# Patient Record
Sex: MALE | Race: WHITE | NOT HISPANIC OR LATINO | Employment: OTHER | URBAN - METROPOLITAN AREA
[De-identification: names, ages, dates, MRNs, and addresses within clinical notes are randomized per-mention and may not be internally consistent; named-entity substitution may affect disease eponyms.]

---

## 2018-08-14 ENCOUNTER — TELEPHONE (OUTPATIENT)
Dept: ENDOCRINOLOGY | Facility: CLINIC | Age: 48
End: 2018-08-14

## 2018-08-14 NOTE — TELEPHONE ENCOUNTER
----- Message from Dc Becker sent at 8/14/2018  1:39 PM CDT -----  Pt is being referred to Dr Torres. Referring clinic would like appt prior to next avail if possible. I have scanned the referral and records into media mgr within Epic for review,please contact pt for scheudling.

## 2018-08-15 NOTE — TELEPHONE ENCOUNTER
----- Message from Jyoti Roth sent at 8/15/2018  8:50 AM CDT -----  Contact: Self  Type:  Patient Returning Call    Who Called:  Patient   Who Left Message for Patient:  Zahraa Mcgarry  Does the patient know what this is regarding?:  Scheduling an appt  Best Call Back Number:  [phone]   Additional Information:  Thanks

## 2018-08-15 NOTE — TELEPHONE ENCOUNTER
Called patient and scheduled him an appointment to see Lennox JOSE 8-16-18. Patient verbalized understanding.

## 2018-08-15 NOTE — TELEPHONE ENCOUNTER
----- Message from Zahraa Mcgarry RN sent at 8/15/2018  3:33 PM CDT -----  Contact: same      ----- Message -----  From: Obey Fam  Sent: 8/15/2018   9:43 AM  To: Brian Blackwood Staff    Unsuccessful call placed to office.  Patient called in and stated he was returning call to office from earlier today.  Patient call back number is [phone]

## 2018-08-16 ENCOUNTER — OFFICE VISIT (OUTPATIENT)
Dept: ENDOCRINOLOGY | Facility: CLINIC | Age: 48
End: 2018-08-16
Payer: MEDICARE

## 2018-08-16 VITALS
RESPIRATION RATE: 18 BRPM | SYSTOLIC BLOOD PRESSURE: 144 MMHG | WEIGHT: 246.69 LBS | DIASTOLIC BLOOD PRESSURE: 90 MMHG | HEIGHT: 72 IN | HEART RATE: 90 BPM | TEMPERATURE: 98 F | BODY MASS INDEX: 33.41 KG/M2

## 2018-08-16 DIAGNOSIS — E11.9 TYPE 2 DIABETES MELLITUS WITHOUT COMPLICATION, WITHOUT LONG-TERM CURRENT USE OF INSULIN: ICD-10-CM

## 2018-08-16 DIAGNOSIS — E04.1 THYROID NODULE: ICD-10-CM

## 2018-08-16 DIAGNOSIS — E03.9 HYPOTHYROIDISM, UNSPECIFIED TYPE: Primary | ICD-10-CM

## 2018-08-16 DIAGNOSIS — I10 HYPERTENSION, UNSPECIFIED TYPE: ICD-10-CM

## 2018-08-16 PROCEDURE — 3008F BODY MASS INDEX DOCD: CPT | Mod: CPTII,S$GLB,, | Performed by: PHYSICIAN ASSISTANT

## 2018-08-16 PROCEDURE — 99204 OFFICE O/P NEW MOD 45 MIN: CPT | Mod: S$GLB,,, | Performed by: PHYSICIAN ASSISTANT

## 2018-08-16 PROCEDURE — 99999 PR PBB SHADOW E&M-EST. PATIENT-LVL III: CPT | Mod: PBBFAC,,, | Performed by: PHYSICIAN ASSISTANT

## 2018-08-16 RX ORDER — ATORVASTATIN CALCIUM 10 MG/1
10 TABLET, FILM COATED ORAL DAILY
COMMUNITY
End: 2019-04-25

## 2018-08-16 RX ORDER — LEVOTHYROXINE SODIUM 75 UG/1
75 TABLET ORAL DAILY
Qty: 40 TABLET | Refills: 0 | Status: SHIPPED | OUTPATIENT
Start: 2018-08-16 | End: 2019-09-16 | Stop reason: SDUPTHER

## 2018-08-16 RX ORDER — METFORMIN HYDROCHLORIDE 500 MG/1
500 TABLET ORAL 2 TIMES DAILY WITH MEALS
COMMUNITY
End: 2019-09-16 | Stop reason: SDUPTHER

## 2018-08-16 RX ORDER — ATENOLOL 25 MG/1
25 TABLET ORAL DAILY
Qty: 30 TABLET | Refills: 11 | Status: SHIPPED | OUTPATIENT
Start: 2018-08-16 | End: 2019-09-16 | Stop reason: SDUPTHER

## 2018-08-16 RX ORDER — LEVOTHYROXINE SODIUM 88 UG/1
88 TABLET ORAL DAILY
COMMUNITY
End: 2018-08-16

## 2018-08-16 RX ORDER — OXYCODONE HCL 40 MG/1
40 TABLET, FILM COATED, EXTENDED RELEASE ORAL EVERY 12 HOURS
COMMUNITY
End: 2018-10-23

## 2018-08-16 NOTE — PROGRESS NOTES
CC: Hypothyroidism/Type 2 DM    HPI: John Corrales is a 48 y.o. male here for hypothyroidism along with pending conditions listed in the Visit Diagnosis. Diagnosed with hypothyroidism ~1.5 years ago. +FHx of DM in his father. No fhx of thyroid disease. Follows w/ Dr. Saunders for DM.    +sweating, insomnia, palpitations, anxiety, temors    No changes in nails, hair loss, diarrhea/constipation    PMHx, PSHx: reviewed in epic.    Social Hx: no ETOH/tobacco use.     BG checks 1x daily.  Fastin  LH: 140s    No hypoglycemia.    Exercise: He swims laps in the pool every day with his daughters.    Kidney stones ~3 times but not in about 15-20 years.    Diet:  BF-banana  LH-tuna, chicken  DN-vegtables, meat  Snacks on cookies, crackers and wheat thins. Drinks water.     ROS:   Constitutional: fatigue, wt loss.  Eyes: + blurry vision with hyperglycemia,No recent visual changes  Cardiovascular: + palpitations, no chest pain  Respiratory: Denies current respiratory difficulty  Gastrointestinal: Denies recent bowel disturbances  GenitoUrinary - No dysuria  Skin: No new skin rash  Neurologic: No focal neurologic complaints  Endocrine: +polydipsia, no polyuria or polyphagia  Remainder ROS negative     BP (!) 144/90 (BP Location: Left arm, Patient Position: Sitting, BP Method: Large (Automatic))   Pulse 90   Temp 98.1 °F (36.7 °C) (Oral)   Resp 18   Ht 6' (1.829 m)   Wt 111.9 kg (246 lb 11.1 oz)   BMI 33.46 kg/m²      Personally reviewed labs from Dr. Saunders: 2018  EKG: NSR w/ nonospecific changes  TSH 0.06  FT4 2.0  FT3 3.8 N  1/3/18  Cholesterol 181  HDL 68  Trigs 84  LDL 96  MAC 1  GFR 85  Cr 1.05  A1c 6.8%  TSH 2.29    No results found for: TSH, O4ZXNRF, K2VYIVJ, THYROIDAB, FREET4       Chemistry    No results found for: NA, K, CL, CO2, BUN, CREATININE, GLU No results found for: CALCIUM, ALKPHOS, AST, ALT, BILITOT, ESTGFRAFRICA, EGFRNONAA      No results found for: HGBA1C     PE:  GENERAL: young male, well  developed, well nourished  NECK: Supple neck, normal thyroid. No bruit. No AN.   LYMPHATIC: No cervical or supraclavicular lymphadenopathy  CARDIOVASCULAR: Normal heart sounds, no edema  RESPIRATORY: Normal effort, clear to auscultation bl  ABDOMEN: soft, non-tender, non-distended.  FEET: appropriate footwear.   PSYCH: normal mood and affect, AAO x 3.    Assessment/Plan:   1. Hypothyroidism, unspecified type  TSH    T4, free    T3    Thyroglobulin    Thyroid peroxidase antibody    Anti-thyroglobulin antibody    US Soft Tissue Head Neck Thyroid    levothyroxine (SYNTHROID) 75 MCG tablet   2. Thyroid nodule  US Soft Tissue Head Neck Thyroid   3. Hypertension, unspecified type  atenolol (TENORMIN) 25 MG tablet   4. Type 2 diabetes mellitus without complication, without long-term current use of insulin  Hemoglobin A1c     Hypothyroidism-TFTs in one month w/ abs. Decrease levothyroxine to 75 mcg  daily.    Thyroid nodule-thyroid u/s screening  HTN-elevated-start atenolol 25 mg daily. Pt complains of palpitations when working out too.  Type 2 DM- [id number] Metformin 1 g daily. F/u w/ PCP.    F/u in 4 mths

## 2018-08-17 ENCOUNTER — HOSPITAL ENCOUNTER (OUTPATIENT)
Dept: RADIOLOGY | Facility: CLINIC | Age: 48
Discharge: HOME | End: 2018-08-17
Attending: PHYSICIAN ASSISTANT
Payer: MEDICARE

## 2018-08-17 DIAGNOSIS — E03.9 HYPOTHYROIDISM, UNSPECIFIED TYPE: ICD-10-CM

## 2018-08-17 DIAGNOSIS — E04.1 THYROID NODULE: ICD-10-CM

## 2018-08-17 PROCEDURE — 76536 US EXAM OF HEAD AND NECK: CPT | Mod: TC,PO

## 2018-08-17 PROCEDURE — 76536 US EXAM OF HEAD AND NECK: CPT | Mod: 26,,, | Performed by: RADIOLOGY

## 2018-10-02 ENCOUNTER — OFFICE VISIT (OUTPATIENT)
Dept: ORTHOPEDICS | Facility: CLINIC | Age: 48
End: 2018-10-02
Payer: MEDICARE

## 2018-10-02 VITALS
SYSTOLIC BLOOD PRESSURE: 170 MMHG | WEIGHT: 250 LBS | BODY MASS INDEX: 33.86 KG/M2 | DIASTOLIC BLOOD PRESSURE: 80 MMHG | HEIGHT: 72 IN

## 2018-10-02 DIAGNOSIS — M17.11 PRIMARY OSTEOARTHRITIS OF RIGHT KNEE: Primary | ICD-10-CM

## 2018-10-02 PROCEDURE — 20610 DRAIN/INJ JOINT/BURSA W/O US: CPT | Mod: RT,,, | Performed by: ORTHOPAEDIC SURGERY

## 2018-10-02 PROCEDURE — 99213 OFFICE O/P EST LOW 20 MIN: CPT | Mod: 25,,, | Performed by: ORTHOPAEDIC SURGERY

## 2018-10-02 PROCEDURE — 3008F BODY MASS INDEX DOCD: CPT | Mod: ,,, | Performed by: ORTHOPAEDIC SURGERY

## 2018-10-02 PROCEDURE — 73562 X-RAY EXAM OF KNEE 3: CPT | Mod: RT,,, | Performed by: ORTHOPAEDIC SURGERY

## 2018-10-02 RX ORDER — OXYCODONE HYDROCHLORIDE 30 MG/1
TABLET ORAL
Refills: 0 | COMMUNITY
Start: 2018-09-14 | End: 2018-10-02

## 2018-10-02 RX ORDER — HYDROMORPHONE HYDROCHLORIDE 8 MG/1
TABLET ORAL
Refills: 0 | COMMUNITY
Start: 2018-07-13 | End: 2018-10-23

## 2018-10-02 RX ORDER — OMEPRAZOLE 20 MG/1
CAPSULE, DELAYED RELEASE ORAL
Refills: 1 | COMMUNITY
Start: 2018-07-13 | End: 2019-09-16

## 2018-10-02 RX ORDER — MORPHINE SULFATE 60 MG/1
TABLET, FILM COATED, EXTENDED RELEASE ORAL
Refills: 0 | COMMUNITY
Start: 2018-07-13 | End: 2018-10-02

## 2018-10-02 RX ORDER — METHYLPREDNISOLONE ACETATE 40 MG/ML
40 INJECTION, SUSPENSION INTRA-ARTICULAR; INTRALESIONAL; INTRAMUSCULAR; SOFT TISSUE
Status: DISCONTINUED | OUTPATIENT
Start: 2018-10-02 | End: 2018-10-02 | Stop reason: HOSPADM

## 2018-10-02 RX ORDER — ATORVASTATIN CALCIUM 20 MG/1
TABLET, FILM COATED ORAL
Refills: 1 | COMMUNITY
Start: 2018-08-15 | End: 2018-10-02

## 2018-10-02 RX ADMIN — METHYLPREDNISOLONE ACETATE 40 MG: 40 INJECTION, SUSPENSION INTRA-ARTICULAR; INTRALESIONAL; INTRAMUSCULAR; SOFT TISSUE at 11:10

## 2018-10-02 NOTE — PROGRESS NOTES
[facility]    Subjective:     Chief Complaint:   Chief Complaint   Patient presents with    Right Knee - Pain     Right knee pain x years.       Past Medical History:   Diagnosis Date    Diabetes mellitus, type 2     General anesthetics causing adverse effect in therapeutic use 2007    During general anesthesia for back surgery , experienced severe pain, heard people talking and felt like his heart was about to explode.    GERD (gastroesophageal reflux disease)     Hyperlipidemia     Hypothyroidism     Nasal septal deviation        Past Surgical History:   Procedure Laterality Date    BACK SURGERY  2007    artificial disc L4-5    CYSTOSCOPY W/ STONE MANIPULATION  2002    Kidney stones removed    KNEE ARTHROSCOPY W/ DEBRIDEMENT  2000 and 2001    Right knee    SEPTOPLASTY, NOSE N/A 2/16/2012    Performed by Ben Ness MD at [facility] OR       Current Outpatient Medications   Medication Sig    atenolol (TENORMIN) 25 MG tablet Take 1 tablet (25 mg total) by mouth once daily.    atorvastatin (LIPITOR) 10 MG tablet Take 10 mg by mouth once daily.    azelastine (ASTELIN) 137 mcg nasal spray 1 spray by Nasal route daily as needed.      cetirizine-pseudoephedrine (ZYRTEC-D) 5-120 mg per tablet Take 1 tablet by mouth once daily.      fluticasone (FLONASE) 50 mcg/Actuation nasal spray 1 spray by Nasal route daily as needed.      HYDROmorphone (DILAUDID) 8 MG tablet     levothyroxine (SYNTHROID) 75 MCG tablet Take 1 tablet (75 mcg total) by mouth once daily.    metFORMIN (GLUCOPHAGE) 500 MG tablet Take 500 mg by mouth 2 (two) times daily with meals.    omeprazole (PRILOSEC) 20 MG capsule     oxyCODONE (OXYCONTIN) 40 mg 12 hr tablet Take 40 mg by mouth every 12 (twelve) hours.     No current facility-administered medications for this visit.        Review of patient's allergies indicates:   Allergen Reactions    Demerol [meperidine] Itching       Family History   Problem Relation Age of Onset     Diabetes Father     Heart disease Father     Cancer Father         prostate       Social History     Socioeconomic History    Marital status:      Spouse name: Not on file    Number of children: Not on file    Years of education: Not on file    Highest education level: Not on file   Social Needs    Financial resource strain: Not on file    Food insecurity - worry: Not on file    Food insecurity - inability: Not on file    Transportation needs - medical: Not on file    Transportation needs - non-medical: Not on file   Occupational History    Not on file   Tobacco Use    Smoking status: Never Smoker   Substance and Sexual Activity    Alcohol use: No    Drug use: No    Sexual activity: Not on file   Other Topics Concern    Not on file   Social History Narrative    Not on file       History of present illness: Patient comes in today for the right knee. He unfortunately is having severe pain difficulty ambulating. He is miserable. He has undergone arthroscopy in the past and was noted to have bone-on-bone osteoarthrosis at the time of arthroscopy      Review of Systems:    Constitution: Negative for chills, fever, and sweats.  Negative for unexplained weight loss.    HENT:  Negative for headaches and blurry vision.    Cardiovascular:Negative for chest pain or irregular heart beat. Negative for hypertension.    Respiratory:  Negative for cough and shortness of breath.    Gastrointestinal: Negative for abdominal pain, heartburn, melena, nausea, and vomitting.    Genitourinary:  Negative bladder incontinence and dysuria.    Musculoskeletal:  See HPI for details.     Neurological: Negative for numbness.    Psychiatric/Behavioral: Negative for depression.  The patient is not nervous/anxious.      Endocrine: Negative for polyuria    Hematologic/Lymphatic: Negative for bleeding problem.  Does not bruise/bleed easily.    Skin: Negative for poor would healing and rash    Objective:      Physical  Examination:    Vital Signs:    Vitals:    10/02/18 1101   BP: (!) 170/80       Body mass index is 33.91 kg/m².    This a well-developed, well nourished patient in no acute distress.  They are alert and oriented and cooperative to examination.        Patient has significant medial joint line tenderness. His significant anterior crepitus. He has a 2+ effusion. His a stable knee to varus valgus stresses  Pertinent New Results:    XRAY Report / Interpretation:   AP lateral sunrise views of the right knee demonstrate 3 compartment spurring no fractures or subluxations    Assessment/Plan:      Severe osteophyte arthritis of the right knee proven on arthroscopy. I injected the right knee with Depo-Medrol and lidocaine. I've ordered Visco supplementation for him specifically Synvisc. I will see him back in 3 weeks for Visco supplementation injection      This note was created using Dragon voice recognition software that occasionally misinterpreted phrases or words.

## 2018-10-02 NOTE — PROCEDURES
Large Joint Aspiration/Injection: R knee  Date/Time: 10/2/2018 11:48 AM  Performed by: Alok Rowe MD  Authorized by: Alok Rowe MD     Consent Done?:  Yes (Verbal)  Indications:  Pain  Procedure site marked: Yes    Timeout: Prior to procedure the correct patient, procedure, and site was verified      Location:  Knee  Site:  R knee  Prep: Patient was prepped and draped in usual sterile fashion    Needle size:  25 G  Medications:  40 mg methylPREDNISolone acetate 40 mg/mL  Patient tolerance:  Patient tolerated the procedure well with no immediate complications

## 2018-10-23 ENCOUNTER — OFFICE VISIT (OUTPATIENT)
Dept: ORTHOPEDICS | Facility: CLINIC | Age: 48
End: 2018-10-23
Payer: MEDICARE

## 2018-10-23 VITALS
SYSTOLIC BLOOD PRESSURE: 150 MMHG | WEIGHT: 250 LBS | DIASTOLIC BLOOD PRESSURE: 80 MMHG | BODY MASS INDEX: 33.86 KG/M2 | HEIGHT: 72 IN

## 2018-10-23 DIAGNOSIS — M17.11 PRIMARY OSTEOARTHRITIS OF RIGHT KNEE: Primary | ICD-10-CM

## 2018-10-23 PROCEDURE — 3008F BODY MASS INDEX DOCD: CPT | Mod: ,,, | Performed by: ORTHOPAEDIC SURGERY

## 2018-10-23 PROCEDURE — 20610 DRAIN/INJ JOINT/BURSA W/O US: CPT | Mod: RT,,, | Performed by: ORTHOPAEDIC SURGERY

## 2018-10-23 PROCEDURE — 99213 OFFICE O/P EST LOW 20 MIN: CPT | Mod: 25,,, | Performed by: ORTHOPAEDIC SURGERY

## 2018-10-23 RX ORDER — OXYCODONE HYDROCHLORIDE 30 MG/1
TABLET, FILM COATED, EXTENDED RELEASE ORAL
Refills: 0 | COMMUNITY
Start: 2018-10-12 | End: 2019-04-02

## 2018-10-23 RX ORDER — OXYCODONE HYDROCHLORIDE 30 MG/1
TABLET ORAL
Refills: 0 | COMMUNITY
Start: 2018-10-12 | End: 2019-04-02

## 2018-10-23 RX ORDER — ESOMEPRAZOLE MAGNESIUM 40 MG/1
CAPSULE, DELAYED RELEASE ORAL
Refills: 5 | COMMUNITY
Start: 2018-10-15 | End: 2019-09-16 | Stop reason: SDUPTHER

## 2018-10-23 NOTE — PROGRESS NOTES
[facility]    Subjective:     Chief Complaint:   Chief Complaint   Patient presents with    Right Knee - Pain     Here for right knee Synvisc       Past Medical History:   Diagnosis Date    Diabetes mellitus, type 2     General anesthetics causing adverse effect in therapeutic use 2007    During general anesthesia for back surgery , experienced severe pain, heard people talking and felt like his heart was about to explode.    GERD (gastroesophageal reflux disease)     Hyperlipidemia     Hypothyroidism     Nasal septal deviation        Past Surgical History:   Procedure Laterality Date    BACK SURGERY  2007    artificial disc L4-5    CYSTOSCOPY W/ STONE MANIPULATION  2002    Kidney stones removed    KNEE ARTHROSCOPY W/ DEBRIDEMENT  2000 and 2001    Right knee    SEPTOPLASTY, NOSE N/A 2/16/2012    Performed by Ben Ness MD at [facility] OR       Current Outpatient Medications   Medication Sig    atenolol (TENORMIN) 25 MG tablet Take 1 tablet (25 mg total) by mouth once daily.    atorvastatin (LIPITOR) 10 MG tablet Take 10 mg by mouth once daily.    azelastine (ASTELIN) 137 mcg nasal spray 1 spray by Nasal route daily as needed.      cetirizine-pseudoephedrine (ZYRTEC-D) 5-120 mg per tablet Take 1 tablet by mouth once daily.      esomeprazole (NEXIUM) 40 MG capsule     fluticasone (FLONASE) 50 mcg/Actuation nasal spray 1 spray by Nasal route daily as needed.      levothyroxine (SYNTHROID) 75 MCG tablet Take 1 tablet (75 mcg total) by mouth once daily.    metFORMIN (GLUCOPHAGE) 500 MG tablet Take 500 mg by mouth 2 (two) times daily with meals.    omeprazole (PRILOSEC) 20 MG capsule     oxyCODONE (ROXICODONE) 30 MG Tab     OXYCONTIN 30 mg TR12 12 hr tablet      No current facility-administered medications for this visit.        Review of patient's allergies indicates:   Allergen Reactions    Demerol [meperidine] Itching       Family History   Problem Relation Age of Onset    Diabetes  Father     Heart disease Father     Cancer Father         prostate       Social History     Socioeconomic History    Marital status:      Spouse name: Not on file    Number of children: Not on file    Years of education: Not on file    Highest education level: Not on file   Social Needs    Financial resource strain: Not on file    Food insecurity - worry: Not on file    Food insecurity - inability: Not on file    Transportation needs - medical: Not on file    Transportation needs - non-medical: Not on file   Occupational History    Not on file   Tobacco Use    Smoking status: Never Smoker   Substance and Sexual Activity    Alcohol use: No    Drug use: No    Sexual activity: Not on file   Other Topics Concern    Not on file   Social History Narrative    Not on file       History of present illness: Patient comes in today for his right knee. Continues complain of achy pain. Difficulty ambulating.      Review of Systems:    Constitution: Negative for chills, fever, and sweats.  Negative for unexplained weight loss.    HENT:  Negative for headaches and blurry vision.    Cardiovascular:Negative for chest pain or irregular heart beat. Negative for hypertension.    Respiratory:  Negative for cough and shortness of breath.    Gastrointestinal: Negative for abdominal pain, heartburn, melena, nausea, and vomitting.    Genitourinary:  Negative bladder incontinence and dysuria.    Musculoskeletal:  See HPI for details.     Neurological: Negative for numbness.    Psychiatric/Behavioral: Negative for depression.  The patient is not nervous/anxious.      Endocrine: Negative for polyuria    Hematologic/Lymphatic: Negative for bleeding problem.  Does not bruise/bleed easily.    Skin: Negative for poor would healing and rash    Objective:      Physical Examination:    Vital Signs:    Vitals:    10/23/18 0910   BP: (!) 150/80       Body mass index is 33.91 kg/m².    This a well-developed, well nourished  patient in no acute distress.  They are alert and oriented and cooperative to examination.        Patient has a 1+ effusion. Range of motion is 0-120 degrees. Significant medial joint line tenderness.  Pertinent New Results:    XRAY Report / Interpretation:   No new XRAYS Today.    Assessment/Plan:      This is a gentleman with advanced osteoarthritis. He has had 3 arthroscopies. He has had multiple Depo-Medrol injections. I injected his knee today with Synvisc one. Risks and benefits of discussed with him in great detail. Cautions and precautions are discussed with him. Sterile technique is utilized. He will follow-up in 3 months      This note was created using Dragon voice recognition software that occasionally misinterpreted phrases or words.

## 2018-10-23 NOTE — PROCEDURES
Large Joint Aspiration/Injection: R knee  Date/Time: 10/23/2018 9:38 AM  Performed by: Alok Rowe MD  Authorized by: Alok Rowe MD     Consent Done?:  Yes (Verbal)  Indications:  Pain  Procedure site marked: Yes    Timeout: Prior to procedure the correct patient, procedure, and site was verified      Location:  Knee  Site:  R knee  Prep: Patient was prepped and draped in usual sterile fashion    Ultrasonic Guidance for needle placement: No  Needle size:  25 G  Medications:  48 mg hylan g-f 20 48 mg/6 mL  Patient tolerance:  Patient tolerated the procedure well with no immediate complications

## 2019-03-20 ENCOUNTER — TELEPHONE (OUTPATIENT)
Dept: ORTHOPEDICS | Facility: CLINIC | Age: 49
End: 2019-03-20

## 2019-03-20 NOTE — TELEPHONE ENCOUNTER
----- Message from Trina Romero sent at 3/20/2019 11:05 AM CDT -----  Pt called he missed your call pts# [phone]

## 2019-03-20 NOTE — TELEPHONE ENCOUNTER
----- Message from Soledad Moreno sent at 3/20/2019  9:55 AM CDT -----  Patient would like you to send prior auth for an injection

## 2019-04-02 ENCOUNTER — OFFICE VISIT (OUTPATIENT)
Dept: ORTHOPEDICS | Facility: CLINIC | Age: 49
End: 2019-04-02
Payer: MEDICARE

## 2019-04-02 VITALS
HEART RATE: 92 BPM | BODY MASS INDEX: 33.18 KG/M2 | SYSTOLIC BLOOD PRESSURE: 150 MMHG | HEIGHT: 72 IN | DIASTOLIC BLOOD PRESSURE: 80 MMHG | WEIGHT: 245 LBS

## 2019-04-02 DIAGNOSIS — M17.11 PRIMARY OSTEOARTHRITIS OF RIGHT KNEE: Primary | ICD-10-CM

## 2019-04-02 PROCEDURE — 3077F PR MOST RECENT SYSTOLIC BLOOD PRESSURE >= 140 MM HG: ICD-10-PCS | Mod: ,,, | Performed by: ORTHOPAEDIC SURGERY

## 2019-04-02 PROCEDURE — 3079F DIAST BP 80-89 MM HG: CPT | Mod: ,,, | Performed by: ORTHOPAEDIC SURGERY

## 2019-04-02 PROCEDURE — 20610 LARGE JOINT ASPIRATION/INJECTION: R KNEE: ICD-10-PCS | Mod: RT,,, | Performed by: ORTHOPAEDIC SURGERY

## 2019-04-02 PROCEDURE — 3079F PR MOST RECENT DIASTOLIC BLOOD PRESSURE 80-89 MM HG: ICD-10-PCS | Mod: ,,, | Performed by: ORTHOPAEDIC SURGERY

## 2019-04-02 PROCEDURE — 3077F SYST BP >= 140 MM HG: CPT | Mod: ,,, | Performed by: ORTHOPAEDIC SURGERY

## 2019-04-02 PROCEDURE — 73562 PR  X-RAY KNEE 3 VIEW: ICD-10-PCS | Mod: RT,,, | Performed by: ORTHOPAEDIC SURGERY

## 2019-04-02 PROCEDURE — 3008F PR BODY MASS INDEX (BMI) DOCUMENTED: ICD-10-PCS | Mod: ,,, | Performed by: ORTHOPAEDIC SURGERY

## 2019-04-02 PROCEDURE — 73562 X-RAY EXAM OF KNEE 3: CPT | Mod: RT,,, | Performed by: ORTHOPAEDIC SURGERY

## 2019-04-02 PROCEDURE — 99213 PR OFFICE/OUTPT VISIT, EST, LEVL III, 20-29 MIN: ICD-10-PCS | Mod: 25,,, | Performed by: ORTHOPAEDIC SURGERY

## 2019-04-02 PROCEDURE — 20610 DRAIN/INJ JOINT/BURSA W/O US: CPT | Mod: RT,,, | Performed by: ORTHOPAEDIC SURGERY

## 2019-04-02 PROCEDURE — 99213 OFFICE O/P EST LOW 20 MIN: CPT | Mod: 25,,, | Performed by: ORTHOPAEDIC SURGERY

## 2019-04-02 PROCEDURE — 3008F BODY MASS INDEX DOCD: CPT | Mod: ,,, | Performed by: ORTHOPAEDIC SURGERY

## 2019-04-02 RX ORDER — METHYLPREDNISOLONE ACETATE 40 MG/ML
40 INJECTION, SUSPENSION INTRA-ARTICULAR; INTRALESIONAL; INTRAMUSCULAR; SOFT TISSUE
Status: DISCONTINUED | OUTPATIENT
Start: 2019-04-02 | End: 2019-04-02 | Stop reason: HOSPADM

## 2019-04-02 RX ORDER — ATORVASTATIN CALCIUM 20 MG/1
TABLET, FILM COATED ORAL
Refills: 1 | COMMUNITY
Start: 2019-03-12 | End: 2019-09-16 | Stop reason: SDUPTHER

## 2019-04-02 RX ORDER — LISINOPRIL 10 MG/1
TABLET ORAL
Refills: 3 | COMMUNITY
Start: 2019-03-12 | End: 2019-09-16 | Stop reason: SDUPTHER

## 2019-04-02 RX ORDER — METOPROLOL TARTRATE 25 MG/1
TABLET, FILM COATED ORAL
COMMUNITY
Start: 2019-03-26 | End: 2019-09-16

## 2019-04-02 RX ADMIN — METHYLPREDNISOLONE ACETATE 40 MG: 40 INJECTION, SUSPENSION INTRA-ARTICULAR; INTRALESIONAL; INTRAMUSCULAR; SOFT TISSUE at 12:04

## 2019-04-02 NOTE — PROGRESS NOTES
[facility]    Subjective:     Chief Complaint:   Chief Complaint   Patient presents with    Right Knee - Pain     right knee pain x years. Synvisc inj 10-23 helped until recently       Past Medical History:   Diagnosis Date    Diabetes mellitus, type 2     General anesthetics causing adverse effect in therapeutic use 2007    During general anesthesia for back surgery , experienced severe pain, heard people talking and felt like his heart was about to explode.    GERD (gastroesophageal reflux disease)     Hyperlipidemia     Hypothyroidism     Nasal septal deviation        Past Surgical History:   Procedure Laterality Date    BACK SURGERY  2007    artificial disc L4-5    CYSTOSCOPY W/ STONE MANIPULATION  2002    Kidney stones removed    KNEE ARTHROSCOPY W/ DEBRIDEMENT  2000 and 2001    Right knee    SEPTOPLASTY, NOSE N/A 2/16/2012    Performed by Ben [facility]ess [facility] at [facility] OR       Current Outpatient Medications   Medication Sig    atenolol (TENORMIN) 25 MG tablet Take 1 tablet (25 mg total) by mouth once daily.    atorvastatin (LIPITOR) 10 MG tablet Take 10 mg by mouth once daily.    atorvastatin (LIPITOR) 20 MG tablet TK 1 T PO QD    azelastine (ASTELIN) 137 mcg nasal spray 1 spray by Nasal route daily as needed.      cetirizine-pseudoephedrine (ZYRTEC-D) 5-120 mg per tablet Take 1 tablet by mouth once daily.      esomeprazole (NEXIUM) 40 MG capsule     fluticasone (FLONASE) 50 mcg/Actuation nasal spray 1 spray by Nasal route daily as needed.      levothyroxine (SYNTHROID) 75 MCG tablet Take 1 tablet (75 mcg total) by mouth once daily.    lisinopril 10 MG tablet TK 1 T PO QD    metFORMIN (GLUCOPHAGE) 500 MG tablet Take 500 mg by mouth 2 (two) times daily with meals.    metoprolol tartrate (LOPRESSOR) 25 MG tablet     omeprazole (PRILOSEC) 20 MG capsule      No current facility-administered medications for this visit.        Review of patient's allergies indicates:   Allergen  Reactions    Demerol [meperidine] Itching       Family History   Problem Relation Age of Onset    Diabetes Father     Heart disease Father     Cancer Father         prostate       Social History     Socioeconomic History    Marital status:      Spouse name: Not on file    Number of children: Not on file    Years of education: Not on file    Highest education level: Not on file   Occupational History    Not on file   Social Needs    Financial resource strain: Not on file    Food insecurity:     Worry: Not on file     Inability: Not on file    Transportation needs:     Medical: Not on file     Non-medical: Not on file   Tobacco Use    Smoking status: Never Smoker   Substance and Sexual Activity    Alcohol use: No    Drug use: No    Sexual activity: Not on file   Lifestyle    Physical activity:     Days per week: Not on file     Minutes per session: Not on file    Stress: Not on file   Relationships    Social connections:     Talks on phone: Not on file     Gets together: Not on file     Attends [employer] service: Not on file     Active member of club or organization: Not on file     Attends meetings of clubs or organizations: Not on file     Relationship status: Not on file    Intimate partner violence:     Fear of current or ex partner: Not on file     Emotionally abused: Not on file     Physically abused: Not on file     Forced sexual activity: Not on file   Other Topics Concern    Not on file   Social History Narrative    Not on file       History of present illness: Patient comes in today for the right knee. He is miserable. His knee pain is severe. He has had 3 arthroscopies. The Visco supplementation has worked well for him in the past. He wants to do that again.      Review of Systems:    Constitution: Negative for chills, fever, and sweats.  Negative for unexplained weight loss.    HENT:  Negative for headaches and blurry vision.    Cardiovascular:Negative for chest pain or  irregular heart beat. Negative for hypertension.    Respiratory:  Negative for cough and shortness of breath.    Gastrointestinal: Negative for abdominal pain, heartburn, melena, nausea, and vomitting.    Genitourinary:  Negative bladder incontinence and dysuria.    Musculoskeletal:  See HPI for details.     Neurological: Negative for numbness.    Psychiatric/Behavioral: Negative for depression.  The patient is not nervous/anxious.      Endocrine: Negative for polyuria    Hematologic/Lymphatic: Negative for bleeding problem.  Does not bruise/bleed easily.    Skin: Negative for poor would healing and rash    Objective:      Physical Examination:    Vital Signs:    Vitals:    04/02/19 1115   BP: (!) 150/80   Pulse: 92       Body mass index is 33.23 kg/m².    This a well-developed, well nourished patient in no acute distress.  They are alert and oriented and cooperative to examination.        Patient has full range of motion of his left knee. His right knee has range of motion from 5-120 degrees. His bilateral varus deformities.  Pertinent New Results:    XRAY Report / Interpretation:   AP lateral and sunrise views of the right knee demonstrate 3 compartment spurring and loss of the medial compartment    Assessment/Plan:      Arthritis right knee and a male. I injected him with Depo-Medrol and lidocaine. I have ordered Visco supplementation for him. We will see him back for the Visco supplementation injection  This note was created using Dragon voice recognition software that occasionally misinterpreted phrases or words.

## 2019-04-02 NOTE — PROCEDURES
Large Joint Aspiration/Injection: R knee  Date/Time: 4/2/2019 12:05 PM  Performed by: Alok Rowe MD  Authorized by: Alok Rowe MD     Consent Done?:  Yes (Verbal)  Indications:  Pain  Procedure site marked: Yes    Timeout: Prior to procedure the correct patient, procedure, and site was verified      Location:  Knee  Site:  R knee  Prep: Patient was prepped and draped in usual sterile fashion    Needle size:  25 G  Medications:  40 mg methylPREDNISolone acetate 40 mg/mL; 40 mg methylPREDNISolone acetate 40 mg/mL  Patient tolerance:  Patient tolerated the procedure well with no immediate complications

## 2019-04-09 ENCOUNTER — TELEPHONE (OUTPATIENT)
Dept: ORTHOPEDICS | Facility: CLINIC | Age: 49
End: 2019-04-09

## 2019-04-25 ENCOUNTER — OFFICE VISIT (OUTPATIENT)
Dept: ORTHOPEDICS | Facility: CLINIC | Age: 49
End: 2019-04-25
Payer: MEDICARE

## 2019-04-25 VITALS
BODY MASS INDEX: 33.18 KG/M2 | SYSTOLIC BLOOD PRESSURE: 138 MMHG | WEIGHT: 245 LBS | DIASTOLIC BLOOD PRESSURE: 62 MMHG | HEART RATE: 74 BPM | HEIGHT: 72 IN

## 2019-04-25 DIAGNOSIS — M17.11 PRIMARY OSTEOARTHRITIS OF RIGHT KNEE: Primary | ICD-10-CM

## 2019-04-25 PROCEDURE — 3078F PR MOST RECENT DIASTOLIC BLOOD PRESSURE < 80 MM HG: ICD-10-PCS | Mod: ,,, | Performed by: ORTHOPAEDIC SURGERY

## 2019-04-25 PROCEDURE — 99499 UNLISTED E&M SERVICE: CPT | Mod: ,,, | Performed by: ORTHOPAEDIC SURGERY

## 2019-04-25 PROCEDURE — 3008F BODY MASS INDEX DOCD: CPT | Mod: ,,, | Performed by: ORTHOPAEDIC SURGERY

## 2019-04-25 PROCEDURE — 20610 LARGE JOINT ASPIRATION/INJECTION: R KNEE: ICD-10-PCS | Mod: RT,,, | Performed by: ORTHOPAEDIC SURGERY

## 2019-04-25 PROCEDURE — 3008F PR BODY MASS INDEX (BMI) DOCUMENTED: ICD-10-PCS | Mod: ,,, | Performed by: ORTHOPAEDIC SURGERY

## 2019-04-25 PROCEDURE — 99499 NO LOS: ICD-10-PCS | Mod: ,,, | Performed by: ORTHOPAEDIC SURGERY

## 2019-04-25 PROCEDURE — 3075F SYST BP GE 130 - 139MM HG: CPT | Mod: ,,, | Performed by: ORTHOPAEDIC SURGERY

## 2019-04-25 PROCEDURE — 3075F PR MOST RECENT SYSTOLIC BLOOD PRESS GE 130-139MM HG: ICD-10-PCS | Mod: ,,, | Performed by: ORTHOPAEDIC SURGERY

## 2019-04-25 PROCEDURE — 20610 DRAIN/INJ JOINT/BURSA W/O US: CPT | Mod: RT,,, | Performed by: ORTHOPAEDIC SURGERY

## 2019-04-25 PROCEDURE — 3078F DIAST BP <80 MM HG: CPT | Mod: ,,, | Performed by: ORTHOPAEDIC SURGERY

## 2019-04-25 NOTE — PROGRESS NOTES
[facility]    Subjective:     Chief Complaint:   Chief Complaint   Patient presents with    Right Knee - Pain     Right knee pain x a while. States that he is here for Synvisc injection. States that over the weekend his knee pain got worse.        Past Medical History:   Diagnosis Date    Diabetes mellitus, type 2     General anesthetics causing adverse effect in therapeutic use 2007    During general anesthesia for back surgery , experienced severe pain, heard people talking and felt like his heart was about to explode.    GERD (gastroesophageal reflux disease)     Hyperlipidemia     Hypothyroidism     Nasal septal deviation        Past Surgical History:   Procedure Laterality Date    BACK SURGERY  2007    artificial disc L4-5    CYSTOSCOPY W/ STONE MANIPULATION  2002    Kidney stones removed    KNEE ARTHROSCOPY W/ DEBRIDEMENT  2000 and 2001    Right knee    SEPTOPLASTY, NOSE N/A 2/16/2012    Performed by Ben Ness MD at [facility] OR       Current Outpatient Medications   Medication Sig    atenolol (TENORMIN) 25 MG tablet Take 1 tablet (25 mg total) by mouth once daily.    atorvastatin (LIPITOR) 20 MG tablet TK 1 T PO QD    azelastine (ASTELIN) 137 mcg nasal spray 1 spray by Nasal route daily as needed.      cetirizine-pseudoephedrine (ZYRTEC-D) 5-120 mg per tablet Take 1 tablet by mouth once daily.      esomeprazole (NEXIUM) 40 MG capsule     fluticasone (FLONASE) 50 mcg/Actuation nasal spray 1 spray by Nasal route daily as needed.      levothyroxine (SYNTHROID) 75 MCG tablet Take 1 tablet (75 mcg total) by mouth once daily.    lisinopril 10 MG tablet TK 1 T PO QD    metFORMIN (GLUCOPHAGE) 500 MG tablet Take 500 mg by mouth 2 (two) times daily with meals.    metoprolol tartrate (LOPRESSOR) 25 MG tablet     omeprazole (PRILOSEC) 20 MG capsule      No current facility-administered medications for this visit.        Review of patient's allergies indicates:   Allergen Reactions     Demerol [meperidine] Itching       Family History   Problem Relation Age of Onset    Diabetes Father     Heart disease Father     Cancer Father         prostate       Social History     Socioeconomic History    Marital status:      Spouse name: Not on file    Number of children: Not on file    Years of education: Not on file    Highest education level: Not on file   Occupational History    Not on file   Social Needs    Financial resource strain: Not on file    Food insecurity:     Worry: Not on file     Inability: Not on file    Transportation needs:     Medical: Not on file     Non-medical: Not on file   Tobacco Use    Smoking status: Never Smoker   Substance and Sexual Activity    Alcohol use: No    Drug use: No    Sexual activity: Not on file   Lifestyle    Physical activity:     Days per week: Not on file     Minutes per session: Not on file    Stress: Not on file   Relationships    Social connections:     Talks on phone: Not on file     Gets together: Not on file     Attends Scientologist service: Not on file     Active member of club or organization: Not on file     Attends meetings of clubs or organizations: Not on file     Relationship status: Not on file   Other Topics Concern    Not on file   Social History Narrative    Not on file       History of present illness: Patient comes in today for the right knee. He's having a lot of achy pain and swelling.      Review of Systems:    Constitution: Negative for chills, fever, and sweats.  Negative for unexplained weight loss.    HENT:  Negative for headaches and blurry vision.    Cardiovascular:Negative for chest pain or irregular heart beat. Negative for hypertension.    Respiratory:  Negative for cough and shortness of breath.    Gastrointestinal: Negative for abdominal pain, heartburn, melena, nausea, and vomitting.    Genitourinary:  Negative bladder incontinence and dysuria.    Musculoskeletal:  See HPI for details.     Neurological:  Negative for numbness.    Psychiatric/Behavioral: Negative for depression.  The patient is not nervous/anxious.      Endocrine: Negative for polyuria    Hematologic/Lymphatic: Negative for bleeding problem.  Does not bruise/bleed easily.    Skin: Negative for poor would healing and rash    Objective:      Physical Examination:    Vital Signs:    Vitals:    04/25/19 1356   BP: 138/62   Pulse: 74       Body mass index is 33.23 kg/m².    This a well-developed, well nourished patient in no acute distress.  They are alert and oriented and cooperative to   Patient is 1+ effusion. He has range of motion from 0-115°. His knee is stable.      XRAY Report / Interpretation:       Assessment/Plan:       osteophyte is right knee. I injected the right knee with Synvisc one. Cautions and precautions are discussed with him in great detail. He understood. He will follow-up when necessary      This note was created using Dragon voice recognition software that occasionally misinterpreted phrases or words.

## 2019-04-25 NOTE — PROCEDURES
Large Joint Aspiration/Injection: R knee  Date/Time: 4/25/2019 3:14 PM  Performed by: Alok Rowe MD  Authorized by: Alok Rowe MD     Consent Done?:  Yes (Verbal)  Indications:  Pain  Procedure site marked: Yes    Timeout: Prior to procedure the correct patient, procedure, and site was verified      Location:  Knee  Site:  R knee  Prep: Patient was prepped and draped in usual sterile fashion    Ultrasonic Guidance for needle placement: No  Needle size:  25 G  Medications:  48 mg hylan g-f 20 48 mg/6 mL  Patient tolerance:  Patient tolerated the procedure well with no immediate complications

## 2019-09-16 ENCOUNTER — OFFICE VISIT (OUTPATIENT)
Dept: FAMILY MEDICINE | Facility: CLINIC | Age: 49
End: 2019-09-16
Payer: MEDICARE

## 2019-09-16 VITALS
HEIGHT: 72 IN | WEIGHT: 243 LBS | SYSTOLIC BLOOD PRESSURE: 130 MMHG | DIASTOLIC BLOOD PRESSURE: 82 MMHG | BODY MASS INDEX: 32.91 KG/M2

## 2019-09-16 DIAGNOSIS — E78.5 HYPERLIPIDEMIA, UNSPECIFIED HYPERLIPIDEMIA TYPE: ICD-10-CM

## 2019-09-16 DIAGNOSIS — E03.9 HYPOTHYROIDISM, UNSPECIFIED TYPE: ICD-10-CM

## 2019-09-16 DIAGNOSIS — M17.11 PRIMARY OSTEOARTHRITIS OF RIGHT KNEE: ICD-10-CM

## 2019-09-16 DIAGNOSIS — M51.9 LUMBAR DISC DISEASE: ICD-10-CM

## 2019-09-16 DIAGNOSIS — I10 HYPERTENSION, UNSPECIFIED TYPE: ICD-10-CM

## 2019-09-16 DIAGNOSIS — B35.4 TINEA CORPORIS: ICD-10-CM

## 2019-09-16 DIAGNOSIS — K21.9 GASTROESOPHAGEAL REFLUX DISEASE, ESOPHAGITIS PRESENCE NOT SPECIFIED: ICD-10-CM

## 2019-09-16 DIAGNOSIS — E11.649 UNCONTROLLED TYPE 2 DIABETES MELLITUS WITH HYPOGLYCEMIA WITHOUT COMA: Primary | ICD-10-CM

## 2019-09-16 LAB — HBA1C MFR BLD: 5.7 %

## 2019-09-16 PROCEDURE — 3008F PR BODY MASS INDEX (BMI) DOCUMENTED: ICD-10-PCS | Mod: S$GLB,,, | Performed by: FAMILY MEDICINE

## 2019-09-16 PROCEDURE — 99214 PR OFFICE/OUTPT VISIT, EST, LEVL IV, 30-39 MIN: ICD-10-PCS | Mod: S$GLB,,, | Performed by: FAMILY MEDICINE

## 2019-09-16 PROCEDURE — 3079F PR MOST RECENT DIASTOLIC BLOOD PRESSURE 80-89 MM HG: ICD-10-PCS | Mod: S$GLB,,, | Performed by: FAMILY MEDICINE

## 2019-09-16 PROCEDURE — 3075F PR MOST RECENT SYSTOLIC BLOOD PRESS GE 130-139MM HG: ICD-10-PCS | Mod: S$GLB,,, | Performed by: FAMILY MEDICINE

## 2019-09-16 PROCEDURE — 3079F DIAST BP 80-89 MM HG: CPT | Mod: S$GLB,,, | Performed by: FAMILY MEDICINE

## 2019-09-16 PROCEDURE — 83036 HEMOGLOBIN GLYCOSYLATED A1C: CPT | Mod: QW,,, | Performed by: FAMILY MEDICINE

## 2019-09-16 PROCEDURE — 3075F SYST BP GE 130 - 139MM HG: CPT | Mod: S$GLB,,, | Performed by: FAMILY MEDICINE

## 2019-09-16 PROCEDURE — 83036 POCT HEMOGLOBIN A1C: ICD-10-PCS | Mod: QW,,, | Performed by: FAMILY MEDICINE

## 2019-09-16 PROCEDURE — 99214 OFFICE O/P EST MOD 30 MIN: CPT | Mod: S$GLB,,, | Performed by: FAMILY MEDICINE

## 2019-09-16 PROCEDURE — 3008F BODY MASS INDEX DOCD: CPT | Mod: S$GLB,,, | Performed by: FAMILY MEDICINE

## 2019-09-16 RX ORDER — ATENOLOL 25 MG/1
25 TABLET ORAL DAILY
Qty: 30 TABLET | Refills: 11 | Status: SHIPPED | OUTPATIENT
Start: 2019-09-16 | End: 2020-03-16 | Stop reason: SDUPTHER

## 2019-09-16 RX ORDER — ATORVASTATIN CALCIUM 20 MG/1
20 TABLET, FILM COATED ORAL DAILY
Qty: 90 TABLET | Refills: 1 | Status: SHIPPED | OUTPATIENT
Start: 2019-09-16 | End: 2020-03-16 | Stop reason: SDUPTHER

## 2019-09-16 RX ORDER — ESOMEPRAZOLE MAGNESIUM 40 MG/1
40 CAPSULE, DELAYED RELEASE ORAL
Qty: 90 CAPSULE | Refills: 1 | Status: SHIPPED | OUTPATIENT
Start: 2019-09-16 | End: 2020-03-16 | Stop reason: SDUPTHER

## 2019-09-16 RX ORDER — NYSTATIN 100000 U/G
CREAM TOPICAL 2 TIMES DAILY
Qty: 30 G | Refills: 2 | Status: SHIPPED | OUTPATIENT
Start: 2019-09-16 | End: 2021-05-20 | Stop reason: SDUPTHER

## 2019-09-16 RX ORDER — LEVOTHYROXINE SODIUM 75 UG/1
75 TABLET ORAL DAILY
Qty: 90 TABLET | Refills: 0 | Status: SHIPPED | OUTPATIENT
Start: 2019-09-16 | End: 2020-03-16 | Stop reason: SDUPTHER

## 2019-09-16 RX ORDER — METFORMIN HYDROCHLORIDE 500 MG/1
500 TABLET ORAL 3 TIMES DAILY
Qty: 270 TABLET | Refills: 1 | Status: SHIPPED | OUTPATIENT
Start: 2019-09-16 | End: 2020-03-16 | Stop reason: SDUPTHER

## 2019-09-16 RX ORDER — LISINOPRIL 10 MG/1
10 TABLET ORAL DAILY
Qty: 90 TABLET | Refills: 1 | Status: SHIPPED | OUTPATIENT
Start: 2019-09-16 | End: 2020-03-16 | Stop reason: SDUPTHER

## 2019-09-16 NOTE — PROGRESS NOTES
SUBJECTIVE:    Patient ID: John Corrales is a 49 y.o. male.    Chief Complaint: Diabetes    Is a 49-year-old male with type 2 diabetes he has been working hard to control blood sugars and reduce his carbs intake.  He is using metformin 500 mg 2 tablets twice a day    He complains of right knee meniscus tear which is chronic.  He gets a yearly Synvisc injection from Dr. good.  This is helped tremendously.  He has a lumbar disc disease situation which she weaned himself off narcotics approximately 1 year ago.  He went through some significant withdrawal symptoms but now is maintained just with ibuprofen p.r.n. current he is not employed.  He can walk several blocks for exercise in the neighborhood.  He plays with the kids on the lawn and mows the lawn.    He complained of some internal dryness not related to skin dryness last office visit and this seems to be improved as his blood sugar improved.    States he has a ring-like rash on his abdomen      No visits with results within 6 Month(s) from this visit.   Latest known visit with results is:   Admission on 02/16/2012, Discharged on 02/16/2012   Component Date Value Ref Range Status    Hematocrit 02/13/2012 46%   Final       Past Medical History:   Diagnosis Date    Diabetes mellitus, type 2     General anesthetics causing adverse effect in therapeutic use 2007    During general anesthesia for back surgery , experienced severe pain, heard people talking and felt like his heart was about to explode.    GERD (gastroesophageal reflux disease)     Hyperlipidemia     Hypothyroidism     Nasal septal deviation      Past Surgical History:   Procedure Laterality Date    BACK SURGERY  2007    artificial disc L4-5    CYSTOSCOPY W/ STONE MANIPULATION  2002    Kidney stones removed    KNEE ARTHROSCOPY W/ DEBRIDEMENT  2000 and 2001    Right knee    SEPTOPLASTY, NOSE N/A 2/16/2012    Performed by Ben eNss MD at [facility] OR     Family History   Problem Relation  Age of Onset    Diabetes Father     Heart disease Father     Cancer Father         prostate       Marital Status:   Alcohol History:  reports that he does not drink alcohol.  Tobacco History:  reports that he has quit smoking. He has never used smokeless tobacco.  Drug History:  reports that he does not use drugs.    Review of patient's allergies indicates:   Allergen Reactions    Demerol [meperidine] Itching       Current Outpatient Medications:     atorvastatin (LIPITOR) 20 MG tablet, Take 1 tablet (20 mg total) by mouth once daily., Disp: 90 tablet, Rfl: 1    azelastine (ASTELIN) 137 mcg nasal spray, 1 spray by Nasal route daily as needed.  , Disp: , Rfl:     cetirizine-pseudoephedrine (ZYRTEC-D) 5-120 mg per tablet, Take 1 tablet by mouth once daily.  , Disp: , Rfl:     esomeprazole (NEXIUM) 40 MG capsule, Take 1 capsule (40 mg total) by mouth before breakfast., Disp: 90 capsule, Rfl: 1    fluticasone (FLONASE) 50 mcg/Actuation nasal spray, 1 spray by Nasal route daily as needed.  , Disp: , Rfl:     lisinopril 10 MG tablet, Take 1 tablet (10 mg total) by mouth once daily., Disp: 90 tablet, Rfl: 1    metFORMIN (GLUCOPHAGE) 500 MG tablet, Take 1 tablet (500 mg total) by mouth 3 (three) times daily., Disp: 270 tablet, Rfl: 1    atenolol (TENORMIN) 25 MG tablet, Take 1 tablet (25 mg total) by mouth once daily., Disp: 30 tablet, Rfl: 11    levothyroxine (SYNTHROID) 75 MCG tablet, Take 1 tablet (75 mcg total) by mouth once daily., Disp: 90 tablet, Rfl: 0    nystatin (MYCOSTATIN) cream, Apply topically 2 (two) times daily., Disp: 30 g, Rfl: 2    Review of Systems   Constitutional: Negative for appetite change, chills, fatigue, fever and unexpected weight change.   HENT: Negative for congestion, ear pain and trouble swallowing.    Eyes: Negative for pain, discharge and visual disturbance.   Respiratory: Negative for apnea, cough, shortness of breath and wheezing.    Cardiovascular: Negative for  chest pain and leg swelling.   Gastrointestinal: Negative for abdominal pain, blood in stool, constipation, diarrhea, nausea and vomiting.        May have  Lactose intolerance   Endocrine: Negative for cold intolerance, heat intolerance and polydipsia.   Genitourinary: Negative for dysuria, hematuria, testicular pain and urgency.        Nocturia 1  X  nite   Musculoskeletal: Negative for gait problem, joint swelling and myalgias.   Neurological: Negative for dizziness, seizures and numbness.   Psychiatric/Behavioral: Negative for agitation, behavioral problems and hallucinations. The patient is not nervous/anxious.           Objective:      Vitals:    09/16/19 1150   BP: 130/82   Weight: 110.2 kg (243 lb)   Height: 6' (1.829 m)     Physical Exam   Constitutional: He is oriented to person, place, and time. He appears well-developed and well-nourished.   HENT:   Head: Normocephalic and atraumatic.   Right Ear: External ear normal.   Left Ear: External ear normal.   Nose: Nose normal.   Mouth/Throat: Oropharynx is clear and moist.   Eyes: Pupils are equal, round, and reactive to light. EOM are normal.   Neck: Normal range of motion. Neck supple. Carotid bruit is not present. No thyromegaly present.   Cardiovascular: Normal rate, regular rhythm, normal heart sounds and intact distal pulses.   No murmur heard.  Pulmonary/Chest: Effort normal and breath sounds normal. He has no wheezes. He has no rales.   Abdominal: Soft. Bowel sounds are normal. He exhibits no distension. There is no hepatosplenomegaly. There is no tenderness.   Musculoskeletal: Normal range of motion. He exhibits no tenderness or deformity.        Lumbar back: Normal. He exhibits no pain and no spasm.   Bends 90 degrees at  Waist. Rt knee crepitant    Lymphadenopathy:     He has no cervical adenopathy.   Neurological: He is alert and oriented to person, place, and time. No cranial nerve deficit. Coordination normal.   Skin: Skin is warm and dry. No rash  noted.   2-3 cm of ring-like fungal rash to the umbilical area   Psychiatric: He has a normal mood and affect. His behavior is normal. Judgment and thought content normal.   Nursing note and vitals reviewed.        Assessment:       1. Uncontrolled type 2 diabetes mellitus with hypoglycemia without coma    2. Hypothyroidism, unspecified type    3. Hypertension, unspecified type    4. Hyperlipidemia, unspecified hyperlipidemia type    5. Gastroesophageal reflux disease, esophagitis presence not specified    6. Tinea corporis    7. Primary osteoarthritis of right knee    8. Lumbar disc disease         Plan:       Uncontrolled type 2 diabetes mellitus with hypoglycemia without coma  -     metFORMIN (GLUCOPHAGE) 500 MG tablet; Take 1 tablet (500 mg total) by mouth 3 (three) times daily.  Dispense: 270 tablet; Refill: 1  -     Hemoglobin A1C, POCT  A1c today is 5.7 showing excellent diabetic control.  We will reduce of metformin to 3 tablets a day at this time  Hypothyroidism, unspecified type  -     levothyroxine (SYNTHROID) 75 MCG tablet; Take 1 tablet (75 mcg total) by mouth once daily.  Dispense: 90 tablet; Refill: 0  Continue thyroid medications  Hypertension, unspecified type  -     lisinopril 10 MG tablet; Take 1 tablet (10 mg total) by mouth once daily.  Dispense: 90 tablet; Refill: 1  -     atenolol (TENORMIN) 25 MG tablet; Take 1 tablet (25 mg total) by mouth once daily.  Dispense: 30 tablet; Refill: 11  Blood pressure well controlled on these medsHyperlipidemia, unspecified hyperlipidemia type  -     atorvastatin (LIPITOR) 20 MG tablet; Take 1 tablet (20 mg total) by mouth once daily.  Dispense: 90 tablet; Refill: 1  -     Comprehensive metabolic panel; Future; Expected date: 09/16/2019  -     Lipid panel; Future; Expected date: 09/16/2019    Gastroesophageal reflux disease, esophagitis presence not specified  -     esomeprazole (NEXIUM) 40 MG capsule; Take 1 capsule (40 mg total) by mouth before breakfast.   Dispense: 90 capsule; Refill: 1  GERD meds doing well  Tinea corporis  -     nystatin (MYCOSTATIN) cream; Apply topically 2 (two) times daily.  Dispense: 30 g; Refill: 2    Primary osteoarthritis of right knee  Referred to Dr. good for further Synvisc injections  Lumbar disc disease      Follow up in about 3 months (around 12/16/2019) for Diabetic Check-Up Pilar.

## 2019-09-17 LAB
ALBUMIN SERPL-MCNC: 4.6 G/DL (ref 3.6–5.1)
ALBUMIN/GLOB SERPL: 1.8 (CALC) (ref 1–2.5)
ALP SERPL-CCNC: 58 U/L (ref 40–115)
ALT SERPL-CCNC: 16 U/L (ref 9–46)
AST SERPL-CCNC: 12 U/L (ref 10–40)
BILIRUB SERPL-MCNC: 0.5 MG/DL (ref 0.2–1.2)
BUN SERPL-MCNC: 17 MG/DL (ref 7–25)
BUN/CREAT SERPL: NORMAL (CALC) (ref 6–22)
CALCIUM SERPL-MCNC: 9.8 MG/DL (ref 8.6–10.3)
CHLORIDE SERPL-SCNC: 106 MMOL/L (ref 98–110)
CHOLEST SERPL-MCNC: 145 MG/DL
CHOLEST/HDLC SERPL: 2.7 (CALC)
CO2 SERPL-SCNC: 27 MMOL/L (ref 20–32)
CREAT SERPL-MCNC: 1.07 MG/DL (ref 0.6–1.35)
GFRSERPLBLD MDRD-ARVRAT: 82 ML/MIN/1.73M2
GLOBULIN SER CALC-MCNC: 2.5 G/DL (CALC) (ref 1.9–3.7)
GLUCOSE SERPL-MCNC: 97 MG/DL (ref 65–139)
HDLC SERPL-MCNC: 54 MG/DL
LDLC SERPL CALC-MCNC: 77 MG/DL (CALC)
NONHDLC SERPL-MCNC: 91 MG/DL (CALC)
POTASSIUM SERPL-SCNC: 4.8 MMOL/L (ref 3.5–5.3)
PROT SERPL-MCNC: 7.1 G/DL (ref 6.1–8.1)
SODIUM SERPL-SCNC: 143 MMOL/L (ref 135–146)
TRIGL SERPL-MCNC: 55 MG/DL

## 2019-09-19 ENCOUNTER — PATIENT MESSAGE (OUTPATIENT)
Dept: ORTHOPEDICS | Facility: CLINIC | Age: 49
End: 2019-09-19

## 2019-12-05 ENCOUNTER — OFFICE VISIT (OUTPATIENT)
Dept: ORTHOPEDICS | Facility: CLINIC | Age: 49
End: 2019-12-05
Payer: MEDICARE

## 2019-12-05 VITALS
HEART RATE: 78 BPM | BODY MASS INDEX: 32.91 KG/M2 | HEIGHT: 72 IN | WEIGHT: 243 LBS | DIASTOLIC BLOOD PRESSURE: 81 MMHG | SYSTOLIC BLOOD PRESSURE: 134 MMHG

## 2019-12-05 DIAGNOSIS — M17.11 PRIMARY OSTEOARTHRITIS OF RIGHT KNEE: Primary | ICD-10-CM

## 2019-12-05 PROCEDURE — 3008F BODY MASS INDEX DOCD: CPT | Mod: S$GLB,,, | Performed by: ORTHOPAEDIC SURGERY

## 2019-12-05 PROCEDURE — 3079F PR MOST RECENT DIASTOLIC BLOOD PRESSURE 80-89 MM HG: ICD-10-PCS | Mod: S$GLB,,, | Performed by: ORTHOPAEDIC SURGERY

## 2019-12-05 PROCEDURE — 99213 OFFICE O/P EST LOW 20 MIN: CPT | Mod: 25,S$GLB,, | Performed by: ORTHOPAEDIC SURGERY

## 2019-12-05 PROCEDURE — 20610 LARGE JOINT ASPIRATION/INJECTION: R KNEE: ICD-10-PCS | Mod: RT,S$GLB,, | Performed by: ORTHOPAEDIC SURGERY

## 2019-12-05 PROCEDURE — 3079F DIAST BP 80-89 MM HG: CPT | Mod: S$GLB,,, | Performed by: ORTHOPAEDIC SURGERY

## 2019-12-05 PROCEDURE — 3075F PR MOST RECENT SYSTOLIC BLOOD PRESS GE 130-139MM HG: ICD-10-PCS | Mod: S$GLB,,, | Performed by: ORTHOPAEDIC SURGERY

## 2019-12-05 PROCEDURE — 3075F SYST BP GE 130 - 139MM HG: CPT | Mod: S$GLB,,, | Performed by: ORTHOPAEDIC SURGERY

## 2019-12-05 PROCEDURE — 20610 DRAIN/INJ JOINT/BURSA W/O US: CPT | Mod: RT,S$GLB,, | Performed by: ORTHOPAEDIC SURGERY

## 2019-12-05 PROCEDURE — 3008F PR BODY MASS INDEX (BMI) DOCUMENTED: ICD-10-PCS | Mod: S$GLB,,, | Performed by: ORTHOPAEDIC SURGERY

## 2019-12-05 PROCEDURE — 99213 PR OFFICE/OUTPT VISIT, EST, LEVL III, 20-29 MIN: ICD-10-PCS | Mod: 25,S$GLB,, | Performed by: ORTHOPAEDIC SURGERY

## 2019-12-05 RX ORDER — METHYLPREDNISOLONE ACETATE 40 MG/ML
40 INJECTION, SUSPENSION INTRA-ARTICULAR; INTRALESIONAL; INTRAMUSCULAR; SOFT TISSUE
Status: DISCONTINUED | OUTPATIENT
Start: 2019-12-05 | End: 2019-12-05 | Stop reason: HOSPADM

## 2019-12-05 RX ADMIN — METHYLPREDNISOLONE ACETATE 40 MG: 40 INJECTION, SUSPENSION INTRA-ARTICULAR; INTRALESIONAL; INTRAMUSCULAR; SOFT TISSUE at 01:12

## 2019-12-05 NOTE — PROGRESS NOTES
[facility]    Subjective:     Chief Complaint:   Chief Complaint   Patient presents with    Right Knee - Pain     Right knee pain x 11.25.19. States that he had an injection done 4.25.19. And states that it did help. He is having issues with walking/ standing for long. States that he would like to see about getting another injection today.        Past Medical History:   Diagnosis Date    Diabetes mellitus, type 2     General anesthetics causing adverse effect in therapeutic use 2007    During general anesthesia for back surgery , experienced severe pain, heard people talking and felt like his heart was about to explode.    GERD (gastroesophageal reflux disease)     Hyperlipidemia     Hypothyroidism     Nasal septal deviation        Past Surgical History:   Procedure Laterality Date    BACK SURGERY  2007    artificial disc L4-5    CYSTOSCOPY W/ STONE MANIPULATION  2002    Kidney stones removed    KNEE ARTHROSCOPY W/ DEBRIDEMENT  2000 and 2001    Right knee       Current Outpatient Medications   Medication Sig    atenolol (TENORMIN) 25 MG tablet Take 1 tablet (25 mg total) by mouth once daily.    atorvastatin (LIPITOR) 20 MG tablet Take 1 tablet (20 mg total) by mouth once daily.    azelastine (ASTELIN) 137 mcg nasal spray 1 spray by Nasal route daily as needed.      esomeprazole (NEXIUM) 40 MG capsule Take 1 capsule (40 mg total) by mouth before breakfast.    fluticasone (FLONASE) 50 mcg/Actuation nasal spray 1 spray by Nasal route daily as needed.      levothyroxine (SYNTHROID) 75 MCG tablet Take 1 tablet (75 mcg total) by mouth once daily.    lisinopril 10 MG tablet Take 1 tablet (10 mg total) by mouth once daily.    metFORMIN (GLUCOPHAGE) 500 MG tablet Take 1 tablet (500 mg total) by mouth 3 (three) times daily.    cetirizine-pseudoephedrine (ZYRTEC-D) 5-120 mg per tablet Take 1 tablet by mouth once daily.      nystatin (MYCOSTATIN) cream Apply topically 2 (two) times daily.  (Patient not taking: Reported on 12/5/2019)     No current facility-administered medications for this visit.        Review of patient's allergies indicates:   Allergen Reactions    Demerol [meperidine] Itching       Family History   Problem Relation Age of Onset    Diabetes Father     Heart disease Father     Cancer Father         prostate       Social History     Socioeconomic History    Marital status:      Spouse name: Not on file    Number of children: Not on file    Years of education: Not on file    Highest education level: Not on file   Occupational History    Not on file   Social Needs    Financial resource strain: Not on file    Food insecurity:     Worry: Not on file     Inability: Not on file    Transportation needs:     Medical: Not on file     Non-medical: Not on file   Tobacco Use    Smoking status: Former Smoker    Smokeless tobacco: Never Used   Substance and Sexual Activity    Alcohol use: No    Drug use: No    Sexual activity: Not on file   Lifestyle    Physical activity:     Days per week: Not on file     Minutes per session: Not on file    Stress: Not on file   Relationships    Social connections:     Talks on phone: Not on file     Gets together: Not on file     Attends Protestant service: Not on file     Active member of club or organization: Not on file     Attends meetings of clubs or organizations: Not on file     Relationship status: Not on file   Other Topics Concern    Not on file   Social History Narrative    Not on file       History of present illness:  Patient comes in today for the right knee.  He is having lot achy pain.  His pain is severe.  The viscosupplementation work for several months but now the pain has returned.  He has known bone-on-bone arthritis proved and arthroscopy      Review of Systems:    Constitution: Negative for chills, fever, and sweats.  Negative for unexplained weight loss.    HENT:  Negative for headaches and blurry  vision.    Cardiovascular:Negative for chest pain or irregular heart beat. Negative for hypertension.    Respiratory:  Negative for cough and shortness of breath.    Gastrointestinal: Negative for abdominal pain, heartburn, melena, nausea, and vomitting.    Genitourinary:  Negative bladder incontinence and dysuria.    Musculoskeletal:  See HPI for details.     Neurological: Negative for numbness.    Psychiatric/Behavioral: Negative for depression.  The patient is not nervous/anxious.      Endocrine: Negative for polyuria    Hematologic/Lymphatic: Negative for bleeding problem.  Does not bruise/bleed easily.    Skin: Negative for poor would healing and rash    Objective:      Physical Examination:    Vital Signs:    Vitals:    12/05/19 1319   BP: 134/81   Pulse: 78       Body mass index is 32.96 kg/m².    This a well-developed, well nourished patient in no acute distress.  They are alert and oriented and cooperative to examination.        Patient has significant crepitus the right knee.  Has 1+ effusion.  His are stable knee to varus valgus stresses.  No crepitus in the left knee.  Negative straight leg raises.  Pertinent New Results:    XRAY Report / Interpretation:       Assessment/Plan:      Osteoarthritis of the right knee.  I injected the right knee with Depo-Medrol and lidocaine.  We have applied for more viscosupplementation as he got such good result last time.  We will see him back for that injection      This note was created using Dragon voice recognition software that occasionally misinterpreted phrases or words.

## 2019-12-05 NOTE — PROCEDURES
Large Joint Aspiration/Injection: R knee  Date/Time: 12/5/2019 1:15 PM  Performed by: Alok Rowe MD  Authorized by: Alok Rowe MD     Consent Done?:  Yes (Verbal)  Indications:  Pain  Procedure site marked: Yes    Timeout: Prior to procedure the correct patient, procedure, and site was verified    Anesthesia  Local anesthesia used  Anesthetic: lidocaine 1% without epinephrine    Location:  Knee  Site:  R knee  Prep: Patient was prepped and draped in usual sterile fashion    Needle size:  25 G  Medications:  40 mg methylPREDNISolone acetate 40 mg/mL; 40 mg methylPREDNISolone acetate 40 mg/mL  Patient tolerance:  Patient tolerated the procedure well with no immediate complications

## 2019-12-13 ENCOUNTER — PATIENT MESSAGE (OUTPATIENT)
Dept: ORTHOPEDICS | Facility: CLINIC | Age: 49
End: 2019-12-13

## 2019-12-17 ENCOUNTER — OFFICE VISIT (OUTPATIENT)
Dept: ORTHOPEDICS | Facility: CLINIC | Age: 49
End: 2019-12-17
Payer: MEDICARE

## 2019-12-17 VITALS
SYSTOLIC BLOOD PRESSURE: 139 MMHG | DIASTOLIC BLOOD PRESSURE: 89 MMHG | HEIGHT: 72 IN | BODY MASS INDEX: 32.91 KG/M2 | WEIGHT: 243 LBS | HEART RATE: 68 BPM

## 2019-12-17 DIAGNOSIS — M17.11 PRIMARY OSTEOARTHRITIS OF RIGHT KNEE: Primary | ICD-10-CM

## 2019-12-17 PROCEDURE — 99499 NO LOS: ICD-10-PCS | Mod: 25,S$GLB,, | Performed by: ORTHOPAEDIC SURGERY

## 2019-12-17 PROCEDURE — 3008F PR BODY MASS INDEX (BMI) DOCUMENTED: ICD-10-PCS | Mod: S$GLB,,, | Performed by: ORTHOPAEDIC SURGERY

## 2019-12-17 PROCEDURE — 3079F DIAST BP 80-89 MM HG: CPT | Mod: S$GLB,,, | Performed by: ORTHOPAEDIC SURGERY

## 2019-12-17 PROCEDURE — 20610 LARGE JOINT ASPIRATION/INJECTION: R KNEE: ICD-10-PCS | Mod: RT,S$GLB,, | Performed by: ORTHOPAEDIC SURGERY

## 2019-12-17 PROCEDURE — 3079F PR MOST RECENT DIASTOLIC BLOOD PRESSURE 80-89 MM HG: ICD-10-PCS | Mod: S$GLB,,, | Performed by: ORTHOPAEDIC SURGERY

## 2019-12-17 PROCEDURE — 20610 DRAIN/INJ JOINT/BURSA W/O US: CPT | Mod: RT,S$GLB,, | Performed by: ORTHOPAEDIC SURGERY

## 2019-12-17 PROCEDURE — 99499 UNLISTED E&M SERVICE: CPT | Mod: 25,S$GLB,, | Performed by: ORTHOPAEDIC SURGERY

## 2019-12-17 PROCEDURE — 3008F BODY MASS INDEX DOCD: CPT | Mod: S$GLB,,, | Performed by: ORTHOPAEDIC SURGERY

## 2019-12-17 PROCEDURE — 3075F PR MOST RECENT SYSTOLIC BLOOD PRESS GE 130-139MM HG: ICD-10-PCS | Mod: S$GLB,,, | Performed by: ORTHOPAEDIC SURGERY

## 2019-12-17 PROCEDURE — 3075F SYST BP GE 130 - 139MM HG: CPT | Mod: S$GLB,,, | Performed by: ORTHOPAEDIC SURGERY

## 2019-12-17 NOTE — PROGRESS NOTES
[facility]    Subjective:     Chief Complaint:   Chief Complaint   Patient presents with    Right Knee - Pain     Right knee pain x a while. States that he is here for a Synvisc injection. Pain is about the same.        Past Medical History:   Diagnosis Date    Diabetes mellitus, type 2     General anesthetics causing adverse effect in therapeutic use 2007    During general anesthesia for back surgery , experienced severe pain, heard people talking and felt like his heart was about to explode.    GERD (gastroesophageal reflux disease)     Hyperlipidemia     Hypothyroidism     Nasal septal deviation        Past Surgical History:   Procedure Laterality Date    BACK SURGERY  2007    artificial disc L4-5    CYSTOSCOPY W/ STONE MANIPULATION  2002    Kidney stones removed    KNEE ARTHROSCOPY W/ DEBRIDEMENT  2000 and 2001    Right knee       Current Outpatient Medications   Medication Sig    atenolol (TENORMIN) 25 MG tablet Take 1 tablet (25 mg total) by mouth once daily.    atorvastatin (LIPITOR) 20 MG tablet Take 1 tablet (20 mg total) by mouth once daily.    azelastine (ASTELIN) 137 mcg nasal spray 1 spray by Nasal route daily as needed.      cetirizine-pseudoephedrine (ZYRTEC-D) 5-120 mg per tablet Take 1 tablet by mouth once daily.      esomeprazole (NEXIUM) 40 MG capsule Take 1 capsule (40 mg total) by mouth before breakfast.    fluticasone (FLONASE) 50 mcg/Actuation nasal spray 1 spray by Nasal route daily as needed.      lisinopril 10 MG tablet Take 1 tablet (10 mg total) by mouth once daily.    metFORMIN (GLUCOPHAGE) 500 MG tablet Take 1 tablet (500 mg total) by mouth 3 (three) times daily.    nystatin (MYCOSTATIN) cream Apply topically 2 (two) times daily.    levothyroxine (SYNTHROID) 75 MCG tablet Take 1 tablet (75 mcg total) by mouth once daily.     No current facility-administered medications for this visit.        Review of patient's allergies indicates:   Allergen Reactions     Demerol [meperidine] Itching       Family History   Problem Relation Age of Onset    Diabetes Father     Heart disease Father     Cancer Father         prostate       Social History     Socioeconomic History    Marital status:      Spouse name: Not on file    Number of children: Not on file    Years of education: Not on file    Highest education level: Not on file   Occupational History    Not on file   Social Needs    Financial resource strain: Not on file    Food insecurity:     Worry: Not on file     Inability: Not on file    Transportation needs:     Medical: Not on file     Non-medical: Not on file   Tobacco Use    Smoking status: Former Smoker    Smokeless tobacco: Never Used   Substance and Sexual Activity    Alcohol use: No    Drug use: No    Sexual activity: Not on file   Lifestyle    Physical activity:     Days per week: Not on file     Minutes per session: Not on file    Stress: Not on file   Relationships    Social connections:     Talks on phone: Not on file     Gets together: Not on file     Attends Muslim service: Not on file     Active member of club or organization: Not on file     Attends meetings of clubs or organizations: Not on file     Relationship status: Not on file   Other Topics Concern    Not on file   Social History Narrative    Not on file       History of present illness:  Patient comes in with right knee pain.  He is here for Synvisc.      Review of Systems:    Constitution: Negative for chills, fever, and sweats.  Negative for unexplained weight loss.    HENT:  Negative for headaches and blurry vision.    Cardiovascular:Negative for chest pain or irregular heart beat. Negative for hypertension.    Respiratory:  Negative for cough and shortness of breath.    Gastrointestinal: Negative for abdominal pain, heartburn, melena, nausea, and vomitting.    Genitourinary:  Negative bladder incontinence and dysuria.    Musculoskeletal:  See HPI for details.      Neurological: Negative for numbness.    Psychiatric/Behavioral: Negative for depression.  The patient is not nervous/anxious.      Endocrine: Negative for polyuria    Hematologic/Lymphatic: Negative for bleeding problem.  Does not bruise/bleed easily.    Skin: Negative for poor would healing and rash    Objective:      Physical Examination:    Vital Signs:    Vitals:    12/17/19 0956   BP: 139/89   Pulse: 68       Body mass index is 32.96 kg/m².    This a well-developed, well nourished patient in no acute distress.  They are alert and oriented and cooperative to examination.        Patient has range of motion 0-120 degrees.  He has anterior crepitus.  He has trace effusion.  Pertinent New Results:    XRAY Report / Interpretation:       Assessment/Plan:      Right knee arthritis.  I injected him with Synvisc-One.  Cautions and precautions discussed with him in great detail.  He will follow up p.r.n.      This note was created using Dragon voice recognition software that occasionally misinterpreted phrases or words.

## 2019-12-17 NOTE — PROCEDURES
Large Joint Aspiration/Injection: R knee  Date/Time: 12/17/2019 10:00 AM  Performed by: Alok Rowe MD  Authorized by: Alok Rowe MD     Consent Done?:  Yes (Verbal)  Indications:  Pain  Procedure site marked: Yes    Timeout: Prior to procedure the correct patient, procedure, and site was verified    Anesthesia  Local anesthesia used  Anesthetic: lidocaine 1% without epinephrine    Location:  Knee  Site:  R knee  Prep: Patient was prepped and draped in usual sterile fashion    Needle size:  25 G  Ultrasonic Guidance for needle placement: No  Medications:  48 mg hylan g-f 20 48 mg/6 mL  Patient tolerance:  Patient tolerated the procedure well with no immediate complications

## 2020-03-02 ENCOUNTER — TELEPHONE (OUTPATIENT)
Dept: FAMILY MEDICINE | Facility: CLINIC | Age: 50
End: 2020-03-02

## 2020-03-02 DIAGNOSIS — E11.649 UNCONTROLLED TYPE 2 DIABETES MELLITUS WITH HYPOGLYCEMIA WITHOUT COMA: ICD-10-CM

## 2020-03-02 DIAGNOSIS — I10 HYPERTENSION, UNSPECIFIED TYPE: ICD-10-CM

## 2020-03-02 DIAGNOSIS — E03.9 HYPOTHYROIDISM, UNSPECIFIED TYPE: ICD-10-CM

## 2020-03-02 DIAGNOSIS — Z00.00 ROUTINE GENERAL MEDICAL EXAMINATION AT A HEALTH CARE FACILITY: Primary | ICD-10-CM

## 2020-03-12 ENCOUNTER — TELEPHONE (OUTPATIENT)
Dept: ORTHOPEDICS | Facility: CLINIC | Age: 50
End: 2020-03-12

## 2020-03-12 ENCOUNTER — OFFICE VISIT (OUTPATIENT)
Dept: ORTHOPEDICS | Facility: CLINIC | Age: 50
End: 2020-03-12
Payer: MEDICARE

## 2020-03-12 VITALS — WEIGHT: 245 LBS | SYSTOLIC BLOOD PRESSURE: 122 MMHG | BODY MASS INDEX: 33.23 KG/M2 | DIASTOLIC BLOOD PRESSURE: 84 MMHG

## 2020-03-12 DIAGNOSIS — Z01.818 PREOP EXAMINATION: ICD-10-CM

## 2020-03-12 DIAGNOSIS — M17.11 PRIMARY OSTEOARTHRITIS OF RIGHT KNEE: Primary | ICD-10-CM

## 2020-03-12 DIAGNOSIS — Z98.890 HISTORY OF ARTHROSCOPY OF RIGHT KNEE: ICD-10-CM

## 2020-03-12 DIAGNOSIS — M17.11 DEGENERATIVE ARTHRITIS OF RIGHT KNEE: ICD-10-CM

## 2020-03-12 PROCEDURE — 3008F BODY MASS INDEX DOCD: CPT | Mod: S$GLB,,, | Performed by: ORTHOPAEDIC SURGERY

## 2020-03-12 PROCEDURE — 3008F PR BODY MASS INDEX (BMI) DOCUMENTED: ICD-10-PCS | Mod: S$GLB,,, | Performed by: ORTHOPAEDIC SURGERY

## 2020-03-12 PROCEDURE — 3074F PR MOST RECENT SYSTOLIC BLOOD PRESSURE < 130 MM HG: ICD-10-PCS | Mod: S$GLB,,, | Performed by: ORTHOPAEDIC SURGERY

## 2020-03-12 PROCEDURE — 3079F DIAST BP 80-89 MM HG: CPT | Mod: S$GLB,,, | Performed by: ORTHOPAEDIC SURGERY

## 2020-03-12 PROCEDURE — 99213 PR OFFICE/OUTPT VISIT, EST, LEVL III, 20-29 MIN: ICD-10-PCS | Mod: S$GLB,,, | Performed by: ORTHOPAEDIC SURGERY

## 2020-03-12 PROCEDURE — 3074F SYST BP LT 130 MM HG: CPT | Mod: S$GLB,,, | Performed by: ORTHOPAEDIC SURGERY

## 2020-03-12 PROCEDURE — 3079F PR MOST RECENT DIASTOLIC BLOOD PRESSURE 80-89 MM HG: ICD-10-PCS | Mod: S$GLB,,, | Performed by: ORTHOPAEDIC SURGERY

## 2020-03-12 PROCEDURE — 99213 OFFICE O/P EST LOW 20 MIN: CPT | Mod: S$GLB,,, | Performed by: ORTHOPAEDIC SURGERY

## 2020-03-12 RX ORDER — MUPIROCIN 20 MG/G
OINTMENT TOPICAL
Status: CANCELLED | OUTPATIENT
Start: 2020-03-12

## 2020-03-12 NOTE — PROGRESS NOTES
[facility]    Subjective:     Chief Complaint:   Chief Complaint   Patient presents with    Right Knee - Pain     Right knee pain ( synvisc injection done 12.17.19) Injection did not help this time       Past Medical History:   Diagnosis Date    Diabetes mellitus, type 2     General anesthetics causing adverse effect in therapeutic use 2007    During general anesthesia for back surgery , experienced severe pain, heard people talking and felt like his heart was about to explode.    GERD (gastroesophageal reflux disease)     Hyperlipidemia     Hypothyroidism     Nasal septal deviation        Past Surgical History:   Procedure Laterality Date    BACK SURGERY  2007    artificial disc L4-5    CYSTOSCOPY W/ STONE MANIPULATION  2002    Kidney stones removed    KNEE ARTHROSCOPY W/ DEBRIDEMENT  2000 and 2001    Right knee       Current Outpatient Medications   Medication Sig    atenolol (TENORMIN) 25 MG tablet Take 1 tablet (25 mg total) by mouth once daily.    atorvastatin (LIPITOR) 20 MG tablet Take 1 tablet (20 mg total) by mouth once daily.    azelastine (ASTELIN) 137 mcg nasal spray 1 spray by Nasal route daily as needed.      cetirizine-pseudoephedrine (ZYRTEC-D) 5-120 mg per tablet Take 1 tablet by mouth once daily.      esomeprazole (NEXIUM) 40 MG capsule Take 1 capsule (40 mg total) by mouth before breakfast.    fluticasone (FLONASE) 50 mcg/Actuation nasal spray 1 spray by Nasal route daily as needed.      lisinopril 10 MG tablet Take 1 tablet (10 mg total) by mouth once daily.    metFORMIN (GLUCOPHAGE) 500 MG tablet Take 1 tablet (500 mg total) by mouth 3 (three) times daily.    nystatin (MYCOSTATIN) cream Apply topically 2 (two) times daily.    levothyroxine (SYNTHROID) 75 MCG tablet Take 1 tablet (75 mcg total) by mouth once daily.     No current facility-administered medications for this visit.        Review of patient's allergies indicates:   Allergen Reactions    Demerol  [meperidine] Itching       Family History   Problem Relation Age of Onset    Diabetes Father     Heart disease Father     Cancer Father         prostate       Social History     Socioeconomic History    Marital status:      Spouse name: Not on file    Number of children: Not on file    Years of education: Not on file    Highest education level: Not on file   Occupational History    Not on file   Social Needs    Financial resource strain: Not on file    Food insecurity:     Worry: Not on file     Inability: Not on file    Transportation needs:     Medical: Not on file     Non-medical: Not on file   Tobacco Use    Smoking status: Former Smoker    Smokeless tobacco: Never Used   Substance and Sexual Activity    Alcohol use: No    Drug use: No    Sexual activity: Not on file   Lifestyle    Physical activity:     Days per week: Not on file     Minutes per session: Not on file    Stress: Not on file   Relationships    Social connections:     Talks on phone: Not on file     Gets together: Not on file     Attends Sabianist service: Not on file     Active member of club or organization: Not on file     Attends meetings of clubs or organizations: Not on file     Relationship status: Not on file   Other Topics Concern    Not on file   Social History Narrative    Not on file       History of present illness:  Patient comes in today for the right knee.  He continues complain of severe right knee pain.  This has been going on for many years.  He underwent arthroscopy where he was noted to have full-thickness cartilaginous loss in the trochlea and the medial compartment.  He has had multiple rounds of steroid injections.  He has had viscosupplementation.  He has been on narcotics and nonsteroidals.  He has not been on narcotics now for 2 years.      Review of Systems:    Constitution: Negative for chills, fever, and sweats.  Negative for unexplained weight loss.    HENT:  Negative for headaches and  blurry vision.    Cardiovascular:Negative for chest pain or irregular heart beat. Negative for hypertension.    Respiratory:  Negative for cough and shortness of breath.    Gastrointestinal: Negative for abdominal pain, heartburn, melena, nausea, and vomitting.    Genitourinary:  Negative bladder incontinence and dysuria.    Musculoskeletal:  See HPI for details.     Neurological: Negative for numbness.    Psychiatric/Behavioral: Negative for depression.  The patient is not nervous/anxious.      Endocrine: Negative for polyuria    Hematologic/Lymphatic: Negative for bleeding problem.  Does not bruise/bleed easily.    Skin: Negative for poor would healing and rash    Objective:      Physical Examination:    Vital Signs:    Vitals:    03/12/20 0915   BP: 122/84       Body mass index is 33.23 kg/m².    This a well-developed, well nourished patient in no acute distress.  They are alert and oriented and cooperative to examination.        Patient has range of motion 0-130 degrees of the right knee.  He has a 2+ effusion.  He has significant anterior crepitus.  Is stable knee to varus valgus stresses.  Pertinent New Results:    XRAY Report / Interpretation:   AP lateral sunrise views of the right knee demonstrate 3 compartment spurring.    Assessment/Plan:      Bone-on-bone arthritis of the right knee proven arthroscopy.  He has undergone several years of conservative care including multiple Depo-Medrol injections viscosupplementation arthroscopy nonsteroidals and even narcotics.  He simply cannot live with the pain anymore.  The knee tony and gives way.  He is having severe resting pain.  He can sleep.  At this point I have offered him a total knee.  I made it very clear to him that he will more likely than not need a revision in his life time.  I also spoke to me about other complications like infection DVT arthrofibrosis and other complications.  He understood and wished to proceed      This note was created using  Dragon voice recognition software that occasionally misinterpreted phrases or words.

## 2020-03-13 LAB
ALBUMIN SERPL-MCNC: 4.7 G/DL (ref 3.6–5.1)
ALBUMIN/GLOB SERPL: 1.8 (CALC) (ref 1–2.5)
ALP SERPL-CCNC: 62 U/L (ref 36–130)
ALT SERPL-CCNC: 13 U/L (ref 9–46)
APPEARANCE UR: CLEAR
AST SERPL-CCNC: 12 U/L (ref 10–40)
BACTERIA #/AREA URNS HPF: NORMAL /HPF
BACTERIA UR CULT: NORMAL
BASOPHILS # BLD AUTO: 44 CELLS/UL (ref 0–200)
BASOPHILS NFR BLD AUTO: 0.5 %
BILIRUB SERPL-MCNC: 0.7 MG/DL (ref 0.2–1.2)
BILIRUB UR QL STRIP: NEGATIVE
BUN SERPL-MCNC: 17 MG/DL (ref 7–25)
BUN/CREAT SERPL: ABNORMAL (CALC) (ref 6–22)
CALCIUM SERPL-MCNC: 10.2 MG/DL (ref 8.6–10.3)
CHLORIDE SERPL-SCNC: 105 MMOL/L (ref 98–110)
CHOLEST SERPL-MCNC: 168 MG/DL
CHOLEST/HDLC SERPL: 2.9 (CALC)
CO2 SERPL-SCNC: 27 MMOL/L (ref 20–32)
COLOR UR: YELLOW
CREAT SERPL-MCNC: 1.19 MG/DL (ref 0.6–1.35)
EOSINOPHIL # BLD AUTO: 157 CELLS/UL (ref 15–500)
EOSINOPHIL NFR BLD AUTO: 1.8 %
ERYTHROCYTE [DISTWIDTH] IN BLOOD BY AUTOMATED COUNT: 12.1 % (ref 11–15)
GFRSERPLBLD MDRD-ARVRAT: 72 ML/MIN/1.73M2
GLOBULIN SER CALC-MCNC: 2.6 G/DL (CALC) (ref 1.9–3.7)
GLUCOSE SERPL-MCNC: 109 MG/DL (ref 65–99)
GLUCOSE UR QL STRIP: NEGATIVE
HBA1C MFR BLD: 6 % OF TOTAL HGB
HCT VFR BLD AUTO: 43.6 % (ref 38.5–50)
HDLC SERPL-MCNC: 57 MG/DL
HGB BLD-MCNC: 13.7 G/DL (ref 13.2–17.1)
HGB UR QL STRIP: NEGATIVE
HYALINE CASTS #/AREA URNS LPF: NORMAL /LPF
KETONES UR QL STRIP: NEGATIVE
LDLC SERPL CALC-MCNC: 96 MG/DL (CALC)
LEUKOCYTE ESTERASE UR QL STRIP: NEGATIVE
LYMPHOCYTES # BLD AUTO: 1618 CELLS/UL (ref 850–3900)
LYMPHOCYTES NFR BLD AUTO: 18.6 %
MCH RBC QN AUTO: 28 PG (ref 27–33)
MCHC RBC AUTO-ENTMCNC: 31.4 G/DL (ref 32–36)
MCV RBC AUTO: 89.2 FL (ref 80–100)
MONOCYTES # BLD AUTO: 522 CELLS/UL (ref 200–950)
MONOCYTES NFR BLD AUTO: 6 %
NEUTROPHILS # BLD AUTO: 6360 CELLS/UL (ref 1500–7800)
NEUTROPHILS NFR BLD AUTO: 73.1 %
NITRITE UR QL STRIP: NEGATIVE
NONHDLC SERPL-MCNC: 111 MG/DL (CALC)
PH UR STRIP: NORMAL [PH] (ref 5–8)
PLATELET # BLD AUTO: 240 THOUSAND/UL (ref 140–400)
PMV BLD REES-ECKER: 12.5 FL (ref 7.5–12.5)
POTASSIUM SERPL-SCNC: 4.7 MMOL/L (ref 3.5–5.3)
PROT SERPL-MCNC: 7.3 G/DL (ref 6.1–8.1)
PROT UR QL STRIP: NEGATIVE
RBC # BLD AUTO: 4.89 MILLION/UL (ref 4.2–5.8)
RBC #/AREA URNS HPF: NORMAL /HPF
SODIUM SERPL-SCNC: 146 MMOL/L (ref 135–146)
SP GR UR STRIP: 1.03 (ref 1–1.03)
SQUAMOUS #/AREA URNS HPF: NORMAL /HPF
TRIGL SERPL-MCNC: 60 MG/DL
TSH SERPL-ACNC: 4.57 MIU/L (ref 0.4–4.5)
WBC # BLD AUTO: 8.7 THOUSAND/UL (ref 3.8–10.8)
WBC #/AREA URNS HPF: NORMAL /HPF

## 2020-03-16 ENCOUNTER — TELEPHONE (OUTPATIENT)
Dept: ORTHOPEDICS | Facility: CLINIC | Age: 50
End: 2020-03-16

## 2020-03-16 ENCOUNTER — PATIENT MESSAGE (OUTPATIENT)
Dept: ORTHOPEDICS | Facility: CLINIC | Age: 50
End: 2020-03-16

## 2020-03-16 DIAGNOSIS — I10 HYPERTENSION, UNSPECIFIED TYPE: ICD-10-CM

## 2020-03-16 DIAGNOSIS — E03.9 HYPOTHYROIDISM, UNSPECIFIED TYPE: ICD-10-CM

## 2020-03-16 DIAGNOSIS — K21.9 GASTROESOPHAGEAL REFLUX DISEASE, ESOPHAGITIS PRESENCE NOT SPECIFIED: ICD-10-CM

## 2020-03-16 DIAGNOSIS — E11.649 UNCONTROLLED TYPE 2 DIABETES MELLITUS WITH HYPOGLYCEMIA WITHOUT COMA: ICD-10-CM

## 2020-03-16 DIAGNOSIS — E78.5 HYPERLIPIDEMIA, UNSPECIFIED HYPERLIPIDEMIA TYPE: ICD-10-CM

## 2020-03-16 RX ORDER — ATENOLOL 25 MG/1
25 TABLET ORAL DAILY
Qty: 90 TABLET | Refills: 1 | Status: SHIPPED | OUTPATIENT
Start: 2020-03-16 | End: 2020-09-28 | Stop reason: SDUPTHER

## 2020-03-16 RX ORDER — ATORVASTATIN CALCIUM 20 MG/1
20 TABLET, FILM COATED ORAL DAILY
Qty: 90 TABLET | Refills: 1 | Status: SHIPPED | OUTPATIENT
Start: 2020-03-16 | End: 2020-09-28 | Stop reason: SDUPTHER

## 2020-03-16 RX ORDER — METFORMIN HYDROCHLORIDE 500 MG/1
500 TABLET ORAL 3 TIMES DAILY
Qty: 270 TABLET | Refills: 1 | Status: SHIPPED | OUTPATIENT
Start: 2020-03-16 | End: 2020-09-28 | Stop reason: SDUPTHER

## 2020-03-16 RX ORDER — LEVOTHYROXINE SODIUM 75 UG/1
75 TABLET ORAL DAILY
Qty: 90 TABLET | Refills: 1 | Status: SHIPPED | OUTPATIENT
Start: 2020-03-16 | End: 2020-09-28 | Stop reason: SDUPTHER

## 2020-03-16 RX ORDER — ESOMEPRAZOLE MAGNESIUM 40 MG/1
40 CAPSULE, DELAYED RELEASE ORAL
Qty: 90 CAPSULE | Refills: 1 | Status: SHIPPED | OUTPATIENT
Start: 2020-03-16 | End: 2020-08-17

## 2020-03-16 RX ORDER — LISINOPRIL 10 MG/1
10 TABLET ORAL DAILY
Qty: 90 TABLET | Refills: 1 | Status: ON HOLD | OUTPATIENT
Start: 2020-03-16 | End: 2020-06-16 | Stop reason: SDUPTHER

## 2020-03-16 NOTE — TELEPHONE ENCOUNTER
Left message for patient and informed him that we would need to cancel his pre op and joint camp classes that are currently scheduled for 03/23/2020. Patient informed that someone would be reaching out to him to reschedule at the direction of the hospital.

## 2020-03-16 NOTE — TELEPHONE ENCOUNTER
----- Message from Shailesh Nguyen sent at 3/16/2020 10:13 AM CDT -----  Contact: John Neumannde  Needs refills put on his medications please. : esomeprazole, atorvastatin, Lisinopril, atenolol , levothyroxine, and Metformin  Send to [facility]  Pt# [phone]

## 2020-03-18 ENCOUNTER — PATIENT MESSAGE (OUTPATIENT)
Dept: ENDOCRINOLOGY | Facility: CLINIC | Age: 50
End: 2020-03-18

## 2020-03-25 ENCOUNTER — TELEPHONE (OUTPATIENT)
Dept: ORTHOPEDICS | Facility: CLINIC | Age: 50
End: 2020-03-25

## 2020-04-06 ENCOUNTER — PATIENT MESSAGE (OUTPATIENT)
Dept: ORTHOPEDICS | Facility: CLINIC | Age: 50
End: 2020-04-06

## 2020-04-17 ENCOUNTER — TELEPHONE (OUTPATIENT)
Dept: ORTHOPEDICS | Facility: CLINIC | Age: 50
End: 2020-04-17

## 2020-04-17 NOTE — TELEPHONE ENCOUNTER
Spoke with patient, he is still interested in having TKA done so I told him we would call as soon as we are able to schedule and give patient a surgery date

## 2020-04-27 ENCOUNTER — OFFICE VISIT (OUTPATIENT)
Dept: FAMILY MEDICINE | Facility: CLINIC | Age: 50
End: 2020-04-27
Payer: MEDICARE

## 2020-04-27 DIAGNOSIS — I10 HYPERTENSION, UNSPECIFIED TYPE: Primary | ICD-10-CM

## 2020-04-27 DIAGNOSIS — E11.9 TYPE 2 DIABETES MELLITUS WITHOUT COMPLICATION, WITHOUT LONG-TERM CURRENT USE OF INSULIN: ICD-10-CM

## 2020-04-27 DIAGNOSIS — E78.5 HYPERLIPIDEMIA, UNSPECIFIED HYPERLIPIDEMIA TYPE: ICD-10-CM

## 2020-04-27 DIAGNOSIS — E03.9 HYPOTHYROIDISM, UNSPECIFIED TYPE: ICD-10-CM

## 2020-04-27 PROCEDURE — 99214 OFFICE O/P EST MOD 30 MIN: CPT | Mod: 95,,, | Performed by: PHYSICIAN ASSISTANT

## 2020-04-27 PROCEDURE — 3044F HG A1C LEVEL LT 7.0%: CPT | Mod: 95,,, | Performed by: PHYSICIAN ASSISTANT

## 2020-04-27 PROCEDURE — 99214 PR OFFICE/OUTPT VISIT, EST, LEVL IV, 30-39 MIN: ICD-10-PCS | Mod: 95,,, | Performed by: PHYSICIAN ASSISTANT

## 2020-04-27 PROCEDURE — 3044F PR MOST RECENT HEMOGLOBIN A1C LEVEL <7.0%: ICD-10-PCS | Mod: 95,,, | Performed by: PHYSICIAN ASSISTANT

## 2020-04-27 NOTE — PROGRESS NOTES
Subjective:        The chief complaint leading to consultation is: annual/6month checkup  The patient location is:  Home  Visit type: Virtual visit with synchronous audio/video or audio only  This was a video visit in lieu of in-person visit due to the coronavirus emergency. Patient acknowledged and consented to the video visit encounter.     This is a 50-year-old white male who presents today for regular 6 month checkup.  This visit is via virtual telemedicine conference.  He reports that overall he has been doing well.  He manages his blood sugars with metformin alone.  Most recent A1c last month at 6%.  Cholesterol and blood pressure has also been looking great.  He is still planning to proceed with total knee replacement per Dr. good.  This is hopefully going to be scheduled for next month.  He admits to some restless leg symptoms at night just for the past few weeks.  We will plan to see if this resolves after his surgery.  If not given strong family history of we may warrant treatment.      Past Surgical History:   Procedure Laterality Date    BACK SURGERY  2007    artificial disc L4-5    CYSTOSCOPY W/ STONE MANIPULATION  2002    Kidney stones removed    KNEE ARTHROSCOPY W/ DEBRIDEMENT  2000 and 2001    Right knee     Past Medical History:   Diagnosis Date    Diabetes mellitus, type 2     General anesthetics causing adverse effect in therapeutic use 2007    During general anesthesia for back surgery , experienced severe pain, heard people talking and felt like his heart was about to explode.    GERD (gastroesophageal reflux disease)     Hyperlipidemia     Hypothyroidism     Nasal septal deviation      Family History   Problem Relation Age of Onset    Diabetes Father     Heart disease Father     Cancer Father         prostate        Social History:   Marital Status:   Alcohol History:  reports that he does not drink alcohol.  Tobacco History:  reports that he has quit smoking. He has  never used smokeless tobacco.  Drug History:  reports that he does not use drugs.    Review of patient's allergies indicates:   Allergen Reactions    Demerol [meperidine] Itching       Current Outpatient Medications   Medication Sig Dispense Refill    atenoloL (TENORMIN) 25 MG tablet Take 1 tablet (25 mg total) by mouth once daily. 90 tablet 1    atorvastatin (LIPITOR) 20 MG tablet Take 1 tablet (20 mg total) by mouth once daily. 90 tablet 1    azelastine (ASTELIN) 137 mcg nasal spray 1 spray by Nasal route daily as needed.        cetirizine-pseudoephedrine (ZYRTEC-D) 5-120 mg per tablet Take 1 tablet by mouth once daily.        esomeprazole (NEXIUM) 40 MG capsule Take 1 capsule (40 mg total) by mouth before breakfast. 90 capsule 1    fluticasone (FLONASE) 50 mcg/Actuation nasal spray 1 spray by Nasal route daily as needed.        levothyroxine (SYNTHROID) 75 MCG tablet Take 1 tablet (75 mcg total) by mouth once daily. 90 tablet 1    lisinopriL 10 MG tablet Take 1 tablet (10 mg total) by mouth once daily. 90 tablet 1    metFORMIN (GLUCOPHAGE) 500 MG tablet Take 1 tablet (500 mg total) by mouth 3 (three) times daily. 270 tablet 1    nystatin (MYCOSTATIN) cream Apply topically 2 (two) times daily. 30 g 2     No current facility-administered medications for this visit.        Review of Systems   Constitutional: Negative for activity change, fatigue, fever and unexpected weight change.   HENT: Negative for congestion.    Respiratory: Negative for apnea, cough, chest tightness and shortness of breath.    Cardiovascular: Negative for chest pain and palpitations.   Gastrointestinal: Negative for abdominal distention and abdominal pain.   Genitourinary: Negative for difficulty urinating and dysuria.   Musculoskeletal: Negative for arthralgias and back pain.   Neurological: Negative for dizziness and weakness.         Objective:        Physical Exam:   Physical Exam   Constitutional: He appears well-developed and  well-nourished. No distress.   HENT:   Head: Normocephalic and atraumatic.   Neck: Normal range of motion.   Pulmonary/Chest: Effort normal. No respiratory distress.            Assessment:       1. Hypertension, unspecified type    2. Hyperlipidemia, unspecified hyperlipidemia type    3. Hypothyroidism, unspecified type    4. Type 2 diabetes mellitus without complication, without long-term current use of insulin      Plan:   Hypertension, unspecified type  Comments:  at goal. continue as is. Has refills.    Hyperlipidemia, unspecified hyperlipidemia type  Comments:  lipids well managed with atorvastatin. no changes today.    Hypothyroidism, unspecified type  Comments:  recent TSH at goal.    Type 2 diabetes mellitus without complication, without long-term current use of insulin  Comments:  a1c right at 6%, no changes today. f/u in 6 mos. Will let me know if he needs refills before that time.      Follow up in about 6 months (around 10/27/2020) for Diabetic Check-Up.    Total time spent with patient: 20min    Each patient to whom he or she provides medical services by telemedicine is:  (1) informed of the relationship between the physician and patient and the respective role of any other health care provider with respect to management of the patient; and (2) notified that he or she may decline to receive medical services by telemedicine and may withdraw from such care at any time.    This note was created using [employer] voice recognition software that occasionally misinterprets phrases or words.

## 2020-04-27 NOTE — PATIENT INSTRUCTIONS
Prevention Guidelines, Men Ages 40 to 49  Screening tests and vaccines are an important part of managing your health. Health counseling is essential, too. Below are guidelines for these, for men ages 40 to 49. Talk with your healthcare provider to make sure youre up to date on what you need.  Screening Who needs it How often   Alcohol misuse All men in this age group At routine exams   Blood pressure All men in this age group Every 2 years if your blood pressure reading is less than 120/80 mm Hg; yearly if your systolic blood pressure reading is 120 to 139 mm Hg, or your diastolic blood pressure reading is 80 to 89 mm Hg   Depression All men in this age group At routine exams   Type 2 diabetes or prediabetes All adults beginning at age 45 and adults without symptoms at any age who are overweight or obese and have 1 or more other risk factors for diabetes At least every 3 years (yearly if blood sugar has begun to rise)   Hepatitis C Men at increased risk for infection - talk with your healthcare provider At routine exams   High cholesterol or triglycerides All men ages 35 and older, and younger men at high risk for coronary artery disease At least every 5 years   HIV All men At routine exams   Obesity All men in this age group At routine exams   Prostate cancer Starting at age 45, talk to healthcare provider about risks and benefits of digital rectal exam (KRISSY) and prostate-specific antigen (PSA) screening1 At routine exams   Syphilis Men at increased risk for infection - talk with your healthcare provider At routine exams   Tuberculosis Men at increased risk for infection - talk with your healthcare provider Check with your healthcare provider   Vision All men in this age group Every 2 to 4 years if no risk factors for eye disease2   Vaccine Who needs it How often   Chickenpox (varicella) All men in this age group who have no record of this infection or vaccine 2 doses; the second dose should be given at least 4  weeks after the first dose   Hepatitis A Men at increased risk for infection - talk with your healthcare provider 2 doses given at least 6 months apart   Hepatitis B Men at increased risk for infection - talk with your healthcare provider 3 doses over 6 months; second dose should be given 1 month after the first dose; the third dose should be given at least 2 months after the second dose and at least 4 months after the first dose   Haemophilus influenzae Type B (HIB) Men at increased risk for infection - talk with your healthcare provider 1 to 3 doses   Influenza (flu) All men in this age group Once a year   Measles, mumps, rubella (MMR) All men in this age group who have no record of these infections or vaccines 1 or 2 doses   Meningococcal Men at increased risk for infection - talk with your healthcare provider 1 or more doses   Pneumococcal conjugate vaccine (PCV13) and pneumococcal polysaccharide vaccine (PPSV23) Men at increased risk for infection - talk with your healthcare provider PCV13: 1 dose ages 19 to 65 (protects against 13 types of pneumococcal bacteria)     PPSV23: 1 to 2 doses through age 64, or 1 dose at 65 or older (protects against 23 types of pneumococcal bacteria)      Tetanus/diphtheria/  pertussis (Td/Tdap) booster All men in this age group Td every 10 years, or a one-time dose of Tdap instead of a Td booster after age 18, then Td every 10 years   Counseling Who needs it How often   Diet and exercise Men who are overweight or obese When diagnosed, and then at routine exams   Sexually transmitted infection prevention Men at increased risk for infection - talk with your healthcare provider At routine exams   Use of daily aspirin Men ages 45 to 79 at risk for cardiovascular health problems At routine exams   Use of tobacco and the health effects it can cause All men in this age group Every exam   [address] [place] [address] [facility]   2[facility] [employer]  Date Last Reviewed:  2/1/2017  © 5773-9354 [employer]. [address] [place] [address], [place] [address]. All rights reserved. This information is not intended as a substitute for professional medical care. Always follow your healthcare professional's instructions.

## 2020-05-05 ENCOUNTER — TELEPHONE (OUTPATIENT)
Dept: ORTHOPEDICS | Facility: CLINIC | Age: 50
End: 2020-05-05

## 2020-05-11 ENCOUNTER — TELEPHONE (OUTPATIENT)
Dept: ORTHOPEDICS | Facility: CLINIC | Age: 50
End: 2020-05-11

## 2020-05-11 NOTE — TELEPHONE ENCOUNTER
----- Message from Tala Herrera sent at 5/11/2020 10:28 AM CDT -----  Ready to schedule surgery Dr. Rowe    [phone]

## 2020-05-12 ENCOUNTER — PATIENT MESSAGE (OUTPATIENT)
Dept: FAMILY MEDICINE | Facility: CLINIC | Age: 50
End: 2020-05-12

## 2020-05-12 DIAGNOSIS — M17.11 PRIMARY OSTEOARTHRITIS OF RIGHT KNEE: Primary | ICD-10-CM

## 2020-05-12 DIAGNOSIS — Z01.818 PREOP EXAMINATION: ICD-10-CM

## 2020-05-19 ENCOUNTER — PATIENT MESSAGE (OUTPATIENT)
Dept: SURGERY | Facility: HOSPITAL | Age: 50
End: 2020-05-19

## 2020-05-20 DIAGNOSIS — M17.11 PRIMARY OSTEOARTHRITIS OF RIGHT KNEE: Primary | ICD-10-CM

## 2020-05-20 RX ORDER — HYDROCODONE BITARTRATE AND ACETAMINOPHEN 5; 325 MG/1; MG/1
1 TABLET ORAL EVERY 6 HOURS PRN
Qty: 28 TABLET | Refills: 0 | Status: SHIPPED | OUTPATIENT
Start: 2020-05-20 | End: 2020-05-27

## 2020-06-04 ENCOUNTER — TELEPHONE (OUTPATIENT)
Dept: FAMILY MEDICINE | Facility: CLINIC | Age: 50
End: 2020-06-04

## 2020-06-04 NOTE — TELEPHONE ENCOUNTER
----- Message from Masha Almonte LPN sent at 6/3/2020  9:02 AM CDT -----      ----- Message -----  From: Masha Almonte LPN  Sent: 5/22/2020   9:19 AM CDT  To: Gilmer Jacobsen    Schedule follow up please     ----- Message -----  From: Gavin Sandoval PA-C  Sent: 4/27/2020  11:13 AM CDT  To: Cyn Morris LPN

## 2020-06-08 ENCOUNTER — HOSPITAL ENCOUNTER (OUTPATIENT)
Dept: RADIOLOGY | Facility: HOSPITAL | Age: 50
Discharge: HOME | End: 2020-06-08
Attending: ORTHOPAEDIC SURGERY
Payer: MEDICARE

## 2020-06-08 ENCOUNTER — HOSPITAL ENCOUNTER (OUTPATIENT)
Dept: PREADMISSION TESTING | Facility: HOSPITAL | Age: 50
Discharge: HOME | End: 2020-06-08
Attending: ORTHOPAEDIC SURGERY
Payer: MEDICARE

## 2020-06-08 VITALS — HEIGHT: 72 IN | BODY MASS INDEX: 32.51 KG/M2 | WEIGHT: 240 LBS

## 2020-06-08 DIAGNOSIS — M17.11 PRIMARY OSTEOARTHRITIS OF RIGHT KNEE: ICD-10-CM

## 2020-06-08 DIAGNOSIS — Z01.818 PREOP EXAMINATION: ICD-10-CM

## 2020-06-08 DIAGNOSIS — Z01.818 PREOP EXAMINATION: Primary | ICD-10-CM

## 2020-06-08 LAB
ABO + RH BLD: NORMAL
ALBUMIN SERPL BCP-MCNC: 4.3 G/DL (ref 3.5–5.2)
ALP SERPL-CCNC: 63 U/L (ref 55–135)
ALT SERPL W/O P-5'-P-CCNC: 24 U/L (ref 10–44)
ANION GAP SERPL CALC-SCNC: 10 MMOL/L (ref 8–16)
AST SERPL-CCNC: 18 U/L (ref 10–40)
BASOPHILS # BLD AUTO: 0.03 K/UL (ref 0–0.2)
BASOPHILS NFR BLD: 0.5 % (ref 0–1.9)
BILIRUB SERPL-MCNC: 0.5 MG/DL (ref 0.1–1)
BILIRUB UR QL STRIP: NEGATIVE
BLD GP AB SCN CELLS X3 SERPL QL: NORMAL
BUN SERPL-MCNC: 16 MG/DL (ref 6–20)
CALCIUM SERPL-MCNC: 9.4 MG/DL (ref 8.7–10.5)
CHLORIDE SERPL-SCNC: 106 MMOL/L (ref 95–110)
CLARITY UR: CLEAR
CO2 SERPL-SCNC: 24 MMOL/L (ref 23–29)
COLOR UR: YELLOW
CREAT SERPL-MCNC: 1.2 MG/DL (ref 0.5–1.4)
DIFFERENTIAL METHOD: ABNORMAL
EOSINOPHIL # BLD AUTO: 0.2 K/UL (ref 0–0.5)
EOSINOPHIL NFR BLD: 3.7 % (ref 0–8)
ERYTHROCYTE [DISTWIDTH] IN BLOOD BY AUTOMATED COUNT: 12.1 % (ref 11.5–14.5)
EST. GFR  (AFRICAN AMERICAN): >60 ML/MIN/1.73 M^2
EST. GFR  (NON AFRICAN AMERICAN): >60 ML/MIN/1.73 M^2
GLUCOSE SERPL-MCNC: 110 MG/DL (ref 70–110)
GLUCOSE UR QL STRIP: NEGATIVE
HCT VFR BLD AUTO: 43.9 % (ref 40–54)
HGB BLD-MCNC: 13.8 G/DL (ref 14–18)
HGB UR QL STRIP: NEGATIVE
IMM GRANULOCYTES # BLD AUTO: 0.05 K/UL (ref 0–0.04)
IMM GRANULOCYTES NFR BLD AUTO: 0.8 % (ref 0–0.5)
KETONES UR QL STRIP: ABNORMAL
LEUKOCYTE ESTERASE UR QL STRIP: NEGATIVE
LYMPHOCYTES # BLD AUTO: 1.6 K/UL (ref 1–4.8)
LYMPHOCYTES NFR BLD: 24.1 % (ref 18–48)
MCH RBC QN AUTO: 26.9 PG (ref 27–31)
MCHC RBC AUTO-ENTMCNC: 31.4 G/DL (ref 32–36)
MCV RBC AUTO: 86 FL (ref 82–98)
MONOCYTES # BLD AUTO: 0.6 K/UL (ref 0.3–1)
MONOCYTES NFR BLD: 9.6 % (ref 4–15)
NEUTROPHILS # BLD AUTO: 4 K/UL (ref 1.8–7.7)
NEUTROPHILS NFR BLD: 61.3 % (ref 38–73)
NITRITE UR QL STRIP: NEGATIVE
NRBC BLD-RTO: 0 /100 WBC
PH UR STRIP: 7 [PH] (ref 5–8)
PLATELET # BLD AUTO: 219 K/UL (ref 150–350)
PMV BLD AUTO: 11.5 FL (ref 9.2–12.9)
POTASSIUM SERPL-SCNC: 4.5 MMOL/L (ref 3.5–5.1)
PROT SERPL-MCNC: 7.5 G/DL (ref 6–8.4)
PROT UR QL STRIP: NEGATIVE
RBC # BLD AUTO: 5.13 M/UL (ref 4.6–6.2)
SODIUM SERPL-SCNC: 140 MMOL/L (ref 136–145)
SP GR UR STRIP: 1.02 (ref 1–1.03)
URN SPEC COLLECT METH UR: ABNORMAL
UROBILINOGEN UR STRIP-ACNC: NEGATIVE EU/DL
WBC # BLD AUTO: 6.55 K/UL (ref 3.9–12.7)

## 2020-06-08 PROCEDURE — 99900103 DSU ONLY-NO CHARGE-INITIAL HR (STAT)

## 2020-06-08 PROCEDURE — 87081 CULTURE SCREEN ONLY: CPT

## 2020-06-08 PROCEDURE — 71046 X-RAY EXAM CHEST 2 VIEWS: CPT | Mod: TC,FY

## 2020-06-08 PROCEDURE — 36415 COLL VENOUS BLD VENIPUNCTURE: CPT

## 2020-06-08 PROCEDURE — 85025 COMPLETE CBC W/AUTO DIFF WBC: CPT

## 2020-06-08 PROCEDURE — 80053 COMPREHEN METABOLIC PANEL: CPT

## 2020-06-08 PROCEDURE — 71046 XR CHEST PA AND LATERAL: ICD-10-PCS | Mod: 26,,, | Performed by: RADIOLOGY

## 2020-06-08 PROCEDURE — 81003 URINALYSIS AUTO W/O SCOPE: CPT

## 2020-06-08 PROCEDURE — 99900104 DSU ONLY-NO CHARGE-EA ADD'L HR (STAT)

## 2020-06-08 PROCEDURE — 71046 X-RAY EXAM CHEST 2 VIEWS: CPT | Mod: 26,,, | Performed by: RADIOLOGY

## 2020-06-08 PROCEDURE — 86901 BLOOD TYPING SEROLOGIC RH(D): CPT

## 2020-06-08 PROCEDURE — 93005 ELECTROCARDIOGRAM TRACING: CPT

## 2020-06-08 NOTE — PRE ADMISSION SCREENING
Patient Name: John Corrales  YOB: 1970   MRN: [id number]     [facility]   Basic Mobility Inpatient Short Form 6 Clicks         How much difficulty does the patient currently have  Unable  A Lot  A Little  None      1. Turning over in bed (including adjusting bedclothes, sheets and blankets)?     1 []    2 []    3 []    4 [x]        2. Sitting down on and standing up from a chair with arms (e.g., wheelchair, bedside commode, etc.)     1 []  2 []  3 []     4 [x]      3. Moving from lying on back to sitting on the side of the bed?     1 []  2 []  3 []    4 [x]    How much help from another person does the patient currently need  Total  A Lot  A Little  None      4. Moving to and from a bed to a chair (including a wheelchair)?    1 []  2 []  3 []    4 [x]      5. Need to walk in hospital room?    1 []  2 []  3 []    4 [x]      6. Climbing 3-5 steps with a railing?    1 []  2 []  3 []    4 [x]       Raw Score:       24           CMS 0-100% Score:    0        %   Standardized Score:    61.14           CMS Modifier:      [facility] AM-PAC   Basic Mobility Inpatient Short Form 6 Clicks Score Conversion Table*         *Use this form to convert -PAC Basic Mobility Inpatient Raw Scores.   [facility] Inpatient Basic Mobility Short Form Scoring Example   1. Add the number values associated with the response to each item. For example, items totals yield a Raw Score of 21.   2. Match the raw score to the t-Scale scores (t-Scale score = 50.25, SE = 4.69).   3. Find the associated CMS % (CMS % = 28.97%).   4. Locate the correct CMS Functional Modifier Code, or G Code (G code = CJ)     NOTE: Each -PAC Short Form has a separate conversion table. Make sure that you use the correct conversion table.       Instruction Manual - page 45 contains conversion table

## 2020-06-08 NOTE — PRE ADMISSION SCREENING
JOINT CAMP ASSESSMENT    Name John Corrales   MRN [id number]    Age/Sex 50 y.o. male    Surgeon Dr. Alok Rowe   Joint Camp Date 6/8/2020   Surgery Date 6/15/2020   Procedure Right Knee Arthroplasty   Insurance Payor: [employer]ARE / Plan: [employer] 65 / Product Type: Medicare Advantage /    Care Team Patient Care Team:  Ralf Saunders MD as PCP - General (Family Medicine)  Alok Rowe MD as Consulting Physician (Orthopedic Surgery)    Pharmacy   [id number] - [place], [place] - [address] AT [facility] [address]  [address]  [place] [address]  Phone: [phone] Fax: [phone]     AM-PAC Score   24   Risk Assessment Score 3     Past Medical History:   Diagnosis Date    Diabetes mellitus, type 2     General anesthetics causing adverse effect in therapeutic use 2007    During general anesthesia for back surgery , experienced severe pain, heard people talking and felt like his heart was about to explode.    GERD (gastroesophageal reflux disease)     Hyperlipidemia     Hypothyroidism     Nasal septal deviation        Past Surgical History:   Procedure Laterality Date    BACK SURGERY  2007    artificial disc L4-5    CYSTOSCOPY W/ STONE MANIPULATION  2002    Kidney stones removed    KNEE ARTHROSCOPY W/ DEBRIDEMENT  2000 and 2001    Right knee         Home Enviroment     Living Arrangement: Lives with family  Home Environment: 1-story house/ trailer, tub-shower  Home Safety Concerns: Pets in the home: dogs (1).    DISCHARGE CAREGIVER/SUPPORT SYSTEM     Identified post-op caregiver: Patient has spouse / significant other.  Patient's caregiver(s) will be able to provide physical assistance. Patient will have someone to assist overnight.      Caregiver present at pre-op interview:  No      PRE-OPERATIVE FUNCTIONAL STATUS     Employment: Disabled    Pre-op Functional Status: Patient is independent with mobility/ambulation, transfers, ADL's,  IADL's.    Use of assistive device for ambulation: none  ADL: self care  ADL Limitations: difficulty with walking  Medical Restrictions: Decreased range of motions in extremities    POTENTIAL BARRIERS TO DISCHARGE/POTENTIAL POST-OP COMPLICATIONS      Patient with hx of type II diabetes, and complications with anesthesia (hearing staff, pain, feeling like his heart was going to explode).    DISCHARGE PLAN     Expected LOS of 2 days or less for joint replacement discussed with patient. We also discussed a discharge path of HH for approximately two weeks with a transition to outpatient PT on the third week given no post-op complications.      Patient in agreement with discharge plan: Yes    Discharge to: Discharge home with home health (PT/OT) x2 weeks with transition to outpatient PT     HH:  [facility]     OP PT: [facility]     Home DME: none    Needed DME at D/C: rolling walker, bedside commode and tub transfer bench. Patient o purchase tub transfer bench from retail prior to surgery.     Rx: Per Dr. Rowe at discharge.     Meds to Beds: N/A  Patient expected to discharge on Aspirin 325mg by mouth twice daily for DVT prophylaxis.

## 2020-06-08 NOTE — PRE ADMISSION SCREENING
CJR Risk Assessment Scale    Patient Name: John Corrales  YOB: 1970  MRN: [id number]            RIsk Factor Measure Recommendation Patient Data Scale/Score   BMI >40 Reconsider surgery, weight loss   Estimated body mass index is 32.55 kg/m² as calculated from the following:    Height as of this encounter: 6' (1.829 m).    Weight as of this encounter: 108.9 kg (240 lb).   [] 0 = 1 - 24.9  [] 1 = 25-29.9  [x] 2 = 30-34.9  [] 3 = 35-39.9  [] 4 = 40-44.9  [] 5 = 45-99.9   Hemoglobin AIC (if applicable) >9 Delay surgery until DM under control  Refer for:  · Nutrition Therapy  · Exercise   · Medication    Lab Results   Component Value Date    HGBA1C 6.0 (H) 03/12/2020       Lab Results   Component Value Date     06/08/2020      [] 0 = 4.0-5.6  [x] 1 = 5.7-6.4  [] 2 = 6.5-6.9  [] 3 = 7.0-7.9  [] 4 = 8.0-8.9  [] 5 = 9.0-12   Hemoglobin (Anemia) <9 Delay surgery   Correct anemia Lab Results   Component Value Date    HGB 13.8 (L) 06/08/2020    [] 20 - <9.0                    Albumin <3 Delay surgery &Workup Lab Results   Component Value Date    ALBUMIN 4.3 06/08/2020    [] 20 - <3.0   Smoking Cessation >4 Weeks Delay Surgery  Refer to OP Cessation Class    Former Smoker [] 20 - current smoker                                _____ PPD                    Hx of MI, PE, Arrhythmia, CVA, DVT <30 Days Delay Surgery    N/A [] 20      Infection Variable Delay surgery and re-evaluate   N/A [] 20 - recent/current infection     Depression (PHQ) >10 out of 27 Delay Surgery and re-evaluate  Medication  Counseling              [x] 0     []1     []2     []3      []4      [] 5                    (1-4)      (5-9)  (10-14)  (15-19)   (20-27)     Memory Impairment & Memory loss (Mini-Cog Screening Tool) Advanced dementia and/or Parkinson's Reconsider surgery     [x] 0     []1     []2     []3     []4     [] 5     Physical Conditioning (Modified AM-PAC Per Physical Therapy at [facility]) Unable to ambulate on  day of surgery Delay surgery and re-evaluate  Pre-Rehabilitation   (PT evaluation)       [x]  0   []4       []8     []12        []16     []20       (<20%)   (<40%)   (<60%)   (<80% )    (>80%)     Home Environment/Caregiver support  (Per /Navigator Interview)    Availability of basic services and/or approprate assistance during post-operative period Delay surgery and re-evaluate  Safe home environment  Health   1 week post-surgery  Transportation  availability  Ability to obtain DME/Medications post-op    [x] 0     []1     []2     []3     []4     [] 5  [x] 0     []1     []2     []3     []4     [] 5  [x] 0     []1     []2     []3     []4     [] 5  [x] 0     []1     []2     []3     []4     [] 5         MD Contact: Dr. Rowe Comments:  Total Score:  3

## 2020-06-08 NOTE — DISCHARGE INSTRUCTIONS

## 2020-06-10 LAB — MRSA SPEC QL CULT: NORMAL

## 2020-06-12 ENCOUNTER — ANESTHESIA EVENT (OUTPATIENT)
Dept: SURGERY | Facility: HOSPITAL | Age: 50
End: 2020-06-12
Payer: MEDICARE

## 2020-06-13 ENCOUNTER — LAB VISIT (OUTPATIENT)
Dept: PRIMARY CARE CLINIC | Facility: CLINIC | Age: 50
End: 2020-06-13
Payer: MEDICARE

## 2020-06-13 DIAGNOSIS — M17.11 PRIMARY OSTEOARTHRITIS OF RIGHT KNEE: ICD-10-CM

## 2020-06-13 DIAGNOSIS — Z01.818 PREOP EXAMINATION: ICD-10-CM

## 2020-06-13 PROCEDURE — U0003 INFECTIOUS AGENT DETECTION BY NUCLEIC ACID (DNA OR RNA); SEVERE ACUTE RESPIRATORY SYNDROME CORONAVIRUS 2 (SARS-COV-2) (CORONAVIRUS DISEASE [COVID-19]), AMPLIFIED PROBE TECHNIQUE, MAKING USE OF HIGH THROUGHPUT TECHNOLOGIES AS DESCRIBED BY CMS-2020-01-R: HCPCS

## 2020-06-15 ENCOUNTER — ANESTHESIA (OUTPATIENT)
Dept: SURGERY | Facility: HOSPITAL | Age: 50
End: 2020-06-15
Payer: MEDICARE

## 2020-06-15 ENCOUNTER — HOSPITAL ENCOUNTER (OUTPATIENT)
Facility: HOSPITAL | Age: 50
Discharge: HOME HEALTH | End: 2020-06-16
Attending: ORTHOPAEDIC SURGERY | Admitting: ORTHOPAEDIC SURGERY
Payer: MEDICARE

## 2020-06-15 DIAGNOSIS — M17.11 PRIMARY OSTEOARTHRITIS OF RIGHT KNEE: Primary | ICD-10-CM

## 2020-06-15 DIAGNOSIS — I10 HYPERTENSION, UNSPECIFIED TYPE: ICD-10-CM

## 2020-06-15 DIAGNOSIS — Z01.818 PREOP EXAMINATION: ICD-10-CM

## 2020-06-15 PROBLEM — E11.9 TYPE 2 DIABETES MELLITUS WITHOUT COMPLICATION, WITHOUT LONG-TERM CURRENT USE OF INSULIN: Status: ACTIVE | Noted: 2019-09-16

## 2020-06-15 LAB
POCT GLUCOSE: 140 MG/DL (ref 70–110)
POCT GLUCOSE: 144 MG/DL (ref 70–110)
POCT GLUCOSE: 148 MG/DL (ref 70–110)
SARS-COV-2 RDRP RESP QL NAA+PROBE: NEGATIVE
SARS-COV-2 RNA RESP QL NAA+PROBE: NOT DETECTED

## 2020-06-15 PROCEDURE — 63600175 PHARM REV CODE 636 W HCPCS: Performed by: ORTHOPAEDIC SURGERY

## 2020-06-15 PROCEDURE — C1713 ANCHOR/SCREW BN/BN,TIS/BN: HCPCS | Performed by: ORTHOPAEDIC SURGERY

## 2020-06-15 PROCEDURE — 27200750 HC INSULATED NEEDLE/ STIMUPLEX: Performed by: ANESTHESIOLOGY

## 2020-06-15 PROCEDURE — 36000710: Performed by: ORTHOPAEDIC SURGERY

## 2020-06-15 PROCEDURE — 37000008 HC ANESTHESIA 1ST 15 MINUTES: Performed by: ORTHOPAEDIC SURGERY

## 2020-06-15 PROCEDURE — 99900035 HC TECH TIME PER 15 MIN (STAT)

## 2020-06-15 PROCEDURE — 97116 GAIT TRAINING THERAPY: CPT

## 2020-06-15 PROCEDURE — U0002 COVID-19 LAB TEST NON-CDC: HCPCS

## 2020-06-15 PROCEDURE — D9220A PRA ANESTHESIA: Mod: CRNA,,, | Performed by: NURSE ANESTHETIST, CERTIFIED REGISTERED

## 2020-06-15 PROCEDURE — 64447 PR NERVE BLOCK INJ, ANES/STEROID, FEMORAL, INCL IMAG GUIDANCE: ICD-10-PCS | Mod: 59,RT,, | Performed by: ANESTHESIOLOGY

## 2020-06-15 PROCEDURE — D9220A PRA ANESTHESIA: Mod: ANES,,, | Performed by: ANESTHESIOLOGY

## 2020-06-15 PROCEDURE — D9220A PRA ANESTHESIA: ICD-10-PCS | Mod: CRNA,,, | Performed by: NURSE ANESTHETIST, CERTIFIED REGISTERED

## 2020-06-15 PROCEDURE — D9220A PRA ANESTHESIA: ICD-10-PCS | Mod: ANES,,, | Performed by: ANESTHESIOLOGY

## 2020-06-15 PROCEDURE — 76942 ECHO GUIDE FOR BIOPSY: CPT | Mod: 26,,, | Performed by: ANESTHESIOLOGY

## 2020-06-15 PROCEDURE — 25000003 PHARM REV CODE 250: Performed by: NURSE PRACTITIONER

## 2020-06-15 PROCEDURE — 64447 NJX AA&/STRD FEMORAL NRV IMG: CPT | Mod: RT,59 | Performed by: ANESTHESIOLOGY

## 2020-06-15 PROCEDURE — S0020 INJECTION, BUPIVICAINE HYDRO: HCPCS | Performed by: ANESTHESIOLOGY

## 2020-06-15 PROCEDURE — 63600175 PHARM REV CODE 636 W HCPCS: Performed by: ANESTHESIOLOGY

## 2020-06-15 PROCEDURE — 27200688 HC TRAY, SPINAL-HYPER/ ISOBARIC: Performed by: ANESTHESIOLOGY

## 2020-06-15 PROCEDURE — C9290 INJ, BUPIVACAINE LIPOSOME: HCPCS | Performed by: ANESTHESIOLOGY

## 2020-06-15 PROCEDURE — 97161 PT EVAL LOW COMPLEX 20 MIN: CPT

## 2020-06-15 PROCEDURE — 25000003 PHARM REV CODE 250: Performed by: ORTHOPAEDIC SURGERY

## 2020-06-15 PROCEDURE — 27201423 OPTIME MED/SURG SUP & DEVICES STERILE SUPPLY: Performed by: ORTHOPAEDIC SURGERY

## 2020-06-15 PROCEDURE — 64447 NJX AA&/STRD FEMORAL NRV IMG: CPT | Mod: 59,RT,, | Performed by: ANESTHESIOLOGY

## 2020-06-15 PROCEDURE — 25000003 PHARM REV CODE 250: Performed by: NURSE ANESTHETIST, CERTIFIED REGISTERED

## 2020-06-15 PROCEDURE — 25000003 PHARM REV CODE 250: Performed by: ANESTHESIOLOGY

## 2020-06-15 PROCEDURE — 37000009 HC ANESTHESIA EA ADD 15 MINS: Performed by: ORTHOPAEDIC SURGERY

## 2020-06-15 PROCEDURE — 63600175 PHARM REV CODE 636 W HCPCS: Performed by: NURSE ANESTHETIST, CERTIFIED REGISTERED

## 2020-06-15 PROCEDURE — 71000033 HC RECOVERY, INTIAL HOUR: Performed by: ORTHOPAEDIC SURGERY

## 2020-06-15 PROCEDURE — 36000711: Performed by: ORTHOPAEDIC SURGERY

## 2020-06-15 PROCEDURE — 94799 UNLISTED PULMONARY SVC/PX: CPT

## 2020-06-15 PROCEDURE — 71000039 HC RECOVERY, EACH ADD'L HOUR: Performed by: ORTHOPAEDIC SURGERY

## 2020-06-15 PROCEDURE — 94761 N-INVAS EAR/PLS OXIMETRY MLT: CPT

## 2020-06-15 PROCEDURE — 76942 PR U/S GUIDANCE FOR NEEDLE GUIDANCE: ICD-10-PCS | Mod: 26,,, | Performed by: ANESTHESIOLOGY

## 2020-06-15 PROCEDURE — C1776 JOINT DEVICE (IMPLANTABLE): HCPCS | Performed by: ORTHOPAEDIC SURGERY

## 2020-06-15 PROCEDURE — 63600175 PHARM REV CODE 636 W HCPCS

## 2020-06-15 PROCEDURE — S5010 5% DEXTROSE AND 0.45% SALINE: HCPCS | Performed by: ORTHOPAEDIC SURGERY

## 2020-06-15 DEVICE — IMPLANTABLE DEVICE: Type: IMPLANTABLE DEVICE | Site: KNEE | Status: FUNCTIONAL

## 2020-06-15 DEVICE — CEMENT BONE ORTHOSET 1 40 GRAM: Type: IMPLANTABLE DEVICE | Site: KNEE | Status: FUNCTIONAL

## 2020-06-15 DEVICE — COMPONENT PATELLA TRI-PEG 38MM: Type: IMPLANTABLE DEVICE | Site: KNEE | Status: FUNCTIONAL

## 2020-06-15 DEVICE — COMPONENT FEM SZ 6 CS/CR PRI R: Type: IMPLANTABLE DEVICE | Site: KNEE | Status: FUNCTIONAL

## 2020-06-15 RX ORDER — LANOLIN ALCOHOL/MO/W.PET/CERES
800 CREAM (GRAM) TOPICAL
Status: DISCONTINUED | OUTPATIENT
Start: 2020-06-15 | End: 2020-06-16 | Stop reason: HOSPADM

## 2020-06-15 RX ORDER — ZOLPIDEM TARTRATE 5 MG/1
5 TABLET ORAL NIGHTLY PRN
Status: DISCONTINUED | OUTPATIENT
Start: 2020-06-15 | End: 2020-06-16 | Stop reason: HOSPADM

## 2020-06-15 RX ORDER — POTASSIUM CHLORIDE 20 MEQ/15ML
60 SOLUTION ORAL
Status: DISCONTINUED | OUTPATIENT
Start: 2020-06-15 | End: 2020-06-16 | Stop reason: HOSPADM

## 2020-06-15 RX ORDER — MIDAZOLAM HYDROCHLORIDE 1 MG/ML
INJECTION, SOLUTION INTRAMUSCULAR; INTRAVENOUS
Status: DISCONTINUED | OUTPATIENT
Start: 2020-06-15 | End: 2020-06-15

## 2020-06-15 RX ORDER — LIDOCAINE HYDROCHLORIDE 10 MG/ML
1 INJECTION, SOLUTION EPIDURAL; INFILTRATION; INTRACAUDAL; PERINEURAL ONCE
Status: COMPLETED | OUTPATIENT
Start: 2020-06-15 | End: 2020-06-15

## 2020-06-15 RX ORDER — TRANEXAMIC ACID 100 MG/ML
INJECTION, SOLUTION INTRAVENOUS
Status: DISCONTINUED | OUTPATIENT
Start: 2020-06-15 | End: 2020-06-15

## 2020-06-15 RX ORDER — OXYCODONE HYDROCHLORIDE 10 MG/1
10 TABLET ORAL EVERY 4 HOURS PRN
Status: DISCONTINUED | OUTPATIENT
Start: 2020-06-15 | End: 2020-06-16 | Stop reason: HOSPADM

## 2020-06-15 RX ORDER — POLYETHYLENE GLYCOL 3350 17 G/17G
17 POWDER, FOR SOLUTION ORAL DAILY
Status: DISCONTINUED | OUTPATIENT
Start: 2020-06-15 | End: 2020-06-16 | Stop reason: HOSPADM

## 2020-06-15 RX ORDER — ONDANSETRON 2 MG/ML
8 INJECTION INTRAMUSCULAR; INTRAVENOUS EVERY 8 HOURS PRN
Status: DISCONTINUED | OUTPATIENT
Start: 2020-06-15 | End: 2020-06-15

## 2020-06-15 RX ORDER — ONDANSETRON 2 MG/ML
INJECTION INTRAMUSCULAR; INTRAVENOUS
Status: DISCONTINUED | OUTPATIENT
Start: 2020-06-15 | End: 2020-06-15

## 2020-06-15 RX ORDER — CELECOXIB 100 MG/1
200 CAPSULE ORAL ONCE
Status: DISCONTINUED | OUTPATIENT
Start: 2020-06-15 | End: 2020-06-15

## 2020-06-15 RX ORDER — DEXTROSE MONOHYDRATE AND SODIUM CHLORIDE 5; .45 G/100ML; G/100ML
INJECTION, SOLUTION INTRAVENOUS CONTINUOUS
Status: DISCONTINUED | OUTPATIENT
Start: 2020-06-15 | End: 2020-06-16

## 2020-06-15 RX ORDER — NAPROXEN SODIUM 220 MG/1
325 TABLET, FILM COATED ORAL 2 TIMES DAILY
Status: CANCELLED | OUTPATIENT
Start: 2020-06-15

## 2020-06-15 RX ORDER — GLUCAGON 1 MG
1 KIT INJECTION
Status: DISCONTINUED | OUTPATIENT
Start: 2020-06-15 | End: 2020-06-16 | Stop reason: HOSPADM

## 2020-06-15 RX ORDER — BUPIVACAINE HYDROCHLORIDE 5 MG/ML
INJECTION, SOLUTION EPIDURAL; INTRACAUDAL
Status: DISCONTINUED | OUTPATIENT
Start: 2020-06-15 | End: 2020-06-15

## 2020-06-15 RX ORDER — POTASSIUM CHLORIDE 20 MEQ/15ML
40 SOLUTION ORAL
Status: DISCONTINUED | OUTPATIENT
Start: 2020-06-15 | End: 2020-06-16 | Stop reason: HOSPADM

## 2020-06-15 RX ORDER — CEFAZOLIN SODIUM 2 G/50ML
2 SOLUTION INTRAVENOUS
Status: COMPLETED | OUTPATIENT
Start: 2020-06-15 | End: 2020-06-15

## 2020-06-15 RX ORDER — PREGABALIN 75 MG/1
75 CAPSULE ORAL ONCE
Status: COMPLETED | OUTPATIENT
Start: 2020-06-15 | End: 2020-06-15

## 2020-06-15 RX ORDER — IBUPROFEN 200 MG
16 TABLET ORAL
Status: DISCONTINUED | OUTPATIENT
Start: 2020-06-15 | End: 2020-06-16 | Stop reason: HOSPADM

## 2020-06-15 RX ORDER — SODIUM CHLORIDE 0.9 % (FLUSH) 0.9 %
10 SYRINGE (ML) INJECTION
Status: DISCONTINUED | OUTPATIENT
Start: 2020-06-15 | End: 2020-06-16 | Stop reason: HOSPADM

## 2020-06-15 RX ORDER — DEXAMETHASONE SODIUM PHOSPHATE 4 MG/ML
INJECTION, SOLUTION INTRA-ARTICULAR; INTRALESIONAL; INTRAMUSCULAR; INTRAVENOUS; SOFT TISSUE
Status: DISCONTINUED | OUTPATIENT
Start: 2020-06-15 | End: 2020-06-15

## 2020-06-15 RX ORDER — FAMOTIDINE 20 MG/1
20 TABLET, FILM COATED ORAL 2 TIMES DAILY
Status: DISCONTINUED | OUTPATIENT
Start: 2020-06-15 | End: 2020-06-16

## 2020-06-15 RX ORDER — BISACODYL 10 MG
10 SUPPOSITORY, RECTAL RECTAL DAILY
Status: DISCONTINUED | OUTPATIENT
Start: 2020-06-15 | End: 2020-06-16 | Stop reason: HOSPADM

## 2020-06-15 RX ORDER — PROPOFOL 10 MG/ML
VIAL (ML) INTRAVENOUS CONTINUOUS PRN
Status: DISCONTINUED | OUTPATIENT
Start: 2020-06-15 | End: 2020-06-15

## 2020-06-15 RX ORDER — MORPHINE SULFATE 2 MG/ML
2 INJECTION, SOLUTION INTRAMUSCULAR; INTRAVENOUS EVERY 4 HOURS PRN
Status: DISCONTINUED | OUTPATIENT
Start: 2020-06-15 | End: 2020-06-16 | Stop reason: HOSPADM

## 2020-06-15 RX ORDER — CELECOXIB 100 MG/1
400 CAPSULE ORAL ONCE
Status: COMPLETED | OUTPATIENT
Start: 2020-06-15 | End: 2020-06-15

## 2020-06-15 RX ORDER — CELECOXIB 100 MG/1
100 CAPSULE ORAL 2 TIMES DAILY
Status: DISCONTINUED | OUTPATIENT
Start: 2020-06-16 | End: 2020-06-15

## 2020-06-15 RX ORDER — ATORVASTATIN CALCIUM 20 MG/1
20 TABLET, FILM COATED ORAL NIGHTLY
Status: DISCONTINUED | OUTPATIENT
Start: 2020-06-15 | End: 2020-06-16 | Stop reason: HOSPADM

## 2020-06-15 RX ORDER — CELECOXIB 100 MG/1
200 CAPSULE ORAL 2 TIMES DAILY
Status: DISCONTINUED | OUTPATIENT
Start: 2020-06-16 | End: 2020-06-16 | Stop reason: HOSPADM

## 2020-06-15 RX ORDER — OXYCODONE HYDROCHLORIDE 5 MG/1
5 TABLET ORAL
Status: DISCONTINUED | OUTPATIENT
Start: 2020-06-15 | End: 2020-06-15 | Stop reason: HOSPADM

## 2020-06-15 RX ORDER — FENTANYL CITRATE 50 UG/ML
25 INJECTION, SOLUTION INTRAMUSCULAR; INTRAVENOUS EVERY 5 MIN PRN
Status: DISCONTINUED | OUTPATIENT
Start: 2020-06-15 | End: 2020-06-15 | Stop reason: HOSPADM

## 2020-06-15 RX ORDER — IBUPROFEN 200 MG
24 TABLET ORAL
Status: DISCONTINUED | OUTPATIENT
Start: 2020-06-15 | End: 2020-06-16 | Stop reason: HOSPADM

## 2020-06-15 RX ORDER — ASPIRIN 325 MG
325 TABLET ORAL 2 TIMES DAILY
Status: DISCONTINUED | OUTPATIENT
Start: 2020-06-15 | End: 2020-06-16 | Stop reason: HOSPADM

## 2020-06-15 RX ORDER — OXYCODONE HYDROCHLORIDE 10 MG/1
10 TABLET ORAL
Status: DISCONTINUED | OUTPATIENT
Start: 2020-06-15 | End: 2020-06-16 | Stop reason: HOSPADM

## 2020-06-15 RX ORDER — ACETAMINOPHEN 10 MG/ML
INJECTION, SOLUTION INTRAVENOUS
Status: DISCONTINUED | OUTPATIENT
Start: 2020-06-15 | End: 2020-06-15

## 2020-06-15 RX ORDER — FENTANYL CITRATE 50 UG/ML
INJECTION, SOLUTION INTRAMUSCULAR; INTRAVENOUS
Status: DISCONTINUED | OUTPATIENT
Start: 2020-06-15 | End: 2020-06-15

## 2020-06-15 RX ORDER — LISINOPRIL 10 MG/1
10 TABLET ORAL DAILY
Status: DISCONTINUED | OUTPATIENT
Start: 2020-06-16 | End: 2020-06-16

## 2020-06-15 RX ORDER — ACETAMINOPHEN 500 MG
1000 TABLET ORAL EVERY 6 HOURS PRN
Status: DISCONTINUED | OUTPATIENT
Start: 2020-06-15 | End: 2020-06-16 | Stop reason: HOSPADM

## 2020-06-15 RX ORDER — INSULIN ASPART 100 [IU]/ML
1-10 INJECTION, SOLUTION INTRAVENOUS; SUBCUTANEOUS
Status: DISCONTINUED | OUTPATIENT
Start: 2020-06-15 | End: 2020-06-16 | Stop reason: HOSPADM

## 2020-06-15 RX ORDER — OXYCODONE AND ACETAMINOPHEN 7.5; 325 MG/1; MG/1
1 TABLET ORAL EVERY 4 HOURS PRN
Qty: 28 TABLET | Refills: 0 | Status: SHIPPED | OUTPATIENT
Start: 2020-06-15 | End: 2020-06-19 | Stop reason: SDUPTHER

## 2020-06-15 RX ORDER — ACETAMINOPHEN 325 MG/1
650 TABLET ORAL EVERY 4 HOURS
Status: DISCONTINUED | OUTPATIENT
Start: 2020-06-15 | End: 2020-06-16 | Stop reason: HOSPADM

## 2020-06-15 RX ORDER — ONDANSETRON 2 MG/ML
4 INJECTION INTRAMUSCULAR; INTRAVENOUS EVERY 12 HOURS PRN
Status: DISCONTINUED | OUTPATIENT
Start: 2020-06-15 | End: 2020-06-15

## 2020-06-15 RX ORDER — HYDROMORPHONE HYDROCHLORIDE 2 MG/ML
0.2 INJECTION, SOLUTION INTRAMUSCULAR; INTRAVENOUS; SUBCUTANEOUS EVERY 5 MIN PRN
Status: DISCONTINUED | OUTPATIENT
Start: 2020-06-15 | End: 2020-06-15 | Stop reason: HOSPADM

## 2020-06-15 RX ORDER — SODIUM CHLORIDE 0.9 % (FLUSH) 0.9 %
5 SYRINGE (ML) INJECTION
Status: DISCONTINUED | OUTPATIENT
Start: 2020-06-15 | End: 2020-06-16 | Stop reason: HOSPADM

## 2020-06-15 RX ORDER — OXYCODONE HYDROCHLORIDE 5 MG/1
5 TABLET ORAL
Status: DISCONTINUED | OUTPATIENT
Start: 2020-06-15 | End: 2020-06-16 | Stop reason: HOSPADM

## 2020-06-15 RX ORDER — OXYCODONE HCL 10 MG/1
10 TABLET, FILM COATED, EXTENDED RELEASE ORAL ONCE
Status: COMPLETED | OUTPATIENT
Start: 2020-06-15 | End: 2020-06-15

## 2020-06-15 RX ORDER — CEFAZOLIN SODIUM 2 G/50ML
2 SOLUTION INTRAVENOUS
Status: COMPLETED | OUTPATIENT
Start: 2020-06-15 | End: 2020-06-16

## 2020-06-15 RX ORDER — PANTOPRAZOLE SODIUM 40 MG/1
40 TABLET, DELAYED RELEASE ORAL DAILY
Status: DISCONTINUED | OUTPATIENT
Start: 2020-06-15 | End: 2020-06-16 | Stop reason: HOSPADM

## 2020-06-15 RX ORDER — KETAMINE HYDROCHLORIDE 100 MG/ML
INJECTION, SOLUTION INTRAMUSCULAR; INTRAVENOUS
Status: DISCONTINUED | OUTPATIENT
Start: 2020-06-15 | End: 2020-06-15

## 2020-06-15 RX ORDER — ONDANSETRON 8 MG/1
8 TABLET, ORALLY DISINTEGRATING ORAL EVERY 8 HOURS PRN
Status: DISCONTINUED | OUTPATIENT
Start: 2020-06-15 | End: 2020-06-16 | Stop reason: HOSPADM

## 2020-06-15 RX ADMIN — DEXTROSE AND SODIUM CHLORIDE: 5; .45 INJECTION, SOLUTION INTRAVENOUS at 10:06

## 2020-06-15 RX ADMIN — BUPIVACAINE 20 ML: 13.3 INJECTION, SUSPENSION, LIPOSOMAL INFILTRATION at 07:06

## 2020-06-15 RX ADMIN — CELECOXIB 400 MG: 100 CAPSULE ORAL at 06:06

## 2020-06-15 RX ADMIN — ASPIRIN 325 MG ORAL TABLET 325 MG: 325 PILL ORAL at 08:06

## 2020-06-15 RX ADMIN — CEFAZOLIN SODIUM 2 G: 2 SOLUTION INTRAVENOUS at 07:06

## 2020-06-15 RX ADMIN — DEXAMETHASONE SODIUM PHOSPHATE 4 MG: 4 INJECTION, SOLUTION INTRAMUSCULAR; INTRAVENOUS at 09:06

## 2020-06-15 RX ADMIN — FENTANYL CITRATE 50 MCG: 50 INJECTION, SOLUTION INTRAMUSCULAR; INTRAVENOUS at 07:06

## 2020-06-15 RX ADMIN — CEFAZOLIN SODIUM 2 G: 2 SOLUTION INTRAVENOUS at 03:06

## 2020-06-15 RX ADMIN — TRANEXAMIC ACID 1000 MG: 100 INJECTION, SOLUTION INTRAVENOUS at 08:06

## 2020-06-15 RX ADMIN — ACETAMINOPHEN 1000 MG: 10 INJECTION, SOLUTION INTRAVENOUS at 08:06

## 2020-06-15 RX ADMIN — MORPHINE SULFATE 2 MG: 2 INJECTION, SOLUTION INTRAMUSCULAR; INTRAVENOUS at 03:06

## 2020-06-15 RX ADMIN — OXYCODONE HYDROCHLORIDE 10 MG: 10 TABLET, FILM COATED, EXTENDED RELEASE ORAL at 06:06

## 2020-06-15 RX ADMIN — PANTOPRAZOLE SODIUM 40 MG: 40 TABLET, DELAYED RELEASE ORAL at 11:06

## 2020-06-15 RX ADMIN — OXYCODONE HYDROCHLORIDE 10 MG: 10 TABLET ORAL at 11:06

## 2020-06-15 RX ADMIN — KETAMINE HYDROCHLORIDE 15 MG: 100 INJECTION, SOLUTION, CONCENTRATE INTRAMUSCULAR; INTRAVENOUS at 07:06

## 2020-06-15 RX ADMIN — SODIUM CHLORIDE, SODIUM GLUCONATE, SODIUM ACETATE, POTASSIUM CHLORIDE, MAGNESIUM CHLORIDE, SODIUM PHOSPHATE, DIBASIC, AND POTASSIUM PHOSPHATE: .53; .5; .37; .037; .03; .012; .00082 INJECTION, SOLUTION INTRAVENOUS at 06:06

## 2020-06-15 RX ADMIN — OXYCODONE HYDROCHLORIDE 15 MG: 10 TABLET ORAL at 05:06

## 2020-06-15 RX ADMIN — FAMOTIDINE 20 MG: 20 TABLET, FILM COATED ORAL at 08:06

## 2020-06-15 RX ADMIN — ONDANSETRON 4 MG: 2 INJECTION, SOLUTION INTRAMUSCULAR; INTRAVENOUS at 08:06

## 2020-06-15 RX ADMIN — SODIUM CHLORIDE, SODIUM GLUCONATE, SODIUM ACETATE, POTASSIUM CHLORIDE, MAGNESIUM CHLORIDE, SODIUM PHOSPHATE, DIBASIC, AND POTASSIUM PHOSPHATE: .53; .5; .37; .037; .03; .012; .00082 INJECTION, SOLUTION INTRAVENOUS at 08:06

## 2020-06-15 RX ADMIN — KETAMINE HYDROCHLORIDE 10 MG: 100 INJECTION, SOLUTION, CONCENTRATE INTRAMUSCULAR; INTRAVENOUS at 07:06

## 2020-06-15 RX ADMIN — MIDAZOLAM 2 MG: 1 INJECTION INTRAMUSCULAR; INTRAVENOUS at 07:06

## 2020-06-15 RX ADMIN — ATORVASTATIN CALCIUM 20 MG: 20 TABLET, FILM COATED ORAL at 08:06

## 2020-06-15 RX ADMIN — LIDOCAINE HYDROCHLORIDE: 10 INJECTION, SOLUTION EPIDURAL; INFILTRATION; INTRACAUDAL; PERINEURAL at 06:06

## 2020-06-15 RX ADMIN — PREGABALIN 75 MG: 75 CAPSULE ORAL at 06:06

## 2020-06-15 RX ADMIN — ACETAMINOPHEN 650 MG: 325 TABLET ORAL at 11:06

## 2020-06-15 RX ADMIN — ACETAMINOPHEN 650 MG: 325 TABLET ORAL at 05:06

## 2020-06-15 RX ADMIN — BUPIVACAINE HYDROCHLORIDE 10 ML: 5 INJECTION, SOLUTION EPIDURAL; INTRACAUDAL; PERINEURAL at 07:06

## 2020-06-15 RX ADMIN — PROPOFOL 50 MCG/KG/MIN: 10 INJECTION, EMULSION INTRAVENOUS at 07:06

## 2020-06-15 RX ADMIN — OXYCODONE HYDROCHLORIDE 15 MG: 10 TABLET ORAL at 08:06

## 2020-06-15 RX ADMIN — ACETAMINOPHEN 650 MG: 325 TABLET ORAL at 09:06

## 2020-06-15 NOTE — PLAN OF CARE
POD 0, right TKA. Remains free from fall or injury. Ambulated well with PT. Pain controlled with PO and IV meds. Neurovascular checks WDL. Cryo in place. IV antibiotics administered per order. Afebrile and VS stable. Updated on POC. Call light in reach. Will continue to monitor.

## 2020-06-15 NOTE — ASSESSMENT & PLAN NOTE
Patient has chronic hypothyroidism. TFTs reviewed-   Lab Results   Component Value Date    TSH 4.57 (H) 03/12/2020   . Will continue chronic levothyroxine and adjust for and clinical changes.

## 2020-06-15 NOTE — ANESTHESIA PROCEDURE NOTES
Peripheral Block    Patient location during procedure: pre-op   Block not for primary anesthetic.  Reason for block: at surgeon's request and post-op pain management   Post-op Pain Location: right knee  Start time: 6/15/2020 7:21 AM  Timeout: 6/15/2020 7:21 AM   End time: 6/15/2020 7:26 AM    Staffing  Authorizing Provider: Augustine Dallas MD  Performing Provider: Augustine Dallas MD    Preanesthetic Checklist  Completed: patient identified, site marked, surgical consent, pre-op evaluation, timeout performed, IV checked, risks and benefits discussed and monitors and equipment checked  Peripheral Block  Patient position: supine  Prep: ChloraPrep  Patient monitoring: heart rate, cardiac monitor, continuous pulse ox, continuous capnometry and frequent blood pressure checks  Block type: adductor canal  Laterality: right  Injection technique: single shot  Needle  Needle type: Stimuplex   Needle gauge: 21 G  Needle length: 4 in  Needle localization: anatomical landmarks and ultrasound guidance   -ultrasound image captured on disc.  Assessment  Injection assessment: negative aspiration, negative parasthesia and local visualized surrounding nerve  Paresthesia pain: none  Heart rate change: no  Slow fractionated injection: yes  Additional Notes  VSS.  [facility] RN monitoring vitals throughout procedure.  Patient tolerated procedure well.Exparel 20ml, 266mg and Bup 0.5%,10ml - no complication

## 2020-06-15 NOTE — HPI
John Corrales is a 50-year-old male with PMHx significant for DM2, HTN, HLD, thyroid disease, and arthritis.  Pt is S/P right TKA with Dr. Rowe.  Preoperatively, he was evaluated by Dr. Rowe for chronic right knee pain. He has had multiple rounds of steroid injections.  He has had viscosupplementation.  He has been on narcotics and nonsteroidals.  Postoperatively, he denies any pain and does endorse expected post anesthesia numbness.  He denies any chest pain, SOB, nausea, vomiting, fever, chills, or other complaints at this time.  Pt was placed in outpatient recovery under the service of hospital Medicine for additional evaluation and management postoperatively.  Other pertinent medical Hx as below:

## 2020-06-15 NOTE — OP NOTE
[facility]  Surgery Department  Operative Note    SUMMARY     Date of Procedure: 6/15/2020     Procedure:  Right total knee arthroplasty    Surgeon(s) and Role:     * Alok Rowe MD - Primary    Assisting Surgeon: Alfa    Pre-Operative Diagnosis: Primary osteoarthritis of right knee [M17.11]  Preop examination [Z01.818]    Post-Operative Diagnosis: Post-Op Diagnosis Codes:     * Primary osteoarthritis of right knee [M17.11]     * Preop examination [Z01.818]    Anesthesia: Spinal/Epidural    Intraoperative Findings:  Bone-on-bone arthritis right knee    Description of the Findings of the Procedure:  Patient was placed on the operating table in supine position.  The right knee was prepped and draped in the usual sterile manner for surgery.  An anterior approach was undertaken to the knee.  It was carried down sharply through the skin.  The medial parapatellar tissues were release the patella was taken laterally.  The medial tibial plateau was taken down minimally in the knee was flexed to 90°.  There was tremendous bone-on-bone arthritis with complete loss of all the cartilage in the medial side.  The patellofemoral joint had no cartilage on the trochlea.  The lateral compartment had grade 3 chondromalacia throughout.  At this point a drill was used to gain access to the femur and an intramedullary rosalia was inserted up the femoral canal.  A cutting jig was pinned into position such that would make a 9mm cut intake a 5 degree valgus cut.  It was checked secondarily in the cut was made.  We now sized the femur.  The size 7 was just too big.  The size 6 was slightly small but it was closer to a 6 and a 7 and so we went with the 6.  Size 6 cutting jig was pinned into position the cuts were made.  We now sublux the tibia anteriorly.  A cutting jig was pinned into position such that it would make a neutral cut intake 2 mm of medial bone.  It was checked secondarily in the cut was made a placed a  femoral trial and tibial trial.  The construct with symmetric flexion extension gaps.  The patella was cut and a button was placed.  The construct trialed beautifully with no lift off.  We pulsed and irrigated and dried the bony surfaces.  We mixed up bone cement and cemented 1st the tibia next the femur and finally the patella.  All excess cement was removed at the time of see mentation in the construct was held in full extension into all the cement hardened.  We copiously irrigated.  The actual spacer was tapped into position.  We closed the extensor mechanism with a combination of 2.  FiberWire.  We closed the subcu with 2 0 Vicryl and the skin with 4-0 Monocryl.  Sterile dressings were applied and the patient was taken to recovery room in stable condition       Complications: No    Estimated Blood Loss (EBL): * No values recorded between 6/15/2020  8:19 AM and 6/15/2020  8:59 AM *           Implants:   Implant Name Type Inv. Item Serial No.  Lot No. LRB No. Used Action   CEMENT BONE ORTHOSET 1 40 GRAM - [id number]  CEMENT BONE ORTHOSET 1 40 GRAM  [employer]  Right 2 Implanted   COMPONENT FEM SZ 6 CS/CR FREDERICK R - [id number]  COMPONENT FEM SZ 6 CS/CR FREDERICK R  [employer]  Right 1 Implanted   [employer] EVOLUTION MP TIBIAL BASE 6+ RIGHT PRIMARY CEMENTED      Right 1 Implanted   COMPONENT PATELLA TRI-PEG 38MM - [id number]  COMPONENT PATELLA TRI-PEG 38MM  [employer]  Right 1 Implanted       Specimens:   Specimen (12h ago, onward)    None                  Condition: Good    Disposition: PACU - hemodynamically stable.

## 2020-06-15 NOTE — ANESTHESIA POSTPROCEDURE EVALUATION
Anesthesia Post Evaluation    Patient: John Corrales    Procedure(s) Performed: Procedure(s) (LRB):  ARTHROPLASTY, KNEE (Right)    Final Anesthesia Type: spinal    Patient location during evaluation: PACU  Patient participation: Yes- Able to Participate  Level of consciousness: sedated and awake  Post-procedure vital signs: reviewed and stable  Pain management: adequate  Airway patency: patent    PONV status at discharge: No PONV  Anesthetic complications: no      Cardiovascular status: hypertensive and blood pressure returned to baseline  Respiratory status: spontaneous ventilation  Hydration status: euvolemic  Follow-up not needed.          Vitals Value Taken Time   /78 06/15/20 1519   Temp 36.7 °C (98 °F) 06/15/20 1519   Pulse 69 06/15/20 1519   Resp 18 06/15/20 1519   SpO2 98 % 06/15/20 1519         Event Time   Out of Recovery 11:00:02         Pain/Sukumar Score: Pain Rating Prior to Med Admin: 8 (6/15/2020  3:24 PM)  Pain Rating Post Med Admin: 4 (6/15/2020 12:57 PM)  Sukumar Score: 10 (6/15/2020 10:30 AM)

## 2020-06-15 NOTE — ASSESSMENT & PLAN NOTE
POD 0 s/p right TKA with Dr. Rowe  Continue to follow Orthopedic recommendations.  Needs aggressive incentive spirometry.  Follow hemoglobin and hematocrit closely.  Pain control with IV narcotics and antiemetics as needed.  Physical therapy as per Orthopedics protocol with fall precautions.  ASA 325mg PO BID for DVT prophylaxis per orthopedic recommendations.

## 2020-06-15 NOTE — H&P
CC/Indication for Procedure: 50 y.o. male with Primary osteoarthritis of right knee [M17.11]  Preop examination [Z01.818]  Primary osteoarthritis of right knee [M17.11].    Patient scheduled for DC TOTAL KNEE ARTHROPLASTY [[id number]] (ARTHROPLASTY, KNEE).    Past Medical History:   Diagnosis Date    Diabetes mellitus, type 2     General anesthetics causing adverse effect in therapeutic use 2007    During general anesthesia for back surgery , experienced severe pain, heard people talking and felt like his heart was about to explode.    GERD (gastroesophageal reflux disease)     Hyperlipidemia     Hypothyroidism     Nasal septal deviation      Past Surgical History:   Procedure Laterality Date    BACK SURGERY  2007    artificial disc L4-5    CYSTOSCOPY W/ STONE MANIPULATION  2002    Kidney stones removed    KNEE ARTHROSCOPY W/ DEBRIDEMENT  2000 and 2001    Right knee     Family History   Problem Relation Age of Onset    Diabetes Father     Heart disease Father     Cancer Father         prostate     Social History     Socioeconomic History    Marital status:      Spouse name: Not on file    Number of children: Not on file    Years of education: Not on file    Highest education level: Not on file   Occupational History    Not on file   Social Needs    Financial resource strain: Not on file    Food insecurity     Worry: Not on file     Inability: Not on file    Transportation needs     Medical: Not on file     Non-medical: Not on file   Tobacco Use    Smoking status: Former Smoker    Smokeless tobacco: Never Used   Substance and Sexual Activity    Alcohol use: No    Drug use: No    Sexual activity: Not on file   Lifestyle    Physical activity     Days per week: Not on file     Minutes per session: Not on file    Stress: Not on file   Relationships    Social connections     Talks on phone: Not on file     Gets together: Not on file     Attends Holiness service: Not on file     Active member  of club or organization: Not on file     Attends meetings of clubs or organizations: Not on file     Relationship status: Not on file   Other Topics Concern    Not on file   Social History Narrative    Not on file       Review of patient's allergies indicates:   Allergen Reactions    Demerol [meperidine] Itching         Current Facility-Administered Medications:     cefazolin (ANCEF) 2 gram in dextrose 5% 50 mL IVPB (premix), 2 g, Intravenous, On Call Procedure, Alok Rowe MD    electrolyte-S (ISOLYTE), , Intravenous, Continuous, Titi Fraga MD, Last Rate: 10 mL/hr at 06/15/20 0625    sodium chloride 0.9% bolus 1,000 mL, 1,000 mL, Intravenous, Once, Titi Fraga MD    ROS:    Denies chest pain or palpitations  Denies shortness of breath  Denies fevers or chills  Denies chest pain  Denies abdominal pain    PE:    General Appearance: Well nourished  Orientation: Oriented to time, place, person  Mental Status: Alert  Heart: RRR  Lungs: CTA  Abdomen: Soft and non-tender    Anesthesia/Surgery risks, benefits and alternative options discussed and understood by patient/family.    This note was created using Dragon voice recognition software that occasionally misinterpreted phrases or words.

## 2020-06-15 NOTE — CARE UPDATE
06/15/20 1129   Patient Assessment/Suction   Level of Consciousness (AVPU) alert   PRE-TX-O2   O2 Device (Oxygen Therapy) room air   SpO2 98 %   Pulse Oximetry Type Intermittent   $ Pulse Oximetry - Multiple Charge Pulse Oximetry - Multiple   Incentive Spirometer   $ Incentive Spirometer Charges postop instruction   Incentive Spirometer Predicted Level (mL) 3000   Administration (IS) instruction provided, initial;proper technique demonstrated   Number of Repetitions (IS) 10   Level Incentive Spirometer (mL) 2750   Patient Tolerance (IS) good

## 2020-06-15 NOTE — PLAN OF CARE
Preop complete. No family present. Text notifications initiated. Pt states that he does not have any belongings that need to be locked in the safe. Pt belongings bag, red bag, and walker all labeled and brought to post op pt belongings cabinet.

## 2020-06-15 NOTE — ASSESSMENT & PLAN NOTE
Patient's FSGs are controlled on current hypoglycemics.   Last A1c reviewed-   Lab Results   Component Value Date    HGBA1C 6.0 (H) 03/12/2020     Most recent fingerstick glucose reviewed-   Recent Labs   Lab 06/15/20  1126   POCTGLUCOSE 144*     Current correctional scale  Medium  Maintain anti-hyperglycemic dose as follows-   Antihyperglycemics (From admission, onward)    Start     Stop Route Frequency Ordered    06/15/20 1150  insulin aspart U-100 pen 1-10 Units      -- SubQ Before meals & nightly PRN 06/15/20 1055        Hold Oral hypoglycemics while patient is in the hospital.

## 2020-06-15 NOTE — PT/OT/SLP EVAL
Physical Therapy Evaluation    Patient Name:  John Corrales   MRN:  [id number]    Recommendations:     Discharge Recommendations:  home health PT   Discharge Equipment Recommendations: none   Barriers to discharge: None    Assessment:     John Corrales is a 50 y.o. male admitted with a medical diagnosis of Primary osteoarthritis of right knee.  He presents with the following impairments/functional limitations:  weakness, impaired endurance, impaired sensation, impaired functional mobilty, gait instability, impaired balance, decreased lower extremity function, pain, orthopedic precautions. Patient is agreeable to PT evaluation. He is POD #0 R TKA. He has a medical history of T2DM, HTN, HLD, thyroid disease, and OA. He lives with his wife and children. He does not work. He states that he used crutches prn prior to surgery. He does have R foot drop post-operatively with DF strength of 1/5. He requires SBA for supine to sit and Kalyan for sit to stand. He ambulated 60' with RW and Kalyan. He performed 20 reps of RLE therex.     Rehab Prognosis: Good; patient would benefit from acute skilled PT services to address these deficits and reach maximum level of function.    Recent Surgery: Procedure(s) (LRB):  ARTHROPLASTY, KNEE (Right) Day of Surgery    Plan:     During this hospitalization, patient to be seen BID to address the identified rehab impairments via gait training, therapeutic activities, therapeutic exercises and progress toward the following goals:    · Plan of Care Expires:  07/15/20    Subjective     Chief Complaint: pain after ambulating  Patient/Family Comments/goals: get R foot moving  Pain/Comfort:  · Pain Rating 1: 0/10  · Location - Side 1: Right  · Location 1: knee  · Pain Rating Post-Intervention 1: 8/10  · Location - Side 2: Right  · Location 2: knee  · Pain Addressed 2: Nurse notified    Patients cultural, spiritual, Quaker conflicts given the current situation:      Living Environment:  Patient  lives with family in a H  Prior to admission, patients level of function was independent, but he would use crutches prn when his knee was bothering him. He does not work.  Equipment used at home: none.  DME owned (not currently used): rolling walker, bedside commode and transfer tub bench.  Upon discharge, patient will have assistance from family.    Objective:     Communicated with MIC Hebert prior to session.  Patient found HOB elevated with cryotherapy, peripheral IV, SCD, telemetry  upon PT entry to room.    General Precautions: Standard, fall   Orthopedic Precautions:RLE weight bearing as tolerated   Braces: N/A     Exams:  · RLE ROM: ~60 degrees of knee flexion  · RLE Strength: 1/5 DF  · LLE Strength: WFL    Functional Mobility:  · Bed Mobility:     · Supine to Sit: stand by assistance  · Transfers:     · Sit to Stand:  minimum assistance with rolling walker  · Gait: 60' RW, Kalyan with R foot drop  · Balance: Fair    Therapeutic Activities and Exercises:   Patient was educated on the importance of OOB activity during hospital stay, safe transfers and ambulation, D/C planning, quad sets and ankle pumps as homework today to increase mm activation    Patient performed 20 reps of LLE QS, SLR, LAQ, SAQ, HS     AM-PAC 6 CLICK MOBILITY  Total Score:19     Patient left HOB elevated with all lines intact, call button in reach, bed alarm on and RN notified.    GOALS:   Multidisciplinary Problems     Physical Therapy Goals        Problem: Physical Therapy Goal    Goal Priority Disciplines Outcome Goal Variances Interventions   Physical Therapy Goal     PT, PT/OT                      History:     Past Medical History:   Diagnosis Date    Diabetes mellitus, type 2     General anesthetics causing adverse effect in therapeutic use 2007    During general anesthesia for back surgery , experienced severe pain, heard people talking and felt like his heart was about to explode.    GERD (gastroesophageal reflux disease)      Hyperlipidemia     Hypothyroidism     Nasal septal deviation        Past Surgical History:   Procedure Laterality Date    BACK SURGERY  2007    artificial disc L4-5    CYSTOSCOPY W/ STONE MANIPULATION  2002    Kidney stones removed    KNEE ARTHROSCOPY W/ DEBRIDEMENT  2000 and 2001    Right knee       Time Tracking:     PT Received On: 06/15/20  PT Start Time: 1447     PT Stop Time: 1516  PT Total Time (min): 29 min     Billable Minutes: Evaluation 10 and Gait Training 19      Salma Marie, PT  06/15/2020

## 2020-06-15 NOTE — SUBJECTIVE & OBJECTIVE
Past Medical History:   Diagnosis Date    Diabetes mellitus, type 2     General anesthetics causing adverse effect in therapeutic use 2007    During general anesthesia for back surgery , experienced severe pain, heard people talking and felt like his heart was about to explode.    GERD (gastroesophageal reflux disease)     Hyperlipidemia     Hypothyroidism     Nasal septal deviation        Past Surgical History:   Procedure Laterality Date    BACK SURGERY  2007    artificial disc L4-5    CYSTOSCOPY W/ STONE MANIPULATION  2002    Kidney stones removed    KNEE ARTHROSCOPY W/ DEBRIDEMENT  2000 and 2001    Right knee       Review of patient's allergies indicates:   Allergen Reactions    Demerol [meperidine] Itching       No current facility-administered medications on file prior to encounter.      Current Outpatient Medications on File Prior to Encounter   Medication Sig    atenoloL (TENORMIN) 25 MG tablet Take 1 tablet (25 mg total) by mouth once daily.    atorvastatin (LIPITOR) 20 MG tablet Take 1 tablet (20 mg total) by mouth once daily. (Patient taking differently: Take 20 mg by mouth every evening. )    esomeprazole (NEXIUM) 40 MG capsule Take 1 capsule (40 mg total) by mouth before breakfast.    levothyroxine (SYNTHROID) 75 MCG tablet Take 1 tablet (75 mcg total) by mouth once daily.    metFORMIN (GLUCOPHAGE) 500 MG tablet Take 1 tablet (500 mg total) by mouth 3 (three) times daily.    nystatin (MYCOSTATIN) cream Apply topically 2 (two) times daily.    azelastine (ASTELIN) 137 mcg nasal spray 1 spray by Nasal route daily as needed.      cetirizine-pseudoephedrine (ZYRTEC-D) 5-120 mg per tablet Take 1 tablet by mouth once daily.      fluticasone (FLONASE) 50 mcg/Actuation nasal spray 1 spray by Nasal route daily as needed.      lisinopriL 10 MG tablet Take 1 tablet (10 mg total) by mouth once daily.     Family History     Problem Relation (Age of Onset)    Cancer Father    Diabetes Father     Heart disease Father        Tobacco Use    Smoking status: Former Smoker    Smokeless tobacco: Never Used   Substance and Sexual Activity    Alcohol use: No    Drug use: No    Sexual activity: Not on file     Review of Systems   Constitutional: Negative for chills, fatigue and fever.   HENT: Negative for congestion, postnasal drip and trouble swallowing.    Eyes: Negative for visual disturbance.   Respiratory: Negative for cough, shortness of breath and wheezing.    Cardiovascular: Negative for chest pain, palpitations and leg swelling.   Gastrointestinal: Negative for abdominal distention, abdominal pain, constipation, diarrhea, nausea and vomiting.   Genitourinary: Negative for difficulty urinating, dysuria and hematuria.   Musculoskeletal: Positive for arthralgias and gait problem.   Skin: Positive for wound (surgical).   Neurological: Negative for weakness, light-headedness and headaches.   Psychiatric/Behavioral: Negative for confusion. The patient is not nervous/anxious.      Objective:     Vital Signs (Most Recent):  Temp: 97.6 °F (36.4 °C) (06/15/20 1058)  Pulse: (!) 51 (06/15/20 1058)  Resp: 16 (06/15/20 1058)  BP: 134/67 (06/15/20 1058)  SpO2: 98 % (06/15/20 1129) Vital Signs (24h Range):  Temp:  [97.6 °F (36.4 °C)-98.8 °F (37.1 °C)] 97.6 °F (36.4 °C)  Pulse:  [46-78] 51  Resp:  [9-18] 16  SpO2:  [95 %-100 %] 98 %  BP: (105-135)/(55-77) 134/67     Weight: 108.9 kg (240 lb)  Body mass index is 32.55 kg/m².    Physical Exam  Vitals signs and nursing note reviewed.   Constitutional:       Appearance: Normal appearance. He is normal weight.   HENT:      Head: Normocephalic and atraumatic.      Nose: Nose normal.      Mouth/Throat:      Mouth: Mucous membranes are moist.      Pharynx: Oropharynx is clear.   Eyes:      Extraocular Movements: Extraocular movements intact.      Conjunctiva/sclera: Conjunctivae normal.      Pupils: Pupils are equal, round, and reactive to light.   Neck:      Musculoskeletal:  Normal range of motion and neck supple.   Cardiovascular:      Rate and Rhythm: Normal rate and regular rhythm.      Pulses: Normal pulses.      Heart sounds: Normal heart sounds.   Pulmonary:      Effort: Pulmonary effort is normal. No respiratory distress.      Breath sounds: Normal breath sounds.   Abdominal:      General: Abdomen is flat. Bowel sounds are normal.      Palpations: Abdomen is soft.   Musculoskeletal:      Comments: RLE wrapped postoperatively with cryo in place.  2+ palpable DP.   Skin:     Capillary Refill: Capillary refill takes less than 2 seconds.   Neurological:      General: No focal deficit present.      Mental Status: He is alert and oriented to person, place, and time. Mental status is at baseline.   Psychiatric:         Mood and Affect: Mood normal.         Behavior: Behavior normal.         Thought Content: Thought content normal.         Judgment: Judgment normal.           CRANIAL NERVES     CN III, IV, VI   Pupils are equal, round, and reactive to light.       Significant Labs:  I reviewed preoperative labs.    Significant Imaging:  XR R knee: Status post right knee joint replacement.

## 2020-06-15 NOTE — ASSESSMENT & PLAN NOTE
Patient is chronically on statin.will continue for now. Monitor clinically. Last LDL was   Lab Results   Component Value Date    LDLCALC 96 03/12/2020

## 2020-06-15 NOTE — H&P
[facility] Hospital Medicine  History & Physical    Patient Name: John Corrales  MRN: [id number]  Admission Date: 6/15/2020  Attending Physician: Pradeep Ballard MD   Primary Care Provider: Ralf Saunders MD         Patient information was obtained from patient and past medical records.     Subjective:     Principal Problem:Primary osteoarthritis of right knee    Chief Complaint:   Chief Complaint   Patient presents with    Knee Pain        HPI: John Corrales is a 50-year-old male with PMHx significant for DM2, HTN, HLD, thyroid disease, and arthritis.  Pt is S/P right TKA with Dr. Rowe.  Preoperatively, he was evaluated by Dr. Rowe for chronic right knee pain. He has had multiple rounds of steroid injections.  He has had viscosupplementation.  He has been on narcotics and nonsteroidals.  Postoperatively, he denies any pain and does endorse expected post anesthesia numbness.  He denies any chest pain, SOB, nausea, vomiting, fever, chills, or other complaints at this time.  Pt was placed in outpatient recovery under the service of hospital Medicine for additional evaluation and management postoperatively.  Other pertinent medical Hx as below:    Past Medical History:   Diagnosis Date    Diabetes mellitus, type 2     General anesthetics causing adverse effect in therapeutic use 2007    During general anesthesia for back surgery , experienced severe pain, heard people talking and felt like his heart was about to explode.    GERD (gastroesophageal reflux disease)     Hyperlipidemia     Hypothyroidism     Nasal septal deviation        Past Surgical History:   Procedure Laterality Date    BACK SURGERY  2007    artificial disc L4-5    CYSTOSCOPY W/ STONE MANIPULATION  2002    Kidney stones removed    KNEE ARTHROSCOPY W/ DEBRIDEMENT  2000 and 2001    Right knee       Review of patient's allergies indicates:   Allergen Reactions    Demerol [meperidine] Itching       No current  facility-administered medications on file prior to encounter.      Current Outpatient Medications on File Prior to Encounter   Medication Sig    atenoloL (TENORMIN) 25 MG tablet Take 1 tablet (25 mg total) by mouth once daily.    atorvastatin (LIPITOR) 20 MG tablet Take 1 tablet (20 mg total) by mouth once daily. (Patient taking differently: Take 20 mg by mouth every evening. )    esomeprazole (NEXIUM) 40 MG capsule Take 1 capsule (40 mg total) by mouth before breakfast.    levothyroxine (SYNTHROID) 75 MCG tablet Take 1 tablet (75 mcg total) by mouth once daily.    metFORMIN (GLUCOPHAGE) 500 MG tablet Take 1 tablet (500 mg total) by mouth 3 (three) times daily.    nystatin (MYCOSTATIN) cream Apply topically 2 (two) times daily.    azelastine (ASTELIN) 137 mcg nasal spray 1 spray by Nasal route daily as needed.      cetirizine-pseudoephedrine (ZYRTEC-D) 5-120 mg per tablet Take 1 tablet by mouth once daily.      fluticasone (FLONASE) 50 mcg/Actuation nasal spray 1 spray by Nasal route daily as needed.      lisinopriL 10 MG tablet Take 1 tablet (10 mg total) by mouth once daily.     Family History     Problem Relation (Age of Onset)    Cancer Father    Diabetes Father    Heart disease Father        Tobacco Use    Smoking status: Former Smoker    Smokeless tobacco: Never Used   Substance and Sexual Activity    Alcohol use: No    Drug use: No    Sexual activity: Not on file     Review of Systems   Constitutional: Negative for chills, fatigue and fever.   HENT: Negative for congestion, postnasal drip and trouble swallowing.    Eyes: Negative for visual disturbance.   Respiratory: Negative for cough, shortness of breath and wheezing.    Cardiovascular: Negative for chest pain, palpitations and leg swelling.   Gastrointestinal: Negative for abdominal distention, abdominal pain, constipation, diarrhea, nausea and vomiting.   Genitourinary: Negative for difficulty urinating, dysuria and hematuria.    Musculoskeletal: Positive for arthralgias and gait problem.   Skin: Positive for wound (surgical).   Neurological: Negative for weakness, light-headedness and headaches.   Psychiatric/Behavioral: Negative for confusion. The patient is not nervous/anxious.      Objective:     Vital Signs (Most Recent):  Temp: 97.6 °F (36.4 °C) (06/15/20 1058)  Pulse: (!) 51 (06/15/20 1058)  Resp: 16 (06/15/20 1058)  BP: 134/67 (06/15/20 1058)  SpO2: 98 % (06/15/20 1129) Vital Signs (24h Range):  Temp:  [97.6 °F (36.4 °C)-98.8 °F (37.1 °C)] 97.6 °F (36.4 °C)  Pulse:  [46-78] 51  Resp:  [9-18] 16  SpO2:  [95 %-100 %] 98 %  BP: (105-135)/(55-77) 134/67     Weight: 108.9 kg (240 lb)  Body mass index is 32.55 kg/m².    Physical Exam  Vitals signs and nursing note reviewed.   Constitutional:       Appearance: Normal appearance. He is normal weight.   HENT:      Head: Normocephalic and atraumatic.      Nose: Nose normal.      Mouth/Throat:      Mouth: Mucous membranes are moist.      Pharynx: Oropharynx is clear.   Eyes:      Extraocular Movements: Extraocular movements intact.      Conjunctiva/sclera: Conjunctivae normal.      Pupils: Pupils are equal, round, and reactive to light.   Neck:      Musculoskeletal: Normal range of motion and neck supple.   Cardiovascular:      Rate and Rhythm: Normal rate and regular rhythm.      Pulses: Normal pulses.      Heart sounds: Normal heart sounds.   Pulmonary:      Effort: Pulmonary effort is normal. No respiratory distress.      Breath sounds: Normal breath sounds.   Abdominal:      General: Abdomen is flat. Bowel sounds are normal.      Palpations: Abdomen is soft.   Musculoskeletal:      Comments: RLE wrapped postoperatively with cryo in place.  2+ palpable DP.   Skin:     Capillary Refill: Capillary refill takes less than 2 seconds.   Neurological:      General: No focal deficit present.      Mental Status: He is alert and oriented to person, place, and time. Mental status is at baseline.    Psychiatric:         Mood and Affect: Mood normal.         Behavior: Behavior normal.         Thought Content: Thought content normal.         Judgment: Judgment normal.           CRANIAL NERVES     CN III, IV, VI   Pupils are equal, round, and reactive to light.       Significant Labs:  I reviewed preoperative labs.    Significant Imaging:  XR R knee: Status post right knee joint replacement.    Assessment/Plan:     * Primary osteoarthritis of right knee  POD 0 s/p right TKA with Dr. Rowe  Continue to follow Orthopedic recommendations.  Needs aggressive incentive spirometry.  Follow hemoglobin and hematocrit closely.  Pain control with IV narcotics and antiemetics as needed.  Physical therapy as per Orthopedics protocol with fall precautions.  ASA 325mg PO BID for DVT prophylaxis per orthopedic recommendations.      Type 2 diabetes mellitus without complication, without long-term current use of insulin  Patient's FSGs are controlled on current hypoglycemics.   Last A1c reviewed-   Lab Results   Component Value Date    HGBA1C 6.0 (H) 03/12/2020     Most recent fingerstick glucose reviewed-   Recent Labs   Lab 06/15/20  1126   POCTGLUCOSE 144*     Current correctional scale  Medium  Maintain anti-hyperglycemic dose as follows-   Antihyperglycemics (From admission, onward)    Start     Stop Route Frequency Ordered    06/15/20 1150  insulin aspart U-100 pen 1-10 Units      -- SubQ Before meals & nightly PRN 06/15/20 1055        Hold Oral hypoglycemics while patient is in the hospital.      Gastroesophageal reflux disease  Chronic, stable.  Continue PPI.      Hyperlipidemia   Patient is chronically on statin.will continue for now. Monitor clinically. Last LDL was   Lab Results   Component Value Date    LDLCALC 96 03/12/2020            Hypertension  Chronic, controlled.  Latest blood pressure and vitals reviewed-   Temp:  [97.6 °F (36.4 °C)-98.8 °F (37.1 °C)]   Pulse:  [46-78]   Resp:  [9-18]   BP: (105-135)/(55-77)    SpO2:  [95 %-100 %] .   Home meds for hypertension were reviewed and noted below. Hospital anti-hypertensive changes were made as shown below.  Hypertension Medications             atenoloL (TENORMIN) 25 MG tablet Take 1 tablet (25 mg total) by mouth once daily.    lisinopriL 10 MG tablet Take 1 tablet (10 mg total) by mouth once daily.      Hospital Medications             lisinopriL tablet 10 mg Starting on 6/16/2020. 10 mg, Oral, Daily        Will utilize p.r.n. blood pressure medication only if patient's blood pressure greater than  180/110 and he develops symptoms such as worsening chest pain or shortness of breath.        Hypothyroidism  Patient has chronic hypothyroidism. TFTs reviewed-   Lab Results   Component Value Date    TSH 4.57 (H) 03/12/2020   . Will continue chronic levothyroxine and adjust for and clinical changes.          VTE Risk Mitigation (From admission, onward)         Ordered     Place sequential compression device  Until discontinued      06/15/20 1055     IP VTE HIGH RISK PATIENT  Once      06/15/20 1055     Place sequential compression device  Until discontinued      06/15/20 1055                   Clara Lozano NP  Department of Hospital Medicine   [facility]

## 2020-06-15 NOTE — TRANSFER OF CARE
Anesthesia Transfer of Care Note    Patient: John Corrales    Procedure(s) Performed: Procedure(s) (LRB):  ARTHROPLASTY, KNEE (Right)    Patient location: PACU    Anesthesia Type: general    Transport from OR: Transported from OR on 2-3 L/min O2 by NC with adequate spontaneous ventilation    Post pain: adequate analgesia    Post assessment: no apparent anesthetic complications and tolerated procedure well    Post vital signs: stable    Level of consciousness: awake, alert and oriented    Nausea/Vomiting: no nausea/vomiting    Complications: none    Transfer of care protocol was followed      Last vitals:   Visit Vitals  /67   Pulse 74   Temp 37.1 °C (98.8 °F) (Skin)   Resp 18   Ht 6' (1.829 m)   Wt 108.9 kg (240 lb)   SpO2 99%   BMI 32.55 kg/m²

## 2020-06-15 NOTE — ANESTHESIA PROCEDURE NOTES
Spinal    Diagnosis: total knee  Patient location during procedure: OR  Start time: 6/15/2020 7:40 AM  Timeout: 6/15/2020 7:40 AM  End time: 6/15/2020 7:46 AM    Staffing  Authorizing Provider: Augustine Dallas MD  Performing Provider: Augustine Dallas MD    Spinal Block  Patient position: sitting  Prep: Betadine and ChloraPrep  Patient monitoring: heart rate, cardiac monitor, continuous pulse ox and frequent blood pressure checks  Approach: left paramedian  Location: L2-3  Injection technique: single shot  CSF Fluid: clear free-flowing CSF  Needle  Needle type: Quincke   Needle gauge: 22 G  Needle length: 3.5 in  Needle localization: anatomical landmarks  Assessment  Sensory level: T10   Dermatomal levels determined by alcohol wipe  Ease of block: moderate  Patient's tolerance of the procedure: comfortable throughout block  Additional Notes  Marcaine 15mg, hyperbaric. No complications. Attempted juli 24G - too short, 22G quincke times one pass, +binu, +csf. No paresthesia

## 2020-06-15 NOTE — ANESTHESIA PREPROCEDURE EVALUATION
06/15/2020  John Corrales is a 50 y.o., male.    Anesthesia Evaluation    I have reviewed the Patient Summary Reports.    I have reviewed the Nursing Notes. I have reviewed the NPO Status.   I have reviewed the Medications.     Review of Systems      Physical Exam  General:  Morbid Obesity    Airway/Jaw/Neck:  Airway Findings: Mouth Opening: Normal Tongue: Normal  General Airway Assessment: Adult  Mallampati: III  Improves to II with phonation.      Dental:  Dental Findings: In tact   Chest/Lungs:  Chest/Lungs Findings: Clear to auscultation, Normal Respiratory Rate         Mental Status:  Mental Status Findings:  Cooperative, Alert and Oriented         Anesthesia Plan  Type of Anesthesia, risks & benefits discussed:  Anesthesia Type:  general, regional, spinal  Patient's Preference:   Intra-op Monitoring Plan: standard ASA monitors  Intra-op Monitoring Plan Comments:   Post Op Pain Control Plan: peripheral nerve block and multimodal analgesia  Post Op Pain Control Plan Comments:   Induction:   Inhalation and IV  Beta Blocker:  Patient is on a Beta-Blocker and has received one dose within the past 24 hours (No further documentation required).       Informed Consent: Patient understands risks and agrees with Anesthesia plan.  Questions answered. Anesthesia consent signed with patient.  ASA Score: 2     Day of Surgery Review of History & Physical:            Ready For Surgery From Anesthesia Perspective.

## 2020-06-15 NOTE — PROGRESS NOTES
Pt arrived to the floor from recovery via bed. No acute distress noted. Denies pain at this time. VSS. Pt oriented to the room and call light in reach. Pt updated on POC. Will continue to monitor.

## 2020-06-16 ENCOUNTER — TELEPHONE (OUTPATIENT)
Dept: FAMILY MEDICINE | Facility: CLINIC | Age: 50
End: 2020-06-16

## 2020-06-16 LAB
ANION GAP SERPL CALC-SCNC: 9 MMOL/L (ref 8–16)
BASOPHILS # BLD AUTO: 0.01 K/UL (ref 0–0.2)
BASOPHILS NFR BLD: 0.1 % (ref 0–1.9)
BUN SERPL-MCNC: 14 MG/DL (ref 6–20)
CALCIUM SERPL-MCNC: 8.1 MG/DL (ref 8.7–10.5)
CHLORIDE SERPL-SCNC: 109 MMOL/L (ref 95–110)
CO2 SERPL-SCNC: 18 MMOL/L (ref 23–29)
CREAT SERPL-MCNC: 1 MG/DL (ref 0.5–1.4)
DIFFERENTIAL METHOD: ABNORMAL
EOSINOPHIL # BLD AUTO: 0.1 K/UL (ref 0–0.5)
EOSINOPHIL NFR BLD: 1.1 % (ref 0–8)
ERYTHROCYTE [DISTWIDTH] IN BLOOD BY AUTOMATED COUNT: 12.3 % (ref 11.5–14.5)
EST. GFR  (AFRICAN AMERICAN): >60 ML/MIN/1.73 M^2
EST. GFR  (NON AFRICAN AMERICAN): >60 ML/MIN/1.73 M^2
GLUCOSE SERPL-MCNC: 168 MG/DL (ref 70–110)
HCT VFR BLD AUTO: 36.8 % (ref 40–54)
HGB BLD-MCNC: 11.6 G/DL (ref 14–18)
IMM GRANULOCYTES # BLD AUTO: 0.02 K/UL (ref 0–0.04)
IMM GRANULOCYTES NFR BLD AUTO: 0.2 % (ref 0–0.5)
LYMPHOCYTES # BLD AUTO: 1.4 K/UL (ref 1–4.8)
LYMPHOCYTES NFR BLD: 13.7 % (ref 18–48)
MCH RBC QN AUTO: 27.4 PG (ref 27–31)
MCHC RBC AUTO-ENTMCNC: 31.5 G/DL (ref 32–36)
MCV RBC AUTO: 87 FL (ref 82–98)
MONOCYTES # BLD AUTO: 0.8 K/UL (ref 0.3–1)
MONOCYTES NFR BLD: 7.4 % (ref 4–15)
NEUTROPHILS # BLD AUTO: 8.1 K/UL (ref 1.8–7.7)
NEUTROPHILS NFR BLD: 77.5 % (ref 38–73)
NRBC BLD-RTO: 0 /100 WBC
PLATELET # BLD AUTO: 169 K/UL (ref 150–350)
PMV BLD AUTO: 11.8 FL (ref 9.2–12.9)
POCT GLUCOSE: 130 MG/DL (ref 70–110)
POTASSIUM SERPL-SCNC: 5.2 MMOL/L (ref 3.5–5.1)
RBC # BLD AUTO: 4.23 M/UL (ref 4.6–6.2)
SODIUM SERPL-SCNC: 136 MMOL/L (ref 136–145)
WBC # BLD AUTO: 10.43 K/UL (ref 3.9–12.7)

## 2020-06-16 PROCEDURE — 97116 GAIT TRAINING THERAPY: CPT

## 2020-06-16 PROCEDURE — 25000003 PHARM REV CODE 250: Performed by: NURSE PRACTITIONER

## 2020-06-16 PROCEDURE — 99900035 HC TECH TIME PER 15 MIN (STAT)

## 2020-06-16 PROCEDURE — S5010 5% DEXTROSE AND 0.45% SALINE: HCPCS | Performed by: ORTHOPAEDIC SURGERY

## 2020-06-16 PROCEDURE — 25000003 PHARM REV CODE 250: Performed by: ORTHOPAEDIC SURGERY

## 2020-06-16 PROCEDURE — 36415 COLL VENOUS BLD VENIPUNCTURE: CPT

## 2020-06-16 PROCEDURE — 97110 THERAPEUTIC EXERCISES: CPT

## 2020-06-16 PROCEDURE — 25000003 PHARM REV CODE 250: Performed by: ANESTHESIOLOGY

## 2020-06-16 PROCEDURE — 80048 BASIC METABOLIC PNL TOTAL CA: CPT

## 2020-06-16 PROCEDURE — 63600175 PHARM REV CODE 636 W HCPCS: Performed by: ORTHOPAEDIC SURGERY

## 2020-06-16 PROCEDURE — 94799 UNLISTED PULMONARY SVC/PX: CPT

## 2020-06-16 PROCEDURE — 97165 OT EVAL LOW COMPLEX 30 MIN: CPT

## 2020-06-16 PROCEDURE — 85025 COMPLETE CBC W/AUTO DIFF WBC: CPT

## 2020-06-16 RX ORDER — ATENOLOL 25 MG/1
25 TABLET ORAL DAILY
Status: DISCONTINUED | OUTPATIENT
Start: 2020-06-16 | End: 2020-06-16 | Stop reason: HOSPADM

## 2020-06-16 RX ORDER — LISINOPRIL 10 MG/1
10 TABLET ORAL DAILY
Qty: 90 TABLET | Refills: 1 | Status: SHIPPED | OUTPATIENT
Start: 2020-06-16 | End: 2020-11-19 | Stop reason: SDUPTHER

## 2020-06-16 RX ORDER — ASPIRIN 325 MG
325 TABLET ORAL 2 TIMES DAILY
Qty: 60 TABLET | Refills: 0 | Status: SHIPPED | OUTPATIENT
Start: 2020-06-16 | End: 2020-08-17

## 2020-06-16 RX ORDER — POLYETHYLENE GLYCOL 3350 17 G/17G
17 POWDER, FOR SOLUTION ORAL DAILY
Qty: 24 EACH | Refills: 0 | Status: SHIPPED | OUTPATIENT
Start: 2020-06-17 | End: 2020-08-17

## 2020-06-16 RX ADMIN — ASPIRIN 325 MG ORAL TABLET 325 MG: 325 PILL ORAL at 08:06

## 2020-06-16 RX ADMIN — LEVOTHYROXINE SODIUM 75 MCG: 50 TABLET ORAL at 05:06

## 2020-06-16 RX ADMIN — ACETAMINOPHEN 650 MG: 325 TABLET ORAL at 01:06

## 2020-06-16 RX ADMIN — OXYCODONE HYDROCHLORIDE 15 MG: 10 TABLET ORAL at 12:06

## 2020-06-16 RX ADMIN — OXYCODONE HYDROCHLORIDE 15 MG: 10 TABLET ORAL at 08:06

## 2020-06-16 RX ADMIN — CEFAZOLIN SODIUM 2 G: 2 SOLUTION INTRAVENOUS at 12:06

## 2020-06-16 RX ADMIN — ATENOLOL 25 MG: 25 TABLET ORAL at 09:06

## 2020-06-16 RX ADMIN — PANTOPRAZOLE SODIUM 40 MG: 40 TABLET, DELAYED RELEASE ORAL at 08:06

## 2020-06-16 RX ADMIN — DEXTROSE AND SODIUM CHLORIDE: 5; .45 INJECTION, SOLUTION INTRAVENOUS at 12:06

## 2020-06-16 RX ADMIN — OXYCODONE HYDROCHLORIDE 15 MG: 10 TABLET ORAL at 05:06

## 2020-06-16 RX ADMIN — ZOLPIDEM TARTRATE 5 MG: 5 TABLET, COATED ORAL at 12:06

## 2020-06-16 RX ADMIN — CELECOXIB 200 MG: 100 CAPSULE ORAL at 08:06

## 2020-06-16 RX ADMIN — ACETAMINOPHEN 650 MG: 325 TABLET ORAL at 09:06

## 2020-06-16 NOTE — NURSING
Pt seen this morning and cleared to go, he was told to follow up with Dr. Rowe on Thursday to address his nerve palsy in his foot.  He denies any numbness or tingling at this time.  He is AAO and has ambulated with PT.  He is eating and drinking well.

## 2020-06-16 NOTE — PT/OT/SLP EVAL
Occupational Therapy   Evaluation and Discharge Note    Name: John Corrales  MRN: [id number]  Admitting Diagnosis:  Primary osteoarthritis of right knee 1 Day Post-Op    Recommendations:     Discharge Recommendations: home health PT  Discharge Equipment Recommendations:  none  Barriers to discharge:  None    Assessment:     John Corrales is a 50 y.o. male with a medical diagnosis of Primary osteoarthritis of right knee. At this time, patient is Supervision with grooming tasks while standing at sink, but required Mod A to don/doff R socks while seated in chair. Patient performed transfers with SBA using RW. Patient will be discharged today. Patient has good support at home. No further acute OT needs.      Plan:     During this hospitalization, patient does not require further acute OT services.  Please re-consult if situation changes.    · Plan of Care Reviewed with: patient    Subjective     Chief Complaint: minimal R knee  Patient/Family Comments/goals: none    Occupational Profile:  Living Environment: Patient lives wife and children in a 1 story home.   Previous level of function: Patient is independent with ADLs and mobility.   Equipment Used at home:  walker, rolling, bedside commode, bath bench  Assistance upon Discharge: Patient will receive from family.     Pain/Comfort:  · Pain Rating 1: 2/10  · Location - Side 1: Right  · Location - Orientation 1: generalized  · Location 1: knee  · Pain Addressed 1: Reposition, Distraction  · Pain Rating Post-Intervention 1: 4/10    Patients cultural, spiritual, Mosque conflicts given the current situation:      Objective:     Communicated with: nurse Karen prior to session.  Patient found up in chair with cryotherapy upon OT entry to room.    General Precautions: Standard, fall   Orthopedic Precautions:RLE weight bearing as tolerated   Braces:       Occupational Performance:    Bed Mobility:    · Not assessed; pt seated in chair upon arrival. See PT notes.      Functional Mobility/Transfers:  · Patient completed Sit <> Stand Transfer with stand by assistance  with  rolling walker   · Patient completed Toilet Transfer Stand Pivot technique with stand by assistance with  rolling walker      Activities of Daily Living:  · Grooming: supervision with oral and facial hygiene while standing at sink.  · Lower Body Dressing: moderate assistance to don/doff R sock while seated in chair; Supervision to don/doff L sock    Cognitive/Visual Perceptual:  Cognitive/Psychosocial Skills:     -       Oriented to: x4   -       Follows Commands/attention:Follows multistep  commands  -       Communication: clear/fluent  -       Safety awareness/insight to disability: intact   -       Mood/Affect/Coping skills/emotional control: Appropriate to situation and Cooperative  Visual/Perceptual:      -Intact     Physical Exam:  Postural examination/scapula alignment:    -       Rounded shoulders  -       Forward head  Upper Extremity Range of Motion:     -       Right Upper Extremity: WFL  -       Left Upper Extremity: WFL  Upper Extremity Strength:    -       Right Upper Extremity: WFL  -       Left Upper Extremity: WFL   Strength:    -       Right Upper Extremity: WFL  -       Left Upper Extremity: WFL  Fine Motor Coordination:    -       Intact  Gross motor coordination:   WFL in B UE    AMPAC 6 Click ADL:  AMPAC Total Score: 22    Treatment & Education:  OT ed patient on safety with walker use for functional mobility with cues for hand placement & sequencing.   OT ed pt on use of adaptive equipment for LB dressing & safe item retrieval with reacher with demonstration provided.  OT ed pt on OT role & POC as well as discharge recommendations.    Education:    Patient left up in chair with call button in reach and chair alarm on    GOALS:   Multidisciplinary Problems     Occupational Therapy Goals     Not on file                History:     Past Medical History:   Diagnosis Date    Diabetes  mellitus, type 2     General anesthetics causing adverse effect in therapeutic use 2007    During general anesthesia for back surgery , experienced severe pain, heard people talking and felt like his heart was about to explode.    GERD (gastroesophageal reflux disease)     Hyperlipidemia     Hypothyroidism     Nasal septal deviation        Past Surgical History:   Procedure Laterality Date    BACK SURGERY  2007    artificial disc L4-5    CYSTOSCOPY W/ STONE MANIPULATION  2002    Kidney stones removed    KNEE ARTHROSCOPY W/ DEBRIDEMENT  2000 and 2001    Right knee       Time Tracking:     OT Date of Treatment: 06/16/20  OT Start Time: 0937  OT Stop Time: 0950  OT Total Time (min): 13 min    Billable Minutes:Evaluation 13    Roly Mathews OT  6/16/2020

## 2020-06-16 NOTE — PLAN OF CARE
Plan of care reviewed with patient. IV fluids/IV antibiotics given as per orders. Dressing to R. Knee C/D/I. Neurovascular checks WNL. Teds/Scds on. Cryotherapy in use. HS . Remains free from falls/injury. Instructed to call for assistance as needed during night, verbalized understanding. Call light in reach, bed alarm on .

## 2020-06-16 NOTE — HOSPITAL COURSE
This patient was observed by hospital medicine after undergoing RIGHT TKA with Dr. Rowe on 6/15.  Patient worked with PT/OT post-operatively who recommended Home health which was ordered. His pain was controlled with oral narcotics as prescribed by orthopedist.  He did exhibit right footdrop postoperatively.  He was discharged home after cleared by orthopedist on aspirin 325 mg BID for 30 days as per orthopedic recommendations for VTE prophylaxis, as well as oral narcotics per orthopedic orders.  Plan is for him to follow-up in clinic with orthopedics in 2 days to reassess foot drop.  He exhibited a very mild hyperkalemia on day of discharge-suspect this could be related to hemolysis.  We held his ACE-inhibitor on day of discharge and will have home health nursing draw a BMP to reassess tomorrow.  These results will be sent to his primary care physician for review.  He was instructed to hold his ACE-inhibitor while monitoring his BP closely at home and contact his primary care provider for results of labs and further instruction.  Fall precautions were discussed with patient and family. Return precautions were discussed at length. Patient to follow up with primary care provider on discharge for any medical needs     Physical Exam:  HRRR; lungs CTA.  RIGHT knee bandage CDI, +palp pulses. 1/5 RIGHT dorsiflexion noted.

## 2020-06-16 NOTE — HOSPITAL COURSE
Postop day 1, status post right total knee arthroplasty.  Patient did well overnight.  Has a walker, he has been out to bed toilet.

## 2020-06-16 NOTE — NURSING
Pt taken to private car in no distress.  All personal belongings with him and his wife is taking him home.  He was given a RX for pain with discharge instructions.

## 2020-06-16 NOTE — SUBJECTIVE & OBJECTIVE
Principal Problem:Primary osteoarthritis of right knee    Principal Orthopedic Problem:  Osteoarthritis right knee    Interval History:     Review of patient's allergies indicates:   Allergen Reactions    Demerol [meperidine] Itching       Current Facility-Administered Medications   Medication    acetaminophen tablet 1,000 mg    acetaminophen tablet 650 mg    aspirin tablet 325 mg    atorvastatin tablet 20 mg    bisacodyL suppository 10 mg    celecoxib capsule 200 mg    dextrose 5 % and 0.45 % NaCl infusion    dextrose 50% injection 12.5 g    dextrose 50% injection 25 g    famotidine tablet 20 mg    glucagon (human recombinant) injection 1 mg    glucose chewable tablet 16 g    glucose chewable tablet 24 g    insulin aspart U-100 pen 1-10 Units    levothyroxine tablet 75 mcg    lisinopriL tablet 10 mg    magnesium oxide tablet 800 mg    magnesium oxide tablet 800 mg    morphine injection 2 mg    ondansetron disintegrating tablet 8 mg    oxyCODONE immediate release tablet 15 mg    oxyCODONE immediate release tablet 5 mg    oxyCODONE immediate release tablet Tab 10 mg    oxyCODONE immediate release tablet Tab 10 mg    pantoprazole EC tablet 40 mg    polyethylene glycol packet 17 g    potassium chloride 10% oral solution 40 mEq    potassium chloride 10% oral solution 60 mEq    promethazine (PHENERGAN) 6.25 mg in dextrose 5 % 50 mL IVPB    sodium chloride 0.9% flush 10 mL    sodium chloride 0.9% flush 5 mL    zolpidem tablet 5 mg     Objective:     Vital Signs (Most Recent):  Temp: 97.7 °F (36.5 °C) (06/16/20 0420)  Pulse: 63 (06/16/20 0420)  Resp: 16 (06/16/20 0420)  BP: 136/84 (06/16/20 0420)  SpO2: 98 % (06/16/20 0420) Vital Signs (24h Range):  Temp:  [96.1 °F (35.6 °C)-98 °F (36.7 °C)] 97.7 °F (36.5 °C)  Pulse:  [46-78] 63  Resp:  [9-20] 16  SpO2:  [95 %-100 %] 98 %  BP: (105-166)/(55-84) 136/84     Weight: 108.9 kg (240 lb)  Height: 6' (182.9 cm)  Body mass index is 32.55  kg/m².      Intake/Output Summary (Last 24 hours) at 6/16/2020 0653  Last data filed at 6/16/2020 0600  Gross per 24 hour   Intake 2552.5 ml   Output 2060 ml   Net 492.5 ml       General    Nursing note and vitals reviewed.  Constitutional: He is oriented to person, place, and time. He appears well-developed and well-nourished. No distress.   HENT:   Head: Atraumatic.   Nose: Nose normal.   Eyes: Conjunctivae and EOM are normal.   Cardiovascular: Normal rate.    Pulmonary/Chest: Effort normal. No respiratory distress.   Neurological: He is alert and oriented to person, place, and time.   Psychiatric: He has a normal mood and affect. His behavior is normal.           Right Knee Exam     Comments:  Right knee, mildly swollen however consistent with history of recent right total knee arthroplasty.  Dressing with no drainage noted.  Dry and intact.    No calf pain or tenderness.    Patient does have decreased extension at the ankle, altered sensation in the right foot.  Most likely secondary to anesthesia however Temporary peroneal nerve palsy considered.    Vascular Exam     Right Pulses  Dorsalis Pedis:      2+  Posterior Tibial:      2+            Significant Labs:   CBC:   Recent Labs   Lab 06/16/20  0557   WBC 10.43   HGB 11.6*   HCT 36.8*        CMP:   Recent Labs   Lab 06/16/20  0447      K 5.2*      CO2 18*   *   BUN 14   CREATININE 1.0   CALCIUM 8.1*   ANIONGAP 9   EGFRNONAA >60     All pertinent labs within the past 24 hours have been reviewed.    Significant Imaging: None

## 2020-06-16 NOTE — ASSESSMENT & PLAN NOTE
Postop day 1    Out of bed with nursing and PT, change dressing prior to discharge    Plan for discharge home today with home health    Follow-up in 2 days, reassess palsy    DVT prophylaxis discussed, patient aware of instructions.

## 2020-06-16 NOTE — PLAN OF CARE
Pt is cleared for CM for D/C.     06/16/20 1755   Final Note   Assessment Type Final Discharge Note   Anticipated Discharge Disposition Home-Health   Hospital Follow Up  Appt(s) scheduled? Yes

## 2020-06-16 NOTE — RESPIRATORY THERAPY
06/16/20 0813   Patient Assessment/Suction   Level of Consciousness (AVPU) alert   Respiratory Effort Normal;Unlabored   Expansion/Accessory Muscles/Retractions no use of accessory muscles;no retractions;expansion symmetric   All Lung Fields Breath Sounds clear;equal bilaterally   Rhythm/Pattern, Respiratory unlabored;pattern regular;depth regular   Cough Frequency no cough   PRE-TX-O2   O2 Device (Oxygen Therapy) room air   SpO2 99 %   Pulse 64   Resp 18   Incentive Spirometer   $ Incentive Spirometer Charges done with encouragement;proper technique demonstrated   Incentive Spirometer Predicted Level (mL) 3000   Administration (IS) instruction provided, follow-up;proper technique demonstrated   Number of Repetitions (IS) 10   Level Incentive Spirometer (mL) 3000   Patient Tolerance (IS) good

## 2020-06-16 NOTE — PT/OT/SLP PROGRESS
Physical Therapy Treatment    Patient Name:  John Corrales   MRN:  [id number]    Recommendations:     Discharge Recommendations:  home health PT   Discharge Equipment Recommendations: none   Barriers to discharge: None    Assessment:     John Corrales is a 50 y.o. male admitted with a medical diagnosis of Primary osteoarthritis of right knee.  He presents with the following impairments/functional limitations:  impaired endurance, weakness, impaired functional mobilty, gait instability, impaired balance, decreased lower extremity function, pain, decreased ROM, edema .  Patient readily agreeable to PT treatment this morning but continued to present with right LE foot drop.  Patient presented supine in bed and was able to transfer supine to sit and sit to stand with SBA.  Patient also able to ambulate x 250 feet with RW with SBA but with abnormal gait pattern due to right LE foot drop.      Rehab Prognosis: Good; patient would benefit from acute skilled PT services to address these deficits and reach maximum level of function.    Recent Surgery: Procedure(s) (LRB):  ARTHROPLASTY, KNEE (Right) 1 Day Post-Op    Plan:     During this hospitalization, patient to be seen BID to address the identified rehab impairments via gait training, therapeutic activities, therapeutic exercises and progress toward the following goals:    · Plan of Care Expires:  07/15/20    Subjective     Chief Complaint: not being able to  foot good  Patient/Family Comments/goals: walk normal  Pain/Comfort:  · Pain Rating 1: 4/10  · Location - Side 1: Right  · Location - Orientation 1: lower  · Location 1: knee  · Pain Addressed 1: Reposition, Cessation of Activity  · Pain Rating Post-Intervention 1: 3/10      Objective:     Communicated with nurse Karen prior to session.  Patient found supine with bed alarm, cryotherapy, peripheral IV, SCD upon PT entry to room.     General Precautions: Standard, fall   Orthopedic Precautions:RUE weight bearing  as tolerated   Braces:       Functional Mobility:  · Bed Mobility:     · Supine to Sit: stand by assistance  · Transfers:     · Sit to Stand:  stand by assistance with rolling walker  · Gait: x 250 feet with RW with SBA      AM-PAC 6 CLICK MOBILITY          Therapeutic Activities and Exercises:   exercise to include quad sets, heel slides, hip abd, SLR, LAQ and seat hip flexion.  All done right LE x 15 reps with 3 second hold on isometrics.  Also performed ankle pumps with facilitation on dorsiflexion.  Gait training. X 250 feet with RW with SBA but with right LE foot drop    Patient left up in chair with call button in reach, chair alarm on and nurse notified..    GOALS:   Multidisciplinary Problems     Physical Therapy Goals        Problem: Physical Therapy Goal    Goal Priority Disciplines Outcome Goal Variances Interventions   Physical Therapy Goal     PT, PT/OT      Description: Goals to be met by: 7/15/2020     Patient will increase functional independence with mobility by performin. Supine to sit with Supervision  2. Sit to stand with Supervision using Rolling Walker  3. Bed to chair transfer with Supervision using Rolling Walker  4. Gait  x 250 feet with Supervision using Rolling Walker.   5. Lower extremity exercise program x20 reps per handout, with independence                       Time Tracking:     PT Received On: 20  PT Start Time: 0753     PT Stop Time: 0818  PT Total Time (min): 25 min     Billable Minutes: Gait Training 10 and Therapeutic Exercise 15    Treatment Type: Treatment  PT/PTA: PT     PTA Visit Number: 0     Chris Megilligan, PT  2020

## 2020-06-16 NOTE — DISCHARGE SUMMARY
[facility]  Discharge Summary      Patient Name: John Corrales  MRN: [id number]  Admission Date: 6/15/2020  Hospital Length of Stay: 0 days  Discharge Date and Time:  06/16/2020 10:27 AM  Attending Physician: Pradeep Ballard MD   Discharging Provider: Clara Lozano NP  Primary Care Provider: Ralf Saunders MD      HPI:   John Corrales is a 50-year-old male with PMHx significant for DM2, HTN, HLD, thyroid disease, and arthritis.  Pt is S/P right TKA with Dr. Rowe.  Preoperatively, he was evaluated by Dr. Rowe for chronic right knee pain. He has had multiple rounds of steroid injections.  He has had viscosupplementation.  He has been on narcotics and nonsteroidals.  Postoperatively, he denies any pain and does endorse expected post anesthesia numbness.  He denies any chest pain, SOB, nausea, vomiting, fever, chills, or other complaints at this time.  Pt was placed in outpatient recovery under the service of hospital Medicine for additional evaluation and management postoperatively.  Other pertinent medical Hx as below:    Procedure(s) (LRB):  ARTHROPLASTY, KNEE (Right)      Hospital Course:   This patient was observed by hospital medicine after undergoing RIGHT TKA with Dr. Rowe on 6/15.  Patient worked with PT/OT post-operatively who recommended Home health which was ordered. His pain was controlled with oral narcotics as prescribed by orthopedist.  He did exhibit right footdrop postoperatively.  He was discharged home after cleared by orthopedist on aspirin 325 mg BID for 30 days as per orthopedic recommendations for VTE prophylaxis, as well as oral narcotics per orthopedic orders.  Plan is for him to follow-up in clinic with orthopedics in 2 days to reassess foot drop.  He exhibited a very mild hyperkalemia on day of discharge-suspect this could be related to hemolysis.  We held his ACE-inhibitor on day of discharge and will have home health nursing draw a BMP to  reassess tomorrow.  These results will be sent to his primary care physician for review.  He was instructed to hold his ACE-inhibitor while monitoring his BP closely at home and contact his primary care provider for results of labs and further instruction.  Fall precautions were discussed with patient and family. Return precautions were discussed at length. Patient to follow up with primary care provider on discharge for any medical needs     Physical Exam:  HRRR; lungs CTA.  RIGHT knee bandage CDI, +palp pulses. 1/5 RIGHT dorsiflexion noted.       Consults:     No new Assessment & Plan notes have been filed under this hospital service since the last note was generated.  Service: Hospital Medicine    Final Active Diagnoses:    Diagnosis Date Noted POA    PRINCIPAL PROBLEM:  Primary osteoarthritis of right knee [M17.11] 04/25/2019 Yes    Hypothyroidism [E03.9] 09/16/2019 Yes    Type 2 diabetes mellitus without complication, without long-term current use of insulin [E11.9] 09/16/2019 Yes    Hyperlipidemia [E78.5] 09/16/2019 Yes    Hypertension [I10] 09/16/2019 Yes    Gastroesophageal reflux disease [K21.9] 09/16/2019 Yes      Problems Resolved During this Admission:       Discharged Condition: good    Disposition: Home-Health Care [facility]    Follow Up:  Follow-up Information     Alok Rowe MD In 2 days.    Specialties: Orthopedic Surgery, Surgery, Sports Medicine  Why: Reassess right lower extremity nerve palsy  Contact information:  [address]  [address]  [place] [address]  [phone]             Ralf Saunders MD In 1 week.    Specialty: Family Medicine  Contact information:  [address]  [address]  [facility]  [place] [address]  [phone]                 Patient Instructions:      Ambulatory referral/consult to Home Health   Standing Status: Future   Referral Priority: Routine Referral Type: Home Health   Referral Reason: Specialty Services Required   Requested Specialty: Home  Health Services   Number of Visits Requested: 1     Diet diabetic     No driving until:   Order Comments: Cleared by surgeon     Notify your health care provider if you experience any of the following:  temperature >100.4     Notify your health care provider if you experience any of the following:  persistent nausea and vomiting or diarrhea     Notify your health care provider if you experience any of the following:  severe uncontrolled pain     Notify your health care provider if you experience any of the following:  redness, tenderness, or signs of infection (pain, swelling, redness, odor or green/yellow discharge around incision site)     Notify your health care provider if you experience any of the following:  difficulty breathing or increased cough     Weight bearing restrictions (specify):   Order Comments: RLE       Significant Diagnostic Studies: Labs:   BMP:   Recent Labs   Lab 06/16/20  0447   *      K 5.2*      CO2 18*   BUN 14   CREATININE 1.0   CALCIUM 8.1*    and CBC   Recent Labs   Lab 06/16/20  0557   WBC 10.43   HGB 11.6*   HCT 36.8*        X-Ray Knee 1 or 2 View Right [[id number]] Resulted: 06/15/20 1020   Order Status: Completed Updated: 06/15/20 1023   Narrative:     EXAMINATION:   XR KNEE 1 OR 2 VIEW RIGHT     CLINICAL HISTORY:   Post Op;  Unilateral primary osteoarthritis, right knee     TECHNIQUE:   AP and lateral views of the right knee were performed.     COMPARISON:   Prior knee x-rays of October 2, 2018     FINDINGS:   The patient has undergone a right knee joint replacement with metallic femoral and tibial components in good position.  Acute fracture is not identified.    Impression:       Status post right knee joint replacement.       Electronically signed by: Wilfredo Chandler MD   Date: 06/15/2020   Time: 10:20       Pending Diagnostic Studies:     None         Medications:  Reconciled Home Medications:      Medication List      START taking these medications     aspirin 325 MG tablet  Take 1 tablet (325 mg total) by mouth 2 (two) times daily.     oxyCODONE-acetaminophen 7.5-325 mg per tablet  Commonly known as: PERCOCET  Take 1 tablet by mouth every 4 (four) hours as needed for Pain.     polyethylene glycol 17 gram Pwpk  Commonly known as: GLYCOLAX  Take 17 g by mouth once daily.  Start taking on: June 17, 2020        CHANGE how you take these medications    atorvastatin 20 MG tablet  Commonly known as: LIPITOR  Take 1 tablet (20 mg total) by mouth once daily.  What changed: when to take this        CONTINUE taking these medications    atenoloL 25 MG tablet  Commonly known as: TENORMIN  Take 1 tablet (25 mg total) by mouth once daily.     azelastine 137 mcg (0.1 %) nasal spray  Commonly known as: ASTELIN  1 spray by Nasal route daily as needed.     cetirizine-pseudoephedrine 5-120 mg Tb12  Take 1 tablet by mouth once daily.     esomeprazole 40 MG capsule  Commonly known as: NEXIUM  Take 1 capsule (40 mg total) by mouth before breakfast.     fluticasone propionate 50 mcg/actuation nasal spray  Commonly known as: FLONASE  1 spray by Nasal route daily as needed.     levothyroxine 75 MCG tablet  Commonly known as: SYNTHROID  Take 1 tablet (75 mcg total) by mouth once daily.     lisinopriL 10 MG tablet  Take 1 tablet (10 mg total) by mouth once daily.     metFORMIN 500 MG tablet  Commonly known as: GLUCOPHAGE  Take 1 tablet (500 mg total) by mouth 3 (three) times daily.     nystatin cream  Commonly known as: MYCOSTATIN  Apply topically 2 (two) times daily.            Indwelling Lines/Drains at time of discharge:   Lines/Drains/Airways     None                 Time spent on the discharge of patient: 35 minutes  Patient was seen and examined on the date of discharge and determined to be suitable for discharge.         Clara Lozano NP  Department of [facility] Medicine  [facility]

## 2020-06-16 NOTE — TELEPHONE ENCOUNTER
----- Message from Gilmer Jacobsen sent at 6/16/2020 10:57 AM CDT -----  Regarding: hos f/u  Pt is needing a hosp f/u appt   Pt [phone]

## 2020-06-16 NOTE — PLAN OF CARE
ACCOUNT WILL NOT LINK TO [facility]    SEHLLEY faxed patient information to [employer] [phone].  Fax confirmation received.       06/16/20 8397   Post-Acute Status   Post-Acute Authorization Home Health   Home Health Status Referrals Sent   Patient choice form signed by patient/caregiver List with quality metrics by geographic area provided       4:15pm SHELLEY received call from Sushma with [facility] who stated that [facility] cannot accept patient.  Per Sushma, [facility] is home health agency. SW spoke to patient regarding change in home health agencies.  Patient verbalized understanding.  DALE Bowman

## 2020-06-16 NOTE — TELEPHONE ENCOUNTER
Does not need hospital follow-up if it was cut and dried knee surgery.  He is not a Medicare patient and will just require follow-up with his orthopedic surgeon

## 2020-06-16 NOTE — PROGRESS NOTES
[facility]  Orthopedics  Progress Note    Patient Name: John Corrales  MRN: [id number]  Admission Date: 6/15/2020  Hospital Length of Stay: 0 days  Attending Provider: Pradeep Ballard MD  Primary Care Provider: Ralf Saunders MD  Follow-up For: Procedure(s) (LRB):  ARTHROPLASTY, KNEE (Right)    Post-Operative Day: 1 Day Post-Op  Subjective:     Principal Problem:Primary osteoarthritis of right knee    Principal Orthopedic Problem:  Osteoarthritis right knee    Interval History:     Review of patient's allergies indicates:   Allergen Reactions    Demerol [meperidine] Itching       Current Facility-Administered Medications   Medication    acetaminophen tablet 1,000 mg    acetaminophen tablet 650 mg    aspirin tablet 325 mg    atorvastatin tablet 20 mg    bisacodyL suppository 10 mg    celecoxib capsule 200 mg    dextrose 5 % and 0.45 % NaCl infusion    dextrose 50% injection 12.5 g    dextrose 50% injection 25 g    famotidine tablet 20 mg    glucagon (human recombinant) injection 1 mg    glucose chewable tablet 16 g    glucose chewable tablet 24 g    insulin aspart U-100 pen 1-10 Units    levothyroxine tablet 75 mcg    lisinopriL tablet 10 mg    magnesium oxide tablet 800 mg    magnesium oxide tablet 800 mg    morphine injection 2 mg    ondansetron disintegrating tablet 8 mg    oxyCODONE immediate release tablet 15 mg    oxyCODONE immediate release tablet 5 mg    oxyCODONE immediate release tablet Tab 10 mg    oxyCODONE immediate release tablet Tab 10 mg    pantoprazole EC tablet 40 mg    polyethylene glycol packet 17 g    potassium chloride 10% oral solution 40 mEq    potassium chloride 10% oral solution 60 mEq    promethazine (PHENERGAN) 6.25 mg in dextrose 5 % 50 mL IVPB    sodium chloride 0.9% flush 10 mL    sodium chloride 0.9% flush 5 mL    zolpidem tablet 5 mg     Objective:     Vital Signs (Most Recent):  Temp: 97.7 °F (36.5 °C) (06/16/20 0420)  Pulse: 63  (06/16/20 0420)  Resp: 16 (06/16/20 0420)  BP: 136/84 (06/16/20 0420)  SpO2: 98 % (06/16/20 0420) Vital Signs (24h Range):  Temp:  [96.1 °F (35.6 °C)-98 °F (36.7 °C)] 97.7 °F (36.5 °C)  Pulse:  [46-78] 63  Resp:  [9-20] 16  SpO2:  [95 %-100 %] 98 %  BP: (105-166)/(55-84) 136/84     Weight: 108.9 kg (240 lb)  Height: 6' (182.9 cm)  Body mass index is 32.55 kg/m².      Intake/Output Summary (Last 24 hours) at 6/16/2020 0653  Last data filed at 6/16/2020 0600  Gross per 24 hour   Intake 2552.5 ml   Output 2060 ml   Net 492.5 ml       General    Nursing note and vitals reviewed.  Constitutional: He is oriented to person, place, and time. He appears well-developed and well-nourished. No distress.   HENT:   Head: Atraumatic.   Nose: Nose normal.   Eyes: Conjunctivae and EOM are normal.   Cardiovascular: Normal rate.    Pulmonary/Chest: Effort normal. No respiratory distress.   Neurological: He is alert and oriented to person, place, and time.   Psychiatric: He has a normal mood and affect. His behavior is normal.           Right Knee Exam     Comments:  Right knee, mildly swollen however consistent with history of recent right total knee arthroplasty.  Dressing with no drainage noted.  Dry and intact.    No calf pain or tenderness.    Patient does have decreased extension at the ankle, altered sensation in the right foot.  Most likely secondary to anesthesia however Temporary peroneal nerve palsy considered.    Vascular Exam     Right Pulses  Dorsalis Pedis:      2+  Posterior Tibial:      2+            Significant Labs:   CBC:   Recent Labs   Lab 06/16/20  0557   WBC 10.43   HGB 11.6*   HCT 36.8*        CMP:   Recent Labs   Lab 06/16/20  0447      K 5.2*      CO2 18*   *   BUN 14   CREATININE 1.0   CALCIUM 8.1*   ANIONGAP 9   EGFRNONAA >60     All pertinent labs within the past 24 hours have been reviewed.    Significant Imaging: None    Assessment/Plan:     * Primary osteoarthritis of right  knee  Postop day 1    Out of bed with nursing and PT, change dressing prior to discharge    Plan for discharge home today with home health    Follow-up in 2 days, reassess palsy    DVT prophylaxis discussed, patient aware of instructions.          REBECA Alcantara  Orthopedics  [facility]

## 2020-06-17 ENCOUNTER — TELEPHONE (OUTPATIENT)
Dept: MEDSURG UNIT | Facility: HOSPITAL | Age: 50
End: 2020-06-17

## 2020-06-19 DIAGNOSIS — Z96.651 STATUS POST TOTAL KNEE REPLACEMENT, RIGHT: Primary | ICD-10-CM

## 2020-06-19 RX ORDER — OXYCODONE AND ACETAMINOPHEN 7.5; 325 MG/1; MG/1
1 TABLET ORAL EVERY 6 HOURS PRN
Qty: 28 TABLET | Refills: 0 | Status: SHIPPED | OUTPATIENT
Start: 2020-06-20 | End: 2020-06-27

## 2020-06-22 VITALS
TEMPERATURE: 97 F | SYSTOLIC BLOOD PRESSURE: 151 MMHG | HEART RATE: 64 BPM | BODY MASS INDEX: 32.51 KG/M2 | OXYGEN SATURATION: 99 % | WEIGHT: 240 LBS | HEIGHT: 72 IN | RESPIRATION RATE: 18 BRPM | DIASTOLIC BLOOD PRESSURE: 86 MMHG

## 2020-06-25 ENCOUNTER — TELEPHONE (OUTPATIENT)
Dept: ORTHOPEDICS | Facility: CLINIC | Age: 50
End: 2020-06-25

## 2020-06-25 NOTE — TELEPHONE ENCOUNTER
Spoke with pt, having a few times when pain is severe. Nurse looked at his leg, all looks good according to pt. Told to make sure he takes his pain meds every 6 hrs and uses his ice pack.

## 2020-06-29 ENCOUNTER — TELEPHONE (OUTPATIENT)
Dept: ORTHOPEDICS | Facility: CLINIC | Age: 50
End: 2020-06-29

## 2020-06-29 DIAGNOSIS — Z96.651 STATUS POST TOTAL KNEE REPLACEMENT, RIGHT: Primary | ICD-10-CM

## 2020-06-29 RX ORDER — OXYCODONE AND ACETAMINOPHEN 7.5; 325 MG/1; MG/1
1 TABLET ORAL EVERY 6 HOURS PRN
Qty: 28 TABLET | Refills: 0 | Status: SHIPPED | OUTPATIENT
Start: 2020-06-29 | End: 2020-07-06 | Stop reason: SDUPTHER

## 2020-06-29 NOTE — TELEPHONE ENCOUNTER
Do you have a cough? no  Have you had a cough since your surgical procedure ?no  Are you short of breath?no  Have you experienced shortness of breath since your surgical procedure?no  Do you have a fever? no   Did you have a fever since your surgical procedure? no  If yes, what was your temperature n/a  Any redness at the surgery site? no Warm to the touch? no  Have you been tested for COVID-19 since your surgical procedure? no What was your result? N/A

## 2020-06-30 ENCOUNTER — OFFICE VISIT (OUTPATIENT)
Dept: ORTHOPEDICS | Facility: CLINIC | Age: 50
End: 2020-06-30
Payer: MEDICARE

## 2020-06-30 VITALS
BODY MASS INDEX: 32.51 KG/M2 | DIASTOLIC BLOOD PRESSURE: 69 MMHG | HEART RATE: 78 BPM | HEIGHT: 72 IN | SYSTOLIC BLOOD PRESSURE: 138 MMHG | WEIGHT: 240 LBS

## 2020-06-30 DIAGNOSIS — Z96.651 STATUS POST TOTAL RIGHT KNEE REPLACEMENT: Primary | ICD-10-CM

## 2020-06-30 PROCEDURE — 99024 PR POST-OP FOLLOW-UP VISIT: ICD-10-PCS | Mod: S$GLB,,, | Performed by: ORTHOPAEDIC SURGERY

## 2020-06-30 PROCEDURE — 99024 POSTOP FOLLOW-UP VISIT: CPT | Mod: S$GLB,,, | Performed by: ORTHOPAEDIC SURGERY

## 2020-06-30 RX ORDER — MELOXICAM 7.5 MG/1
7.5 TABLET ORAL DAILY
Qty: 30 TABLET | Refills: 3 | Status: SHIPPED | OUTPATIENT
Start: 2020-06-30 | End: 2020-07-30

## 2020-06-30 NOTE — PROGRESS NOTES
[facility] POST-OP NOTE    Subjective:           Chief Complaint:   Chief Complaint   Patient presents with    Right Knee - Post-op Evaluation     Right TKA 6.15.2020 Suture removal. States that his knee is doing ok, Having pain pain off an on and states that he gets a throbbing pain.        Past Medical History:   Diagnosis Date    Diabetes mellitus, type 2     General anesthetics causing adverse effect in therapeutic use 2007    During general anesthesia for back surgery , experienced severe pain, heard people talking and felt like his heart was about to explode.    GERD (gastroesophageal reflux disease)     Hyperlipidemia     Hypothyroidism     Nasal septal deviation        Past Surgical History:   Procedure Laterality Date    BACK SURGERY  2007    artificial disc L4-5    CYSTOSCOPY W/ STONE MANIPULATION  2002    Kidney stones removed    KNEE ARTHROPLASTY Right 6/15/2020    Procedure: ARTHROPLASTY, KNEE;  Surgeon: Alok Rowe MD;  Location: [facility];  Service: Orthopedics;  Laterality: Right;  SAMEERA FAYE    KNEE ARTHROSCOPY W/ DEBRIDEMENT  2000 and 2001    Right knee       Current Outpatient Medications   Medication Sig    aspirin 325 MG tablet Take 1 tablet (325 mg total) by mouth 2 (two) times daily.    atenoloL (TENORMIN) 25 MG tablet Take 1 tablet (25 mg total) by mouth once daily.    atorvastatin (LIPITOR) 20 MG tablet Take 1 tablet (20 mg total) by mouth once daily. (Patient taking differently: Take 20 mg by mouth every evening. )    azelastine (ASTELIN) 137 mcg nasal spray 1 spray by Nasal route daily as needed.      cetirizine-pseudoephedrine (ZYRTEC-D) 5-120 mg per tablet Take 1 tablet by mouth once daily.      esomeprazole (NEXIUM) 40 MG capsule Take 1 capsule (40 mg total) by mouth before breakfast.    fluticasone (FLONASE) 50 mcg/Actuation nasal spray 1 spray by Nasal route daily as needed.      lisinopriL 10 MG tablet Take 1 tablet (10 mg total) by mouth once daily.     metFORMIN (GLUCOPHAGE) 500 MG tablet Take 1 tablet (500 mg total) by mouth 3 (three) times daily.    nystatin (MYCOSTATIN) cream Apply topically 2 (two) times daily.    oxyCODONE-acetaminophen (PERCOCET) 7.5-325 mg per tablet Take 1 tablet by mouth every 6 (six) hours as needed for Pain.    polyethylene glycol (GLYCOLAX) 17 gram PwPk Take 17 g by mouth once daily.    levothyroxine (SYNTHROID) 75 MCG tablet Take 1 tablet (75 mcg total) by mouth once daily.     No current facility-administered medications for this visit.        Review of patient's allergies indicates:   Allergen Reactions    Demerol [meperidine] Itching       Family History   Problem Relation Age of Onset    Diabetes Father     Heart disease Father     Cancer Father         prostate       Social History     Socioeconomic History    Marital status:      Spouse name: Not on file    Number of children: Not on file    Years of education: Not on file    Highest education level: Not on file   Occupational History    Not on file   Social Needs    Financial resource strain: Not on file    Food insecurity     Worry: Not on file     Inability: Not on file    Transportation needs     Medical: Not on file     Non-medical: Not on file   Tobacco Use    Smoking status: Former Smoker    Smokeless tobacco: Never Used   Substance and Sexual Activity    Alcohol use: No    Drug use: No    Sexual activity: Not on file   Lifestyle    Physical activity     Days per week: Not on file     Minutes per session: Not on file    Stress: Not on file   Relationships    Social connections     Talks on phone: Not on file     Gets together: Not on file     Attends Zoroastrian service: Not on file     Active member of club or organization: Not on file     Attends meetings of clubs or organizations: Not on file     Relationship status: Not on file   Other Topics Concern    Not on file   Social History Narrative    Not on file       History of present  "illness:  Patient returns to clinic today for his 1st follow-up visit status post right total knee arthroplasty on June 15th.    Patient is doing well, he has been doing home physical therapy.  He still has a fair amount of pain.  He does endorses swelling is diminishing.      Review of Systems:    Musculoskeletal:  See HPI      Objective:        Physical Examination:    Vital Signs:    Vitals:    06/30/20 0856   BP: 138/69   Pulse: 78       Body mass index is 32.55 kg/m².    This a well-developed, well nourished patient in no acute distress.  They are alert and oriented and cooperative to examination.        Examination of the right knee, the surgical incision appears to be well-healed.  There is no evidence of wound failure dehiscence.  The wound is dry and intact.  His sutures were removed.  Sterile tape dressings were placed.    The patient has limited range of motion secondary to pain.  He lacks about 15° of extension, he also can only flex to about 45°.    Calf is soft and nontender.    Examination of the right foot, the patient was noted to have some decreased motor ability during his hospitalization.  He had a footdrop which was felt most likely to be secondary to his spinal anesthesia.  Patient states this resolved 1-2 days after his discharge from hospital.  He was instructed to follow up in the office however he did not.  He has good dorsiflexion of the right foot with 5+ strength in the right foot and ankle.    Pertinent New Results:    XRAY Report / Interpretation:       Assessment/Plan:      Status post right total knee arthroplasty.  Will begin outpatient physical therapy.  Reinforced aggressive range of motion exercises.  Patient seems to be apprehensive and has a low pain threshold.  Will also add an anti-inflammatory, meloxicam.    Will follow up in 3 weeks.    Tanner Melendez PA-C served as a scribe for this patient encounter. A "face to face" encounter occured with Dr. Alok Rowe on this date. " The treatment plan and medical decision making are outlined as above:    This note was created using Dragon voice recognition software that occasionally misinterpreted phrases or words.

## 2020-07-01 ENCOUNTER — DOCUMENT SCAN (OUTPATIENT)
Dept: HOME HEALTH SERVICES | Facility: HOSPITAL | Age: 50
End: 2020-07-01

## 2020-07-06 DIAGNOSIS — Z96.651 STATUS POST TOTAL KNEE REPLACEMENT, RIGHT: ICD-10-CM

## 2020-07-06 RX ORDER — OXYCODONE AND ACETAMINOPHEN 7.5; 325 MG/1; MG/1
1 TABLET ORAL EVERY 6 HOURS PRN
Qty: 28 TABLET | Refills: 0 | Status: SHIPPED | OUTPATIENT
Start: 2020-07-06 | End: 2020-07-13 | Stop reason: SDUPTHER

## 2020-07-13 DIAGNOSIS — Z96.651 STATUS POST TOTAL KNEE REPLACEMENT, RIGHT: ICD-10-CM

## 2020-07-13 RX ORDER — OXYCODONE AND ACETAMINOPHEN 7.5; 325 MG/1; MG/1
1 TABLET ORAL EVERY 6 HOURS PRN
Qty: 28 TABLET | Refills: 0 | Status: SHIPPED | OUTPATIENT
Start: 2020-07-13 | End: 2020-07-20

## 2020-07-15 ENCOUNTER — EXTERNAL HOME HEALTH (OUTPATIENT)
Dept: HOME HEALTH SERVICES | Facility: HOSPITAL | Age: 50
End: 2020-07-15

## 2020-07-16 ENCOUNTER — DOCUMENT SCAN (OUTPATIENT)
Dept: HOME HEALTH SERVICES | Facility: HOSPITAL | Age: 50
End: 2020-07-16

## 2020-07-21 ENCOUNTER — OFFICE VISIT (OUTPATIENT)
Dept: ORTHOPEDICS | Facility: CLINIC | Age: 50
End: 2020-07-21
Payer: MEDICARE

## 2020-07-21 VITALS
HEIGHT: 72 IN | WEIGHT: 240 LBS | SYSTOLIC BLOOD PRESSURE: 129 MMHG | HEART RATE: 64 BPM | BODY MASS INDEX: 32.51 KG/M2 | DIASTOLIC BLOOD PRESSURE: 76 MMHG

## 2020-07-21 DIAGNOSIS — Z96.651 STATUS POST TOTAL RIGHT KNEE REPLACEMENT: Primary | ICD-10-CM

## 2020-07-21 PROCEDURE — 99024 PR POST-OP FOLLOW-UP VISIT: ICD-10-PCS | Mod: S$GLB,,, | Performed by: ORTHOPAEDIC SURGERY

## 2020-07-21 PROCEDURE — 99024 POSTOP FOLLOW-UP VISIT: CPT | Mod: S$GLB,,, | Performed by: ORTHOPAEDIC SURGERY

## 2020-07-21 RX ORDER — OXYCODONE AND ACETAMINOPHEN 5; 325 MG/1; MG/1
1 TABLET ORAL EVERY 6 HOURS PRN
Qty: 28 TABLET | Refills: 0 | Status: SHIPPED | OUTPATIENT
Start: 2020-07-21 | End: 2020-07-28 | Stop reason: SDUPTHER

## 2020-07-21 NOTE — PROGRESS NOTES
[facility] POST-OP NOTE    Subjective:           Chief Complaint:   Chief Complaint   Patient presents with    Right Knee - Post-op Evaluation     Right TKA 6.15.2020. States that he still ha tightness in the knee, and leg pain in the evening. Pain depends on what he is doing PT days are the worse with pain.       Past Medical History:   Diagnosis Date    Diabetes mellitus, type 2     General anesthetics causing adverse effect in therapeutic use 2007    During general anesthesia for back surgery , experienced severe pain, heard people talking and felt like his heart was about to explode.    GERD (gastroesophageal reflux disease)     Hyperlipidemia     Hypothyroidism     Nasal septal deviation        Past Surgical History:   Procedure Laterality Date    BACK SURGERY  2007    artificial disc L4-5    CYSTOSCOPY W/ STONE MANIPULATION  2002    Kidney stones removed    KNEE ARTHROPLASTY Right 6/15/2020    Procedure: ARTHROPLASTY, KNEE;  Surgeon: Alok Rowe MD;  Location: [facility];  Service: Orthopedics;  Laterality: Right;  SAMEERA FAYE    KNEE ARTHROSCOPY W/ DEBRIDEMENT  2000 and 2001    Right knee       Current Outpatient Medications   Medication Sig    atenoloL (TENORMIN) 25 MG tablet Take 1 tablet (25 mg total) by mouth once daily.    atorvastatin (LIPITOR) 20 MG tablet Take 1 tablet (20 mg total) by mouth once daily. (Patient taking differently: Take 20 mg by mouth every evening. )    azelastine (ASTELIN) 137 mcg nasal spray 1 spray by Nasal route daily as needed.      cetirizine-pseudoephedrine (ZYRTEC-D) 5-120 mg per tablet Take 1 tablet by mouth once daily.      esomeprazole (NEXIUM) 40 MG capsule Take 1 capsule (40 mg total) by mouth before breakfast.    fluticasone (FLONASE) 50 mcg/Actuation nasal spray 1 spray by Nasal route daily as needed.      lisinopriL 10 MG tablet Take 1 tablet (10 mg total) by mouth once daily.    meloxicam (MOBIC) 7.5 MG tablet Take 1 tablet (7.5 mg  total) by mouth once daily.    metFORMIN (GLUCOPHAGE) 500 MG tablet Take 1 tablet (500 mg total) by mouth 3 (three) times daily.    nystatin (MYCOSTATIN) cream Apply topically 2 (two) times daily.    polyethylene glycol (GLYCOLAX) 17 gram PwPk Take 17 g by mouth once daily.    aspirin 325 MG tablet Take 1 tablet (325 mg total) by mouth 2 (two) times daily.    levothyroxine (SYNTHROID) 75 MCG tablet Take 1 tablet (75 mcg total) by mouth once daily.     No current facility-administered medications for this visit.        Review of patient's allergies indicates:   Allergen Reactions    Demerol [meperidine] Itching       Family History   Problem Relation Age of Onset    Diabetes Father     Heart disease Father     Cancer Father         prostate       Social History     Socioeconomic History    Marital status:      Spouse name: Not on file    Number of children: Not on file    Years of education: Not on file    Highest education level: Not on file   Occupational History    Not on file   Social Needs    Financial resource strain: Not on file    Food insecurity     Worry: Not on file     Inability: Not on file    Transportation needs     Medical: Not on file     Non-medical: Not on file   Tobacco Use    Smoking status: Former Smoker    Smokeless tobacco: Never Used   Substance and Sexual Activity    Alcohol use: No    Drug use: No    Sexual activity: Not on file   Lifestyle    Physical activity     Days per week: Not on file     Minutes per session: Not on file    Stress: Not on file   Relationships    Social connections     Talks on phone: Not on file     Gets together: Not on file     Attends [employer] service: Not on file     Active member of club or organization: Not on file     Attends meetings of clubs or organizations: Not on file     Relationship status: Not on file   Other Topics Concern    Not on file   Social History Narrative    Not on file       History of present illness:   "Patient returns today for his right knee, he is 5 weeks status post right total knee arthroplasty.  He still has some mild-to-moderate pains.  Primarily at the end of the day, after physical therapy.      Review of Systems:    Musculoskeletal:  See HPI      Objective:        Physical Examination:    Vital Signs:    Vitals:    07/21/20 0957   BP: 129/76   Pulse: 64       Body mass index is 32.55 kg/m².    This a well-developed, well nourished patient in no acute distress.  They are alert and oriented and cooperative to examination.        Examination of the right knee, the right knee has come down nicely in size.  No significant effusion is palpable.  He lacks about 5-10 degrees of extension.  He can flex to about 90°.  His surgical incision appears to be well-healed no evidence of wound failure dehiscence.  No signs symptoms of infection.    Pertinent New Results:    XRAY Report / Interpretation:   Three views of the right knee taken today in the office demonstrate a appropriately position right total knee arthroplasty with no evidence of hardware failure or loosening.    Assessment/Plan:      Stable status post right total knee arthroplasty.  Continue physical therapy, I just refilled his pain medicine yesterday.  Follow-up in 3 weeks to reassess range of motion.  Possibly close manipulation if not progressing.     Tanner Melendez PA-C served as a scribe for this patient encounter. A "face to face" encounter occured with Dr. Alok Rowe on this date. The treatment plan and medical decision making are outlined as above:    This note was created using Dragon voice recognition software that occasionally misinterpreted phrases or words.        "

## 2020-07-28 DIAGNOSIS — Z96.651 STATUS POST TOTAL RIGHT KNEE REPLACEMENT: ICD-10-CM

## 2020-07-28 RX ORDER — OXYCODONE AND ACETAMINOPHEN 5; 325 MG/1; MG/1
1 TABLET ORAL EVERY 6 HOURS PRN
Qty: 28 TABLET | Refills: 0 | Status: SHIPPED | OUTPATIENT
Start: 2020-07-28 | End: 2020-08-04

## 2020-08-04 DIAGNOSIS — Z96.651 STATUS POST TOTAL RIGHT KNEE REPLACEMENT: Primary | ICD-10-CM

## 2020-08-04 RX ORDER — HYDROCODONE BITARTRATE AND ACETAMINOPHEN 7.5; 325 MG/1; MG/1
1 TABLET ORAL EVERY 6 HOURS PRN
Qty: 28 TABLET | Refills: 0 | Status: SHIPPED | OUTPATIENT
Start: 2020-08-04 | End: 2020-08-11

## 2020-08-10 ENCOUNTER — TELEPHONE (OUTPATIENT)
Dept: FAMILY MEDICINE | Facility: CLINIC | Age: 50
End: 2020-08-10

## 2020-08-11 ENCOUNTER — OFFICE VISIT (OUTPATIENT)
Dept: ORTHOPEDICS | Facility: CLINIC | Age: 50
End: 2020-08-11
Payer: MEDICARE

## 2020-08-11 VITALS
DIASTOLIC BLOOD PRESSURE: 82 MMHG | SYSTOLIC BLOOD PRESSURE: 144 MMHG | HEART RATE: 77 BPM | BODY MASS INDEX: 32.51 KG/M2 | WEIGHT: 240 LBS | HEIGHT: 72 IN

## 2020-08-11 DIAGNOSIS — Z96.651 STATUS POST TOTAL RIGHT KNEE REPLACEMENT: Primary | ICD-10-CM

## 2020-08-11 PROCEDURE — 99024 POSTOP FOLLOW-UP VISIT: CPT | Mod: S$GLB,,, | Performed by: ORTHOPAEDIC SURGERY

## 2020-08-11 PROCEDURE — 99024 PR POST-OP FOLLOW-UP VISIT: ICD-10-PCS | Mod: S$GLB,,, | Performed by: ORTHOPAEDIC SURGERY

## 2020-08-11 RX ORDER — HYDROCODONE BITARTRATE AND ACETAMINOPHEN 5; 325 MG/1; MG/1
1 TABLET ORAL EVERY 6 HOURS PRN
Qty: 28 TABLET | Refills: 0 | Status: SHIPPED | OUTPATIENT
Start: 2020-08-11 | End: 2020-08-19 | Stop reason: SDUPTHER

## 2020-08-11 NOTE — PROGRESS NOTES
[facility] POST-OP NOTE    Subjective:           Chief Complaint:   Chief Complaint   Patient presents with    Right Knee - Post-op Evaluation     Right TKA 6.15.2020. States that he is good in the morning and states that the pain gets worse in the afternoon,        Past Medical History:   Diagnosis Date    Diabetes mellitus, type 2     General anesthetics causing adverse effect in therapeutic use 2007    During general anesthesia for back surgery , experienced severe pain, heard people talking and felt like his heart was about to explode.    GERD (gastroesophageal reflux disease)     Hyperlipidemia     Hypothyroidism     Nasal septal deviation        Past Surgical History:   Procedure Laterality Date    BACK SURGERY  2007    artificial disc L4-5    CYSTOSCOPY W/ STONE MANIPULATION  2002    Kidney stones removed    KNEE ARTHROPLASTY Right 6/15/2020    Procedure: ARTHROPLASTY, KNEE;  Surgeon: Alok Rowe MD;  Location: [facility];  Service: Orthopedics;  Laterality: Right;  SAMEERA FAYE    KNEE ARTHROSCOPY W/ DEBRIDEMENT  2000 and 2001    Right knee       Current Outpatient Medications   Medication Sig    atenoloL (TENORMIN) 25 MG tablet Take 1 tablet (25 mg total) by mouth once daily.    atorvastatin (LIPITOR) 20 MG tablet Take 1 tablet (20 mg total) by mouth once daily. (Patient taking differently: Take 20 mg by mouth every evening. )    azelastine (ASTELIN) 137 mcg nasal spray 1 spray by Nasal route daily as needed.      cetirizine-pseudoephedrine (ZYRTEC-D) 5-120 mg per tablet Take 1 tablet by mouth once daily.      esomeprazole (NEXIUM) 40 MG capsule Take 1 capsule (40 mg total) by mouth before breakfast.    fluticasone (FLONASE) 50 mcg/Actuation nasal spray 1 spray by Nasal route daily as needed.      HYDROcodone-acetaminophen (NORCO) 7.5-325 mg per tablet Take 1 tablet by mouth every 6 (six) hours as needed for Pain.    lisinopriL 10 MG tablet Take 1 tablet (10 mg total) by mouth  once daily.    metFORMIN (GLUCOPHAGE) 500 MG tablet Take 1 tablet (500 mg total) by mouth 3 (three) times daily.    nystatin (MYCOSTATIN) cream Apply topically 2 (two) times daily.    polyethylene glycol (GLYCOLAX) 17 gram PwPk Take 17 g by mouth once daily.    aspirin 325 MG tablet Take 1 tablet (325 mg total) by mouth 2 (two) times daily.    levothyroxine (SYNTHROID) 75 MCG tablet Take 1 tablet (75 mcg total) by mouth once daily.     No current facility-administered medications for this visit.        Review of patient's allergies indicates:   Allergen Reactions    Demerol [meperidine] Itching       Family History   Problem Relation Age of Onset    Diabetes Father     Heart disease Father     Cancer Father         prostate       Social History     Socioeconomic History    Marital status:      Spouse name: Not on file    Number of children: Not on file    Years of education: Not on file    Highest education level: Not on file   Occupational History    Not on file   Social Needs    Financial resource strain: Not on file    Food insecurity     Worry: Not on file     Inability: Not on file    Transportation needs     Medical: Not on file     Non-medical: Not on file   Tobacco Use    Smoking status: Former Smoker    Smokeless tobacco: Never Used   Substance and Sexual Activity    Alcohol use: No    Drug use: No    Sexual activity: Not on file   Lifestyle    Physical activity     Days per week: Not on file     Minutes per session: Not on file    Stress: Not on file   Relationships    Social connections     Talks on phone: Not on file     Gets together: Not on file     Attends Rastafari service: Not on file     Active member of club or organization: Not on file     Attends meetings of clubs or organizations: Not on file     Relationship status: Not on file   Other Topics Concern    Not on file   Social History Narrative    Not on file       History of present illness:  Patient returns for  "postop follow-up right total knee arthroplasty done in mid June of this year.  At this last visit, we were concerned that the patient was not progressing with range of motion.  He has made significant progress with his range of motion, he can still continues to complain of mild to moderate pain, generally in the later afternoons after being on or walking significant amounts.      Review of Systems:    Musculoskeletal:  See HPI      Objective:        Physical Examination:    Vital Signs:    Vitals:    08/11/20 0851   BP: (!) 144/82   Pulse: 77       Body mass index is 32.55 kg/m².    This a well-developed, well nourished patient in no acute distress.  They are alert and oriented and cooperative to examination.        Examination of the right knee, midline surgical scar appears to be well healed.  No evidence of wound failure dehiscence or infection.  The knee is not significantly swollen.  It is symmetrical in appearance to the left.  Range of motion is excellent, he can flex to greater than 120°, he can extend to 0°.  This is symmetrical with the left knee.    Pertinent New Results:    XRAY Report / Interpretation:   AP lateral sunrise views of the right knee taken today in the office demonstrate a right total knee arthroplasty in appropriate position without evidence of osseous abnormality.    Assessment/Plan:      Two month postop follow-up right total knee arthroplasty.  Patient has made excellent progress in range of motion with normal flexion and extension.  Will continue with physical therapy and strengthening.  Patient continues to complain of some mild discomfort over the medial aspect of the right knee.  Will continue conservative therapy, follow-up in 2-3 months for follow-up.    Tanner Melendez PA-C served as a scribe for this patient encounter. A "face to face" encounter occured with Dr. Alok Rowe on this date. The treatment plan and medical decision making are outlined as above:    This note was " created using Dragon voice recognition software that occasionally misinterpreted phrases or words.

## 2020-08-17 ENCOUNTER — OFFICE VISIT (OUTPATIENT)
Dept: FAMILY MEDICINE | Facility: CLINIC | Age: 50
End: 2020-08-17
Payer: MEDICARE

## 2020-08-17 VITALS
SYSTOLIC BLOOD PRESSURE: 112 MMHG | TEMPERATURE: 97 F | HEART RATE: 68 BPM | BODY MASS INDEX: 30.88 KG/M2 | WEIGHT: 228 LBS | HEIGHT: 72 IN | DIASTOLIC BLOOD PRESSURE: 70 MMHG

## 2020-08-17 DIAGNOSIS — E78.5 HYPERLIPIDEMIA, UNSPECIFIED HYPERLIPIDEMIA TYPE: ICD-10-CM

## 2020-08-17 DIAGNOSIS — I10 HYPERTENSION, UNSPECIFIED TYPE: Primary | ICD-10-CM

## 2020-08-17 DIAGNOSIS — G25.81 RLS (RESTLESS LEGS SYNDROME): ICD-10-CM

## 2020-08-17 DIAGNOSIS — E11.9 TYPE 2 DIABETES MELLITUS WITHOUT COMPLICATION, WITHOUT LONG-TERM CURRENT USE OF INSULIN: ICD-10-CM

## 2020-08-17 PROCEDURE — 3074F PR MOST RECENT SYSTOLIC BLOOD PRESSURE < 130 MM HG: ICD-10-PCS | Mod: S$GLB,,, | Performed by: PHYSICIAN ASSISTANT

## 2020-08-17 PROCEDURE — 3044F HG A1C LEVEL LT 7.0%: CPT | Mod: S$GLB,,, | Performed by: PHYSICIAN ASSISTANT

## 2020-08-17 PROCEDURE — 3044F PR MOST RECENT HEMOGLOBIN A1C LEVEL <7.0%: ICD-10-PCS | Mod: S$GLB,,, | Performed by: PHYSICIAN ASSISTANT

## 2020-08-17 PROCEDURE — 3078F PR MOST RECENT DIASTOLIC BLOOD PRESSURE < 80 MM HG: ICD-10-PCS | Mod: S$GLB,,, | Performed by: PHYSICIAN ASSISTANT

## 2020-08-17 PROCEDURE — 3008F BODY MASS INDEX DOCD: CPT | Mod: S$GLB,,, | Performed by: PHYSICIAN ASSISTANT

## 2020-08-17 PROCEDURE — 3074F SYST BP LT 130 MM HG: CPT | Mod: S$GLB,,, | Performed by: PHYSICIAN ASSISTANT

## 2020-08-17 PROCEDURE — 3008F PR BODY MASS INDEX (BMI) DOCUMENTED: ICD-10-PCS | Mod: S$GLB,,, | Performed by: PHYSICIAN ASSISTANT

## 2020-08-17 PROCEDURE — 99214 PR OFFICE/OUTPT VISIT, EST, LEVL IV, 30-39 MIN: ICD-10-PCS | Mod: S$GLB,,, | Performed by: PHYSICIAN ASSISTANT

## 2020-08-17 PROCEDURE — 3078F DIAST BP <80 MM HG: CPT | Mod: S$GLB,,, | Performed by: PHYSICIAN ASSISTANT

## 2020-08-17 PROCEDURE — 99214 OFFICE O/P EST MOD 30 MIN: CPT | Mod: S$GLB,,, | Performed by: PHYSICIAN ASSISTANT

## 2020-08-17 RX ORDER — ROPINIROLE 0.5 MG/1
0.5 TABLET, FILM COATED ORAL 2 TIMES DAILY
Qty: 60 TABLET | Refills: 2 | Status: SHIPPED | OUTPATIENT
Start: 2020-08-17 | End: 2020-09-28 | Stop reason: SDUPTHER

## 2020-08-17 NOTE — PROGRESS NOTES
SUBJECTIVE:    Patient ID: John Corrales is a 50 y.o. male.    Chief Complaint: Diabetes and Hypertension    51 yo wm presents for regular checkup and refills. Reports that he has been doing pretty well. TKR on the RT side on 6/15 with Dr. Rowe. Finished out therapy. Doing well. Minimal pain but still noticeable. Back continues to be bothersome. Does check BS at home and seems to be within range. A1c last visit was 6.0% Continues with the RLS sxs. Says maybe 3 nights or so out of the week. Difficulty sleeping due to this. We were waiting until after the surgery to see how he does.      Admission on 06/15/2020, Discharged on 06/16/2020   Component Date Value Ref Range Status    SARS-CoV-2 RNA, Amplification, Qual 06/15/2020 Negative  Negative Final    POCT Glucose 06/15/2020 144* 70 - 110 mg/dL Final    POCT Glucose 06/15/2020 140* 70 - 110 mg/dL Final    POCT Glucose 06/15/2020 148* 70 - 110 mg/dL Final    Sodium 06/16/2020 136  136 - 145 mmol/L Final    Potassium 06/16/2020 5.2* 3.5 - 5.1 mmol/L Final    Chloride 06/16/2020 109  95 - 110 mmol/L Final    CO2 06/16/2020 18* 23 - 29 mmol/L Final    Glucose 06/16/2020 168* 70 - 110 mg/dL Final    BUN, Bld 06/16/2020 14  6 - 20 mg/dL Final    Creatinine 06/16/2020 1.0  0.5 - 1.4 mg/dL Final    Calcium 06/16/2020 8.1* 8.7 - 10.5 mg/dL Final    Anion Gap 06/16/2020 9  8 - 16 mmol/L Final    eGFR if African American 06/16/2020 >60  >60 mL/min/1.73 m^2 Final    eGFR if non African American 06/16/2020 >60  >60 mL/min/1.73 m^2 Final    WBC 06/16/2020 10.43  3.90 - 12.70 K/uL Final    RBC 06/16/2020 4.23* 4.60 - 6.20 M/uL Final    Hemoglobin 06/16/2020 11.6* 14.0 - 18.0 g/dL Final    Hematocrit 06/16/2020 36.8* 40.0 - 54.0 % Final    Mean Corpuscular Volume 06/16/2020 87  82 - 98 fL Final    Mean Corpuscular Hemoglobin 06/16/2020 27.4  27.0 - 31.0 pg Final    Mean Corpuscular Hemoglobin Conc 06/16/2020 31.5* 32.0 - 36.0 g/dL Final    RDW 06/16/2020  12.3  11.5 - 14.5 % Final    Platelets 06/16/2020 169  150 - 350 K/uL Final    MPV 06/16/2020 11.8  9.2 - 12.9 fL Final    Immature Granulocytes 06/16/2020 0.2  0.0 - 0.5 % Final    Gran # (ANC) 06/16/2020 8.1* 1.8 - 7.7 K/uL Final    Immature Grans (Abs) 06/16/2020 0.02  0.00 - 0.04 K/uL Final    Lymph # 06/16/2020 1.4  1.0 - 4.8 K/uL Final    Mono # 06/16/2020 0.8  0.3 - 1.0 K/uL Final    Eos # 06/16/2020 0.1  0.0 - 0.5 K/uL Final    Baso # 06/16/2020 0.01  0.00 - 0.20 K/uL Final    nRBC 06/16/2020 0  0 /100 WBC Final    Gran% 06/16/2020 77.5* 38.0 - 73.0 % Final    Lymph% 06/16/2020 13.7* 18.0 - 48.0 % Final    Mono% 06/16/2020 7.4  4.0 - 15.0 % Final    Eosinophil% 06/16/2020 1.1  0.0 - 8.0 % Final    Basophil% 06/16/2020 0.1  0.0 - 1.9 % Final    Differential Method 06/16/2020 Automated   Final    POCT Glucose 06/16/2020 130* 70 - 110 mg/dL Final   Lab Visit on 06/13/2020   Component Date Value Ref Range Status    SARS-CoV2 (COVID-19) Qualitative P* 06/13/2020 Not Detected  Not Detected Final   Hospital Outpatient Visit on 06/08/2020   Component Date Value Ref Range Status    MRSA Surveillance Screen 06/08/2020 No MRSA isolated   Final    WBC 06/08/2020 6.55  3.90 - 12.70 K/uL Final    RBC 06/08/2020 5.13  4.60 - 6.20 M/uL Final    Hemoglobin 06/08/2020 13.8* 14.0 - 18.0 g/dL Final    Hematocrit 06/08/2020 43.9  40.0 - 54.0 % Final    Mean Corpuscular Volume 06/08/2020 86  82 - 98 fL Final    Mean Corpuscular Hemoglobin 06/08/2020 26.9* 27.0 - 31.0 pg Final    Mean Corpuscular Hemoglobin Conc 06/08/2020 31.4* 32.0 - 36.0 g/dL Final    RDW 06/08/2020 12.1  11.5 - 14.5 % Final    Platelets 06/08/2020 219  150 - 350 K/uL Final    MPV 06/08/2020 11.5  9.2 - 12.9 fL Final    Immature Granulocytes 06/08/2020 0.8* 0.0 - 0.5 % Final    Gran # (ANC) 06/08/2020 4.0  1.8 - 7.7 K/uL Final    Immature Grans (Abs) 06/08/2020 0.05* 0.00 - 0.04 K/uL Final    Lymph # 06/08/2020 1.6  1.0 - 4.8  K/uL Final    Mono # 06/08/2020 0.6  0.3 - 1.0 K/uL Final    Eos # 06/08/2020 0.2  0.0 - 0.5 K/uL Final    Baso # 06/08/2020 0.03  0.00 - 0.20 K/uL Final    nRBC 06/08/2020 0  0 /100 WBC Final    Gran% 06/08/2020 61.3  38.0 - 73.0 % Final    Lymph% 06/08/2020 24.1  18.0 - 48.0 % Final    Mono% 06/08/2020 9.6  4.0 - 15.0 % Final    Eosinophil% 06/08/2020 3.7  0.0 - 8.0 % Final    Basophil% 06/08/2020 0.5  0.0 - 1.9 % Final    Differential Method 06/08/2020 Automated   Final    Sodium 06/08/2020 140  136 - 145 mmol/L Final    Potassium 06/08/2020 4.5  3.5 - 5.1 mmol/L Final    Chloride 06/08/2020 106  95 - 110 mmol/L Final    CO2 06/08/2020 24  23 - 29 mmol/L Final    Glucose 06/08/2020 110  70 - 110 mg/dL Final    BUN, Bld 06/08/2020 16  6 - 20 mg/dL Final    Creatinine 06/08/2020 1.2  0.5 - 1.4 mg/dL Final    Calcium 06/08/2020 9.4  8.7 - 10.5 mg/dL Final    Total Protein 06/08/2020 7.5  6.0 - 8.4 g/dL Final    Albumin 06/08/2020 4.3  3.5 - 5.2 g/dL Final    Total Bilirubin 06/08/2020 0.5  0.1 - 1.0 mg/dL Final    Alkaline Phosphatase 06/08/2020 63  55 - 135 U/L Final    AST 06/08/2020 18  10 - 40 U/L Final    ALT 06/08/2020 24  10 - 44 U/L Final    Anion Gap 06/08/2020 10  8 - 16 mmol/L Final    eGFR if African American 06/08/2020 >60  >60 mL/min/1.73 m^2 Final    eGFR if non African American 06/08/2020 >60  >60 mL/min/1.73 m^2 Final    Group & Rh 06/08/2020 O POS   Final    Indirect Lito 06/08/2020 NEG   Final    Specimen UA 06/08/2020 Urine, Clean Catch   Final    Color, UA 06/08/2020 Yellow  Yellow, Straw, Bibi Final    Appearance, UA 06/08/2020 Clear  Clear Final    pH, UA 06/08/2020 7.0  5.0 - 8.0 Final    Specific Gravity, UA 06/08/2020 1.025  1.005 - 1.030 Final    Protein, UA 06/08/2020 Negative  Negative Final    Glucose, UA 06/08/2020 Negative  Negative Final    Ketones, UA 06/08/2020 Trace* Negative Final    Bilirubin (UA) 06/08/2020 Negative  Negative Final     Occult Blood UA 06/08/2020 Negative  Negative Final    Nitrite, UA 06/08/2020 Negative  Negative Final    Urobilinogen, UA 06/08/2020 Negative  <2.0 EU/dL Final    Leukocytes, UA 06/08/2020 Negative  Negative Final   Telephone on 03/02/2020   Component Date Value Ref Range Status    WBC 03/12/2020 8.7  3.8 - 10.8 Thousand/uL Final    RBC 03/12/2020 4.89  4.20 - 5.80 Million/uL Final    Hemoglobin 03/12/2020 13.7  13.2 - 17.1 g/dL Final    Hematocrit 03/12/2020 43.6  38.5 - 50.0 % Final    Mean Corpuscular Volume 03/12/2020 89.2  80.0 - 100.0 fL Final    Mean Corpuscular Hemoglobin 03/12/2020 28.0  27.0 - 33.0 pg Final    Mean Corpuscular Hemoglobin Conc 03/12/2020 31.4* 32.0 - 36.0 g/dL Final    RDW 03/12/2020 12.1  11.0 - 15.0 % Final    Platelets 03/12/2020 240  140 - 400 Thousand/uL Final    MPV 03/12/2020 12.5  7.5 - 12.5 fL Final    Neutrophils Absolute 03/12/2020 6,360  1,500 - 7,800 cells/uL Final    Lymph # 03/12/2020 1,618  850 - 3,900 cells/uL Final    Mono # 03/12/2020 522  200 - 950 cells/uL Final    Eos # 03/12/2020 157  15 - 500 cells/uL Final    Baso # 03/12/2020 44  0 - 200 cells/uL Final    Neutrophils Relative 03/12/2020 73.1  % Final    Lymph% 03/12/2020 18.6  % Final    Mono% 03/12/2020 6.0  % Final    Eosinophil% 03/12/2020 1.8  % Final    Basophil% 03/12/2020 0.5  % Final    Glucose 03/12/2020 109* 65 - 99 mg/dL Final    BUN, Bld 03/12/2020 17  7 - 25 mg/dL Final    Creatinine 03/12/2020 1.19  0.60 - 1.35 mg/dL Final    eGFR if non African American 03/12/2020 72  > OR = 60 mL/min/1.73m2 Final    eGFR if African American 03/12/2020 83  > OR = 60 mL/min/1.73m2 Final    BUN/Creatinine Ratio 03/12/2020 NOT APPLICABLE  6 - 22 (calc) Final    Sodium 03/12/2020 146  135 - 146 mmol/L Final    Potassium 03/12/2020 4.7  3.5 - 5.3 mmol/L Final    Chloride 03/12/2020 105  98 - 110 mmol/L Final    CO2 03/12/2020 27  20 - 32 mmol/L Final    Calcium 03/12/2020 10.2  8.6 -  10.3 mg/dL Final    Total Protein 03/12/2020 7.3  6.1 - 8.1 g/dL Final    Albumin 03/12/2020 4.7  3.6 - 5.1 g/dL Final    Globulin, Total 03/12/2020 2.6  1.9 - 3.7 g/dL (calc) Final    Albumin/Globulin Ratio 03/12/2020 1.8  1.0 - 2.5 (calc) Final    Total Bilirubin 03/12/2020 0.7  0.2 - 1.2 mg/dL Final    Alkaline Phosphatase 03/12/2020 62  36 - 130 U/L Final    AST 03/12/2020 12  10 - 40 U/L Final    ALT 03/12/2020 13  9 - 46 U/L Final    Hemoglobin A1C 03/12/2020 6.0* <5.7 % of total Hgb Final    Cholesterol 03/12/2020 168  <200 mg/dL Final    HDL 03/12/2020 57  > OR = 40 mg/dL Final    Triglycerides 03/12/2020 60  <150 mg/dL Final    LDL Cholesterol 03/12/2020 96  mg/dL (calc) Final    Hdl/Cholesterol Ratio 03/12/2020 2.9  <5.0 (calc) Final    Non HDL Chol. (LDL+VLDL) 03/12/2020 111  <130 mg/dL (calc) Final    TSH 03/12/2020 4.57* 0.40 - 4.50 mIU/L Final    Color, UA 03/12/2020 YELLOW  YELLOW Final    Appearance, UA 03/12/2020 CLEAR  CLEAR Final    Specific [place], UA 03/12/2020 1.026  1.001 - 1.035 Final    pH, UA 03/12/2020 < OR = 5.0  5.0 - 8.0 Final    Glucose, UA 03/12/2020 NEGATIVE  NEGATIVE Final    Bilirubin, UA 03/12/2020 NEGATIVE  NEGATIVE Final    Ketones, UA 03/12/2020 NEGATIVE  NEGATIVE Final    Occult Blood UA 03/12/2020 NEGATIVE  NEGATIVE Final    Protein, UA 03/12/2020 NEGATIVE  NEGATIVE Final    Nitrite, UA 03/12/2020 NEGATIVE  NEGATIVE Final    Leukocytes, UA 03/12/2020 NEGATIVE  NEGATIVE Final    WBC Casts, UA 03/12/2020 NONE SEEN  < OR = 5 /HPF Final    RBC Casts, UA 03/12/2020 0-2  < OR = 2 /HPF Final    Squam Epithel, UA 03/12/2020 NONE SEEN  < OR = 5 /HPF Final    Bacteria, UA 03/12/2020 NONE SEEN  NONE SEEN /HPF Final    Hyaline Casts, UA 03/12/2020 NONE SEEN  NONE SEEN /LPF Final    Reflexive Urine Culture 03/12/2020 NO CULTURE INDICATED   Final       Past Medical History:   Diagnosis Date    Diabetes mellitus, type 2     General anesthetics causing  adverse effect in therapeutic use 2007    During general anesthesia for back surgery , experienced severe pain, heard people talking and felt like his heart was about to explode.    GERD (gastroesophageal reflux disease)     Hyperlipidemia     Hypothyroidism     Nasal septal deviation      Past Surgical History:   Procedure Laterality Date    BACK SURGERY  2007    artificial disc L4-5    CYSTOSCOPY W/ STONE MANIPULATION  2002    Kidney stones removed    KNEE ARTHROPLASTY Right 6/15/2020    Procedure: ARTHROPLASTY, KNEE;  Surgeon: Alok Rowe MD;  Location: [facility];  Service: Orthopedics;  Laterality: Right;  SAMEERA FAYE    KNEE ARTHROSCOPY W/ DEBRIDEMENT  2000 and 2001    Right knee     Family History   Problem Relation Age of Onset    Diabetes Father     Heart disease Father     Cancer Father         prostate       Marital Status:   Alcohol History:  reports no history of alcohol use.  Tobacco History:  reports that he has quit smoking. He has never used smokeless tobacco.  Drug History:  reports no history of drug use.    Review of patient's allergies indicates:   Allergen Reactions    Demerol [meperidine] Itching       Current Outpatient Medications:     atenoloL (TENORMIN) 25 MG tablet, Take 1 tablet (25 mg total) by mouth once daily., Disp: 90 tablet, Rfl: 1    atorvastatin (LIPITOR) 20 MG tablet, Take 1 tablet (20 mg total) by mouth once daily. (Patient taking differently: Take 20 mg by mouth every evening. ), Disp: 90 tablet, Rfl: 1    azelastine (ASTELIN) 137 mcg nasal spray, 1 spray by Nasal route daily as needed.  , Disp: , Rfl:     cetirizine-pseudoephedrine (ZYRTEC-D) 5-120 mg per tablet, Take 1 tablet by mouth once daily.  , Disp: , Rfl:     fluticasone (FLONASE) 50 mcg/Actuation nasal spray, 1 spray by Nasal route daily as needed.  , Disp: , Rfl:     HYDROcodone-acetaminophen (NORCO) 5-325 mg per tablet, Take 1 tablet by mouth every 6 (six) hours as needed for Pain., Disp:  28 tablet, Rfl: 0    levothyroxine (SYNTHROID) 75 MCG tablet, Take 1 tablet (75 mcg total) by mouth once daily., Disp: 90 tablet, Rfl: 1    lisinopriL 10 MG tablet, Take 1 tablet (10 mg total) by mouth once daily., Disp: 90 tablet, Rfl: 1    metFORMIN (GLUCOPHAGE) 500 MG tablet, Take 1 tablet (500 mg total) by mouth 3 (three) times daily., Disp: 270 tablet, Rfl: 1    nystatin (MYCOSTATIN) cream, Apply topically 2 (two) times daily., Disp: 30 g, Rfl: 2    rOPINIRole (REQUIP) 0.5 MG tablet, Take 1 tablet (0.5 mg total) by mouth 2 (two) times a day., Disp: 60 tablet, Rfl: 2    Review of Systems   Constitutional: Negative for activity change, fatigue, fever and unexpected weight change.   HENT: Negative for congestion.    Respiratory: Negative for apnea, cough, chest tightness and shortness of breath.    Cardiovascular: Negative for chest pain and palpitations.   Gastrointestinal: Negative for abdominal distention and abdominal pain.   Genitourinary: Negative for difficulty urinating and dysuria.   Musculoskeletal: Positive for arthralgias. Negative for back pain.   Neurological: Negative for dizziness and weakness.          Objective:      Vitals:    08/17/20 0749   BP: 112/70   Pulse: 68   Temp: 97.3 °F (36.3 °C)   Weight: 103.4 kg (228 lb)   Height: 6' (1.829 m)     Physical Exam  Constitutional:       General: He is not in acute distress.     Appearance: He is well-developed.   HENT:      Head: Normocephalic and atraumatic.   Eyes:      Pupils: Pupils are equal, round, and reactive to light.   Neck:      Musculoskeletal: Normal range of motion and neck supple.      Thyroid: No thyromegaly.   Cardiovascular:      Rate and Rhythm: Normal rate and regular rhythm.      Heart sounds: Normal heart sounds.   Pulmonary:      Effort: Pulmonary effort is normal.      Breath sounds: Normal breath sounds.   Abdominal:      General: Bowel sounds are normal. There is no distension.      Palpations: Abdomen is soft.       Tenderness: There is no abdominal tenderness.   Musculoskeletal: Normal range of motion.         General: Tenderness (mild ttp Rt knee) present.   Skin:     General: Skin is warm and dry.      Findings: No erythema or rash.   Neurological:      Mental Status: He is alert and oriented to person, place, and time.      Cranial Nerves: No cranial nerve deficit.           Assessment:       1. Hypertension, unspecified type    2. Hyperlipidemia, unspecified hyperlipidemia type    3. Type 2 diabetes mellitus without complication, without long-term current use of insulin    4. RLS (restless legs syndrome)         Plan:       Hypertension, unspecified type  Comments:  Very well managed. continue as is.    Hyperlipidemia, unspecified hyperlipidemia type    Type 2 diabetes mellitus without complication, without long-term current use of insulin  Comments:  Previous a1c at 6.0% he is down another 12 lbs and doing well. recheck a1c at the next visit.    RLS (restless legs syndrome)  Comments:  trial with ropinirol and will titrate up as needed.  Orders:  -     rOPINIRole (REQUIP) 0.5 MG tablet; Take 1 tablet (0.5 mg total) by mouth 2 (two) times a day.  Dispense: 60 tablet; Refill: 2      Follow up in about 3 months (around 11/17/2020) for RLS, Diabetic Check-Up.        8/17/2020 Gavin Sandoval PA-C

## 2020-08-17 NOTE — PATIENT INSTRUCTIONS
Understanding Restless Legs Syndrome    Are you ever annoyed by a creeping or itching feeling in your legs? Do you often feel an urge to move your legs while sitting or lying in bed? This can keep you from falling asleep at night. You may then feel tired during the day. If you have these problems, talk to your health care provider. He or she can suggest a treatment plan and help you find ways to sleep better.  Restless legs syndrome (RLS)  RLS is a creeping, crawly, or jumpy feeling in the legs with an urge to move them. Symptoms of RLS often occur during periods of inactivity, such as when you sit or lie down at night. This discomfort can keep you from falling asleep. RLS is more common in older people and tends to run in families. Overuse of caffeine or alcohol may make symptoms worse. Iron deficiency, diabetes, or kidney problems can contribute to RLS.  Periodic limb movement syndrome (PLMS)  PLMS is sudden, repetitive leg jerking during sleep. The person you sleep with is often the one who notices it. Your legs may jerk many times during the night. You and your partner may both have trouble sleeping and feel tired in the morning. PLMS shouldnt be confused with the normal leg or body twitching many people have when first falling asleep.  Treating these problems  If these problems are causing disrupted sleep and daytime symptoms, treatment may be needed. Possible treatments may include:  · Avoiding medications like antidepressants, antinausea medications, and antipsychotic medications.   · Prescribed medications.  · Lifestyle changes, such as controlling caffeine intake, alcohol, and smoking.  Date Last Reviewed: 7/18/2015  © 2124-7790 [employer]. [address] [place] [address], [place] [address]. All rights reserved. This information is not intended as a substitute for professional medical care. Always follow your healthcare professional's instructions.        Restless Legs Syndrome: What You Can  Do  Symptoms of restless leg syndrome (RLS) can be treated. Together, you and your health care provider can work on your treatment plan. If needed, medications may be prescribed. Also learn what you can do to ease your discomfort. Good sleep habits and a healthy lifestyle will help you rest better at night and have more energy during the day.    Working with your health care provider  RLS may occur on its own and may be passed on in families. It is sometimes linked to other medical problems. Low iron may cause some RLS symptoms. Your health care provider may order a lab test to check your iron level. Other medical problems associated with RLS are kidney disease, diabetes, and multiple sclerosis. Your doctor may prescribe medications to reduce your symptoms and help you sleep better.  Tips for temporary relief  To reduce your discomfort, try the following:  · Walking or stretching  · Rubbing your legs  · Having a massage  · Taking a hot or cold bath  · Doing activities that make muscles in your hands or legs work  · Relaxing with yoga or meditation  Good sleep habits  Even though you have RLS, you can still have restful sleep. Try these good sleeping habits:  · Keep a regular sleep schedule. Go to bed and get up at the same time each day.  · Avoid or limit naps.  · Make sure the bedroom is quiet, dark, and not too hot or too cold.  · Use your bed only for sleep and sex.  Healthy lifestyle  Your lifestyle affects your health and your sleep. Here are some healthy habits:  · Eat a balanced diet. To get enough vitamins and minerals, you may also need to take supplements.  · Manage stress and learn ways to relax. Deep breathing techniques and visualization can help to relax your muscles and calm your mind.  · Exercise regularly. It can help reduce stress. Also, you will have more energy during the day and be more tired at bedtime. Afternoon exercise is best. Nighttime exercise may affect how well you sleep.  · Avoid  alcohol, nicotine, and caffeine.  Date Last Reviewed: 6/7/2015  © 0605-0450 [employer]. [address] [place] [address], [place] [address]. All rights reserved. This information is not intended as a substitute for professional medical care. Always follow your healthcare professional's instructions.

## 2020-08-19 DIAGNOSIS — Z96.651 STATUS POST TOTAL RIGHT KNEE REPLACEMENT: ICD-10-CM

## 2020-08-19 RX ORDER — HYDROCODONE BITARTRATE AND ACETAMINOPHEN 5; 325 MG/1; MG/1
1 TABLET ORAL EVERY 6 HOURS PRN
Qty: 28 TABLET | Refills: 0 | Status: SHIPPED | OUTPATIENT
Start: 2020-08-19 | End: 2020-08-26

## 2020-08-26 DIAGNOSIS — Z96.651 STATUS POST TOTAL RIGHT KNEE REPLACEMENT: Primary | ICD-10-CM

## 2020-08-26 RX ORDER — HYDROCODONE BITARTRATE AND ACETAMINOPHEN 5; 325 MG/1; MG/1
1 TABLET ORAL EVERY 6 HOURS PRN
Qty: 28 TABLET | Refills: 0 | Status: SHIPPED | OUTPATIENT
Start: 2020-08-26 | End: 2020-09-02 | Stop reason: SDUPTHER

## 2020-09-02 DIAGNOSIS — Z96.651 STATUS POST TOTAL RIGHT KNEE REPLACEMENT: ICD-10-CM

## 2020-09-02 RX ORDER — HYDROCODONE BITARTRATE AND ACETAMINOPHEN 5; 325 MG/1; MG/1
1 TABLET ORAL EVERY 6 HOURS PRN
Qty: 28 TABLET | Refills: 0 | Status: SHIPPED | OUTPATIENT
Start: 2020-09-02 | End: 2020-09-09 | Stop reason: SDUPTHER

## 2020-09-09 DIAGNOSIS — Z96.651 STATUS POST TOTAL RIGHT KNEE REPLACEMENT: ICD-10-CM

## 2020-09-09 RX ORDER — HYDROCODONE BITARTRATE AND ACETAMINOPHEN 5; 325 MG/1; MG/1
1 TABLET ORAL EVERY 8 HOURS PRN
Qty: 21 TABLET | Refills: 0 | Status: SHIPPED | OUTPATIENT
Start: 2020-09-09 | End: 2020-09-16

## 2020-09-16 ENCOUNTER — PATIENT MESSAGE (OUTPATIENT)
Dept: ORTHOPEDICS | Facility: CLINIC | Age: 50
End: 2020-09-16

## 2020-09-16 DIAGNOSIS — Z96.651 STATUS POST TOTAL RIGHT KNEE REPLACEMENT: Primary | ICD-10-CM

## 2020-09-16 RX ORDER — HYDROCODONE BITARTRATE AND ACETAMINOPHEN 5; 325 MG/1; MG/1
1 TABLET ORAL EVERY 8 HOURS PRN
Qty: 21 TABLET | Refills: 0 | Status: SHIPPED | OUTPATIENT
Start: 2020-09-16 | End: 2020-09-23

## 2020-09-23 ENCOUNTER — PATIENT MESSAGE (OUTPATIENT)
Dept: ORTHOPEDICS | Facility: CLINIC | Age: 50
End: 2020-09-23

## 2020-09-23 DIAGNOSIS — Z96.651 STATUS POST TOTAL RIGHT KNEE REPLACEMENT: Primary | ICD-10-CM

## 2020-09-23 RX ORDER — TRAMADOL HYDROCHLORIDE 50 MG/1
50 TABLET ORAL EVERY 6 HOURS PRN
Qty: 28 TABLET | Refills: 1 | Status: SHIPPED | OUTPATIENT
Start: 2020-09-23 | End: 2020-10-08

## 2020-09-28 ENCOUNTER — PATIENT MESSAGE (OUTPATIENT)
Dept: FAMILY MEDICINE | Facility: CLINIC | Age: 50
End: 2020-09-28

## 2020-09-28 DIAGNOSIS — E78.5 HYPERLIPIDEMIA, UNSPECIFIED HYPERLIPIDEMIA TYPE: ICD-10-CM

## 2020-09-28 DIAGNOSIS — E03.9 HYPOTHYROIDISM, UNSPECIFIED TYPE: ICD-10-CM

## 2020-09-28 DIAGNOSIS — G25.81 RLS (RESTLESS LEGS SYNDROME): ICD-10-CM

## 2020-09-28 DIAGNOSIS — I10 HYPERTENSION, UNSPECIFIED TYPE: ICD-10-CM

## 2020-09-28 DIAGNOSIS — E11.649 UNCONTROLLED TYPE 2 DIABETES MELLITUS WITH HYPOGLYCEMIA WITHOUT COMA: ICD-10-CM

## 2020-09-28 RX ORDER — ROPINIROLE 0.5 MG/1
0.5 TABLET, FILM COATED ORAL 2 TIMES DAILY
Qty: 60 TABLET | Refills: 2 | Status: SHIPPED | OUTPATIENT
Start: 2020-09-28 | End: 2020-11-19 | Stop reason: SDUPTHER

## 2020-09-28 RX ORDER — METFORMIN HYDROCHLORIDE 500 MG/1
500 TABLET ORAL 3 TIMES DAILY
Qty: 270 TABLET | Refills: 1 | Status: SHIPPED | OUTPATIENT
Start: 2020-09-28 | End: 2020-11-19 | Stop reason: SDUPTHER

## 2020-09-28 RX ORDER — LEVOTHYROXINE SODIUM 75 UG/1
75 TABLET ORAL DAILY
Qty: 90 TABLET | Refills: 1 | Status: SHIPPED | OUTPATIENT
Start: 2020-09-28 | End: 2020-11-19 | Stop reason: SDUPTHER

## 2020-09-28 RX ORDER — ATORVASTATIN CALCIUM 20 MG/1
20 TABLET, FILM COATED ORAL DAILY
Qty: 90 TABLET | Refills: 1 | Status: SHIPPED | OUTPATIENT
Start: 2020-09-28 | End: 2020-11-19 | Stop reason: SDUPTHER

## 2020-09-28 RX ORDER — ATENOLOL 25 MG/1
25 TABLET ORAL DAILY
Qty: 90 TABLET | Refills: 1 | Status: SHIPPED | OUTPATIENT
Start: 2020-09-28 | End: 2020-11-19 | Stop reason: SDUPTHER

## 2020-10-08 ENCOUNTER — OFFICE VISIT (OUTPATIENT)
Dept: ORTHOPEDICS | Facility: CLINIC | Age: 50
End: 2020-10-08
Payer: MEDICARE

## 2020-10-08 VITALS
WEIGHT: 228 LBS | HEIGHT: 72 IN | BODY MASS INDEX: 30.88 KG/M2 | SYSTOLIC BLOOD PRESSURE: 140 MMHG | HEART RATE: 84 BPM | OXYGEN SATURATION: 99 % | DIASTOLIC BLOOD PRESSURE: 84 MMHG

## 2020-10-08 DIAGNOSIS — Z96.651 HISTORY OF TOTAL KNEE ARTHROPLASTY, RIGHT: Primary | ICD-10-CM

## 2020-10-08 PROCEDURE — 99212 PR OFFICE/OUTPT VISIT, EST, LEVL II, 10-19 MIN: ICD-10-PCS | Mod: S$GLB,,, | Performed by: ORTHOPAEDIC SURGERY

## 2020-10-08 PROCEDURE — 3008F BODY MASS INDEX DOCD: CPT | Mod: S$GLB,,, | Performed by: ORTHOPAEDIC SURGERY

## 2020-10-08 PROCEDURE — 3008F PR BODY MASS INDEX (BMI) DOCUMENTED: ICD-10-PCS | Mod: S$GLB,,, | Performed by: ORTHOPAEDIC SURGERY

## 2020-10-08 PROCEDURE — 3079F DIAST BP 80-89 MM HG: CPT | Mod: S$GLB,,, | Performed by: ORTHOPAEDIC SURGERY

## 2020-10-08 PROCEDURE — 3077F SYST BP >= 140 MM HG: CPT | Mod: S$GLB,,, | Performed by: ORTHOPAEDIC SURGERY

## 2020-10-08 PROCEDURE — 3077F PR MOST RECENT SYSTOLIC BLOOD PRESSURE >= 140 MM HG: ICD-10-PCS | Mod: S$GLB,,, | Performed by: ORTHOPAEDIC SURGERY

## 2020-10-08 PROCEDURE — 3079F PR MOST RECENT DIASTOLIC BLOOD PRESSURE 80-89 MM HG: ICD-10-PCS | Mod: S$GLB,,, | Performed by: ORTHOPAEDIC SURGERY

## 2020-10-08 PROCEDURE — 99212 OFFICE O/P EST SF 10 MIN: CPT | Mod: S$GLB,,, | Performed by: ORTHOPAEDIC SURGERY

## 2020-10-08 NOTE — PROGRESS NOTES
[facility]    Subjective:     Chief Complaint:   Chief Complaint   Patient presents with    Right Knee - Pain     S/p right TKA on 6/15/2020, still having some pain and difficulty walking. Still on a 7 pain scale if up walking a lot/end of day.        Past Medical History:   Diagnosis Date    Diabetes mellitus, type 2     General anesthetics causing adverse effect in therapeutic use 2007    During general anesthesia for back surgery , experienced severe pain, heard people talking and felt like his heart was about to explode.    GERD (gastroesophageal reflux disease)     Hyperlipidemia     Hypothyroidism     Nasal septal deviation        Past Surgical History:   Procedure Laterality Date    BACK SURGERY  2007    artificial disc L4-5    CYSTOSCOPY W/ STONE MANIPULATION  2002    Kidney stones removed    KNEE ARTHROPLASTY Right 6/15/2020    Procedure: ARTHROPLASTY, KNEE;  Surgeon: Alok Rowe MD;  Location: [facility];  Service: Orthopedics;  Laterality: Right;  SAMEERA JOSUES    KNEE ARTHROSCOPY W/ DEBRIDEMENT  2000 and 2001    Right knee       Current Outpatient Medications   Medication Sig    atenoloL (TENORMIN) 25 MG tablet Take 1 tablet (25 mg total) by mouth once daily.    atorvastatin (LIPITOR) 20 MG tablet Take 1 tablet (20 mg total) by mouth once daily.    azelastine (ASTELIN) 137 mcg nasal spray 1 spray by Nasal route daily as needed.      cetirizine-pseudoephedrine (ZYRTEC-D) 5-120 mg per tablet Take 1 tablet by mouth once daily.      fluticasone (FLONASE) 50 mcg/Actuation nasal spray 1 spray by Nasal route daily as needed.      levothyroxine (SYNTHROID) 75 MCG tablet Take 1 tablet (75 mcg total) by mouth once daily.    lisinopriL 10 MG tablet Take 1 tablet (10 mg total) by mouth once daily.    metFORMIN (GLUCOPHAGE) 500 MG tablet Take 1 tablet (500 mg total) by mouth 3 (three) times daily.    nystatin (MYCOSTATIN) cream Apply topically 2 (two) times daily.    rOPINIRole  (REQUIP) 0.5 MG tablet Take 1 tablet (0.5 mg total) by mouth 2 (two) times a day.    traMADoL (ULTRAM) 50 mg tablet Take 1 tablet (50 mg total) by mouth every 6 (six) hours as needed for Pain.     No current facility-administered medications for this visit.        Review of patient's allergies indicates:   Allergen Reactions    Demerol [meperidine] Itching       Family History   Problem Relation Age of Onset    Diabetes Father     Heart disease Father     Cancer Father         prostate       Social History     Socioeconomic History    Marital status:      Spouse name: Not on file    Number of children: Not on file    Years of education: Not on file    Highest education level: Not on file   Occupational History    Not on file   Social Needs    Financial resource strain: Somewhat hard    Food insecurity     Worry: Never true     Inability: Never true    Transportation needs     Medical: No     Non-medical: No   Tobacco Use    Smoking status: Former Smoker    Smokeless tobacco: Never Used   Substance and Sexual Activity    Alcohol use: No     Frequency: Never     Drinks per session: Patient refused     Binge frequency: Never    Drug use: No    Sexual activity: Not on file   Lifestyle    Physical activity     Days per week: 3 days     Minutes per session: Patient refused    Stress: Not on file   Relationships    Social connections     Talks on phone: More than three times a week     Gets together: Once a week     Attends Rastafari service: Not on file     Active member of club or organization: Yes     Attends meetings of clubs or organizations: More than 4 times per year     Relationship status:    Other Topics Concern    Not on file   Social History Narrative    Not on file       History of present illness:  Patient comes in today for the right knee.  He continues to improve.  Still having some gait abnormality is in some pain.  He limb for so long.  He still  limbs.      Review of  Systems:    Constitution: Negative for chills, fever, and sweats.  Negative for unexplained weight loss.    HENT:  Negative for headaches and blurry vision.    Cardiovascular:Negative for chest pain or irregular heart beat. Negative for hypertension.    Respiratory:  Negative for cough and shortness of breath.    Gastrointestinal: Negative for abdominal pain, heartburn, melena, nausea, and vomitting.    Genitourinary:  Negative bladder incontinence and dysuria.    Musculoskeletal:  See HPI for details.     Neurological: Negative for numbness.    Psychiatric/Behavioral: Negative for depression.  The patient is not nervous/anxious.      Endocrine: Negative for polyuria    Hematologic/Lymphatic: Negative for bleeding problem.  Does not bruise/bleed easily.    Skin: Negative for poor would healing and rash    Objective:      Physical Examination:    Vital Signs:    Vitals:    10/08/20 0824   BP: (!) 140/84   Pulse: 84       Body mass index is 30.92 kg/m².    This a well-developed, well nourished patient in no acute distress.  They are alert and oriented and cooperative to examination.        Patient has range of motion 0-115 degrees.  The knee is stable to varus valgus stresses.  No effusions.  Pertinent New Results:    XRAY Report / Interpretation:   AP lateral and sunrise views of the right knee demonstrates right total knee to be in acceptable position.  No change in position.    Assessment/Plan:      Right total knee.  Improving.  I am going to put him back in physical therapy primarily for gait training.  He limped for so long that I think a lot of this is just a bit she will.  I will see him back in 3 months      This note was created using Dragon voice recognition software that occasionally misinterpreted phrases or words.

## 2020-10-12 ENCOUNTER — PATIENT MESSAGE (OUTPATIENT)
Dept: ORTHOPEDICS | Facility: CLINIC | Age: 50
End: 2020-10-12

## 2020-10-13 DIAGNOSIS — Z96.651 HISTORY OF TOTAL KNEE ARTHROPLASTY, RIGHT: Primary | ICD-10-CM

## 2020-10-13 RX ORDER — HYDROCODONE BITARTRATE AND ACETAMINOPHEN 5; 325 MG/1; MG/1
1 TABLET ORAL EVERY 8 HOURS PRN
Qty: 21 TABLET | Refills: 0 | Status: SHIPPED | OUTPATIENT
Start: 2020-10-13 | End: 2020-10-20

## 2020-10-22 ENCOUNTER — PATIENT MESSAGE (OUTPATIENT)
Dept: ORTHOPEDICS | Facility: CLINIC | Age: 50
End: 2020-10-22

## 2020-10-22 DIAGNOSIS — Z96.651 HISTORY OF TOTAL KNEE ARTHROPLASTY, RIGHT: Primary | ICD-10-CM

## 2020-10-22 RX ORDER — TRAMADOL HYDROCHLORIDE 50 MG/1
50 TABLET ORAL EVERY 6 HOURS PRN
Qty: 28 TABLET | Refills: 0 | Status: SHIPPED | OUTPATIENT
Start: 2020-10-22 | End: 2020-10-29

## 2020-11-19 ENCOUNTER — PATIENT MESSAGE (OUTPATIENT)
Dept: FAMILY MEDICINE | Facility: CLINIC | Age: 50
End: 2020-11-19

## 2020-11-19 ENCOUNTER — OFFICE VISIT (OUTPATIENT)
Dept: FAMILY MEDICINE | Facility: CLINIC | Age: 50
End: 2020-11-19
Payer: MEDICARE

## 2020-11-19 VITALS
WEIGHT: 227 LBS | SYSTOLIC BLOOD PRESSURE: 122 MMHG | DIASTOLIC BLOOD PRESSURE: 72 MMHG | BODY MASS INDEX: 30.75 KG/M2 | HEIGHT: 72 IN | HEART RATE: 82 BPM

## 2020-11-19 DIAGNOSIS — G25.81 RLS (RESTLESS LEGS SYNDROME): ICD-10-CM

## 2020-11-19 DIAGNOSIS — E78.5 HYPERLIPIDEMIA, UNSPECIFIED HYPERLIPIDEMIA TYPE: ICD-10-CM

## 2020-11-19 DIAGNOSIS — I10 HYPERTENSION, UNSPECIFIED TYPE: ICD-10-CM

## 2020-11-19 DIAGNOSIS — E11.9 TYPE 2 DIABETES MELLITUS WITHOUT COMPLICATION, WITHOUT LONG-TERM CURRENT USE OF INSULIN: Primary | ICD-10-CM

## 2020-11-19 DIAGNOSIS — E03.9 HYPOTHYROIDISM, UNSPECIFIED TYPE: ICD-10-CM

## 2020-11-19 DIAGNOSIS — E11.649 UNCONTROLLED TYPE 2 DIABETES MELLITUS WITH HYPOGLYCEMIA WITHOUT COMA: ICD-10-CM

## 2020-11-19 PROCEDURE — 3078F PR MOST RECENT DIASTOLIC BLOOD PRESSURE < 80 MM HG: ICD-10-PCS | Mod: S$GLB,,, | Performed by: PHYSICIAN ASSISTANT

## 2020-11-19 PROCEDURE — 3074F SYST BP LT 130 MM HG: CPT | Mod: S$GLB,,, | Performed by: PHYSICIAN ASSISTANT

## 2020-11-19 PROCEDURE — 99214 OFFICE O/P EST MOD 30 MIN: CPT | Mod: S$GLB,,, | Performed by: PHYSICIAN ASSISTANT

## 2020-11-19 PROCEDURE — 3074F PR MOST RECENT SYSTOLIC BLOOD PRESSURE < 130 MM HG: ICD-10-PCS | Mod: S$GLB,,, | Performed by: PHYSICIAN ASSISTANT

## 2020-11-19 PROCEDURE — 3008F PR BODY MASS INDEX (BMI) DOCUMENTED: ICD-10-PCS | Mod: S$GLB,,, | Performed by: PHYSICIAN ASSISTANT

## 2020-11-19 PROCEDURE — 99214 PR OFFICE/OUTPT VISIT, EST, LEVL IV, 30-39 MIN: ICD-10-PCS | Mod: S$GLB,,, | Performed by: PHYSICIAN ASSISTANT

## 2020-11-19 PROCEDURE — 3044F PR MOST RECENT HEMOGLOBIN A1C LEVEL <7.0%: ICD-10-PCS | Mod: S$GLB,,, | Performed by: PHYSICIAN ASSISTANT

## 2020-11-19 PROCEDURE — 3044F HG A1C LEVEL LT 7.0%: CPT | Mod: S$GLB,,, | Performed by: PHYSICIAN ASSISTANT

## 2020-11-19 PROCEDURE — 3008F BODY MASS INDEX DOCD: CPT | Mod: S$GLB,,, | Performed by: PHYSICIAN ASSISTANT

## 2020-11-19 PROCEDURE — 3078F DIAST BP <80 MM HG: CPT | Mod: S$GLB,,, | Performed by: PHYSICIAN ASSISTANT

## 2020-11-19 RX ORDER — INFLUENZA A VIRUS A/NEBRASKA/14/2019 (H1N1) ANTIGEN (MDCK CELL DERIVED, PROPIOLACTONE INACTIVATED), INFLUENZA A VIRUS A/DELAWARE/39/2019 (H3N2) ANTIGEN (MDCK CELL DERIVED, PROPIOLACTONE INACTIVATED), INFLUENZA B VIRUS B/SINGAPORE/INFTT-16-0610/2016 ANTIGEN (MDCK CELL DERIVED, PROPIOLACTONE INACTIVATED), INFLUENZA B VIRUS B/DARWIN/7/2019 ANTIGEN (MDCK CELL DERIVED, PROPIOLACTONE INACTIVATED) 15; 15; 15; 15 UG/.5ML; UG/.5ML; UG/.5ML; UG/.5ML
INJECTION, SUSPENSION INTRAMUSCULAR
COMMUNITY
Start: 2020-08-26 | End: 2021-10-12

## 2020-11-19 RX ORDER — METFORMIN HYDROCHLORIDE 500 MG/1
500 TABLET ORAL 3 TIMES DAILY
Qty: 270 TABLET | Refills: 1 | Status: SHIPPED | OUTPATIENT
Start: 2020-11-19 | End: 2021-05-20 | Stop reason: SDUPTHER

## 2020-11-19 RX ORDER — ATENOLOL 25 MG/1
25 TABLET ORAL DAILY
Qty: 90 TABLET | Refills: 1 | Status: SHIPPED | OUTPATIENT
Start: 2020-11-19 | End: 2021-05-20 | Stop reason: SDUPTHER

## 2020-11-19 RX ORDER — LISINOPRIL 10 MG/1
10 TABLET ORAL DAILY
Qty: 90 TABLET | Refills: 1 | Status: SHIPPED | OUTPATIENT
Start: 2020-11-19 | End: 2021-05-20 | Stop reason: SDUPTHER

## 2020-11-19 RX ORDER — ATORVASTATIN CALCIUM 20 MG/1
20 TABLET, FILM COATED ORAL DAILY
Qty: 90 TABLET | Refills: 1 | Status: SHIPPED | OUTPATIENT
Start: 2020-11-19 | End: 2021-05-20 | Stop reason: SDUPTHER

## 2020-11-19 RX ORDER — LEVOTHYROXINE SODIUM 75 UG/1
75 TABLET ORAL DAILY
Qty: 90 TABLET | Refills: 1 | Status: SHIPPED | OUTPATIENT
Start: 2020-11-19 | End: 2021-05-20 | Stop reason: SDUPTHER

## 2020-11-19 RX ORDER — ROPINIROLE 0.5 MG/1
0.5 TABLET, FILM COATED ORAL 2 TIMES DAILY
Qty: 60 TABLET | Refills: 2 | Status: SHIPPED | OUTPATIENT
Start: 2020-11-19 | End: 2021-05-20 | Stop reason: SDUPTHER

## 2020-11-19 NOTE — PATIENT INSTRUCTIONS
Diet: Diabetes  Food is an important tool that you can use to control diabetes and stay healthy. Eating well-balanced meals in the correct amounts will help you control your blood glucose levels and prevent low blood sugar reactions. It will also help you reduce the health risks of diabetes. There is no one specific diet that is right for everyone with diabetes. But there are general guidelines to follow. A registered dietitian (RD) will create a tailored diet approach thats just right for you. He or she will also help you plan healthy meals and snacks. If you have any questions, call your dietitian for advice.     Guidelines for success  Talk with your healthcare provider before starting a diabetes diet or weight loss program. If you haven't talked with a dietitian yet, ask your provider for a referral. The following guidelines can help you succeed:  · Select foods from the 6 food groups below. Your dietitian will help you find food choices within each group. He or she will also show you serving sizes and how many servings you can have at each meal.  ¨ Grains, beans, and starchy vegetables  ¨ Vegetables  ¨ Fruit  ¨ Milk or yogurt  ¨ Meat, poultry, fish, or tofu  ¨ Healthy fats  · Check your blood sugar levels as directed by your provider. Take any medicine as prescribed by your provider.  · Learn to read food labels and pick the right portion sizes.  · Eat only the amount of food in your meal plan. Eat about the same amount of food at regular times each day. Dont skip meals. Eat meals 4 to 5 hours apart, with snacks in between.  · Limit alcohol. It raises blood sugar levels. Drink water or calorie-free diet drinks that use safe sweeteners.  · Eat less fat to help lower your risk of heart disease. Use nonfat or low-fat dairy products and lean meats. Avoid fried foods. Use cooking oils that are unsaturated, such as olive, canola, or peanut oil.  · Talk with your dietitian about safe sugar substitutes.  · Avoid  added salt. It can contribute to high blood pressure, which can cause heart disease. People with diabetes already have a risk of high blood pressure and heart disease.  · Stay at a healthy weight. If you need to lose weight, cut down on your portion sizes. But dont skip meals. Exercise is an important part of any weight management program. Talk with your provider about an exercise program thats right for you.  · For more information about the best diet plan for you, talk with a registered dietitian (RD). To find an RD in your area, contact:  ¨ Academy of Nutrition and Dietetics www.eatright.org  ¨ The American Diabetes Association [phone] www.diabetes.org  Date Last Reviewed: 8/1/2016 © 2000-2017 [employer] [address] [place] [address], [place] [address]. All rights reserved. This information is not intended as a substitute for professional medical care. Always follow your healthcare professional's instructions.

## 2020-11-19 NOTE — PROGRESS NOTES
SUBJECTIVE:    Patient ID: John Corrales is a 50 y.o. male.    Chief Complaint: Diabetes (no bottles, Pt uncertain on refills, eye exam scheduled for 1/2021, prepared for foot exam, JAX)    49 yo wm presents for regular checkup and refills. I did start him on ropinirole which has helped with the RLS sxs. Finds him taking it earlier in the day. BS still looking great at home. Last a1c was 6%. DFE completed. Still working with therapy for the knee. Trying to get the pain better controlled.      Admission on 06/15/2020, Discharged on 06/16/2020   Component Date Value Ref Range Status    SARS-CoV-2 RNA, Amplification, Qual 06/15/2020 Negative  Negative Final    POCT Glucose 06/15/2020 144* 70 - 110 mg/dL Final    POCT Glucose 06/15/2020 140* 70 - 110 mg/dL Final    POCT Glucose 06/15/2020 148* 70 - 110 mg/dL Final    Sodium 06/16/2020 136  136 - 145 mmol/L Final    Potassium 06/16/2020 5.2* 3.5 - 5.1 mmol/L Final    Chloride 06/16/2020 109  95 - 110 mmol/L Final    CO2 06/16/2020 18* 23 - 29 mmol/L Final    Glucose 06/16/2020 168* 70 - 110 mg/dL Final    BUN 06/16/2020 14  6 - 20 mg/dL Final    Creatinine 06/16/2020 1.0  0.5 - 1.4 mg/dL Final    Calcium 06/16/2020 8.1* 8.7 - 10.5 mg/dL Final    Anion Gap 06/16/2020 9  8 - 16 mmol/L Final    eGFR if African American 06/16/2020 >60  >60 mL/min/1.73 m^2 Final    eGFR if non African American 06/16/2020 >60  >60 mL/min/1.73 m^2 Final    WBC 06/16/2020 10.43  3.90 - 12.70 K/uL Final    RBC 06/16/2020 4.23* 4.60 - 6.20 M/uL Final    Hemoglobin 06/16/2020 11.6* 14.0 - 18.0 g/dL Final    Hematocrit 06/16/2020 36.8* 40.0 - 54.0 % Final    MCV 06/16/2020 87  82 - 98 fL Final    MCH 06/16/2020 27.4  27.0 - 31.0 pg Final    MCHC 06/16/2020 31.5* 32.0 - 36.0 g/dL Final    RDW 06/16/2020 12.3  11.5 - 14.5 % Final    Platelets 06/16/2020 169  150 - 350 K/uL Final    MPV 06/16/2020 11.8  9.2 - 12.9 fL Final    Immature Granulocytes 06/16/2020 0.2  0.0 - 0.5  % Final    Gran # (ANC) 06/16/2020 8.1* 1.8 - 7.7 K/uL Final    Immature Grans (Abs) 06/16/2020 0.02  0.00 - 0.04 K/uL Final    Lymph # 06/16/2020 1.4  1.0 - 4.8 K/uL Final    Mono # 06/16/2020 0.8  0.3 - 1.0 K/uL Final    Eos # 06/16/2020 0.1  0.0 - 0.5 K/uL Final    Baso # 06/16/2020 0.01  0.00 - 0.20 K/uL Final    nRBC 06/16/2020 0  0 /100 WBC Final    Gran % 06/16/2020 77.5* 38.0 - 73.0 % Final    Lymph % 06/16/2020 13.7* 18.0 - 48.0 % Final    Mono % 06/16/2020 7.4  4.0 - 15.0 % Final    Eosinophil % 06/16/2020 1.1  0.0 - 8.0 % Final    Basophil % 06/16/2020 0.1  0.0 - 1.9 % Final    Differential Method 06/16/2020 Automated   Final    POCT Glucose 06/16/2020 130* 70 - 110 mg/dL Final   Lab Visit on 06/13/2020   Component Date Value Ref Range Status    SARS-CoV2 (COVID-19) Qualitative P* 06/13/2020 Not Detected  Not Detected Final   Hospital Outpatient Visit on 06/08/2020   Component Date Value Ref Range Status    MRSA Surveillance Screen 06/08/2020 No MRSA isolated   Final    WBC 06/08/2020 6.55  3.90 - 12.70 K/uL Final    RBC 06/08/2020 5.13  4.60 - 6.20 M/uL Final    Hemoglobin 06/08/2020 13.8* 14.0 - 18.0 g/dL Final    Hematocrit 06/08/2020 43.9  40.0 - 54.0 % Final    MCV 06/08/2020 86  82 - 98 fL Final    MCH 06/08/2020 26.9* 27.0 - 31.0 pg Final    MCHC 06/08/2020 31.4* 32.0 - 36.0 g/dL Final    RDW 06/08/2020 12.1  11.5 - 14.5 % Final    Platelets 06/08/2020 219  150 - 350 K/uL Final    MPV 06/08/2020 11.5  9.2 - 12.9 fL Final    Immature Granulocytes 06/08/2020 0.8* 0.0 - 0.5 % Final    Gran # (ANC) 06/08/2020 4.0  1.8 - 7.7 K/uL Final    Immature Grans (Abs) 06/08/2020 0.05* 0.00 - 0.04 K/uL Final    Lymph # 06/08/2020 1.6  1.0 - 4.8 K/uL Final    Mono # 06/08/2020 0.6  0.3 - 1.0 K/uL Final    Eos # 06/08/2020 0.2  0.0 - 0.5 K/uL Final    Baso # 06/08/2020 0.03  0.00 - 0.20 K/uL Final    nRBC 06/08/2020 0  0 /100 WBC Final    Gran % 06/08/2020 61.3  38.0 - 73.0 %  Final    Lymph % 06/08/2020 24.1  18.0 - 48.0 % Final    Mono % 06/08/2020 9.6  4.0 - 15.0 % Final    Eosinophil % 06/08/2020 3.7  0.0 - 8.0 % Final    Basophil % 06/08/2020 0.5  0.0 - 1.9 % Final    Differential Method 06/08/2020 Automated   Final    Sodium 06/08/2020 140  136 - 145 mmol/L Final    Potassium 06/08/2020 4.5  3.5 - 5.1 mmol/L Final    Chloride 06/08/2020 106  95 - 110 mmol/L Final    CO2 06/08/2020 24  23 - 29 mmol/L Final    Glucose 06/08/2020 110  70 - 110 mg/dL Final    BUN 06/08/2020 16  6 - 20 mg/dL Final    Creatinine 06/08/2020 1.2  0.5 - 1.4 mg/dL Final    Calcium 06/08/2020 9.4  8.7 - 10.5 mg/dL Final    Total Protein 06/08/2020 7.5  6.0 - 8.4 g/dL Final    Albumin 06/08/2020 4.3  3.5 - 5.2 g/dL Final    Total Bilirubin 06/08/2020 0.5  0.1 - 1.0 mg/dL Final    Alkaline Phosphatase 06/08/2020 63  55 - 135 U/L Final    AST 06/08/2020 18  10 - 40 U/L Final    ALT 06/08/2020 24  10 - 44 U/L Final    Anion Gap 06/08/2020 10  8 - 16 mmol/L Final    eGFR if African American 06/08/2020 >60  >60 mL/min/1.73 m^2 Final    eGFR if non African American 06/08/2020 >60  >60 mL/min/1.73 m^2 Final    Group & Rh 06/08/2020 O POS   Final    Indirect Lito 06/08/2020 NEG   Final    Specimen UA 06/08/2020 Urine, Clean Catch   Final    Color, UA 06/08/2020 Yellow  Yellow, Straw, Bibi Final    Appearance, UA 06/08/2020 Clear  Clear Final    pH, UA 06/08/2020 7.0  5.0 - 8.0 Final    Specific Gravity, UA 06/08/2020 1.025  1.005 - 1.030 Final    Protein, UA 06/08/2020 Negative  Negative Final    Glucose, UA 06/08/2020 Negative  Negative Final    Ketones, UA 06/08/2020 Trace* Negative Final    Bilirubin (UA) 06/08/2020 Negative  Negative Final    Occult Blood UA 06/08/2020 Negative  Negative Final    Nitrite, UA 06/08/2020 Negative  Negative Final    Urobilinogen, UA 06/08/2020 Negative  <2.0 EU/dL Final    Leukocytes, UA 06/08/2020 Negative  Negative Final       Past Medical  History:   Diagnosis Date    Diabetes mellitus, type 2     General anesthetics causing adverse effect in therapeutic use 2007    During general anesthesia for back surgery , experienced severe pain, heard people talking and felt like his heart was about to explode.    GERD (gastroesophageal reflux disease)     Hyperlipidemia     Hypothyroidism     Nasal septal deviation      Past Surgical History:   Procedure Laterality Date    BACK SURGERY  2007    artificial disc L4-5    CYSTOSCOPY W/ STONE MANIPULATION  2002    Kidney stones removed    KNEE ARTHROPLASTY Right 6/15/2020    Procedure: ARTHROPLASTY, KNEE;  Surgeon: Alok Rowe MD;  [facility];  Service: Orthopedics;  Laterality: Right;  SAMEERA FAYE    KNEE ARTHROSCOPY W/ DEBRIDEMENT  2000 and 2001    Right knee     Family History   Problem Relation Age of Onset    Diabetes Father     Heart disease Father     Cancer Father         prostate       Marital Status:   Alcohol History:  reports no history of alcohol use.  Tobacco History:  reports that he has quit smoking. He has never used smokeless tobacco.  Drug History:  reports no history of drug use.    Review of patient's allergies indicates:   Allergen Reactions    Demerol [meperidine] Itching       Current Outpatient Medications:     atenoloL (TENORMIN) 25 MG tablet, Take 1 tablet (25 mg total) by mouth once daily., Disp: 90 tablet, Rfl: 1    atorvastatin (LIPITOR) 20 MG tablet, Take 1 tablet (20 mg total) by mouth once daily., Disp: 90 tablet, Rfl: 1    azelastine (ASTELIN) 137 mcg nasal spray, 1 spray by Nasal route daily as needed.  , Disp: , Rfl:     cetirizine-pseudoephedrine (ZYRTEC-D) 5-120 mg per tablet, Take 1 tablet by mouth once daily.  , Disp: , Rfl:     FLUCELVAX QUAD 3619-8737, PF, 60 mcg (15 mcg x 4)/0.5 mL Syrg, ADM 0.5ML IM UTD, Disp: , Rfl:     fluticasone (FLONASE) 50 mcg/Actuation nasal spray, 1 spray by Nasal route daily as needed.  , Disp: , Rfl:      levothyroxine (SYNTHROID) 75 MCG tablet, Take 1 tablet (75 mcg total) by mouth once daily., Disp: 90 tablet, Rfl: 1    lisinopriL 10 MG tablet, Take 1 tablet (10 mg total) by mouth once daily., Disp: 90 tablet, Rfl: 1    metFORMIN (GLUCOPHAGE) 500 MG tablet, Take 1 tablet (500 mg total) by mouth 3 (three) times daily., Disp: 270 tablet, Rfl: 1    nystatin (MYCOSTATIN) cream, Apply topically 2 (two) times daily., Disp: 30 g, Rfl: 2    rOPINIRole (REQUIP) 0.5 MG tablet, Take 1 tablet (0.5 mg total) by mouth 2 (two) times a day., Disp: 60 tablet, Rfl: 2    Review of Systems   Constitutional: Negative for activity change, fatigue, fever and unexpected weight change.   HENT: Negative for congestion.    Respiratory: Negative for apnea, cough, chest tightness and shortness of breath.    Cardiovascular: Negative for chest pain and palpitations.   Gastrointestinal: Negative for abdominal distention and abdominal pain.   Genitourinary: Negative for difficulty urinating and dysuria.   Musculoskeletal: Negative for arthralgias and back pain.   Neurological: Negative for dizziness and weakness.          Objective:      Vitals:    11/19/20 0822   BP: 122/72   Pulse: 82   Weight: 103 kg (227 lb)   Height: 6' (1.829 m)     Physical Exam  Constitutional:       General: He is not in acute distress.     Appearance: He is well-developed.   HENT:      Head: Normocephalic and atraumatic.   Eyes:      Pupils: Pupils are equal, round, and reactive to light.   Neck:      Musculoskeletal: Normal range of motion and neck supple.      Thyroid: No thyromegaly.   Cardiovascular:      Rate and Rhythm: Normal rate and regular rhythm.      Pulses:           Dorsalis pedis pulses are 2+ on the right side and 2+ on the left side.        Posterior tibial pulses are 2+ on the right side and 2+ on the left side.      Heart sounds: Normal heart sounds.   Pulmonary:      Effort: Pulmonary effort is normal.      Breath sounds: Normal breath sounds.    Abdominal:      General: Bowel sounds are normal. There is no distension.      Palpations: Abdomen is soft.      Tenderness: There is no abdominal tenderness.   Musculoskeletal: Normal range of motion.   Feet:      Right foot:      Protective Sensation: 2 sites tested. 2 sites sensed.      Skin integrity: Skin integrity normal.      Left foot:      Protective Sensation: 2 sites tested. 2 sites sensed.      Skin integrity: Skin integrity normal.   Skin:     General: Skin is warm and dry.      Findings: No erythema or rash.   Neurological:      Mental Status: He is alert and oriented to person, place, and time.      Cranial Nerves: No cranial nerve deficit.           Assessment:       1. Type 2 diabetes mellitus without complication, without long-term current use of insulin    2. RLS (restless legs syndrome)         Plan:       Type 2 diabetes mellitus without complication, without long-term current use of insulin  Comments:  will recheck A1c today. DFE completed.  Orders:  -     Foot Exam Performed    RLS (restless legs syndrome)  Comments:  Doing better with the ropinirole. Consider increasing the dose as needed. f/u 6 mos.      No follow-ups on file.        11/19/2020 Gavin Sandoval PA-C

## 2020-11-20 LAB — HBA1C MFR BLD: 5.9 % OF TOTAL HGB

## 2021-01-12 ENCOUNTER — OFFICE VISIT (OUTPATIENT)
Dept: ORTHOPEDICS | Facility: CLINIC | Age: 51
End: 2021-01-12
Payer: MEDICARE

## 2021-01-12 VITALS
WEIGHT: 227 LBS | DIASTOLIC BLOOD PRESSURE: 80 MMHG | HEART RATE: 82 BPM | HEIGHT: 72 IN | BODY MASS INDEX: 30.75 KG/M2 | SYSTOLIC BLOOD PRESSURE: 126 MMHG

## 2021-01-12 DIAGNOSIS — Z96.651 HISTORY OF TOTAL KNEE ARTHROPLASTY, RIGHT: Primary | ICD-10-CM

## 2021-01-12 PROCEDURE — 3074F PR MOST RECENT SYSTOLIC BLOOD PRESSURE < 130 MM HG: ICD-10-PCS | Mod: S$GLB,,, | Performed by: ORTHOPAEDIC SURGERY

## 2021-01-12 PROCEDURE — 99213 PR OFFICE/OUTPT VISIT, EST, LEVL III, 20-29 MIN: ICD-10-PCS | Mod: S$GLB,,, | Performed by: ORTHOPAEDIC SURGERY

## 2021-01-12 PROCEDURE — 3079F PR MOST RECENT DIASTOLIC BLOOD PRESSURE 80-89 MM HG: ICD-10-PCS | Mod: S$GLB,,, | Performed by: ORTHOPAEDIC SURGERY

## 2021-01-12 PROCEDURE — 1125F AMNT PAIN NOTED PAIN PRSNT: CPT | Mod: S$GLB,,, | Performed by: ORTHOPAEDIC SURGERY

## 2021-01-12 PROCEDURE — 3008F BODY MASS INDEX DOCD: CPT | Mod: S$GLB,,, | Performed by: ORTHOPAEDIC SURGERY

## 2021-01-12 PROCEDURE — 3079F DIAST BP 80-89 MM HG: CPT | Mod: S$GLB,,, | Performed by: ORTHOPAEDIC SURGERY

## 2021-01-12 PROCEDURE — 3074F SYST BP LT 130 MM HG: CPT | Mod: S$GLB,,, | Performed by: ORTHOPAEDIC SURGERY

## 2021-01-12 PROCEDURE — 1125F PR PAIN SEVERITY QUANTIFIED, PAIN PRESENT: ICD-10-PCS | Mod: S$GLB,,, | Performed by: ORTHOPAEDIC SURGERY

## 2021-01-12 PROCEDURE — 3008F PR BODY MASS INDEX (BMI) DOCUMENTED: ICD-10-PCS | Mod: S$GLB,,, | Performed by: ORTHOPAEDIC SURGERY

## 2021-01-12 PROCEDURE — 99213 OFFICE O/P EST LOW 20 MIN: CPT | Mod: S$GLB,,, | Performed by: ORTHOPAEDIC SURGERY

## 2021-01-22 ENCOUNTER — TELEPHONE (OUTPATIENT)
Dept: FAMILY MEDICINE | Facility: CLINIC | Age: 51
End: 2021-01-22

## 2021-04-23 ENCOUNTER — TELEPHONE (OUTPATIENT)
Dept: FAMILY MEDICINE | Facility: CLINIC | Age: 51
End: 2021-04-23

## 2021-04-23 DIAGNOSIS — Z79.899 ENCOUNTER FOR LONG-TERM (CURRENT) USE OF OTHER MEDICATIONS: Primary | ICD-10-CM

## 2021-05-10 ENCOUNTER — PATIENT MESSAGE (OUTPATIENT)
Dept: RESEARCH | Facility: HOSPITAL | Age: 51
End: 2021-05-10

## 2021-05-15 LAB
ALBUMIN SERPL-MCNC: 4.8 G/DL (ref 3.6–5.1)
ALBUMIN/CREAT UR: 5 MCG/MG CREAT
ALBUMIN/GLOB SERPL: 1.9 (CALC) (ref 1–2.5)
ALP SERPL-CCNC: 62 U/L (ref 35–144)
ALT SERPL-CCNC: 14 U/L (ref 9–46)
APPEARANCE UR: CLEAR
AST SERPL-CCNC: 15 U/L (ref 10–35)
BACTERIA #/AREA URNS HPF: NORMAL /HPF
BACTERIA UR CULT: NORMAL
BASOPHILS # BLD AUTO: 52 CELLS/UL (ref 0–200)
BASOPHILS NFR BLD AUTO: 0.7 %
BILIRUB SERPL-MCNC: 0.6 MG/DL (ref 0.2–1.2)
BILIRUB UR QL STRIP: NEGATIVE
BUN SERPL-MCNC: 17 MG/DL (ref 7–25)
BUN/CREAT SERPL: ABNORMAL (CALC) (ref 6–22)
CALCIUM SERPL-MCNC: 9.9 MG/DL (ref 8.6–10.3)
CHLORIDE SERPL-SCNC: 102 MMOL/L (ref 98–110)
CHOLEST SERPL-MCNC: 156 MG/DL
CHOLEST/HDLC SERPL: 2.7 (CALC)
CO2 SERPL-SCNC: 28 MMOL/L (ref 20–32)
COLOR UR: YELLOW
CREAT SERPL-MCNC: 1.09 MG/DL (ref 0.7–1.33)
CREAT UR-MCNC: 265 MG/DL (ref 20–320)
EOSINOPHIL # BLD AUTO: 163 CELLS/UL (ref 15–500)
EOSINOPHIL NFR BLD AUTO: 2.2 %
ERYTHROCYTE [DISTWIDTH] IN BLOOD BY AUTOMATED COUNT: 15.9 % (ref 11–15)
GLOBULIN SER CALC-MCNC: 2.5 G/DL (CALC) (ref 1.9–3.7)
GLUCOSE SERPL-MCNC: 116 MG/DL (ref 65–99)
GLUCOSE UR QL STRIP: NEGATIVE
HBA1C MFR BLD: 6.2 % OF TOTAL HGB
HCT VFR BLD AUTO: 40.3 % (ref 38.5–50)
HDLC SERPL-MCNC: 57 MG/DL
HGB BLD-MCNC: 12.2 G/DL (ref 13.2–17.1)
HGB UR QL STRIP: NEGATIVE
HYALINE CASTS #/AREA URNS LPF: NORMAL /LPF
KETONES UR QL STRIP: NEGATIVE
LDLC SERPL CALC-MCNC: 86 MG/DL (CALC)
LEUKOCYTE ESTERASE UR QL STRIP: NEGATIVE
LYMPHOCYTES # BLD AUTO: 1591 CELLS/UL (ref 850–3900)
LYMPHOCYTES NFR BLD AUTO: 21.5 %
MCH RBC QN AUTO: 22.6 PG (ref 27–33)
MCHC RBC AUTO-ENTMCNC: 30.3 G/DL (ref 32–36)
MCV RBC AUTO: 74.8 FL (ref 80–100)
MICROALBUMIN UR-MCNC: 1.4 MG/DL
MONOCYTES # BLD AUTO: 555 CELLS/UL (ref 200–950)
MONOCYTES NFR BLD AUTO: 7.5 %
NEUTROPHILS # BLD AUTO: 5039 CELLS/UL (ref 1500–7800)
NEUTROPHILS NFR BLD AUTO: 68.1 %
NITRITE UR QL STRIP: NEGATIVE
NONHDLC SERPL-MCNC: 99 MG/DL (CALC)
PH UR STRIP: 6 [PH] (ref 5–8)
PLATELET # BLD AUTO: 264 THOUSAND/UL (ref 140–400)
PMV BLD REES-ECKER: 12 FL (ref 7.5–12.5)
POTASSIUM SERPL-SCNC: 4.6 MMOL/L (ref 3.5–5.3)
PROT SERPL-MCNC: 7.3 G/DL (ref 6.1–8.1)
PROT UR QL STRIP: NEGATIVE
RBC # BLD AUTO: 5.39 MILLION/UL (ref 4.2–5.8)
RBC #/AREA URNS HPF: NORMAL /HPF
SODIUM SERPL-SCNC: 140 MMOL/L (ref 135–146)
SP GR UR STRIP: 1.02 (ref 1–1.03)
SQUAMOUS #/AREA URNS HPF: NORMAL /HPF
TRIGL SERPL-MCNC: 52 MG/DL
TSH SERPL-ACNC: 1.5 MIU/L (ref 0.4–4.5)
WBC # BLD AUTO: 7.4 THOUSAND/UL (ref 3.8–10.8)
WBC #/AREA URNS HPF: NORMAL /HPF

## 2021-05-20 ENCOUNTER — HOSPITAL ENCOUNTER (OUTPATIENT)
Dept: RADIOLOGY | Facility: HOSPITAL | Age: 51
Discharge: HOME | End: 2021-05-20
Attending: PHYSICIAN ASSISTANT
Payer: MEDICARE

## 2021-05-20 ENCOUNTER — OFFICE VISIT (OUTPATIENT)
Dept: FAMILY MEDICINE | Facility: CLINIC | Age: 51
End: 2021-05-20
Payer: MEDICARE

## 2021-05-20 ENCOUNTER — HOSPITAL ENCOUNTER (EMERGENCY)
Facility: HOSPITAL | Age: 51
Discharge: HOME | End: 2021-05-20
Attending: EMERGENCY MEDICINE
Payer: MEDICARE

## 2021-05-20 VITALS
HEART RATE: 71 BPM | SYSTOLIC BLOOD PRESSURE: 136 MMHG | TEMPERATURE: 98 F | WEIGHT: 240 LBS | RESPIRATION RATE: 18 BRPM | BODY MASS INDEX: 32.51 KG/M2 | DIASTOLIC BLOOD PRESSURE: 65 MMHG | HEIGHT: 72 IN | OXYGEN SATURATION: 96 %

## 2021-05-20 VITALS
OXYGEN SATURATION: 99 % | SYSTOLIC BLOOD PRESSURE: 152 MMHG | HEIGHT: 72 IN | BODY MASS INDEX: 32.51 KG/M2 | WEIGHT: 240 LBS | HEART RATE: 77 BPM | DIASTOLIC BLOOD PRESSURE: 92 MMHG

## 2021-05-20 DIAGNOSIS — Z12.11 SCREENING FOR COLON CANCER: Primary | ICD-10-CM

## 2021-05-20 DIAGNOSIS — M54.9 DORSALGIA, UNSPECIFIED: ICD-10-CM

## 2021-05-20 DIAGNOSIS — E11.9 TYPE 2 DIABETES MELLITUS WITHOUT COMPLICATION, WITHOUT LONG-TERM CURRENT USE OF INSULIN: ICD-10-CM

## 2021-05-20 DIAGNOSIS — B35.4 TINEA CORPORIS: ICD-10-CM

## 2021-05-20 DIAGNOSIS — G25.81 RLS (RESTLESS LEGS SYNDROME): ICD-10-CM

## 2021-05-20 DIAGNOSIS — M54.30 SCIATICA, UNSPECIFIED LATERALITY: ICD-10-CM

## 2021-05-20 DIAGNOSIS — I10 HYPERTENSION, UNSPECIFIED TYPE: ICD-10-CM

## 2021-05-20 DIAGNOSIS — E03.9 HYPOTHYROIDISM, UNSPECIFIED TYPE: ICD-10-CM

## 2021-05-20 DIAGNOSIS — M54.31 SCIATICA OF RIGHT SIDE: Primary | ICD-10-CM

## 2021-05-20 DIAGNOSIS — E78.5 HYPERLIPIDEMIA, UNSPECIFIED HYPERLIPIDEMIA TYPE: ICD-10-CM

## 2021-05-20 LAB
BILIRUB UR QL STRIP: NEGATIVE
CLARITY UR: CLEAR
COLOR UR: COLORLESS
CREAT SERPL-MCNC: 1.1 MG/DL (ref 0.5–1.4)
GLUCOSE SERPL-MCNC: 90 MG/DL (ref 70–110)
GLUCOSE UR QL STRIP: NEGATIVE
HGB UR QL STRIP: NEGATIVE
KETONES UR QL STRIP: NEGATIVE
LEUKOCYTE ESTERASE UR QL STRIP: NEGATIVE
NITRITE UR QL STRIP: NEGATIVE
PH UR STRIP: 8 [PH] (ref 5–8)
PROT UR QL STRIP: NEGATIVE
SAMPLE: NORMAL
SP GR UR STRIP: 1.01 (ref 1–1.03)
URN SPEC COLLECT METH UR: ABNORMAL
UROBILINOGEN UR STRIP-ACNC: NEGATIVE EU/DL

## 2021-05-20 PROCEDURE — 99214 OFFICE O/P EST MOD 30 MIN: CPT | Mod: S$GLB,,, | Performed by: PHYSICIAN ASSISTANT

## 2021-05-20 PROCEDURE — 3077F SYST BP >= 140 MM HG: CPT | Mod: S$GLB,,, | Performed by: PHYSICIAN ASSISTANT

## 2021-05-20 PROCEDURE — 63600175 PHARM REV CODE 636 W HCPCS: Performed by: NURSE PRACTITIONER

## 2021-05-20 PROCEDURE — 25000003 PHARM REV CODE 250: Performed by: NURSE PRACTITIONER

## 2021-05-20 PROCEDURE — 99214 PR OFFICE/OUTPT VISIT, EST, LEVL IV, 30-39 MIN: ICD-10-PCS | Mod: S$GLB,,, | Performed by: PHYSICIAN ASSISTANT

## 2021-05-20 PROCEDURE — 3008F BODY MASS INDEX DOCD: CPT | Mod: S$GLB,,, | Performed by: PHYSICIAN ASSISTANT

## 2021-05-20 PROCEDURE — 99284 EMERGENCY DEPT VISIT MOD MDM: CPT | Mod: 25

## 2021-05-20 PROCEDURE — 3080F PR MOST RECENT DIASTOLIC BLOOD PRESSURE >= 90 MM HG: ICD-10-PCS | Mod: S$GLB,,, | Performed by: PHYSICIAN ASSISTANT

## 2021-05-20 PROCEDURE — 3080F DIAST BP >= 90 MM HG: CPT | Mod: S$GLB,,, | Performed by: PHYSICIAN ASSISTANT

## 2021-05-20 PROCEDURE — 72110 X-RAY EXAM L-2 SPINE 4/>VWS: CPT | Mod: TC,PO

## 2021-05-20 PROCEDURE — 96372 THER/PROPH/DIAG INJ SC/IM: CPT

## 2021-05-20 PROCEDURE — 3044F PR MOST RECENT HEMOGLOBIN A1C LEVEL <7.0%: ICD-10-PCS | Mod: S$GLB,,, | Performed by: PHYSICIAN ASSISTANT

## 2021-05-20 PROCEDURE — 82962 GLUCOSE BLOOD TEST: CPT

## 2021-05-20 PROCEDURE — 81003 URINALYSIS AUTO W/O SCOPE: CPT | Performed by: NURSE PRACTITIONER

## 2021-05-20 PROCEDURE — 3008F PR BODY MASS INDEX (BMI) DOCUMENTED: ICD-10-PCS | Mod: S$GLB,,, | Performed by: PHYSICIAN ASSISTANT

## 2021-05-20 PROCEDURE — 3044F HG A1C LEVEL LT 7.0%: CPT | Mod: S$GLB,,, | Performed by: PHYSICIAN ASSISTANT

## 2021-05-20 PROCEDURE — 3077F PR MOST RECENT SYSTOLIC BLOOD PRESSURE >= 140 MM HG: ICD-10-PCS | Mod: S$GLB,,, | Performed by: PHYSICIAN ASSISTANT

## 2021-05-20 RX ORDER — ATORVASTATIN CALCIUM 20 MG/1
20 TABLET, FILM COATED ORAL DAILY
Qty: 90 TABLET | Refills: 1 | Status: SHIPPED | OUTPATIENT
Start: 2021-05-20 | End: 2021-10-12 | Stop reason: SDUPTHER

## 2021-05-20 RX ORDER — NYSTATIN 100000 U/G
CREAM TOPICAL 2 TIMES DAILY
Qty: 30 G | Refills: 2 | Status: SHIPPED | OUTPATIENT
Start: 2021-05-20 | End: 2022-06-08

## 2021-05-20 RX ORDER — METHYLPREDNISOLONE 4 MG/1
TABLET ORAL
Qty: 1 PACKAGE | Refills: 0 | Status: SHIPPED | OUTPATIENT
Start: 2021-05-20 | End: 2021-06-10

## 2021-05-20 RX ORDER — LISINOPRIL 10 MG/1
10 TABLET ORAL DAILY
Qty: 90 TABLET | Refills: 1 | Status: SHIPPED | OUTPATIENT
Start: 2021-05-20 | End: 2021-10-12 | Stop reason: SDUPTHER

## 2021-05-20 RX ORDER — IBUPROFEN 600 MG/1
600 TABLET ORAL EVERY 6 HOURS PRN
Qty: 20 TABLET | Refills: 0 | Status: SHIPPED | OUTPATIENT
Start: 2021-05-20 | End: 2021-10-12

## 2021-05-20 RX ORDER — OMEPRAZOLE 10 MG/1
10 CAPSULE, DELAYED RELEASE ORAL DAILY
COMMUNITY

## 2021-05-20 RX ORDER — LEVOTHYROXINE SODIUM 75 UG/1
75 TABLET ORAL DAILY
Qty: 90 TABLET | Refills: 1 | Status: SHIPPED | OUTPATIENT
Start: 2021-05-20 | End: 2021-10-12 | Stop reason: SDUPTHER

## 2021-05-20 RX ORDER — ORPHENADRINE CITRATE 30 MG/ML
60 INJECTION INTRAMUSCULAR; INTRAVENOUS
Status: COMPLETED | OUTPATIENT
Start: 2021-05-20 | End: 2021-05-20

## 2021-05-20 RX ORDER — LIDOCAINE 50 MG/G
1 PATCH TOPICAL DAILY
Qty: 15 PATCH | Refills: 0 | Status: SHIPPED | OUTPATIENT
Start: 2021-05-20 | End: 2021-10-12

## 2021-05-20 RX ORDER — KETOROLAC TROMETHAMINE 30 MG/ML
30 INJECTION, SOLUTION INTRAMUSCULAR; INTRAVENOUS
Status: COMPLETED | OUTPATIENT
Start: 2021-05-20 | End: 2021-05-20

## 2021-05-20 RX ORDER — ROPINIROLE 0.5 MG/1
0.5 TABLET, FILM COATED ORAL 2 TIMES DAILY
Qty: 60 TABLET | Refills: 2 | Status: SHIPPED | OUTPATIENT
Start: 2021-05-20 | End: 2021-08-17 | Stop reason: SDUPTHER

## 2021-05-20 RX ORDER — METHOCARBAMOL 750 MG/1
500 TABLET, FILM COATED ORAL 3 TIMES DAILY
COMMUNITY
End: 2021-05-20

## 2021-05-20 RX ORDER — HYDROCODONE BITARTRATE AND ACETAMINOPHEN 7.5; 325 MG/1; MG/1
1 TABLET ORAL
Status: COMPLETED | OUTPATIENT
Start: 2021-05-20 | End: 2021-05-20

## 2021-05-20 RX ORDER — METHYLPREDNISOLONE 4 MG/1
TABLET ORAL
Qty: 1 PACKAGE | Refills: 0 | OUTPATIENT
Start: 2021-05-20 | End: 2021-05-20

## 2021-05-20 RX ORDER — ATENOLOL 25 MG/1
25 TABLET ORAL DAILY
Qty: 90 TABLET | Refills: 1 | Status: SHIPPED | OUTPATIENT
Start: 2021-05-20 | End: 2021-10-12 | Stop reason: SDUPTHER

## 2021-05-20 RX ORDER — CYCLOBENZAPRINE HCL 10 MG
10 TABLET ORAL 3 TIMES DAILY PRN
Qty: 15 TABLET | Refills: 0 | Status: SHIPPED | OUTPATIENT
Start: 2021-05-20 | End: 2021-05-25

## 2021-05-20 RX ORDER — METFORMIN HYDROCHLORIDE 500 MG/1
500 TABLET ORAL 3 TIMES DAILY
Qty: 270 TABLET | Refills: 1 | Status: SHIPPED | OUTPATIENT
Start: 2021-05-20 | End: 2021-10-12 | Stop reason: SDUPTHER

## 2021-05-20 RX ADMIN — KETOROLAC TROMETHAMINE 30 MG: 30 INJECTION, SOLUTION INTRAMUSCULAR; INTRAVENOUS at 12:05

## 2021-05-20 RX ADMIN — ORPHENADRINE CITRATE 60 MG: 60 INJECTION INTRAMUSCULAR; INTRAVENOUS at 12:05

## 2021-05-20 RX ADMIN — HYDROCODONE BITARTRATE AND ACETAMINOPHEN 1 TABLET: 7.5; 325 TABLET ORAL at 12:05

## 2021-06-10 ENCOUNTER — TELEPHONE (OUTPATIENT)
Dept: FAMILY MEDICINE | Facility: CLINIC | Age: 51
End: 2021-06-10

## 2021-08-12 ENCOUNTER — OFFICE VISIT (OUTPATIENT)
Dept: ENDOCRINOLOGY | Facility: CLINIC | Age: 51
End: 2021-08-12
Payer: MEDICARE

## 2021-08-12 ENCOUNTER — LAB VISIT (OUTPATIENT)
Dept: LAB | Facility: HOSPITAL | Age: 51
End: 2021-08-12
Attending: PHYSICIAN ASSISTANT
Payer: MEDICARE

## 2021-08-12 VITALS
WEIGHT: 238.63 LBS | SYSTOLIC BLOOD PRESSURE: 126 MMHG | HEART RATE: 69 BPM | OXYGEN SATURATION: 97 % | DIASTOLIC BLOOD PRESSURE: 70 MMHG | HEIGHT: 72 IN | BODY MASS INDEX: 32.32 KG/M2 | TEMPERATURE: 98 F

## 2021-08-12 DIAGNOSIS — G25.81 RESTLESS LEG SYNDROME: ICD-10-CM

## 2021-08-12 DIAGNOSIS — E11.9 TYPE 2 DIABETES MELLITUS WITHOUT COMPLICATION, WITHOUT LONG-TERM CURRENT USE OF INSULIN: ICD-10-CM

## 2021-08-12 DIAGNOSIS — I10 HYPERTENSION, UNSPECIFIED TYPE: ICD-10-CM

## 2021-08-12 DIAGNOSIS — E03.9 HYPOTHYROIDISM, UNSPECIFIED TYPE: Primary | ICD-10-CM

## 2021-08-12 LAB
T4 FREE SERPL-MCNC: 1.03 NG/DL (ref 0.71–1.51)
TSH SERPL DL<=0.005 MIU/L-ACNC: 2.54 UIU/ML (ref 0.4–4)

## 2021-08-12 PROCEDURE — 99999 PR PBB SHADOW E&M-EST. PATIENT-LVL IV: ICD-10-PCS | Mod: PBBFAC,,, | Performed by: PHYSICIAN ASSISTANT

## 2021-08-12 PROCEDURE — 3078F PR MOST RECENT DIASTOLIC BLOOD PRESSURE < 80 MM HG: ICD-10-PCS | Mod: CPTII,S$GLB,, | Performed by: PHYSICIAN ASSISTANT

## 2021-08-12 PROCEDURE — 1126F PR PAIN SEVERITY QUANTIFIED, NO PAIN PRESENT: ICD-10-PCS | Mod: CPTII,S$GLB,, | Performed by: PHYSICIAN ASSISTANT

## 2021-08-12 PROCEDURE — 99999 PR PBB SHADOW E&M-EST. PATIENT-LVL IV: CPT | Mod: PBBFAC,,, | Performed by: PHYSICIAN ASSISTANT

## 2021-08-12 PROCEDURE — 1126F AMNT PAIN NOTED NONE PRSNT: CPT | Mod: CPTII,S$GLB,, | Performed by: PHYSICIAN ASSISTANT

## 2021-08-12 PROCEDURE — 3078F DIAST BP <80 MM HG: CPT | Mod: CPTII,S$GLB,, | Performed by: PHYSICIAN ASSISTANT

## 2021-08-12 PROCEDURE — 84439 ASSAY OF FREE THYROXINE: CPT | Performed by: PHYSICIAN ASSISTANT

## 2021-08-12 PROCEDURE — 3074F PR MOST RECENT SYSTOLIC BLOOD PRESSURE < 130 MM HG: ICD-10-PCS | Mod: CPTII,S$GLB,, | Performed by: PHYSICIAN ASSISTANT

## 2021-08-12 PROCEDURE — 1159F PR MEDICATION LIST DOCUMENTED IN MEDICAL RECORD: ICD-10-PCS | Mod: CPTII,S$GLB,, | Performed by: PHYSICIAN ASSISTANT

## 2021-08-12 PROCEDURE — 1159F MED LIST DOCD IN RCRD: CPT | Mod: CPTII,S$GLB,, | Performed by: PHYSICIAN ASSISTANT

## 2021-08-12 PROCEDURE — 36415 COLL VENOUS BLD VENIPUNCTURE: CPT | Mod: PO | Performed by: PHYSICIAN ASSISTANT

## 2021-08-12 PROCEDURE — 3044F HG A1C LEVEL LT 7.0%: CPT | Mod: CPTII,S$GLB,, | Performed by: PHYSICIAN ASSISTANT

## 2021-08-12 PROCEDURE — 3074F SYST BP LT 130 MM HG: CPT | Mod: CPTII,S$GLB,, | Performed by: PHYSICIAN ASSISTANT

## 2021-08-12 PROCEDURE — 99214 PR OFFICE/OUTPT VISIT, EST, LEVL IV, 30-39 MIN: ICD-10-PCS | Mod: S$GLB,,, | Performed by: PHYSICIAN ASSISTANT

## 2021-08-12 PROCEDURE — 83036 HEMOGLOBIN GLYCOSYLATED A1C: CPT | Performed by: PHYSICIAN ASSISTANT

## 2021-08-12 PROCEDURE — 84443 ASSAY THYROID STIM HORMONE: CPT | Performed by: PHYSICIAN ASSISTANT

## 2021-08-12 PROCEDURE — 3044F PR MOST RECENT HEMOGLOBIN A1C LEVEL <7.0%: ICD-10-PCS | Mod: CPTII,S$GLB,, | Performed by: PHYSICIAN ASSISTANT

## 2021-08-12 PROCEDURE — 99214 OFFICE O/P EST MOD 30 MIN: CPT | Mod: S$GLB,,, | Performed by: PHYSICIAN ASSISTANT

## 2021-08-12 PROCEDURE — 1160F RVW MEDS BY RX/DR IN RCRD: CPT | Mod: CPTII,S$GLB,, | Performed by: PHYSICIAN ASSISTANT

## 2021-08-12 PROCEDURE — 3008F BODY MASS INDEX DOCD: CPT | Mod: CPTII,S$GLB,, | Performed by: PHYSICIAN ASSISTANT

## 2021-08-12 PROCEDURE — 1160F PR REVIEW ALL MEDS BY PRESCRIBER/CLIN PHARMACIST DOCUMENTED: ICD-10-PCS | Mod: CPTII,S$GLB,, | Performed by: PHYSICIAN ASSISTANT

## 2021-08-12 PROCEDURE — 80069 RENAL FUNCTION PANEL: CPT | Performed by: PHYSICIAN ASSISTANT

## 2021-08-12 PROCEDURE — 3008F PR BODY MASS INDEX (BMI) DOCUMENTED: ICD-10-PCS | Mod: CPTII,S$GLB,, | Performed by: PHYSICIAN ASSISTANT

## 2021-08-13 LAB
ALBUMIN SERPL BCP-MCNC: 4.2 G/DL (ref 3.5–5.2)
ANION GAP SERPL CALC-SCNC: 11 MMOL/L (ref 8–16)
BUN SERPL-MCNC: 14 MG/DL (ref 6–20)
CALCIUM SERPL-MCNC: 10.1 MG/DL (ref 8.7–10.5)
CHLORIDE SERPL-SCNC: 104 MMOL/L (ref 95–110)
CO2 SERPL-SCNC: 25 MMOL/L (ref 23–29)
CREAT SERPL-MCNC: 1.2 MG/DL (ref 0.5–1.4)
EST. GFR  (AFRICAN AMERICAN): >60 ML/MIN/1.73 M^2
EST. GFR  (NON AFRICAN AMERICAN): >60 ML/MIN/1.73 M^2
ESTIMATED AVG GLUCOSE: 146 MG/DL (ref 68–131)
GLUCOSE SERPL-MCNC: 110 MG/DL (ref 70–110)
HBA1C MFR BLD: 6.7 % (ref 4–5.6)
PHOSPHATE SERPL-MCNC: 3.6 MG/DL (ref 2.7–4.5)
POTASSIUM SERPL-SCNC: 4.6 MMOL/L (ref 3.5–5.1)
SODIUM SERPL-SCNC: 140 MMOL/L (ref 136–145)

## 2021-08-17 ENCOUNTER — PATIENT MESSAGE (OUTPATIENT)
Dept: FAMILY MEDICINE | Facility: CLINIC | Age: 51
End: 2021-08-17

## 2021-08-17 DIAGNOSIS — G25.81 RLS (RESTLESS LEGS SYNDROME): ICD-10-CM

## 2021-08-17 RX ORDER — ROPINIROLE 0.5 MG/1
0.5 TABLET, FILM COATED ORAL 2 TIMES DAILY
Qty: 60 TABLET | Refills: 0 | Status: SHIPPED | OUTPATIENT
Start: 2021-08-17 | End: 2021-09-20 | Stop reason: SDUPTHER

## 2021-09-07 ENCOUNTER — PATIENT MESSAGE (OUTPATIENT)
Dept: FAMILY MEDICINE | Facility: CLINIC | Age: 51
End: 2021-09-07

## 2021-09-17 ENCOUNTER — PATIENT MESSAGE (OUTPATIENT)
Dept: FAMILY MEDICINE | Facility: CLINIC | Age: 51
End: 2021-09-17

## 2021-09-17 DIAGNOSIS — G25.81 RLS (RESTLESS LEGS SYNDROME): ICD-10-CM

## 2021-09-20 RX ORDER — ROPINIROLE 0.5 MG/1
0.5 TABLET, FILM COATED ORAL 2 TIMES DAILY
Qty: 60 TABLET | Refills: 1 | Status: SHIPPED | OUTPATIENT
Start: 2021-09-20 | End: 2021-10-12 | Stop reason: SDUPTHER

## 2021-10-12 ENCOUNTER — OFFICE VISIT (OUTPATIENT)
Dept: FAMILY MEDICINE | Facility: CLINIC | Age: 51
End: 2021-10-12
Payer: MEDICARE

## 2021-10-12 ENCOUNTER — TELEPHONE (OUTPATIENT)
Dept: ORTHOPEDICS | Facility: CLINIC | Age: 51
End: 2021-10-12

## 2021-10-12 VITALS
HEIGHT: 72 IN | WEIGHT: 237 LBS | DIASTOLIC BLOOD PRESSURE: 68 MMHG | SYSTOLIC BLOOD PRESSURE: 130 MMHG | BODY MASS INDEX: 32.1 KG/M2 | HEART RATE: 80 BPM

## 2021-10-12 DIAGNOSIS — E11.9 TYPE 2 DIABETES MELLITUS WITHOUT COMPLICATION, WITHOUT LONG-TERM CURRENT USE OF INSULIN: ICD-10-CM

## 2021-10-12 DIAGNOSIS — E03.9 HYPOTHYROIDISM, UNSPECIFIED TYPE: ICD-10-CM

## 2021-10-12 DIAGNOSIS — E78.5 HYPERLIPIDEMIA, UNSPECIFIED HYPERLIPIDEMIA TYPE: ICD-10-CM

## 2021-10-12 DIAGNOSIS — I10 HYPERTENSION, UNSPECIFIED TYPE: ICD-10-CM

## 2021-10-12 DIAGNOSIS — G25.81 RLS (RESTLESS LEGS SYNDROME): ICD-10-CM

## 2021-10-12 DIAGNOSIS — M54.30 SCIATICA, UNSPECIFIED LATERALITY: Primary | ICD-10-CM

## 2021-10-12 PROCEDURE — 1160F RVW MEDS BY RX/DR IN RCRD: CPT | Mod: S$GLB,,, | Performed by: PHYSICIAN ASSISTANT

## 2021-10-12 PROCEDURE — 3075F SYST BP GE 130 - 139MM HG: CPT | Mod: S$GLB,,, | Performed by: PHYSICIAN ASSISTANT

## 2021-10-12 PROCEDURE — 3044F PR MOST RECENT HEMOGLOBIN A1C LEVEL <7.0%: ICD-10-PCS | Mod: S$GLB,,, | Performed by: PHYSICIAN ASSISTANT

## 2021-10-12 PROCEDURE — 3008F PR BODY MASS INDEX (BMI) DOCUMENTED: ICD-10-PCS | Mod: S$GLB,,, | Performed by: PHYSICIAN ASSISTANT

## 2021-10-12 PROCEDURE — 3078F DIAST BP <80 MM HG: CPT | Mod: S$GLB,,, | Performed by: PHYSICIAN ASSISTANT

## 2021-10-12 PROCEDURE — 96372 PR INJECTION,THERAP/PROPH/DIAG2ST, IM OR SUBCUT: ICD-10-PCS | Mod: S$GLB,,, | Performed by: PHYSICIAN ASSISTANT

## 2021-10-12 PROCEDURE — 99214 PR OFFICE/OUTPT VISIT, EST, LEVL IV, 30-39 MIN: ICD-10-PCS | Mod: 25,S$GLB,, | Performed by: PHYSICIAN ASSISTANT

## 2021-10-12 PROCEDURE — 3075F PR MOST RECENT SYSTOLIC BLOOD PRESS GE 130-139MM HG: ICD-10-PCS | Mod: S$GLB,,, | Performed by: PHYSICIAN ASSISTANT

## 2021-10-12 PROCEDURE — 3066F NEPHROPATHY DOC TX: CPT | Mod: S$GLB,,, | Performed by: PHYSICIAN ASSISTANT

## 2021-10-12 PROCEDURE — 3066F PR DOCUMENTATION OF TREATMENT FOR NEPHROPATHY: ICD-10-PCS | Mod: S$GLB,,, | Performed by: PHYSICIAN ASSISTANT

## 2021-10-12 PROCEDURE — 4010F ACE/ARB THERAPY RXD/TAKEN: CPT | Mod: S$GLB,,, | Performed by: PHYSICIAN ASSISTANT

## 2021-10-12 PROCEDURE — 3061F PR NEG MICROALBUMINURIA RESULT DOCUMENTED/REVIEW: ICD-10-PCS | Mod: S$GLB,,, | Performed by: PHYSICIAN ASSISTANT

## 2021-10-12 PROCEDURE — 99214 OFFICE O/P EST MOD 30 MIN: CPT | Mod: 25,S$GLB,, | Performed by: PHYSICIAN ASSISTANT

## 2021-10-12 PROCEDURE — 3008F BODY MASS INDEX DOCD: CPT | Mod: S$GLB,,, | Performed by: PHYSICIAN ASSISTANT

## 2021-10-12 PROCEDURE — 96372 THER/PROPH/DIAG INJ SC/IM: CPT | Mod: S$GLB,,, | Performed by: PHYSICIAN ASSISTANT

## 2021-10-12 PROCEDURE — 3061F NEG MICROALBUMINURIA REV: CPT | Mod: S$GLB,,, | Performed by: PHYSICIAN ASSISTANT

## 2021-10-12 PROCEDURE — 1160F PR REVIEW ALL MEDS BY PRESCRIBER/CLIN PHARMACIST DOCUMENTED: ICD-10-PCS | Mod: S$GLB,,, | Performed by: PHYSICIAN ASSISTANT

## 2021-10-12 PROCEDURE — 4010F PR ACE/ARB THEARPY RXD/TAKEN: ICD-10-PCS | Mod: S$GLB,,, | Performed by: PHYSICIAN ASSISTANT

## 2021-10-12 PROCEDURE — 3078F PR MOST RECENT DIASTOLIC BLOOD PRESSURE < 80 MM HG: ICD-10-PCS | Mod: S$GLB,,, | Performed by: PHYSICIAN ASSISTANT

## 2021-10-12 PROCEDURE — 3044F HG A1C LEVEL LT 7.0%: CPT | Mod: S$GLB,,, | Performed by: PHYSICIAN ASSISTANT

## 2021-10-12 RX ORDER — KETOROLAC TROMETHAMINE 30 MG/ML
60 INJECTION, SOLUTION INTRAMUSCULAR; INTRAVENOUS
Status: COMPLETED | OUTPATIENT
Start: 2021-10-12 | End: 2021-10-12

## 2021-10-12 RX ORDER — KETOROLAC TROMETHAMINE 30 MG/ML
60 INJECTION, SOLUTION INTRAMUSCULAR; INTRAVENOUS
Status: DISCONTINUED | OUTPATIENT
Start: 2021-10-12 | End: 2021-10-12

## 2021-10-12 RX ORDER — HYDROCODONE BITARTRATE AND ACETAMINOPHEN 5; 325 MG/1; MG/1
1 TABLET ORAL EVERY 6 HOURS PRN
Qty: 28 TABLET | Refills: 0 | Status: SHIPPED | OUTPATIENT
Start: 2021-10-12 | End: 2021-10-19

## 2021-10-12 RX ORDER — ATENOLOL 25 MG/1
25 TABLET ORAL DAILY
Qty: 90 TABLET | Refills: 1 | Status: ON HOLD | OUTPATIENT
Start: 2021-10-12 | End: 2022-03-31 | Stop reason: HOSPADM

## 2021-10-12 RX ORDER — LISINOPRIL 10 MG/1
10 TABLET ORAL DAILY
Qty: 90 TABLET | Refills: 1 | Status: SHIPPED | OUTPATIENT
Start: 2021-10-12 | End: 2022-07-11 | Stop reason: SDUPTHER

## 2021-10-12 RX ORDER — ATORVASTATIN CALCIUM 20 MG/1
20 TABLET, FILM COATED ORAL DAILY
Qty: 90 TABLET | Refills: 1 | Status: SHIPPED | OUTPATIENT
Start: 2021-10-12 | End: 2022-07-11 | Stop reason: SDUPTHER

## 2021-10-12 RX ORDER — TIZANIDINE 4 MG/1
4 TABLET ORAL EVERY 6 HOURS PRN
Qty: 20 TABLET | Refills: 0 | Status: SHIPPED | OUTPATIENT
Start: 2021-10-12 | End: 2021-10-22

## 2021-10-12 RX ORDER — LEVOTHYROXINE SODIUM 75 UG/1
75 TABLET ORAL DAILY
Qty: 90 TABLET | Refills: 1 | Status: SHIPPED | OUTPATIENT
Start: 2021-10-12 | End: 2022-07-11 | Stop reason: SDUPTHER

## 2021-10-12 RX ORDER — ROPINIROLE 0.5 MG/1
0.5 TABLET, FILM COATED ORAL 2 TIMES DAILY
Qty: 60 TABLET | Refills: 1 | Status: SHIPPED | OUTPATIENT
Start: 2021-10-12 | End: 2021-11-22 | Stop reason: SDUPTHER

## 2021-10-12 RX ORDER — METFORMIN HYDROCHLORIDE 500 MG/1
500 TABLET ORAL 3 TIMES DAILY
Qty: 270 TABLET | Refills: 1 | Status: SHIPPED | OUTPATIENT
Start: 2021-10-12 | End: 2022-07-11 | Stop reason: SDUPTHER

## 2021-10-12 RX ADMIN — KETOROLAC TROMETHAMINE 60 MG: 30 INJECTION, SOLUTION INTRAMUSCULAR; INTRAVENOUS at 12:10

## 2021-10-13 ENCOUNTER — OFFICE VISIT (OUTPATIENT)
Dept: PHYSICAL MEDICINE AND REHAB | Facility: CLINIC | Age: 51
End: 2021-10-13
Payer: MEDICARE

## 2021-10-13 VITALS — RESPIRATION RATE: 16 BRPM | HEIGHT: 72 IN | BODY MASS INDEX: 32.1 KG/M2 | WEIGHT: 237 LBS

## 2021-10-13 DIAGNOSIS — Z98.1 HISTORY OF LUMBAR FUSION: Primary | ICD-10-CM

## 2021-10-13 DIAGNOSIS — M54.16 LUMBAR RADICULOPATHY: ICD-10-CM

## 2021-10-13 DIAGNOSIS — M54.41 CHRONIC BILATERAL LOW BACK PAIN WITH RIGHT-SIDED SCIATICA: ICD-10-CM

## 2021-10-13 DIAGNOSIS — M53.3 CHRONIC RIGHT SI JOINT PAIN: ICD-10-CM

## 2021-10-13 DIAGNOSIS — M54.9 DORSALGIA, UNSPECIFIED: ICD-10-CM

## 2021-10-13 DIAGNOSIS — G89.29 CHRONIC RIGHT SI JOINT PAIN: ICD-10-CM

## 2021-10-13 DIAGNOSIS — M47.817 SPONDYLOSIS WITHOUT MYELOPATHY OR RADICULOPATHY, LUMBOSACRAL REGION: ICD-10-CM

## 2021-10-13 DIAGNOSIS — G89.29 CHRONIC BILATERAL LOW BACK PAIN WITH RIGHT-SIDED SCIATICA: ICD-10-CM

## 2021-10-13 PROCEDURE — 3008F BODY MASS INDEX DOCD: CPT | Mod: CPTII,S$GLB,, | Performed by: PHYSICAL MEDICINE & REHABILITATION

## 2021-10-13 PROCEDURE — 4010F PR ACE/ARB THEARPY RXD/TAKEN: ICD-10-PCS | Mod: CPTII,S$GLB,, | Performed by: PHYSICAL MEDICINE & REHABILITATION

## 2021-10-13 PROCEDURE — 4010F ACE/ARB THERAPY RXD/TAKEN: CPT | Mod: CPTII,S$GLB,, | Performed by: PHYSICAL MEDICINE & REHABILITATION

## 2021-10-13 PROCEDURE — 3066F PR DOCUMENTATION OF TREATMENT FOR NEPHROPATHY: ICD-10-PCS | Mod: CPTII,S$GLB,, | Performed by: PHYSICAL MEDICINE & REHABILITATION

## 2021-10-13 PROCEDURE — 3044F HG A1C LEVEL LT 7.0%: CPT | Mod: CPTII,S$GLB,, | Performed by: PHYSICAL MEDICINE & REHABILITATION

## 2021-10-13 PROCEDURE — 99204 OFFICE O/P NEW MOD 45 MIN: CPT | Mod: S$GLB,,, | Performed by: PHYSICAL MEDICINE & REHABILITATION

## 2021-10-13 PROCEDURE — 1159F MED LIST DOCD IN RCRD: CPT | Mod: CPTII,S$GLB,, | Performed by: PHYSICAL MEDICINE & REHABILITATION

## 2021-10-13 PROCEDURE — 99204 PR OFFICE/OUTPT VISIT, NEW, LEVL IV, 45-59 MIN: ICD-10-PCS | Mod: S$GLB,,, | Performed by: PHYSICAL MEDICINE & REHABILITATION

## 2021-10-13 PROCEDURE — 3061F PR NEG MICROALBUMINURIA RESULT DOCUMENTED/REVIEW: ICD-10-PCS | Mod: CPTII,S$GLB,, | Performed by: PHYSICAL MEDICINE & REHABILITATION

## 2021-10-13 PROCEDURE — 3044F PR MOST RECENT HEMOGLOBIN A1C LEVEL <7.0%: ICD-10-PCS | Mod: CPTII,S$GLB,, | Performed by: PHYSICAL MEDICINE & REHABILITATION

## 2021-10-13 PROCEDURE — 99999 PR PBB SHADOW E&M-EST. PATIENT-LVL III: CPT | Mod: PBBFAC,,, | Performed by: PHYSICAL MEDICINE & REHABILITATION

## 2021-10-13 PROCEDURE — 1159F PR MEDICATION LIST DOCUMENTED IN MEDICAL RECORD: ICD-10-PCS | Mod: CPTII,S$GLB,, | Performed by: PHYSICAL MEDICINE & REHABILITATION

## 2021-10-13 PROCEDURE — 3008F PR BODY MASS INDEX (BMI) DOCUMENTED: ICD-10-PCS | Mod: CPTII,S$GLB,, | Performed by: PHYSICAL MEDICINE & REHABILITATION

## 2021-10-13 PROCEDURE — 3066F NEPHROPATHY DOC TX: CPT | Mod: CPTII,S$GLB,, | Performed by: PHYSICAL MEDICINE & REHABILITATION

## 2021-10-13 PROCEDURE — 99999 PR PBB SHADOW E&M-EST. PATIENT-LVL III: ICD-10-PCS | Mod: PBBFAC,,, | Performed by: PHYSICAL MEDICINE & REHABILITATION

## 2021-10-13 PROCEDURE — 3061F NEG MICROALBUMINURIA REV: CPT | Mod: CPTII,S$GLB,, | Performed by: PHYSICAL MEDICINE & REHABILITATION

## 2021-10-21 ENCOUNTER — HOSPITAL ENCOUNTER (OUTPATIENT)
Dept: RADIOLOGY | Facility: HOSPITAL | Age: 51
Discharge: HOME | End: 2021-10-21
Attending: PHYSICAL MEDICINE & REHABILITATION
Payer: MEDICARE

## 2021-10-21 DIAGNOSIS — Z98.1 HISTORY OF LUMBAR FUSION: ICD-10-CM

## 2021-10-21 DIAGNOSIS — M54.9 DORSALGIA, UNSPECIFIED: ICD-10-CM

## 2021-10-21 DIAGNOSIS — M47.817 SPONDYLOSIS WITHOUT MYELOPATHY OR RADICULOPATHY, LUMBOSACRAL REGION: ICD-10-CM

## 2021-10-21 PROCEDURE — 72148 MRI LUMBAR SPINE W/O DYE: CPT | Mod: TC,PO

## 2021-10-25 ENCOUNTER — PATIENT MESSAGE (OUTPATIENT)
Dept: PHYSICAL MEDICINE AND REHAB | Facility: CLINIC | Age: 51
End: 2021-10-25
Payer: MEDICARE

## 2021-11-02 ENCOUNTER — OFFICE VISIT (OUTPATIENT)
Dept: PHYSICAL MEDICINE AND REHAB | Facility: CLINIC | Age: 51
End: 2021-11-02
Payer: MEDICARE

## 2021-11-02 VITALS — HEIGHT: 72 IN | WEIGHT: 237 LBS | BODY MASS INDEX: 32.1 KG/M2

## 2021-11-02 DIAGNOSIS — M47.816 LUMBAR FACET ARTHROPATHY: ICD-10-CM

## 2021-11-02 DIAGNOSIS — M51.36 DDD (DEGENERATIVE DISC DISEASE), LUMBAR: ICD-10-CM

## 2021-11-02 DIAGNOSIS — M54.50 CHRONIC BILATERAL LOW BACK PAIN WITHOUT SCIATICA: ICD-10-CM

## 2021-11-02 DIAGNOSIS — Z98.1 HISTORY OF LUMBAR FUSION: Primary | ICD-10-CM

## 2021-11-02 DIAGNOSIS — G89.29 CHRONIC BILATERAL LOW BACK PAIN WITHOUT SCIATICA: ICD-10-CM

## 2021-11-02 DIAGNOSIS — E66.9 CLASS 1 OBESITY WITH BODY MASS INDEX (BMI) OF 32.0 TO 32.9 IN ADULT, UNSPECIFIED OBESITY TYPE, UNSPECIFIED WHETHER SERIOUS COMORBIDITY PRESENT: ICD-10-CM

## 2021-11-02 PROCEDURE — 3066F PR DOCUMENTATION OF TREATMENT FOR NEPHROPATHY: ICD-10-PCS | Mod: CPTII,S$GLB,, | Performed by: PHYSICAL MEDICINE & REHABILITATION

## 2021-11-02 PROCEDURE — 99214 OFFICE O/P EST MOD 30 MIN: CPT | Mod: S$GLB,,, | Performed by: PHYSICAL MEDICINE & REHABILITATION

## 2021-11-02 PROCEDURE — 4010F PR ACE/ARB THEARPY RXD/TAKEN: ICD-10-PCS | Mod: CPTII,S$GLB,, | Performed by: PHYSICAL MEDICINE & REHABILITATION

## 2021-11-02 PROCEDURE — 4010F ACE/ARB THERAPY RXD/TAKEN: CPT | Mod: CPTII,S$GLB,, | Performed by: PHYSICAL MEDICINE & REHABILITATION

## 2021-11-02 PROCEDURE — 99999 PR PBB SHADOW E&M-EST. PATIENT-LVL III: ICD-10-PCS | Mod: PBBFAC,,, | Performed by: PHYSICAL MEDICINE & REHABILITATION

## 2021-11-02 PROCEDURE — 99999 PR PBB SHADOW E&M-EST. PATIENT-LVL III: CPT | Mod: PBBFAC,,, | Performed by: PHYSICAL MEDICINE & REHABILITATION

## 2021-11-02 PROCEDURE — 3066F NEPHROPATHY DOC TX: CPT | Mod: CPTII,S$GLB,, | Performed by: PHYSICAL MEDICINE & REHABILITATION

## 2021-11-02 PROCEDURE — 99214 PR OFFICE/OUTPT VISIT, EST, LEVL IV, 30-39 MIN: ICD-10-PCS | Mod: S$GLB,,, | Performed by: PHYSICAL MEDICINE & REHABILITATION

## 2021-11-02 PROCEDURE — 3061F NEG MICROALBUMINURIA REV: CPT | Mod: CPTII,S$GLB,, | Performed by: PHYSICAL MEDICINE & REHABILITATION

## 2021-11-02 PROCEDURE — 3061F PR NEG MICROALBUMINURIA RESULT DOCUMENTED/REVIEW: ICD-10-PCS | Mod: CPTII,S$GLB,, | Performed by: PHYSICAL MEDICINE & REHABILITATION

## 2021-11-22 ENCOUNTER — PATIENT MESSAGE (OUTPATIENT)
Dept: FAMILY MEDICINE | Facility: CLINIC | Age: 51
End: 2021-11-22
Payer: MEDICARE

## 2021-11-22 DIAGNOSIS — G25.81 RLS (RESTLESS LEGS SYNDROME): ICD-10-CM

## 2021-11-22 RX ORDER — ROPINIROLE 0.5 MG/1
0.5 TABLET, FILM COATED ORAL 2 TIMES DAILY
Qty: 180 TABLET | Refills: 0 | Status: SHIPPED | OUTPATIENT
Start: 2021-11-22 | End: 2021-11-30 | Stop reason: SDUPTHER

## 2021-11-30 RX ORDER — ROPINIROLE 0.5 MG/1
0.5 TABLET, FILM COATED ORAL 2 TIMES DAILY
Qty: 180 TABLET | Refills: 1 | Status: SHIPPED | OUTPATIENT
Start: 2021-11-30 | End: 2022-07-11 | Stop reason: SDUPTHER

## 2021-12-16 ENCOUNTER — PATIENT MESSAGE (OUTPATIENT)
Dept: PHYSICAL MEDICINE AND REHAB | Facility: CLINIC | Age: 51
End: 2021-12-16
Payer: MEDICARE

## 2021-12-20 DIAGNOSIS — M54.16 LUMBAR RADICULOPATHY: ICD-10-CM

## 2021-12-20 DIAGNOSIS — Z01.818 PRE-OP TESTING: Primary | ICD-10-CM

## 2022-01-18 ENCOUNTER — LAB VISIT (OUTPATIENT)
Dept: PRIMARY CARE CLINIC | Facility: CLINIC | Age: 52
End: 2022-01-18
Payer: MEDICARE

## 2022-01-18 DIAGNOSIS — Z01.818 PRE-OP TESTING: ICD-10-CM

## 2022-01-18 LAB
SARS-COV-2 RNA RESP QL NAA+PROBE: DETECTED
SARS-COV-2- CYCLE NUMBER: 18

## 2022-01-18 PROCEDURE — U0005 INFEC AGEN DETEC AMPLI PROBE: HCPCS | Performed by: PHYSICAL MEDICINE & REHABILITATION

## 2022-01-18 PROCEDURE — U0003 INFECTIOUS AGENT DETECTION BY NUCLEIC ACID (DNA OR RNA); SEVERE ACUTE RESPIRATORY SYNDROME CORONAVIRUS 2 (SARS-COV-2) (CORONAVIRUS DISEASE [COVID-19]), AMPLIFIED PROBE TECHNIQUE, MAKING USE OF HIGH THROUGHPUT TECHNOLOGIES AS DESCRIBED BY CMS-2020-01-R: HCPCS | Performed by: PHYSICAL MEDICINE & REHABILITATION

## 2022-01-19 ENCOUNTER — PATIENT MESSAGE (OUTPATIENT)
Dept: PHYSICAL MEDICINE AND REHAB | Facility: CLINIC | Age: 52
End: 2022-01-19
Payer: MEDICARE

## 2022-01-19 DIAGNOSIS — U07.1 COVID-19 VIRUS DETECTED: ICD-10-CM

## 2022-01-31 ENCOUNTER — HOSPITAL ENCOUNTER (OUTPATIENT)
Facility: AMBULARY SURGERY CENTER | Age: 52
Discharge: HOME | End: 2022-01-31
Attending: PHYSICAL MEDICINE & REHABILITATION | Admitting: PHYSICAL MEDICINE & REHABILITATION
Payer: MEDICARE

## 2022-01-31 DIAGNOSIS — M54.16 LUMBAR RADICULOPATHY: Primary | ICD-10-CM

## 2022-01-31 LAB — POCT GLUCOSE: 112 MG/DL (ref 70–110)

## 2022-01-31 PROCEDURE — 64484 NJX AA&/STRD TFRM EPI L/S EA: CPT | Mod: RT,,, | Performed by: PHYSICAL MEDICINE & REHABILITATION

## 2022-01-31 PROCEDURE — 64484 PRA INJECT ANES/STEROID FORAMEN LUMBAR/SACRAL W IMG GUIDE ,EA ADD LEVEL: ICD-10-PCS | Mod: RT,,, | Performed by: PHYSICAL MEDICINE & REHABILITATION

## 2022-01-31 PROCEDURE — 64483 NJX AA&/STRD TFRM EPI L/S 1: CPT | Mod: RT,,, | Performed by: PHYSICAL MEDICINE & REHABILITATION

## 2022-01-31 PROCEDURE — 64483 NJX AA&/STRD TFRM EPI L/S 1: CPT | Performed by: PHYSICAL MEDICINE & REHABILITATION

## 2022-01-31 PROCEDURE — 64484 NJX AA&/STRD TFRM EPI L/S EA: CPT | Mod: RT | Performed by: PHYSICAL MEDICINE & REHABILITATION

## 2022-01-31 PROCEDURE — 64483 PR EPIDURAL INJ, ANES/STEROID, TRANSFORAMINAL, LUMB/SACR, SNGL LEVL: ICD-10-PCS | Mod: RT,,, | Performed by: PHYSICAL MEDICINE & REHABILITATION

## 2022-01-31 RX ORDER — MIDAZOLAM HYDROCHLORIDE 1 MG/ML
INJECTION, SOLUTION INTRAMUSCULAR; INTRAVENOUS
Status: DISCONTINUED | OUTPATIENT
Start: 2022-01-31 | End: 2022-01-31 | Stop reason: HOSPADM

## 2022-01-31 RX ORDER — BETAMETHASONE SODIUM PHOSPHATE AND BETAMETHASONE ACETATE 3; 3 MG/ML; MG/ML
INJECTION, SUSPENSION INTRA-ARTICULAR; INTRALESIONAL; INTRAMUSCULAR; SOFT TISSUE
Status: DISCONTINUED | OUTPATIENT
Start: 2022-01-31 | End: 2022-01-31 | Stop reason: HOSPADM

## 2022-01-31 RX ORDER — DEXAMETHASONE SODIUM PHOSPHATE 10 MG/ML
INJECTION INTRAMUSCULAR; INTRAVENOUS
Status: DISCONTINUED | OUTPATIENT
Start: 2022-01-31 | End: 2022-01-31 | Stop reason: HOSPADM

## 2022-01-31 RX ORDER — SODIUM CHLORIDE, SODIUM LACTATE, POTASSIUM CHLORIDE, CALCIUM CHLORIDE 600; 310; 30; 20 MG/100ML; MG/100ML; MG/100ML; MG/100ML
INJECTION, SOLUTION INTRAVENOUS CONTINUOUS
Status: DISCONTINUED | OUTPATIENT
Start: 2022-01-31 | End: 2022-01-31 | Stop reason: HOSPADM

## 2022-01-31 RX ORDER — LIDOCAINE HYDROCHLORIDE 10 MG/ML
INJECTION, SOLUTION EPIDURAL; INFILTRATION; INTRACAUDAL; PERINEURAL
Status: DISCONTINUED | OUTPATIENT
Start: 2022-01-31 | End: 2022-01-31 | Stop reason: HOSPADM

## 2022-01-31 RX ORDER — ALPRAZOLAM 0.5 MG/1
0.5 TABLET, ORALLY DISINTEGRATING ORAL ONCE AS NEEDED
Status: DISCONTINUED | OUTPATIENT
Start: 2022-01-31 | End: 2022-01-31 | Stop reason: HOSPADM

## 2022-01-31 RX ORDER — LIDOCAINE HYDROCHLORIDE 10 MG/ML
1 INJECTION, SOLUTION EPIDURAL; INFILTRATION; INTRACAUDAL; PERINEURAL ONCE
Status: DISCONTINUED | OUTPATIENT
Start: 2022-01-31 | End: 2022-01-31 | Stop reason: HOSPADM

## 2022-01-31 RX ORDER — FENTANYL CITRATE 50 UG/ML
INJECTION, SOLUTION INTRAMUSCULAR; INTRAVENOUS
Status: DISCONTINUED | OUTPATIENT
Start: 2022-01-31 | End: 2022-01-31 | Stop reason: HOSPADM

## 2022-01-31 RX ORDER — SODIUM CHLORIDE 9 MG/ML
INJECTION, SOLUTION INTRAMUSCULAR; INTRAVENOUS; SUBCUTANEOUS
Status: DISCONTINUED | OUTPATIENT
Start: 2022-01-31 | End: 2022-01-31 | Stop reason: HOSPADM

## 2022-01-31 RX ADMIN — SODIUM CHLORIDE, SODIUM LACTATE, POTASSIUM CHLORIDE, CALCIUM CHLORIDE: 600; 310; 30; 20 INJECTION, SOLUTION INTRAVENOUS at 07:01

## 2022-01-31 NOTE — OP NOTE
BRIEF HISTORY  John Corrales is a 51 y.o. male with chronic right sided low back pain. History physical exam and MRI findings are consistent with lumbar disc herniation with nerve root impingement. WE will proceed with right L2, L3 and L4 TFESI    Time-out was taken to identify the patient and the procedure side prior to starting the procedure.    CONSENT  Risks, benefits, and alternatives of procedure were discussed with the patient.  All questions were answered to the satisfaction of the patient.  Written consent was signed prior to the procedure.    PRE-PROCEDURE DIAGNOSIS  Lumbar disc herniation with radiculopathy     POST-PROCEDURE DIAGNOSIS  SAME    NAME OF OPERATION  Right L2, L3 and L4 transforaminal steroid injection under fluoroscopy.  Right L2, L3 and L4 epidurograms    PHYSICIAN  Maury Herrera, DO    ASSISTANTS  None    LOCAL ANESTHETIC GIVEN  Xylocaine 1% 4 mL.    SEDATION MEDICATIONS  RN IV sedation 2mg IV versed, 50mcg IV fentanyl    ESTIMATED BLOOD LOSS  None.    COMPLICATIONS  None.    TECHNIQUE  Procedure in detail: Risks and benefits were discussed and written informed consent was obtained.  The patient was placed in the prone position on the exam table.  Skin was cleaned with ChloraPrep.  Skin was then allowed to dry.  Area was draped with sterile towels.  The right L3-4 neural foramen was identified with a 20 degree oblique view to the right.  Skin and tract was anesthetized using a 1.5 inch 27-gauge needle using approximately 1 cc of 1% lidocaine.  This needle was removed and replaced with a 3.5 inch 25-gauge needle which was advanced under intermittent fluoroscopy and alternating oblique, AP, and lateral views until the needle tip in the AP view was at the 6:00 of the L5 pedicle and in the middle half of the superior aspect of the L3-4 neural foramen in the lateral view.  Next, in the AP view 1 cc of omnipaque 300 e was injected under live fluoroscopy showing excellent L3 epidurography.  There  was no evidence of vascular spread.  Next, 1.5 cc of a solution of 2 cc of preservative-free normal saline, 2 cc of preservative-free 1% lidocaine, and 1 cc of 10mg/cc of dexamethasone was injected.  Next, right L4-5 neural foramen was identified with a 20 degree oblique view to the right.  Skin and tract was anesthetized using a 1.5 inch 27-gauge needle using approximately 1 cc of 1% lidocaine.  This needle was removed and replaced with a 3.5 inch 25-gauge needle which was advanced under intermittent fluoroscopy and alternating oblique, AP, and lateral views until the needle tip in the AP view was at the 6:00 of the L4 pedicle and in the middle half of the superior aspect of the L4-5 neural foramen in the lateral view.  Next, in the AP view 1 cc of omnipaque 300 was injected under live fluoroscopy showing excellent L4 epidurography.  There is no evidence of vascular spread.  .  Next, 1.5 cc of a solution of 2 cc of preservative-free normal saline, 2 cc of preservative-free 1% lidocaine, and 1 cc of 10 mg/cc of dexamethasone was injected. Lastly, the procedure was then repeated at the L2-3 neural foramen on the right in a similar fashion.  Needle was removed, and Band-Aid was applied.  The patient tolerated the procedure well with no adverse events.      EPIDUROGRAM  AP and lateral images were obtained.  The contrast material flowed distally along the root and ascended in the lateral and ventral epidural space to the supra-adjacent disc level.  From the second position the contrast material flowed distally along the nerve root and ascended in the lateral and ventral epidural space to the supra-adjacent disc level.  No venous, arterial or subarachnoid flow was observed at L2, L3, and L4 on the right.    MONITORS  Prior to and during the procedure, the patient was monitored with pulse oximetry, EKG, and blood pressure cuff.  The procedure was tolerated well.  Oxygenation, blood pressure, and pulse rate were maintained  within normal limits during the procedure.  The patient was awake, alert, and able to respond to all questions appropriately throughout the entire procedure.      The patient was monitored after the procedure.  On exam, 30 minutes after the procedure, the patient was noted to be neurovascularly intact in BLE.  The patient was discharged home with family/responsible adult in stable condition.  The patient was given post procedure and discharge instructions to follow at home.  Diet on discharge is to be the same as prior to the procedure.  Activity on discharge is as per the instruction sheet given to the patient.  We will see the patient back in clinic in 3-4 weeks for follow-up.

## 2022-01-31 NOTE — PLAN OF CARE
Patient is speaking with Dr Herrera about plan of care post procedure. Doretha is driving him home and has been called.

## 2022-01-31 NOTE — H&P
Today's Date: 01/31/2022     Referring Provider: Dr. Maury Herrera     Chief Complaint: No chief complaint on file.      HPI: John Corrales is a 51 y.o. male  who presents today for right L2, L3 and L4 TFESI    Review of Systems   Constitutional: Negative for chills and fever.   HENT: Negative for congestion and tinnitus.    Eyes: Negative for blurred vision and photophobia.   Respiratory: Negative for shortness of breath and wheezing.    Cardiovascular: Negative for chest pain and palpitations.   Gastrointestinal: Negative for nausea and vomiting.   Genitourinary: Negative for dysuria and frequency.   Musculoskeletal: Positive for back pain. Negative for joint pain and myalgias.   Skin: Negative for itching and rash.   Neurological: Negative for speech change and focal weakness.   Endo/Heme/Allergies: Negative for environmental allergies. Does not bruise/bleed easily.   Psychiatric/Behavioral: Negative for depression. The patient is not nervous/anxious.         Social History     Socioeconomic History    Marital status:    Tobacco Use    Smoking status: Former Smoker    Smokeless tobacco: Never Used   Substance and Sexual Activity    Alcohol use: No    Drug use: No     Social Determinants of Health     Financial Resource Strain: Low Risk     Difficulty of Paying Living Expenses: Not very hard   Food Insecurity: No Food Insecurity    Worried About Running Out of Food in the Last Year: Never true    Ran Out of Food in the Last Year: Never true   Transportation Needs: No Transportation Needs    Lack of Transportation (Medical): No    Lack of Transportation (Non-Medical): No   Physical Activity: Insufficiently Active    Days of Exercise per Week: 4 days    Minutes of Exercise per Session: 20 min   Stress: No Stress Concern Present    Feeling of Stress : Only a little   Social Connections: Unknown    Frequency of Communication with Friends and Family: More than three times a week    Frequency of  Social Gatherings with Friends and Family: Twice a week    Active Member of Clubs or Organizations: Yes    Attends Club or Organization Meetings: 1 to 4 times per year    Marital Status:    Housing Stability: Low Risk     Unable to Pay for Housing in the Last Year: No    Number of Places Lived in the Last Year: 1    Unstable Housing in the Last Year: No       Family History   Problem Relation Age of Onset    Diabetes Father     Heart disease Father     Cancer Father         prostate       No current facility-administered medications on file prior to encounter.     Current Outpatient Medications on File Prior to Encounter   Medication Sig Dispense Refill    atenoloL (TENORMIN) 25 MG tablet Take 1 tablet (25 mg total) by mouth once daily. 90 tablet 1    atorvastatin (LIPITOR) 20 MG tablet Take 1 tablet (20 mg total) by mouth once daily. 90 tablet 1    levothyroxine (SYNTHROID) 75 MCG tablet Take 1 tablet (75 mcg total) by mouth once daily. 90 tablet 1    lisinopriL 10 MG tablet Take 1 tablet (10 mg total) by mouth once daily. 90 tablet 1    metFORMIN (GLUCOPHAGE) 500 MG tablet Take 1 tablet (500 mg total) by mouth 3 (three) times daily. 270 tablet 1    nystatin (MYCOSTATIN) cream Apply topically 2 (two) times daily. 30 g 2    omeprazole (PRILOSEC) 10 MG capsule Take 10 mg by mouth once daily.      rOPINIRole (REQUIP) 0.5 MG tablet Take 1 tablet (0.5 mg total) by mouth 2 (two) times a day. 180 tablet 1        Review of patient's allergies indicates:   Allergen Reactions    Demerol [meperidine] Itching       Past Surgical History:   Procedure Laterality Date    BACK SURGERY  2007    artificial disc L4-5    CYSTOSCOPY W/ STONE MANIPULATION  2002    Kidney stones removed    KNEE ARTHROPLASTY Right 6/15/2020    Procedure: ARTHROPLASTY, KNEE;  Surgeon: Alok Rowe MD;  [facility];  Service: Orthopedics;  Laterality: Right;  SAMEERA FAYE    KNEE ARTHROSCOPY W/ DEBRIDEMENT  2000 and 2001     Right knee       Past Medical History:   Diagnosis Date    Diabetes mellitus, type 2     General anesthetics causing adverse effect in therapeutic use 2007    During general anesthesia for back surgery , experienced severe pain, heard people talking and felt like his heart was about to explode.    GERD (gastroesophageal reflux disease)     Hyperlipidemia     Hypothyroidism     Nasal septal deviation        Vitals:    01/31/22 0659   BP: 136/77   Pulse: 68   Resp: 18   Temp: 98.1 °F (36.7 °C)     Physical Exam  Constitutional:       General: He is not in acute distress.     Appearance: Normal appearance.   HENT:      Head: Normocephalic and atraumatic.      Nose: Nose normal. No congestion.      Mouth/Throat:      Mouth: Mucous membranes are moist.      Pharynx: Oropharynx is clear.   Eyes:      Extraocular Movements: Extraocular movements intact.      Pupils: Pupils are equal, round, and reactive to light.   Neck:      Trachea: Trachea and phonation normal.   Pulmonary:      Effort: Pulmonary effort is normal. No respiratory distress.   Abdominal:      General: Abdomen is flat. There is no distension.   Skin:     General: Skin is warm and dry.   Neurological:      General: No focal deficit present.      Mental Status: He is alert and oriented to person, place, and time. Mental status is at baseline.      Cranial Nerves: Cranial nerves are intact.      Sensory: Sensation is intact.      Motor: Motor function is intact.      Gait: Gait is intact.   Psychiatric:         Mood and Affect: Mood and affect normal.         Behavior: Behavior normal.        Ortho Exam     CLINICAL HISTORY:  51 years (1970) Male Lumbosacral osteoarthritis; Back pain or radiculopathy, > 6 wks; Back pain or radiculopathy, prior surgery, new symptoms; history disc replacement L4-5. L5-S1 anterior fusion BACK PAIN CAUSING DIFFICULTY WALKING, MOVEMENT; BILATERAL LEG PAIN. MORE PROMINENT TO RIGHT; NO TRAUMA; NO CA; HX LUMBAR  SURGERY     TECHNIQUE:  MR LUMBAR SPINE WITHOUT IV CONTRAST. 171 images obtained. MRI of the lumbar spine was performed utilizing 1.5 T magnet.     CONTRAST:  No contrast was administered.     COMPARISON:  CT most recently from June 6, 2017 in radiograph from May 20, 2021.     FINDINGS:  This report assumes that there are 5 non-rib bearing lumbar vertebral bodies with the L5 vertebral body articulating with the sacrum. Accurate numbering of the spine levels would require imaging of the thoracolumbar spine to count ribs.     The prevertebral soft tissues are normal. Alignment is anatomic. Individual vertebral height is maintained. Marrow signal is within normal limits. Conus is normal in caliber and signal. The conus terminates at L1     Postoperative: Metallic intervertebral discectomy spacer device at L4-L5 with susceptibility artifact (obscuring MRI findings). Additional metallic intervertebral discectomy spacer device with oblique screws and near complete osseous fusion at L5-S1, unchanged from the previous exam.     At L1-2, the disc demonstrates normal height, signal intensity and posterior contour. The spinal canal is patent. The neural foramen is patent bilaterally. There is no ligamentum flavum thickening. There is no facet arthropathy.     At L2-3, the disc shows mild disc height loss with a small broad-based posterior disc bulge. There is a superimposed right lateral recess high intensity zone/annular fissure and disc extrusion, the extruded disc measures approximately 6 mm in diameter (axial image 9, sagittal image 17). These findings in combination result in mild spinal canal and bilateral neural foraminal stenosis, however there is effacement of the right lateral recess and possible impingement on the traversing L3 nerve root in the right lateral recess by the extruded disc (consider correlation for radiculopathy). There is no facet arthropathy or ligamentum flavum hypertrophy.     At L3-4, the disc  shows mild to moderate disc height loss with a small broad-based posterior disc osteophyte complex, slightly more eccentric to the left neural foramina. There is a small superimposed central posterior high intensity zone/annular fissure in the disc and tiny superimposed central posterior disc extrusion measuring 4 mm in diameter. There is no facet arthropathy and ligamentum flavum hypertrophy. These findings in combination result in moderate left neural foraminal stenosis and minimal spinal canal/right neural foraminal stenosis.     At L4-5, susceptibility artifact secures this level, there is mild bilateral facet arthropathy with ligamentum flavum hypertrophy. The spinal canal is patent and the visualized portions of the neural foramina appear patent.     At L5-S1, discectomy fusion at this level with a relative normal posterior contour. There is no facet arthropathy. The spinal canal and neural foramina appear patent.     The SI joints and visualized pelvis are within normal limits.     IMPRESSION:  1. Postoperative fusion at L4-L5 and L5-S1 with susceptibility artifact, but no gross adverse interval change from the most recent comparison.  2. Disc disease as outlined in detail above most pronounced at L2-L3 and L3-L4 with small disc extrusions at these levels.                Lumbar radiculopathy  -     Case Request Operating Room: Injection, Steroid, Epidural L2, L3, L4    Other orders  -     lactated ringers infusion  -     betamethasone acetate-betamethasone sodium phosphate injection  -     iohexoL (OMNIPAQUE 300) injection  -     LIDOcaine (PF) 10 mg/ml (1%) injection  -     sodium chloride 0.9% injection  -     POCT glucose; Standing           PLAN:   John Corrales is a 51 y.o. male with chronic right sided low back pain. History physical exam and MRI findings are consistent with lumbar disc herniation with nerve root impingement. WE will proceed with right L2, L3 and L4 TFESI      ASA 2, Mallampati  2    Maury Herrera D.O.  Physical Medicine and Rehabilitation          CC: Patients PCP: Gavin Sandoval PA-C  CC: Referring Provider: Dr. Maury Herrera

## 2022-01-31 NOTE — DISCHARGE SUMMARY
[facility]  Discharge Note  Short Stay    Procedure(s) (LRB):  Trans Right L2, L3, L4 (Right)    OUTCOME: Patient tolerated treatment/procedure well without complication and is now ready for discharge.    DISPOSITION: Home or Self Care    FINAL DIAGNOSIS: Lumbar radiuclopathy    FOLLOWUP: In clinic    DISCHARGE INSTRUCTIONS:  No discharge procedures on file.     TIME SPENT ON DISCHARGE: 15 minutes

## 2022-02-01 VITALS
OXYGEN SATURATION: 97 % | RESPIRATION RATE: 17 BRPM | WEIGHT: 237 LBS | SYSTOLIC BLOOD PRESSURE: 128 MMHG | HEIGHT: 72 IN | BODY MASS INDEX: 32.1 KG/M2 | HEART RATE: 63 BPM | DIASTOLIC BLOOD PRESSURE: 73 MMHG | TEMPERATURE: 99 F

## 2022-03-30 ENCOUNTER — TELEPHONE (OUTPATIENT)
Dept: FAMILY MEDICINE | Facility: CLINIC | Age: 52
End: 2022-03-30
Payer: MEDICARE

## 2022-03-30 ENCOUNTER — HOSPITAL ENCOUNTER (OUTPATIENT)
Facility: HOSPITAL | Age: 52
Discharge: HOME | End: 2022-03-31
Attending: EMERGENCY MEDICINE | Admitting: INTERNAL MEDICINE
Payer: MEDICARE

## 2022-03-30 ENCOUNTER — OFFICE VISIT (OUTPATIENT)
Dept: FAMILY MEDICINE | Facility: CLINIC | Age: 52
End: 2022-03-30
Payer: MEDICARE

## 2022-03-30 VITALS
SYSTOLIC BLOOD PRESSURE: 132 MMHG | HEART RATE: 160 BPM | DIASTOLIC BLOOD PRESSURE: 90 MMHG | BODY MASS INDEX: 31.42 KG/M2 | HEIGHT: 72 IN | WEIGHT: 232 LBS

## 2022-03-30 DIAGNOSIS — R07.9 CHEST PAIN: ICD-10-CM

## 2022-03-30 DIAGNOSIS — I48.92 ATRIAL FLUTTER: Primary | ICD-10-CM

## 2022-03-30 DIAGNOSIS — R00.0 TACHYCARDIA: Primary | ICD-10-CM

## 2022-03-30 DIAGNOSIS — M54.50 ACUTE MIDLINE LOW BACK PAIN WITHOUT SCIATICA: ICD-10-CM

## 2022-03-30 DIAGNOSIS — R00.2 PALPITATIONS: ICD-10-CM

## 2022-03-30 DIAGNOSIS — I48.92 ATRIAL FLUTTER, UNSPECIFIED TYPE: ICD-10-CM

## 2022-03-30 DIAGNOSIS — I48.91 ATRIAL FIBRILLATION: ICD-10-CM

## 2022-03-30 DIAGNOSIS — M51.9 LUMBAR DISC DISEASE: ICD-10-CM

## 2022-03-30 LAB
ALBUMIN SERPL BCP-MCNC: 4.5 G/DL (ref 3.5–5.2)
ALP SERPL-CCNC: 61 U/L (ref 55–135)
ALT SERPL W/O P-5'-P-CCNC: 19 U/L (ref 10–44)
AMPHET+METHAMPHET UR QL: NEGATIVE
ANION GAP SERPL CALC-SCNC: 12 MMOL/L (ref 8–16)
APTT PPP: 28 SEC (ref 23.3–35.1)
AST SERPL-CCNC: 18 U/L (ref 10–40)
BARBITURATES UR QL SCN>200 NG/ML: NEGATIVE
BASOPHILS # BLD AUTO: 0.05 K/UL (ref 0–0.2)
BASOPHILS # BLD AUTO: 0.05 K/UL (ref 0–0.2)
BASOPHILS NFR BLD: 0.5 % (ref 0–1.9)
BASOPHILS NFR BLD: 0.5 % (ref 0–1.9)
BENZODIAZ UR QL SCN>200 NG/ML: NEGATIVE
BILIRUB SERPL-MCNC: 0.6 MG/DL (ref 0.1–1)
BILIRUB UR QL STRIP: NEGATIVE
BNP SERPL-MCNC: 34 PG/ML (ref 0–99)
BNP SERPL-MCNC: 34 PG/ML (ref 0–99)
BUN SERPL-MCNC: 28 MG/DL (ref 6–20)
BZE UR QL SCN: NEGATIVE
CALCIUM SERPL-MCNC: 9.5 MG/DL (ref 8.7–10.5)
CANNABINOIDS UR QL SCN: ABNORMAL
CHLORIDE SERPL-SCNC: 100 MMOL/L (ref 95–110)
CK SERPL-CCNC: 272 U/L (ref 20–200)
CLARITY UR: CLEAR
CO2 SERPL-SCNC: 24 MMOL/L (ref 23–29)
COLOR UR: YELLOW
CREAT SERPL-MCNC: 1.4 MG/DL (ref 0.5–1.4)
CREAT UR-MCNC: 210 MG/DL (ref 23–375)
DIFFERENTIAL METHOD: ABNORMAL
DIFFERENTIAL METHOD: ABNORMAL
EKG 12-LEAD: ABNORMAL
EOSINOPHIL # BLD AUTO: 0.2 K/UL (ref 0–0.5)
EOSINOPHIL # BLD AUTO: 0.2 K/UL (ref 0–0.5)
EOSINOPHIL NFR BLD: 2.3 % (ref 0–8)
EOSINOPHIL NFR BLD: 2.3 % (ref 0–8)
ERYTHROCYTE [DISTWIDTH] IN BLOOD BY AUTOMATED COUNT: 15.5 % (ref 11.5–14.5)
ERYTHROCYTE [DISTWIDTH] IN BLOOD BY AUTOMATED COUNT: 15.5 % (ref 11.5–14.5)
EST. GFR  (AFRICAN AMERICAN): >60 ML/MIN/1.73 M^2
EST. GFR  (NON AFRICAN AMERICAN): 58.2 ML/MIN/1.73 M^2
GLUCOSE SERPL-MCNC: 111 MG/DL (ref 70–110)
GLUCOSE SERPL-MCNC: 92 MG/DL (ref 70–110)
GLUCOSE UR QL STRIP: NEGATIVE
HCT VFR BLD AUTO: 41.7 % (ref 40–54)
HCT VFR BLD AUTO: 41.7 % (ref 40–54)
HGB BLD-MCNC: 12.7 G/DL (ref 14–18)
HGB BLD-MCNC: 12.7 G/DL (ref 14–18)
HGB UR QL STRIP: NEGATIVE
IMM GRANULOCYTES # BLD AUTO: 0.03 K/UL (ref 0–0.04)
IMM GRANULOCYTES # BLD AUTO: 0.03 K/UL (ref 0–0.04)
IMM GRANULOCYTES NFR BLD AUTO: 0.3 % (ref 0–0.5)
IMM GRANULOCYTES NFR BLD AUTO: 0.3 % (ref 0–0.5)
INR PPP: 1
KETONES UR QL STRIP: NEGATIVE
LEUKOCYTE ESTERASE UR QL STRIP: NEGATIVE
LIPASE SERPL-CCNC: 43 U/L (ref 4–60)
LYMPHOCYTES # BLD AUTO: 2.3 K/UL (ref 1–4.8)
LYMPHOCYTES # BLD AUTO: 2.3 K/UL (ref 1–4.8)
LYMPHOCYTES NFR BLD: 22.3 % (ref 18–48)
LYMPHOCYTES NFR BLD: 22.3 % (ref 18–48)
MAGNESIUM SERPL-MCNC: 1.8 MG/DL (ref 1.6–2.6)
MCH RBC QN AUTO: 22.3 PG (ref 27–31)
MCH RBC QN AUTO: 22.3 PG (ref 27–31)
MCHC RBC AUTO-ENTMCNC: 30.5 G/DL (ref 32–36)
MCHC RBC AUTO-ENTMCNC: 30.5 G/DL (ref 32–36)
MCV RBC AUTO: 73 FL (ref 82–98)
MCV RBC AUTO: 73 FL (ref 82–98)
MONOCYTES # BLD AUTO: 1 K/UL (ref 0.3–1)
MONOCYTES # BLD AUTO: 1 K/UL (ref 0.3–1)
MONOCYTES NFR BLD: 9.5 % (ref 4–15)
MONOCYTES NFR BLD: 9.5 % (ref 4–15)
NEUTROPHILS # BLD AUTO: 6.7 K/UL (ref 1.8–7.7)
NEUTROPHILS # BLD AUTO: 6.7 K/UL (ref 1.8–7.7)
NEUTROPHILS NFR BLD: 65.1 % (ref 38–73)
NEUTROPHILS NFR BLD: 65.1 % (ref 38–73)
NITRITE UR QL STRIP: NEGATIVE
NRBC BLD-RTO: 0 /100 WBC
NRBC BLD-RTO: 0 /100 WBC
OPIATES UR QL SCN: ABNORMAL
PCP UR QL SCN>25 NG/ML: NEGATIVE
PH UR STRIP: 6 [PH] (ref 5–8)
PLATELET # BLD AUTO: 298 K/UL (ref 150–450)
PLATELET # BLD AUTO: 298 K/UL (ref 150–450)
PMV BLD AUTO: 11.7 FL (ref 9.2–12.9)
PMV BLD AUTO: 11.7 FL (ref 9.2–12.9)
POTASSIUM SERPL-SCNC: 4.1 MMOL/L (ref 3.5–5.1)
PR INTERVAL: ABNORMAL
PROT SERPL-MCNC: 7.7 G/DL (ref 6–8.4)
PROT UR QL STRIP: ABNORMAL
PROTHROMBIN TIME: 12.6 SEC (ref 11.4–13.7)
PRT AXES: ABNORMAL
QRS DURATION: ABNORMAL
QT/QTC: ABNORMAL
RBC # BLD AUTO: 5.7 M/UL (ref 4.6–6.2)
RBC # BLD AUTO: 5.7 M/UL (ref 4.6–6.2)
SARS-COV-2 RDRP RESP QL NAA+PROBE: NEGATIVE
SODIUM SERPL-SCNC: 136 MMOL/L (ref 136–145)
SP GR UR STRIP: 1.03 (ref 1–1.03)
T4 FREE SERPL-MCNC: 0.9 NG/DL (ref 0.71–1.51)
TOXICOLOGY INFORMATION: ABNORMAL
TROPONIN I SERPL DL<=0.01 NG/ML-MCNC: <0.03 NG/ML
TSH SERPL DL<=0.005 MIU/L-ACNC: 5.93 UIU/ML (ref 0.34–5.6)
URN SPEC COLLECT METH UR: ABNORMAL
UROBILINOGEN UR STRIP-ACNC: NEGATIVE EU/DL
VENTRICULAR RATE: ABNORMAL
WBC # BLD AUTO: 10.2 K/UL (ref 3.9–12.7)
WBC # BLD AUTO: 10.2 K/UL (ref 3.9–12.7)

## 2022-03-30 PROCEDURE — 83880 ASSAY OF NATRIURETIC PEPTIDE: CPT | Performed by: NURSE PRACTITIONER

## 2022-03-30 PROCEDURE — 80307 DRUG TEST PRSMV CHEM ANLYZR: CPT | Performed by: EMERGENCY MEDICINE

## 2022-03-30 PROCEDURE — 85025 COMPLETE CBC W/AUTO DIFF WBC: CPT | Performed by: NURSE PRACTITIONER

## 2022-03-30 PROCEDURE — U0002 COVID-19 LAB TEST NON-CDC: HCPCS | Performed by: EMERGENCY MEDICINE

## 2022-03-30 PROCEDURE — 4010F PR ACE/ARB THEARPY RXD/TAKEN: ICD-10-PCS | Mod: S$GLB,,, | Performed by: NURSE PRACTITIONER

## 2022-03-30 PROCEDURE — G0378 HOSPITAL OBSERVATION PER HR: HCPCS

## 2022-03-30 PROCEDURE — 25000003 PHARM REV CODE 250: Performed by: INTERNAL MEDICINE

## 2022-03-30 PROCEDURE — 96361 HYDRATE IV INFUSION ADD-ON: CPT | Mod: GZ

## 2022-03-30 PROCEDURE — 1160F RVW MEDS BY RX/DR IN RCRD: CPT | Mod: S$GLB,,, | Performed by: NURSE PRACTITIONER

## 2022-03-30 PROCEDURE — 81003 URINALYSIS AUTO W/O SCOPE: CPT | Mod: 59 | Performed by: EMERGENCY MEDICINE

## 2022-03-30 PROCEDURE — 85730 THROMBOPLASTIN TIME PARTIAL: CPT | Performed by: EMERGENCY MEDICINE

## 2022-03-30 PROCEDURE — 25000003 PHARM REV CODE 250: Performed by: STUDENT IN AN ORGANIZED HEALTH CARE EDUCATION/TRAINING PROGRAM

## 2022-03-30 PROCEDURE — 99285 EMERGENCY DEPT VISIT HI MDM: CPT | Mod: 25

## 2022-03-30 PROCEDURE — 1160F PR REVIEW ALL MEDS BY PRESCRIBER/CLIN PHARMACIST DOCUMENTED: ICD-10-PCS | Mod: S$GLB,,, | Performed by: NURSE PRACTITIONER

## 2022-03-30 PROCEDURE — 84484 ASSAY OF TROPONIN QUANT: CPT | Performed by: NURSE PRACTITIONER

## 2022-03-30 PROCEDURE — 80349 CANNABINOIDS NATURAL: CPT | Performed by: STUDENT IN AN ORGANIZED HEALTH CARE EDUCATION/TRAINING PROGRAM

## 2022-03-30 PROCEDURE — 93010 EKG 12-LEAD: ICD-10-PCS | Mod: 76,,, | Performed by: GENERAL PRACTICE

## 2022-03-30 PROCEDURE — 80053 COMPREHEN METABOLIC PANEL: CPT | Performed by: NURSE PRACTITIONER

## 2022-03-30 PROCEDURE — 3008F BODY MASS INDEX DOCD: CPT | Mod: S$GLB,,, | Performed by: NURSE PRACTITIONER

## 2022-03-30 PROCEDURE — 82550 ASSAY OF CK (CPK): CPT | Performed by: EMERGENCY MEDICINE

## 2022-03-30 PROCEDURE — 80307 DRUG TEST PRSMV CHEM ANLYZR: CPT | Mod: 91 | Performed by: STUDENT IN AN ORGANIZED HEALTH CARE EDUCATION/TRAINING PROGRAM

## 2022-03-30 PROCEDURE — 93010 ELECTROCARDIOGRAM REPORT: CPT | Mod: 76,,, | Performed by: GENERAL PRACTICE

## 2022-03-30 PROCEDURE — 93005 ELECTROCARDIOGRAM TRACING: CPT | Performed by: GENERAL PRACTICE

## 2022-03-30 PROCEDURE — 4010F ACE/ARB THERAPY RXD/TAKEN: CPT | Mod: S$GLB,,, | Performed by: NURSE PRACTITIONER

## 2022-03-30 PROCEDURE — 83735 ASSAY OF MAGNESIUM: CPT | Performed by: EMERGENCY MEDICINE

## 2022-03-30 PROCEDURE — 99214 PR OFFICE/OUTPT VISIT, EST, LEVL IV, 30-39 MIN: ICD-10-PCS | Mod: 25,S$GLB,, | Performed by: NURSE PRACTITIONER

## 2022-03-30 PROCEDURE — 84443 ASSAY THYROID STIM HORMONE: CPT | Performed by: EMERGENCY MEDICINE

## 2022-03-30 PROCEDURE — 85610 PROTHROMBIN TIME: CPT | Performed by: NURSE PRACTITIONER

## 2022-03-30 PROCEDURE — 3008F PR BODY MASS INDEX (BMI) DOCUMENTED: ICD-10-PCS | Mod: S$GLB,,, | Performed by: NURSE PRACTITIONER

## 2022-03-30 PROCEDURE — 83690 ASSAY OF LIPASE: CPT | Performed by: EMERGENCY MEDICINE

## 2022-03-30 PROCEDURE — 99214 OFFICE O/P EST MOD 30 MIN: CPT | Mod: 25,S$GLB,, | Performed by: NURSE PRACTITIONER

## 2022-03-30 PROCEDURE — 93000 POCT EKG 12-LEAD: ICD-10-PCS | Mod: S$GLB,,, | Performed by: NURSE PRACTITIONER

## 2022-03-30 PROCEDURE — 93000 ELECTROCARDIOGRAM COMPLETE: CPT | Mod: S$GLB,,, | Performed by: NURSE PRACTITIONER

## 2022-03-30 PROCEDURE — 84439 ASSAY OF FREE THYROXINE: CPT | Performed by: EMERGENCY MEDICINE

## 2022-03-30 PROCEDURE — 93010 ELECTROCARDIOGRAM REPORT: CPT | Mod: ,,, | Performed by: GENERAL PRACTICE

## 2022-03-30 RX ORDER — IBUPROFEN 200 MG
16 TABLET ORAL
Status: DISCONTINUED | OUTPATIENT
Start: 2022-03-30 | End: 2022-03-31 | Stop reason: HOSPADM

## 2022-03-30 RX ORDER — ROPINIROLE 0.25 MG/1
0.5 TABLET, FILM COATED ORAL 2 TIMES DAILY
Status: DISCONTINUED | OUTPATIENT
Start: 2022-03-30 | End: 2022-03-31 | Stop reason: HOSPADM

## 2022-03-30 RX ORDER — IBUPROFEN 200 MG
24 TABLET ORAL
Status: DISCONTINUED | OUTPATIENT
Start: 2022-03-30 | End: 2022-03-31 | Stop reason: HOSPADM

## 2022-03-30 RX ORDER — NALOXONE HCL 0.4 MG/ML
0.02 VIAL (ML) INJECTION
Status: DISCONTINUED | OUTPATIENT
Start: 2022-03-30 | End: 2022-03-31 | Stop reason: HOSPADM

## 2022-03-30 RX ORDER — METOPROLOL TARTRATE 1 MG/ML
5 INJECTION, SOLUTION INTRAVENOUS
Status: DISPENSED | OUTPATIENT
Start: 2022-03-30 | End: 2022-03-31

## 2022-03-30 RX ORDER — ACETAMINOPHEN 325 MG/1
650 TABLET ORAL EVERY 4 HOURS PRN
Status: DISCONTINUED | OUTPATIENT
Start: 2022-03-30 | End: 2022-03-31 | Stop reason: HOSPADM

## 2022-03-30 RX ORDER — HYDROCODONE BITARTRATE AND ACETAMINOPHEN 5; 325 MG/1; MG/1
1 TABLET ORAL EVERY 6 HOURS PRN
COMMUNITY
End: 2022-06-08

## 2022-03-30 RX ORDER — METOPROLOL TARTRATE 25 MG/1
12.5 TABLET ORAL 2 TIMES DAILY
Status: DISCONTINUED | OUTPATIENT
Start: 2022-03-30 | End: 2022-03-31 | Stop reason: HOSPADM

## 2022-03-30 RX ORDER — PANTOPRAZOLE SODIUM 40 MG/1
40 TABLET, DELAYED RELEASE ORAL
Status: DISCONTINUED | OUTPATIENT
Start: 2022-03-31 | End: 2022-03-31 | Stop reason: HOSPADM

## 2022-03-30 RX ORDER — TIZANIDINE HYDROCHLORIDE 4 MG/1
CAPSULE, GELATIN COATED ORAL
COMMUNITY
End: 2022-06-08

## 2022-03-30 RX ORDER — ATORVASTATIN CALCIUM 20 MG/1
20 TABLET, FILM COATED ORAL DAILY
Qty: 90 TABLET | Refills: 1 | Status: CANCELLED | OUTPATIENT
Start: 2022-03-30

## 2022-03-30 RX ORDER — LEVOTHYROXINE SODIUM 100 UG/1
100 TABLET ORAL
Status: DISCONTINUED | OUTPATIENT
Start: 2022-03-31 | End: 2022-03-31 | Stop reason: HOSPADM

## 2022-03-30 RX ORDER — POLYETHYLENE GLYCOL 3350 17 G/17G
17 POWDER, FOR SOLUTION ORAL DAILY
Status: DISCONTINUED | OUTPATIENT
Start: 2022-03-31 | End: 2022-03-31 | Stop reason: HOSPADM

## 2022-03-30 RX ORDER — LISINOPRIL 10 MG/1
10 TABLET ORAL DAILY
Status: DISCONTINUED | OUTPATIENT
Start: 2022-03-31 | End: 2022-03-31 | Stop reason: HOSPADM

## 2022-03-30 RX ORDER — ACETAMINOPHEN 500 MG
1000 TABLET ORAL EVERY 8 HOURS PRN
Status: DISCONTINUED | OUTPATIENT
Start: 2022-03-30 | End: 2022-03-31 | Stop reason: HOSPADM

## 2022-03-30 RX ORDER — GLUCAGON 1 MG
1 KIT INJECTION
Status: DISCONTINUED | OUTPATIENT
Start: 2022-03-30 | End: 2022-03-31 | Stop reason: HOSPADM

## 2022-03-30 RX ORDER — LEVOTHYROXINE SODIUM 75 UG/1
75 TABLET ORAL DAILY
Qty: 90 TABLET | Refills: 1 | Status: CANCELLED | OUTPATIENT
Start: 2022-03-30 | End: 2022-06-28

## 2022-03-30 RX ORDER — SODIUM CHLORIDE 0.9 % (FLUSH) 0.9 %
10 SYRINGE (ML) INJECTION EVERY 6 HOURS PRN
Status: DISCONTINUED | OUTPATIENT
Start: 2022-03-30 | End: 2022-03-31 | Stop reason: HOSPADM

## 2022-03-30 RX ORDER — MORPHINE SULFATE 4 MG/ML
4 INJECTION, SOLUTION INTRAMUSCULAR; INTRAVENOUS EVERY 4 HOURS PRN
Status: DISCONTINUED | OUTPATIENT
Start: 2022-03-30 | End: 2022-03-31 | Stop reason: HOSPADM

## 2022-03-30 RX ORDER — HYDROCODONE BITARTRATE AND ACETAMINOPHEN 5; 325 MG/1; MG/1
1 TABLET ORAL EVERY 6 HOURS PRN
Status: DISCONTINUED | OUTPATIENT
Start: 2022-03-30 | End: 2022-03-31 | Stop reason: HOSPADM

## 2022-03-30 RX ORDER — TALC
9 POWDER (GRAM) TOPICAL NIGHTLY PRN
Status: DISCONTINUED | OUTPATIENT
Start: 2022-03-30 | End: 2022-03-31 | Stop reason: HOSPADM

## 2022-03-30 RX ORDER — ATORVASTATIN CALCIUM 20 MG/1
20 TABLET, FILM COATED ORAL DAILY
Status: DISCONTINUED | OUTPATIENT
Start: 2022-03-31 | End: 2022-03-31 | Stop reason: HOSPADM

## 2022-03-30 RX ADMIN — APIXABAN 5 MG: 5 TABLET, FILM COATED ORAL at 09:03

## 2022-03-30 RX ADMIN — SODIUM CHLORIDE 1000 ML: 0.9 INJECTION, SOLUTION INTRAVENOUS at 05:03

## 2022-03-30 RX ADMIN — HYDROCODONE BITARTRATE AND ACETAMINOPHEN 1 TABLET: 5; 325 TABLET ORAL at 09:03

## 2022-03-30 RX ADMIN — METOPROLOL TARTRATE 12.5 MG: 25 TABLET, FILM COATED ORAL at 09:03

## 2022-03-30 NOTE — PROGRESS NOTES
SUBJECTIVE:    Patient ID: John Corrales is a 51 y.o. male.    Chief Complaint: Back Pain (Brought bottles// c/o back pain that started Monday afteroon-MJ)    Pt presents with c/o low back pain. This is a chronic issue for him that flares up from time to time. History of lumbar fusion L5-S1. Has seen Dr. Herrera and received epidural injection which helped for a while. Was bending down working on Monday and back started to tighten up. Has previous Hydrocodone prescription he has taken which helped some. Also helps to lay flat, hot baths, and lay on heating pad.   On initial workup, nurse found pt's heart rate to be 160s and irregular. He does take Atenolol and Lisinopril for HTN but has not had a Cardiac workup in the past. Other history includes diabetes and RLS. He reports he did notice some heart palpitations this morning and took his Atenolol early (usually takes at night) and seemed to subsided. Denies SOB, dizziness, or lightheadedness. Does feel hot and is visibly diaphoretic. Blood pressure is stable.           Admission on 01/31/2022, Discharged on 01/31/2022   Component Date Value Ref Range Status    POCT Glucose 01/31/2022 112 (A) 70 - 110 mg/dL Final   Lab Visit on 01/18/2022   Component Date Value Ref Range Status    SARS-CoV2 (COVID-19) Qualitative P* 01/18/2022 Detected (A) Not Detected Final    SARS-COV-2- Cycle Number 01/18/2022 18   Final       Past Medical History:   Diagnosis Date    Diabetes mellitus, type 2     General anesthetics causing adverse effect in therapeutic use 2007    During general anesthesia for back surgery , experienced severe pain, heard people talking and felt like his heart was about to explode.    GERD (gastroesophageal reflux disease)     Hyperlipidemia     Hypothyroidism     Nasal septal deviation      Past Surgical History:   Procedure Laterality Date    BACK SURGERY  01/01/2007    artificial disc L4-5    CYSTOSCOPY W/ STONE MANIPULATION  01/01/2002    Kidney  stones removed    EPIDURAL STEROID INJECTION  01/31/2022    KNEE ARTHROPLASTY Right 06/15/2020    Procedure: ARTHROPLASTY, KNEE;  Surgeon: Alok Rowe MD;  [facility];  Service: Orthopedics;  Laterality: Right;  SAMEERA FAYE    KNEE ARTHROSCOPY W/ DEBRIDEMENT  2000 and 2001    Right knee     Family History   Problem Relation Age of Onset    Diabetes Father     Heart disease Father     Cancer Father         prostate       Marital Status:   Alcohol History:  reports no history of alcohol use.  Tobacco History:  reports that he has quit smoking. He has never used smokeless tobacco.  Drug History:  reports no history of drug use.    Review of patient's allergies indicates:   Allergen Reactions    Demerol [meperidine] Itching       Current Outpatient Medications:     atenoloL (TENORMIN) 25 MG tablet, Take 1 tablet (25 mg total) by mouth once daily., Disp: 90 tablet, Rfl: 1    HYDROcodone-acetaminophen (NORCO) 5-325 mg per tablet, Take 1 tablet by mouth every 6 (six) hours as needed., Disp: , Rfl:     metFORMIN (GLUCOPHAGE) 500 MG tablet, Take 1 tablet (500 mg total) by mouth 3 (three) times daily., Disp: 270 tablet, Rfl: 1    omeprazole (PRILOSEC) 10 MG capsule, Take 10 mg by mouth once daily., Disp: , Rfl:     tiZANidine 4 mg Cap, Take by mouth., Disp: , Rfl:     atorvastatin (LIPITOR) 20 MG tablet, Take 1 tablet (20 mg total) by mouth once daily., Disp: 90 tablet, Rfl: 1    levothyroxine (SYNTHROID) 75 MCG tablet, Take 1 tablet (75 mcg total) by mouth once daily., Disp: 90 tablet, Rfl: 1    lisinopriL 10 MG tablet, Take 1 tablet (10 mg total) by mouth once daily., Disp: 90 tablet, Rfl: 1    nystatin (MYCOSTATIN) cream, Apply topically 2 (two) times daily., Disp: 30 g, Rfl: 2    rOPINIRole (REQUIP) 0.5 MG tablet, Take 1 tablet (0.5 mg total) by mouth 2 (two) times a day., Disp: 180 tablet, Rfl: 1    Review of Systems   Constitutional: Positive for diaphoresis. Negative for activity  change.   Respiratory: Negative for chest tightness and shortness of breath.    Cardiovascular: Positive for palpitations. Negative for chest pain.   Musculoskeletal: Positive for back pain.   Neurological: Negative for dizziness and light-headedness.   Psychiatric/Behavioral: The patient is not nervous/anxious.           Objective:      Vitals:    03/30/22 1400   BP: (!) 132/90   Pulse: (!) 160   Weight: 105.2 kg (232 lb)   Height: 6' (1.829 m)     Body mass index is 31.46 kg/m².  Physical Exam  Constitutional:       Appearance: He is ill-appearing.   HENT:      Head: Normocephalic and atraumatic.   Cardiovascular:      Rate and Rhythm: Tachycardia present. Rhythm irregular.   Pulmonary:      Effort: Pulmonary effort is normal. No respiratory distress.   Skin:     General: Skin is warm and moist.      Coloration: Skin is pale.   Neurological:      Mental Status: He is alert and oriented to person, place, and time.   Psychiatric:         Mood and Affect: Mood normal.           Assessment:       1. Tachycardia    2. Lumbar disc disease    3. Acute midline low back pain without sciatica    4. Atrial flutter, unspecified type         Plan:       Tachycardia  -     POCT EKG 12-LEAD (NOT FOR [facility] USE)    Lumbar disc disease    Acute midline low back pain without sciatica    Atrial flutter, unspecified type    Atrial flutter on EKG with no prior history, pt symptomatic on exam. Deferred to ER- Spoke with John charge nurse.     No follow-ups on file.

## 2022-03-30 NOTE — ED PROVIDER NOTES
Encounter Date: 3/30/2022       History     Chief Complaint   Patient presents with    Palpitations     Sent from Dr. Saunders's office for palpitations      HPI     51-year-old male with past medical history of diabetes, hypothyroidism, chronic back pain presents with new onset atrial flutter.  Patient states that he felt palpitations this morning.  He is prescribed atenolol for blood pressure.  He states he took this medication early today because he felt the palpitation.  However when he got to the primary care office, his heart rate was 160 and irregular.  He has no chest pain or shortness of breath.  He feels hot and is diaphoretic.  Patient states he went to his primary care doctor for an acute flare of his back pain.  He had a disc surgery in his lumbar spine several years ago.  No he has chronic lumbar pain bilaterally.  It some times moves down his legs.  He has tried epidural injections, topical creams, opiate and non opiate pain medications, and muscle relaxers with variable results.      Review of patient's allergies indicates:   Allergen Reactions    Demerol [meperidine] Itching     Past Medical History:   Diagnosis Date    Diabetes mellitus, type 2     General anesthetics causing adverse effect in therapeutic use 2007    During general anesthesia for back surgery , experienced severe pain, heard people talking and felt like his heart was about to explode.    GERD (gastroesophageal reflux disease)     Hyperlipidemia     Hypothyroidism     Nasal septal deviation      Past Surgical History:   Procedure Laterality Date    BACK SURGERY  01/01/2007    artificial disc L4-5    CYSTOSCOPY W/ STONE MANIPULATION  01/01/2002    Kidney stones removed    EPIDURAL STEROID INJECTION  01/31/2022    KNEE ARTHROPLASTY Right 06/15/2020    Procedure: ARTHROPLASTY, KNEE;  Surgeon: Alok Rowe MD;  Location: [facility];  Service: Orthopedics;  Laterality: Right;  SAMEERA FAYE    KNEE ARTHROSCOPY W/ DEBRIDEMENT   2000 and 2001    Right knee     Family History   Problem Relation Age of Onset    Diabetes Father     Heart disease Father     Cancer Father         prostate     Social History     Tobacco Use    Smoking status: Former Smoker    Smokeless tobacco: Never Used   Substance Use Topics    Alcohol use: No    Drug use: No     Review of Systems   Constitutional: Positive for diaphoresis. Negative for fever.   HENT: Negative for congestion and sore throat.    Eyes: Negative for pain and redness.   Respiratory: Negative for shortness of breath.    Cardiovascular: Positive for palpitations. Negative for chest pain.   Gastrointestinal: Negative for nausea and vomiting.   Genitourinary: Negative for dysuria.   Musculoskeletal: Positive for back pain.   Skin: Negative for rash.   Neurological: Negative for weakness.   Hematological: Does not bruise/bleed easily.   Psychiatric/Behavioral: Negative for agitation and confusion.       Physical Exam     Initial Vitals [03/30/22 1459]   BP Pulse Resp Temp SpO2   (!) 169/128 (!) 144 18 98.2 °F (36.8 °C) 96 %      MAP       --         Physical Exam    Nursing note and vitals reviewed.  Constitutional: He appears well-developed and well-nourished. He is diaphoretic.   HENT:   Mouth/Throat: Oropharynx is clear and moist.   Eyes: EOM are normal. Pupils are equal, round, and reactive to light.   Neck: Neck supple. No JVD present.   Normal range of motion.  Cardiovascular: Normal heart sounds and intact distal pulses.   Irregular, 140-150   Pulmonary/Chest: Breath sounds normal. No respiratory distress. He has no wheezes.   Abdominal: Abdomen is soft. He exhibits no distension. There is no abdominal tenderness.   Musculoskeletal:         General: No edema. Normal range of motion.      Cervical back: Normal range of motion and neck supple.      Comments: Negative straight leg raise bilaterally      Neurological: He is alert and oriented to person, place, and time. He has normal  strength. GCS score is 15. GCS eye subscore is 4. GCS verbal subscore is 5. GCS motor subscore is 6.   Five of 5 strength in bilateral lower extremities, in hip flexion and knee flexion/extension.  No sensory deficit.    Psychiatric: He has a normal mood and affect. Thought content normal.         ED Course   Procedures  Labs Reviewed   CBC W/ AUTO DIFFERENTIAL - Abnormal; Notable for the following components:       Result Value    Hemoglobin 12.7 (*)     MCV 73 (*)     MCH 22.3 (*)     MCHC 30.5 (*)     RDW 15.5 (*)     All other components within normal limits   COMPREHENSIVE METABOLIC PANEL - Abnormal; Notable for the following components:    Glucose 111 (*)     BUN 28 (*)     eGFR if non  58.2 (*)     All other components within normal limits   CBC W/ AUTO DIFFERENTIAL - Abnormal; Notable for the following components:    Hemoglobin 12.7 (*)     MCV 73 (*)     MCH 22.3 (*)     MCHC 30.5 (*)     RDW 15.5 (*)     All other components within normal limits   DRUG SCREEN PANEL, URINE EMERGENCY - Abnormal; Notable for the following components:    Opiate Scrn, Ur Presumptive Positive (*)     THC Presumptive Positive (*)     All other components within normal limits    Narrative:     Specimen Source->Urine   TSH - Abnormal; Notable for the following components:    TSH 5.930 (*)     All other components within normal limits   URINALYSIS - Abnormal; Notable for the following components:    Protein, UA Trace (*)     All other components within normal limits    Narrative:     Specimen Source->Urine   CK - Abnormal; Notable for the following components:     (*)     All other components within normal limits   B-TYPE NATRIURETIC PEPTIDE   TROPONIN I   PROTIME-INR   APTT   B-TYPE NATRIURETIC PEPTIDE   LIPASE   MAGNESIUM   SARS-COV-2 RNA AMPLIFICATION, QUAL   TSH   T4, FREE   DRUGS OF ABUSE SCREEN, BLOOD        ECG Results          EKG 12-lead (In process)  Result time 03/31/22 05:12:39    In process by  Interface, Lab In [facility] (03/31/22 05:12:39)                 Narrative:    Test Reason : R07.9,    Vent. Rate : 066 BPM     Atrial Rate : 066 BPM     P-R Int : 154 ms          QRS Dur : 100 ms      QT Int : 388 ms       P-R-T Axes : 037 076 021 degrees     QTc Int : 406 ms    Sinus rhythm with Premature supraventricular complexes  Incomplete right bundle branch block  Borderline Abnormal ECG  When compared with ECG of 30-MAR-2022 15:11,  Sinus rhythm has replaced Atrial flutter  Vent. rate has decreased BY  87 BPM  ST elevation has replaced ST depression in Inferior leads  ST no longer depressed in Anterior leads  T wave inversion less evident in Inferior leads  Nonspecific T wave abnormality no longer evident in Anterior leads    Referred By: [email or web address]   SELF           Confirmed By:                              EKG 12-lead (In process)  Result time 03/30/22 15:15:55    In process by Interface, Lab In [facility] (03/30/22 15:15:55)                 Narrative:    Test Reason : R07.9,    Vent. Rate : 153 BPM     Atrial Rate : 306 BPM     P-R Int : 000 ms          QRS Dur : 094 ms      QT Int : 256 ms       P-R-T Axes : 247 085 -09 degrees     QTc Int : 408 ms    Atrial flutter with 2:1 A-V conduction  Incomplete right bundle branch block  Nonspecific ST and T wave abnormality  Abnormal ECG  When compared with ECG of 30-MAR-2022 15:10,  No significant change was found    Referred By:             Confirmed By:                             Imaging Results          X-Ray Chest AP Portable (Final result)  Result time 03/30/22 19:24:02    Final result by Maximilian Charles MD (03/30/22 19:24:02)                 Impression:      Negative chest.      Electronically signed by: Maximilian Charles MD  Date:    03/30/2022  Time:    19:24             Narrative:    EXAMINATION:  XR CHEST AP PORTABLE    CLINICAL HISTORY:  palpitations;    FINDINGS:  Portable chest at 1917 compared with 06/08/2020 shows normal cardiomediastinal silhouette.    Lungs  are clear. Pulmonary vasculature is normal. No acute osseous abnormality.                                 Medications   metoprolol injection 5 mg (0 mg Intravenous Hold 3/30/22 1530)   sodium chloride 0.9% bolus 1,000 mL (0 mLs Intravenous Stopped 3/30/22 1840)   ketorolac injection 15 mg (15 mg Intravenous Given 3/31/22 1102)     Medical Decision Making:   Initial Assessment:   51-year-old male with thyroid disease presented to primary care clinic today with palpitations.  No chest pain or shortness of breath Found to be in a flutter.  No history of AFib.  Patient does take atenolol for blood pressure, took 1 today.    Patient's back pain is typical of previous episodes.  There is no midline tenderness, no neurologic deficits.      Plan for beta-blockers  Troponin, electrolytes, TSH in process.    Maribeth Phillips MD  [facility] Emergency Medicine Residency PGY-4  03/30/2022 3:44 PM     Differential Diagnosis:   Thyrotoxicosis, tox/withdrawal syndrome, electrolyte abnormality. Less likely PE, the patient is hypertensive, no SOB or hypoxia.      UPDATE:   Troponin is negative.BNP normal.  TSH elevated with a normal free T4.    Remainder of labs are unremarkable.  Drug screen panel significant for opiates and marijuana which patient reports are prescribed.  This is the patient's 1st known episode of atrial flutter.  However he reports multiple episodes of palpitations recently.  Most Likely he is going in and out of this arrhythmia.  Will admit for telemetry, medication titration and echo.    Maribeth Phillips MD  [facility] Emergency Medicine Residency PGY-4  04/01/2022 11:43 PM             Attending Attestation:   Physician Attestation Statement for Resident:  As the supervising MD   Physician Attestation Statement: I have personally seen and examined this patient.   I agree with the above history. -:   As the supervising MD I agree with the above PE.    As the supervising MD I agree with the above treatment, course, plan, and  disposition.  I have reviewed and agree with the residents interpretation of the following: lab data, x-rays, CT scans and EKG.                ED Course as of 04/01/22 1306   Wed Mar 30, 2022   1542 Pt converted to NSR spontaneously. Will repeat EKG, hold metoprolol [EC]      ED Course User Index  [EC] Maribeth Phillips MD             Clinical Impression:   Final diagnoses:  [R07.9] Chest pain  [R07.9] Chest pain  [I48.92] Atrial flutter (Primary)  [R00.2] Palpitations          ED Disposition Condition    Observation               Maribeth Phillips MD  03/30/22 9188       Maribeth Phillips MD  03/30/22 2350       Phyllis Whitney MD  04/01/22 1306

## 2022-03-30 NOTE — TELEPHONE ENCOUNTER
----- Message from Yolanda Real sent at 3/30/2022 11:51 AM CDT -----  Patient called and stated that he hurt his back on Monday and would like to see if he can come in soon please give him a call at [phone]

## 2022-03-31 ENCOUNTER — CLINICAL SUPPORT (OUTPATIENT)
Dept: CARDIOLOGY | Facility: HOSPITAL | Age: 52
End: 2022-03-31
Attending: INTERNAL MEDICINE
Payer: MEDICARE

## 2022-03-31 VITALS
OXYGEN SATURATION: 98 % | DIASTOLIC BLOOD PRESSURE: 74 MMHG | HEIGHT: 72 IN | TEMPERATURE: 98 F | SYSTOLIC BLOOD PRESSURE: 133 MMHG | RESPIRATION RATE: 18 BRPM | BODY MASS INDEX: 32.25 KG/M2 | WEIGHT: 238.13 LBS | HEART RATE: 51 BPM

## 2022-03-31 PROBLEM — I48.92 ATRIAL FLUTTER: Status: ACTIVE | Noted: 2022-03-31

## 2022-03-31 LAB
ALBUMIN SERPL BCP-MCNC: 3.9 G/DL (ref 3.5–5.2)
ALP SERPL-CCNC: 49 U/L (ref 55–135)
ALT SERPL W/O P-5'-P-CCNC: 16 U/L (ref 10–44)
ANION GAP SERPL CALC-SCNC: 12 MMOL/L (ref 8–16)
AORTIC ROOT ANNULUS: 3.63 CM
AORTIC VALVE CUSP SEPERATION: 2.4 CM
AST SERPL-CCNC: 17 U/L (ref 10–40)
AV INDEX (PROSTH): 0.92
AV MEAN GRADIENT: 3 MMHG
AV PEAK GRADIENT: 4 MMHG
AV VALVE AREA: 3.51 CM2
AV VELOCITY RATIO: 85.32
BASOPHILS # BLD AUTO: 0.03 K/UL (ref 0–0.2)
BASOPHILS NFR BLD: 0.5 % (ref 0–1.9)
BILIRUB SERPL-MCNC: 0.4 MG/DL (ref 0.1–1)
BSA FOR ECHO PROCEDURE: 2.34 M2
BUN SERPL-MCNC: 26 MG/DL (ref 6–20)
CALCIUM SERPL-MCNC: 9 MG/DL (ref 8.7–10.5)
CHLORIDE SERPL-SCNC: 104 MMOL/L (ref 95–110)
CHOLEST SERPL-MCNC: 148 MG/DL (ref 120–199)
CHOLEST/HDLC SERPL: 3 {RATIO} (ref 2–5)
CO2 SERPL-SCNC: 23 MMOL/L (ref 23–29)
CREAT SERPL-MCNC: 1.1 MG/DL (ref 0.5–1.4)
CV ECHO LV RWT: 0.45 CM
DIFFERENTIAL METHOD: ABNORMAL
DOP CALC AO PEAK VEL: 1.06 M/S
DOP CALC AO VTI: 22.61 CM
DOP CALC LVOT AREA: 3.8 CM2
DOP CALC LVOT DIAMETER: 2.21 CM
DOP CALC LVOT PEAK VEL: 90.44 M/S
DOP CALC LVOT STROKE VOLUME: 79.36 CM3
DOP CALCLVOT PEAK VEL VTI: 20.7 CM
E WAVE DECELERATION TIME: 211.34 MSEC
E/A RATIO: 1.85
E/E' RATIO: 9.67 M/S
ECHO LV POSTERIOR WALL: 1.09 CM (ref 0.6–1.1)
EJECTION FRACTION: 60 %
EOSINOPHIL # BLD AUTO: 0.2 K/UL (ref 0–0.5)
EOSINOPHIL NFR BLD: 4.1 % (ref 0–8)
ERYTHROCYTE [DISTWIDTH] IN BLOOD BY AUTOMATED COUNT: 15.4 % (ref 11.5–14.5)
EST. GFR  (AFRICAN AMERICAN): >60 ML/MIN/1.73 M^2
EST. GFR  (NON AFRICAN AMERICAN): >60 ML/MIN/1.73 M^2
ESTIMATED AVG GLUCOSE: 137 MG/DL (ref 68–131)
FRACTIONAL SHORTENING: 32 % (ref 28–44)
GLUCOSE SERPL-MCNC: 109 MG/DL (ref 70–110)
GLUCOSE SERPL-MCNC: 118 MG/DL (ref 70–110)
GLUCOSE SERPL-MCNC: 130 MG/DL (ref 70–110)
GLUCOSE SERPL-MCNC: 130 MG/DL (ref 70–110)
HBA1C MFR BLD: 6.4 % (ref 4.5–6.2)
HCT VFR BLD AUTO: 37.9 % (ref 40–54)
HDLC SERPL-MCNC: 50 MG/DL (ref 40–75)
HDLC SERPL: 33.8 % (ref 20–50)
HGB BLD-MCNC: 11.1 G/DL (ref 14–18)
IMM GRANULOCYTES # BLD AUTO: 0.01 K/UL (ref 0–0.04)
IMM GRANULOCYTES NFR BLD AUTO: 0.2 % (ref 0–0.5)
INTERVENTRICULAR SEPTUM: 1.1 CM (ref 0.6–1.1)
LDLC SERPL CALC-MCNC: 85.2 MG/DL (ref 63–159)
LEFT ATRIUM SIZE: 4.2 CM
LEFT INTERNAL DIMENSION IN SYSTOLE: 3.28 CM (ref 2.1–4)
LEFT VENTRICLE MASS INDEX: 85 G/M2
LEFT VENTRICULAR INTERNAL DIMENSION IN DIASTOLE: 4.82 CM (ref 3.5–6)
LEFT VENTRICULAR MASS: 194.03 G
LV LATERAL E/E' RATIO: 8.7 M/S
LV SEPTAL E/E' RATIO: 10.88 M/S
LYMPHOCYTES # BLD AUTO: 1.8 K/UL (ref 1–4.8)
LYMPHOCYTES NFR BLD: 32 % (ref 18–48)
MAGNESIUM SERPL-MCNC: 1.7 MG/DL (ref 1.6–2.6)
MCH RBC QN AUTO: 22.2 PG (ref 27–31)
MCHC RBC AUTO-ENTMCNC: 29.3 G/DL (ref 32–36)
MCV RBC AUTO: 76 FL (ref 82–98)
MONOCYTES # BLD AUTO: 0.6 K/UL (ref 0.3–1)
MONOCYTES NFR BLD: 10.4 % (ref 4–15)
MV PEAK A VEL: 0.47 M/S
MV PEAK E VEL: 0.87 M/S
NEUTROPHILS # BLD AUTO: 2.9 K/UL (ref 1.8–7.7)
NEUTROPHILS NFR BLD: 52.8 % (ref 38–73)
NONHDLC SERPL-MCNC: 98 MG/DL
NRBC BLD-RTO: 0 /100 WBC
PLATELET # BLD AUTO: 248 K/UL (ref 150–450)
PMV BLD AUTO: 12.1 FL (ref 9.2–12.9)
POTASSIUM SERPL-SCNC: 4.1 MMOL/L (ref 3.5–5.1)
PROT SERPL-MCNC: 6.9 G/DL (ref 6–8.4)
PV PEAK VELOCITY: 83.11 CM/S
RA PRESSURE: 3 MMHG
RBC # BLD AUTO: 5 M/UL (ref 4.6–6.2)
RIGHT VENTRICULAR END-DIASTOLIC DIMENSION: 270 CM
SODIUM SERPL-SCNC: 139 MMOL/L (ref 136–145)
TDI LATERAL: 0.1 M/S
TDI SEPTAL: 0.08 M/S
TDI: 0.09 M/S
TRIGL SERPL-MCNC: 64 MG/DL (ref 30–150)
WBC # BLD AUTO: 5.56 K/UL (ref 3.9–12.7)

## 2022-03-31 PROCEDURE — 82962 GLUCOSE BLOOD TEST: CPT

## 2022-03-31 PROCEDURE — 80053 COMPREHEN METABOLIC PANEL: CPT | Performed by: INTERNAL MEDICINE

## 2022-03-31 PROCEDURE — 93306 ECHO (CUPID ONLY): ICD-10-PCS | Mod: 26,,, | Performed by: GENERAL PRACTICE

## 2022-03-31 PROCEDURE — 96374 THER/PROPH/DIAG INJ IV PUSH: CPT | Mod: 59

## 2022-03-31 PROCEDURE — 25000003 PHARM REV CODE 250: Performed by: INTERNAL MEDICINE

## 2022-03-31 PROCEDURE — 93306 TTE W/DOPPLER COMPLETE: CPT | Mod: 26,,, | Performed by: GENERAL PRACTICE

## 2022-03-31 PROCEDURE — 85025 COMPLETE CBC W/AUTO DIFF WBC: CPT | Performed by: INTERNAL MEDICINE

## 2022-03-31 PROCEDURE — 36415 COLL VENOUS BLD VENIPUNCTURE: CPT | Performed by: INTERNAL MEDICINE

## 2022-03-31 PROCEDURE — 63600175 PHARM REV CODE 636 W HCPCS: Performed by: STUDENT IN AN ORGANIZED HEALTH CARE EDUCATION/TRAINING PROGRAM

## 2022-03-31 PROCEDURE — 83735 ASSAY OF MAGNESIUM: CPT | Performed by: INTERNAL MEDICINE

## 2022-03-31 PROCEDURE — 99220 PR INITIAL OBSERVATION CARE,LEVL III: ICD-10-PCS | Mod: ,,, | Performed by: INTERNAL MEDICINE

## 2022-03-31 PROCEDURE — 99220 PR INITIAL OBSERVATION CARE,LEVL III: CPT | Mod: ,,, | Performed by: INTERNAL MEDICINE

## 2022-03-31 PROCEDURE — 93306 TTE W/DOPPLER COMPLETE: CPT

## 2022-03-31 PROCEDURE — 80061 LIPID PANEL: CPT | Performed by: INTERNAL MEDICINE

## 2022-03-31 PROCEDURE — 83036 HEMOGLOBIN GLYCOSYLATED A1C: CPT | Performed by: INTERNAL MEDICINE

## 2022-03-31 PROCEDURE — G0378 HOSPITAL OBSERVATION PER HR: HCPCS

## 2022-03-31 RX ORDER — KETOROLAC TROMETHAMINE 30 MG/ML
15 INJECTION, SOLUTION INTRAMUSCULAR; INTRAVENOUS EVERY 6 HOURS PRN
Status: DISCONTINUED | OUTPATIENT
Start: 2022-03-31 | End: 2022-03-31 | Stop reason: HOSPADM

## 2022-03-31 RX ORDER — METOPROLOL TARTRATE 25 MG/1
12.5 TABLET, FILM COATED ORAL 2 TIMES DAILY
Qty: 30 TABLET | Refills: 11 | Status: SHIPPED | OUTPATIENT
Start: 2022-03-31 | End: 2022-04-12 | Stop reason: SDUPTHER

## 2022-03-31 RX ORDER — KETOROLAC TROMETHAMINE 30 MG/ML
15 INJECTION, SOLUTION INTRAMUSCULAR; INTRAVENOUS ONCE
Status: COMPLETED | OUTPATIENT
Start: 2022-03-31 | End: 2022-03-31

## 2022-03-31 RX ADMIN — METOPROLOL TARTRATE 12.5 MG: 25 TABLET, FILM COATED ORAL at 09:03

## 2022-03-31 RX ADMIN — LEVOTHYROXINE SODIUM 100 MCG: 0.1 TABLET ORAL at 05:03

## 2022-03-31 RX ADMIN — HYDROCODONE BITARTRATE AND ACETAMINOPHEN 1 TABLET: 5; 325 TABLET ORAL at 09:03

## 2022-03-31 RX ADMIN — ATORVASTATIN CALCIUM 20 MG: 20 TABLET, FILM COATED ORAL at 09:03

## 2022-03-31 RX ADMIN — APIXABAN 5 MG: 5 TABLET, FILM COATED ORAL at 09:03

## 2022-03-31 RX ADMIN — ROPINIROLE 0.5 MG: 0.25 TABLET, FILM COATED ORAL at 09:03

## 2022-03-31 RX ADMIN — KETOROLAC TROMETHAMINE 15 MG: 30 INJECTION, SOLUTION INTRAMUSCULAR at 11:03

## 2022-03-31 RX ADMIN — LISINOPRIL 10 MG: 10 TABLET ORAL at 09:03

## 2022-03-31 RX ADMIN — PANTOPRAZOLE SODIUM 40 MG: 40 TABLET, DELAYED RELEASE ORAL at 05:03

## 2022-03-31 NOTE — PLAN OF CARE
[facility]  Initial Discharge Assessment       Primary Care Provider: Ralf Saunders MD    Admission Diagnosis: Atrial fibrillation [I48.91]    Admission Date: 3/30/2022  Expected Discharge Date: 3/31/2022    Discharge Barriers Identified: (P) None    Payor: [employer]ARE / Plan: [employer] / Product Type: Medicare Advantage /     Extended Emergency Contact Information  Primary Emergency Contact: AntoniNickie  Address: [address]           [place] [address] [facility]  Home Phone: [phone]  Mobile Phone: [phone]  Relation: Spouse  Secondary Emergency Contact: Doretha Corrales  Address: [address] [place] [address] [place] [address] [facility]  Home Phone: [phone]  Mobile Phone: [phone]  Relation: Mother    Discharge Plan A: (P) Home with family  Discharge Plan B: (P) Home with family      [id number] - [place]  [address] AT [facility] [address]  [address]  [place] [address]  Phone: [phone] Fax: [phone]    [id number] - [place]  [address] AT [facility]  [address] [facility] [address]  Phone: [phone] Fax: [phone]      Initial Assessment (most recent)       Adult Discharge Assessment - 03/31/22 0852          Discharge Assessment    Assessment Type Discharge Planning Assessment (P)      Confirmed/corrected address, phone number and insurance Yes (P)      Confirmed Demographics Correct on Facesheet (P)      Source of Information patient (P)    Assessment completed at bedside    Communicated ANGEL with patient/caregiver Yes (P)      Lives With spouse;child(fay), dependent (P)      Facility Arrived From: Home (P)      Do you expect to return to your current living situation? Yes (P)      Do you have help at home or someone to help you manage your care at home? Yes (P)      Who are your caregiver(s) and  their phone number(s)? Nickie Corrales (Spouse)   [phone] (P)      Prior to hospitilization cognitive status: Alert/Oriented (P)      Current cognitive status: Alert/Oriented (P)      Walking or Climbing Stairs Difficulty none (P)    Uses intermittently as needed    Dressing/Bathing Difficulty none (P)      Equipment Currently Used at Home cane, straight (P)      Readmission within 30 days? No (P)      Patient currently being followed by outpatient case management? No (P)      Do you currently have service(s) that help you manage your care at home? No (P)      Do you take prescription medications? Yes (P)      Do you have prescription coverage? Yes (P)      Coverage PHN (P)      Do you have any problems affording any of your prescribed medications? No (P)      Is the patient taking medications as prescribed? yes (P)      Who is going to help you get home at discharge? Nickie Corrales (Spouse)   [phone] (P)      How do you get to doctors appointments? car, drives self (P)      Are you on dialysis? No (P)      Do you take coumadin? No (P)      Discharge Plan A Home with family (P)      Discharge Plan B Home with family (P)      DME Needed Upon Discharge  none (P)      Discharge Plan discussed with: Patient (P)      Discharge Barriers Identified None (P)         Relationship/Environment    Name(s) of Who Lives With Patient Nickie Corrales (Spouse)   [phone] (P)

## 2022-03-31 NOTE — DISCHARGE SUMMARY
[facility] Medicine  Discharge Summary      Patient Name: John Corrales  MRN: [id number]  Patient Class: OP- Observation  Admission Date: 3/30/2022  Hospital Length of Stay: 0 days  Discharge Date and Time:  03/31/2022 12:40 PM  Attending Physician: Manny Hankins MD   Discharging Provider: Manny Hankins MD  Primary Care Provider: Ralf Saunders MD      HPI:   No notes on file    * No surgery found *      Hospital Course:   Patient presented with palpitations dyspnea and diaphoresis found to have a flutter with RVR.  Patient was started on beta-blockers with improvement in his heart rate.  He converted to sinus rhythm in the 60s overnight.  It sounds like the patient has been experiencing intermittent palpitations the past several months.  He will continue p.o. metoprolol and apixaban upon discharge.  He will follow-up with his cardiologist in 4 weeks and see electrophysiology via outpatient referral to Dr. Morocho at [facility].    Physical Exam  GENERAL:  No apparent distress. Alert and oriented times three  EYES:  PERRLA, EOMI. Conjunctivae intact  ENT:  Posterior pharynx clear  NECK:  Supple with no lymphadenopathy or thyromegaly  LUNGS:  No respiratory distress. Clear to auscultation bilaterally with good air movement  CARDIAC:  RRR without murmur, rub or gallop  ABDOMEN:  Positive bowel sounds. Nontender and nondistended. No organomegaly  EXTREMITIES:  Peripheral pulses are 2+. Hands and feet are warm. Good capillary refill in fingers (< 2 seconds). No clubbing, cyanosis or edema  SKIN:  Skin color, texture and turgor normal. No rashes, ulcerations or nodules  NEUROLOGIC:  CN II-XII intact. Light touch sensation intact. Muscle strength 5/5 in all extremities            Goals of Care Treatment Preferences:  Code Status: Full Code      Consults:   Consults (From admission, onward)        Status Ordering Provider     Inpatient consult to Cardiology  Once        Provider:   Jacob Muller MD    Completed BULMARO QUIROGA     Inpatient consult to Hospitalist  Once        Provider:  Bulmaro Quiroga MD    Acknowledged ARON WANG          No new Assessment & Plan notes have been filed under this hospital service since the last note was generated.  Service: Hospital Medicine    Final Active Diagnoses:    Diagnosis Date Noted POA    PRINCIPAL PROBLEM:  Atrial flutter [I48.92] 03/31/2022 Yes      Problems Resolved During this Admission:       Discharged Condition: good    Disposition: Home or Self Care    Follow Up:   Follow-up Information     Jacob Mluler MD. Schedule an appointment as soon as possible for a visit in 4 week(s).    Specialties: Interventional Cardiology, Cardiology  Contact information:  [address] [place] [address]  [address]  [facility] [address]  [phone]                       Patient Instructions:      Ambulatory referral/consult to Cardiac Electrophysiology   Standing Status: Future   Referral Priority: Routine Referral Type: Consultation   Referral Reason: Specialty Services Required   Requested Specialty: Cardiology   Number of Visits Requested: 1     Diet Cardiac     Activity as tolerated       Significant Diagnostic Studies: Labs:   CMP   Recent Labs   Lab 03/30/22  1518 03/31/22  0417    139   K 4.1 4.1    104   CO2 24 23   * 130*   BUN 28* 26*   CREATININE 1.4 1.1   CALCIUM 9.5 9.0   PROT 7.7 6.9   ALBUMIN 4.5 3.9   BILITOT 0.6 0.4   ALKPHOS 61 49*   AST 18 17   ALT 19 16   ANIONGAP 12 12   ESTGFRAFRICA >60.0 >60.0   EGFRNONAA 58.2* >60.0    and CBC   Recent Labs   Lab 03/30/22  1518 03/31/22  0417   WBC 10.20  10.20 5.56   HGB 12.7*  12.7* 11.1*   HCT 41.7  41.7 37.9*     298 248       Pending Diagnostic Studies:     Procedure Component Value Units Date/Time    Drugs of Abuse Screen, Blood [[id number]] Collected: 03/30/22 1616    Order Status: Sent Lab Status: In process Updated: 03/30/22 1630    Specimen: Blood     Echo [[id number]]      Order Status: Sent Lab Status: No result     Hemoglobin A1c [[id number]] Collected: 03/31/22 0417    Order Status: Sent Lab Status: In process Updated: 03/31/22 0534    Specimen: Blood          Medications:  Reconciled Home Medications:      Medication List      START taking these medications    apixaban 5 mg Tab  Commonly known as: ELIQUIS  Take 1 tablet (5 mg total) by mouth 2 (two) times daily.     metoprolol tartrate 25 MG tablet  Commonly known as: LOPRESSOR  Take 0.5 tablets (12.5 mg total) by mouth 2 (two) times daily.        CHANGE how you take these medications    atorvastatin 20 MG tablet  Commonly known as: LIPITOR  Take 1 tablet (20 mg total) by mouth once daily.  What changed: when to take this        CONTINUE taking these medications    HYDROcodone-acetaminophen 5-325 mg per tablet  Commonly known as: NORCO  Take 1 tablet by mouth every 6 (six) hours as needed.     levothyroxine 75 MCG tablet  Commonly known as: SYNTHROID  Take 1 tablet (75 mcg total) by mouth once daily.     lisinopriL 10 MG tablet  Take 1 tablet (10 mg total) by mouth once daily.     metFORMIN 500 MG tablet  Commonly known as: GLUCOPHAGE  Take 1 tablet (500 mg total) by mouth 3 (three) times daily.     nystatin cream  Commonly known as: MYCOSTATIN  Apply topically 2 (two) times daily.     omeprazole 10 MG capsule  Commonly known as: PRILOSEC  Take 10 mg by mouth once daily.     rOPINIRole 0.5 MG tablet  Commonly known as: REQUIP  Take 1 tablet (0.5 mg total) by mouth 2 (two) times a day.     tiZANidine 4 mg Cap  Take by mouth.        STOP taking these medications    atenoloL 25 MG tablet  Commonly known as: TENORMIN            Indwelling Lines/Drains at time of discharge:   Lines/Drains/Airways     None                 Time spent on the discharge of patient: 25 minutes         Manny Hankins MD  Department of Hospital Medicine  [facility]

## 2022-03-31 NOTE — PLAN OF CARE
Problem: Adult Inpatient Plan of Care  Goal: Patient-Specific Goal (Individualized)  Outcome: Ongoing, Progressing  Goal: Optimal Comfort and Wellbeing  Outcome: Ongoing, Progressing     Problem: Diabetes Comorbidity  Goal: Blood Glucose Level Within Targeted Range  Outcome: Ongoing, Progressing

## 2022-03-31 NOTE — PLAN OF CARE
03/31/22 1256   Final Note   Assessment Type Final Discharge Note   Anticipated Discharge Disposition Home   Hospital Resources/Appts/Education Provided Post-Acute resouces added to AVS   Post-Acute Status   Post-Acute Authorization Other   Other Status No Post-Acute Service Needs   Discharge Delays None known at this time

## 2022-03-31 NOTE — PLAN OF CARE
Medicare Outpatient Observation Notice was signed, explained and given to patient/caregiver on 03/31/2022 at 9:30am     addressed any questions or concerns.    Medicare Outpatient Observation Notice will be scanned into patient's medical record

## 2022-03-31 NOTE — HOSPITAL COURSE
Patient presented with palpitations dyspnea and diaphoresis found to have a flutter with RVR.  Patient was started on beta-blockers with improvement in his heart rate.  He converted to sinus rhythm in the 60s overnight.  It sounds like the patient has been experiencing intermittent palpitations the past several months.  He will continue p.o. metoprolol and apixaban upon discharge.  He will follow-up with his cardiologist in 4 weeks and see electrophysiology via outpatient referral to Dr. Morocho at [facility].    Physical Exam  GENERAL:  No apparent distress. Alert and oriented times three  EYES:  PERRLA, EOMI. Conjunctivae intact  ENT:  Posterior pharynx clear  NECK:  Supple with no lymphadenopathy or thyromegaly  LUNGS:  No respiratory distress. Clear to auscultation bilaterally with good air movement  CARDIAC:  RRR without murmur, rub or gallop  ABDOMEN:  Positive bowel sounds. Nontender and nondistended. No organomegaly  EXTREMITIES:  Peripheral pulses are 2+. Hands and feet are warm. Good capillary refill in fingers (< 2 seconds). No clubbing, cyanosis or edema  SKIN:  Skin color, texture and turgor normal. No rashes, ulcerations or nodules  NEUROLOGIC:  CN II-XII intact. Light touch sensation intact. Muscle strength 5/5 in all extremities

## 2022-03-31 NOTE — H&P
[facility] Medicine History & Physical Examination   Patient Name: John Corrales  MRN: [id number]  Patient Class: OP- Observation   Admission Date: 3/30/2022  3:12 PM  Length of Stay: 0  Attending Physician: Bulmaro Nielsen MD  Primary Care Provider: Ralf Saunders MD  Face-to-Face encounter date: 03/30/2022  Code Status: Full code  Chief Complaint: Palpitations (Sent from Dr. Saunders's office for palpitations )        HISTORY OF PRESENT ILLNESS:   John Corrales is a 51 y.o. White male with known history of essential hypertension controlled with atenolol who presented with a primary complaint of palpitations. Symptoms onset x 1 day. He went to his primary care for routine check up where he was diagnosed with atrial flutter. He was sent to ED for further evaluation. In the ED, he was back in NSR. Patient denies change in vision, hearing, headache, fever, cough, congestion, runny nose, chest pain, shortness of breath, abdominal pain, diarrhea, constipation, vomiting, dysuria, hematuria, joint pain.    REVIEW OF SYSTEMS:   10 Point Review of System was performed and was found to be negative except for that mentioned already in the HPI above.     PAST MEDICAL HISTORY:     Past Medical History:   Diagnosis Date    Diabetes mellitus, type 2     General anesthetics causing adverse effect in therapeutic use 2007    During general anesthesia for back surgery , experienced severe pain, heard people talking and felt like his heart was about to explode.    GERD (gastroesophageal reflux disease)     Hyperlipidemia     Hypothyroidism     Nasal septal deviation        PAST SURGICAL HISTORY:     Past Surgical History:   Procedure Laterality Date    BACK SURGERY  01/01/2007    artificial disc L4-5    CYSTOSCOPY W/ STONE MANIPULATION  01/01/2002    Kidney stones removed    EPIDURAL STEROID INJECTION  01/31/2022    KNEE ARTHROPLASTY Right 06/15/2020    Procedure: ARTHROPLASTY, KNEE;  Surgeon:  Alok Rowe MD;  Location: [facility];  Service: Orthopedics;  Laterality: Right;  SAMEERA FAYE    KNEE ARTHROSCOPY W/ DEBRIDEMENT  2000 and 2001    Right knee       ALLERGIES:   Demerol [meperidine]    FAMILY HISTORY:     Family History   Problem Relation Age of Onset    Diabetes Father     Heart disease Father     Cancer Father         prostate       SOCIAL HISTORY:     Social History     Tobacco Use    Smoking status: Former Smoker    Smokeless tobacco: Never Used   Substance Use Topics    Alcohol use: No        Social History     Substance and Sexual Activity   Sexual Activity Not on file        HOME MEDICATIONS:     Prior to Admission medications    Medication Sig Start Date End Date Taking? Authorizing Provider   atenoloL (TENORMIN) 25 MG tablet Take 1 tablet (25 mg total) by mouth once daily. 10/12/21 4/10/22 Yes Gavin Sandoval PA-C   atorvastatin (LIPITOR) 20 MG tablet Take 1 tablet (20 mg total) by mouth once daily.  Patient taking differently: Take 20 mg by mouth every evening. 10/12/21  Yes Gavin Sandoval PA-C   levothyroxine (SYNTHROID) 75 MCG tablet Take 1 tablet (75 mcg total) by mouth once daily. 10/12/21 1/10/22 Yes Gavin Sandoval PA-C   lisinopriL 10 MG tablet Take 1 tablet (10 mg total) by mouth once daily. 10/12/21  Yes Gavin Sandoval PA-C   metFORMIN (GLUCOPHAGE) 500 MG tablet Take 1 tablet (500 mg total) by mouth 3 (three) times daily. 10/12/21  Yes Gavin Sandoval PA-C   omeprazole (PRILOSEC) 10 MG capsule Take 10 mg by mouth once daily.   Yes Historical Provider   rOPINIRole (REQUIP) 0.5 MG tablet Take 1 tablet (0.5 mg total) by mouth 2 (two) times a day. 11/30/21  Yes Gavin Sandoval PA-C   HYDROcodone-acetaminophen (NORCO) 5-325 mg per tablet Take 1 tablet by mouth every 6 (six) hours as needed.    Historical Provider   nystatin (MYCOSTATIN) cream Apply topically 2 (two) times daily. 5/20/21   Gavin Sandoval PA-C   tiZANidine 4 mg Cap Take by mouth.     Historical Provider         PHYSICAL EXAM:   /79   Pulse (!) 59   Temp 98.2 °F (36.8 °C) (Oral)   Resp 20   Ht 6' (1.829 m)   Wt 105.2 kg (232 lb)   SpO2 98%   BMI 31.46 kg/m²   Vitals Reviewed  General appearance: non-toxic and not acutely ill-appearing White male in no apparent distress.  Skin: No Rash.   Neuro: Motor and sensory exams grossly intact. Good tone. Power in all 4 extremities 5/5.   HENT: Atraumatic head. Moist mucous membranes of oral cavity.  Eyes: Normal extraocular movements.   Neck: Supple. No evidence of lymphadenopathy. No thyroidomegaly.  Lungs: Clear to auscultation bilaterally. No wheezing present.   Heart: Regular rate and rhythm. S1 and S2 present with no murmurs/gallop/rub. No pedal edema. No JVD present.   Abdomen: Soft, non-distended, non-tender. No rebound tenderness/guarding. No masses or organomegaly. Bowel sounds are normal. Bladder is not palpable.   Extremities: No cyanosis, clubbing.  Psych/mental status: Alert and oriented. Cooperative. Responds appropriately to questions.   EMERGENCY DEPARTMENT LABS AND IMAGING:     Labs Reviewed   CBC W/ AUTO DIFFERENTIAL - Abnormal; Notable for the following components:       Result Value    Hemoglobin 12.7 (*)     MCV 73 (*)     MCH 22.3 (*)     MCHC 30.5 (*)     RDW 15.5 (*)     All other components within normal limits   COMPREHENSIVE METABOLIC PANEL - Abnormal; Notable for the following components:    Glucose 111 (*)     BUN 28 (*)     eGFR if non  58.2 (*)     All other components within normal limits   CBC W/ AUTO DIFFERENTIAL - Abnormal; Notable for the following components:    Hemoglobin 12.7 (*)     MCV 73 (*)     MCH 22.3 (*)     MCHC 30.5 (*)     RDW 15.5 (*)     All other components within normal limits   DRUG SCREEN PANEL, URINE EMERGENCY - Abnormal; Notable for the following components:    Opiate Scrn, Ur Presumptive Positive (*)     THC Presumptive Positive (*)     All other components within normal  limits    Narrative:     Specimen Source->Urine   TSH - Abnormal; Notable for the following components:    TSH 5.930 (*)     All other components within normal limits   URINALYSIS - Abnormal; Notable for the following components:    Protein, UA Trace (*)     All other components within normal limits    Narrative:     Specimen Source->Urine   CK - Abnormal; Notable for the following components:     (*)     All other components within normal limits   B-TYPE NATRIURETIC PEPTIDE   TROPONIN I   PROTIME-INR   APTT   B-TYPE NATRIURETIC PEPTIDE   LIPASE   MAGNESIUM   SARS-COV-2 RNA AMPLIFICATION, QUAL   TSH   T4, FREE   DRUGS OF ABUSE SCREEN, BLOOD       X-Ray Chest AP Portable   Final Result      Negative chest.         Electronically signed by: Maximilian Charles MD   Date:    03/30/2022   Time:    19:24          ASSESSMENT & PLAN:   John Corrales is a 51 y.o. male admitted for    1. New onset Atrial flutter now NSR- admit to Cardio B, metoprolol 12.5mg BID, CHADVASC - 2, Will start PO apixaban 5 mg BID, cardiology consultation, may benefit from ablation.   2. Hypothyroidism -  Elevated TSH, I have increased the dose of Levothyroxine to 100mcg   3. Essential hypertension - Lisinopril and metoprolol  4. DM type II - Insulin SS  5. GERD - PPI   6. Restless leg syndrome - chronic, home med    Diet: diabetic    DVT Prophylaxis: DOAC and Encourage ambulation. OOB as tolerated.     Discharge Planning and Disposition:  Home    Expected LOS: 1-2 days    ________________________________________________________________________________    Face-to-Face encounter date: 03/30/2022  Encounter included review of the medical records, interviewing and examining the patient face-to-face, discussion with family and other health care providers including emergency medicine physician, admission orders, interpreting lab/test results and formulating a plan of care.   Medical Decision Making during this encounter was High Complexity due to  Patient has a condition that poses threat to life and bodily function: new onset atrial fibrillation with hypothyroidism.   ________________________________________________________________________________    INPATIENT LIST OF MEDICATIONS     Current Facility-Administered Medications:     apixaban tablet 5 mg, 5 mg, Oral, BID, Bulmaro Nielsen MD    metoprolol injection 5 mg, 5 mg, Intravenous, ED 1 Time, Maribeth Phillips MD    metoprolol tartrate (LOPRESSOR) split tablet 12.5 mg, 12.5 mg, Oral, BID, Bulmaro Nielsen MD    Current Outpatient Medications:     atenoloL (TENORMIN) 25 MG tablet, Take 1 tablet (25 mg total) by mouth once daily., Disp: 90 tablet, Rfl: 1    atorvastatin (LIPITOR) 20 MG tablet, Take 1 tablet (20 mg total) by mouth once daily. (Patient taking differently: Take 20 mg by mouth every evening.), Disp: 90 tablet, Rfl: 1    levothyroxine (SYNTHROID) 75 MCG tablet, Take 1 tablet (75 mcg total) by mouth once daily., Disp: 90 tablet, Rfl: 1    lisinopriL 10 MG tablet, Take 1 tablet (10 mg total) by mouth once daily., Disp: 90 tablet, Rfl: 1    metFORMIN (GLUCOPHAGE) 500 MG tablet, Take 1 tablet (500 mg total) by mouth 3 (three) times daily., Disp: 270 tablet, Rfl: 1    omeprazole (PRILOSEC) 10 MG capsule, Take 10 mg by mouth once daily., Disp: , Rfl:     rOPINIRole (REQUIP) 0.5 MG tablet, Take 1 tablet (0.5 mg total) by mouth 2 (two) times a day., Disp: 180 tablet, Rfl: 1    HYDROcodone-acetaminophen (NORCO) 5-325 mg per tablet, Take 1 tablet by mouth every 6 (six) hours as needed., Disp: , Rfl:     nystatin (MYCOSTATIN) cream, Apply topically 2 (two) times daily., Disp: 30 g, Rfl: 2    tiZANidine 4 mg Cap, Take by mouth., Disp: , Rfl:       Scheduled Meds:   apixaban  5 mg Oral BID    metoprolol  5 mg Intravenous ED 1 Time    metoprolol tartrate  12.5 mg Oral BID     Continuous Infusions:  PRN Meds:.      Bulmaro Nielsen  [facility] Hospitalist  03/30/2022

## 2022-03-31 NOTE — DISCHARGE INSTRUCTIONS
You were seen for an abnormally fast heart rhythm    You do not need to take atenolol on more.  Please start taking metoprolol instead  You also need to start taking blood thinner    He will follow-up with Dr. Muller in 4 weeks and will need to see a new doctor, Dr. Hinds, at [facility].

## 2022-03-31 NOTE — CONSULTS
Replaced by [facility]  Department of Cardiology  Consult Note      PATIENT NAME: John Corrales  MRN: [id number]  TODAY'S DATE: 03/31/2022  ADMIT DATE: 3/30/2022                          CONSULT REQUESTED BY: Manny Hankins MD    SUBJECTIVE     PRINCIPAL PROBLEM: <principal problem not specified>      REASON FOR CONSULT:  A fib rvr     HPI:  51-year-old male with a past medical history of hypertension, type 2 diabetes on insulin, hypothyroidism, and palpitations who came to the ED from his primary care office due to a flutter in the 150s.  He remained in a flutter once he got to the ED but before they can give him any IV medications he converted back to normal sinus rhythm on his own.  He has been having palpitations for the past several years but it has never been properly worked up as it was thought to be secondary to his thyroid.  He denies any other cardiac history but his dad did have 5 vessel bypass surgery.  Troponin and BNP negative echo with grade 3 diastolic dysfunction otherwise unremarkable.  He is currently asymptomatic with no complaints and is ready to go home.    Per H and P:  John Corrales is a 51 y.o. White male with known history of essential hypertension controlled with atenolol who presented with a primary complaint of palpitations. Symptoms onset x 1 day. He went to his primary care for routine check up where he was diagnosed with atrial flutter. He was sent to ED for further evaluation. In the ED, he was back in NSR. Patient denies change in vision, hearing, headache, fever, cough, congestion, runny nose, chest pain, shortness of breath, abdominal pain, diarrhea, constipation, vomiting, dysuria, hematuria, joint pain.    Review of patient's allergies indicates:   Allergen Reactions    Demerol [meperidine] Itching       Past Medical History:   Diagnosis Date    Diabetes mellitus, type 2     General anesthetics causing adverse effect in therapeutic use 2007    During general  anesthesia for back surgery , experienced severe pain, heard people talking and felt like his heart was about to explode.    GERD (gastroesophageal reflux disease)     Hyperlipidemia     Hypothyroidism     Nasal septal deviation      Past Surgical History:   Procedure Laterality Date    BACK SURGERY  01/01/2007    artificial disc L4-5    CYSTOSCOPY W/ STONE MANIPULATION  01/01/2002    Kidney stones removed    EPIDURAL STEROID INJECTION  01/31/2022    KNEE ARTHROPLASTY Right 06/15/2020    Procedure: ARTHROPLASTY, KNEE;  Surgeon: Alok Rowe MD;  Location: [facility];  Service: Orthopedics;  Laterality: Right;  SAMEERA FAYE    KNEE ARTHROSCOPY W/ DEBRIDEMENT  2000 and 2001    Right knee     Social History     Tobacco Use    Smoking status: Former Smoker    Smokeless tobacco: Never Used   Substance Use Topics    Alcohol use: No    Drug use: No        REVIEW OF SYSTEMS  CONSTITUTIONAL: Negative for chills, fatigue and fever.   EYES: No double vision, No blurred vision  NEURO: No headaches, No dizziness  RESPIRATORY: Negative for cough, shortness of breath and wheezing.    CARDIOVASCULAR: Negative for chest pain. Negative for palpitations and leg swelling.   GI: Negative for abdominal pain, No melena, diarrhea, nausea and vomiting.   : Negative for dysuria and frequency, Negative for hematuria  SKIN: Negative for bruising, Negative for edema or discoloration noted.   ENDOCRINE: Negative for polyphagia, Negative for heat intolerance, Negative for cold intolerance  PSYCHIATRIC: Negative for depression, Negative for anxiety, Negative for memory loss  MUSCULOSKELETAL: Negative for neck pain, Negative for muscle weakness, Negative for back pain     OBJECTIVE     VITAL SIGNS (Most Recent)  Temp: 97.9 °F (36.6 °C) (03/31/22 0757)  Pulse: 60 (03/31/22 0757)  Resp: 18 (03/31/22 0903)  BP: 134/78 (03/31/22 0757)  SpO2: 99 % (03/31/22 0757)    VENTILATION STATUS  Resp: 18 (03/31/22 0903)  SpO2: 99 % (03/31/22 0757)        I & O (Last 24H):    Intake/Output Summary (Last 24 hours) at 3/31/2022 0934  Last data filed at 3/31/2022 0903  Gross per 24 hour   Intake 480 ml   Output 325 ml   Net 155 ml       WEIGHTS  Wt Readings from Last 3 Encounters:   03/30/22 2042 108 kg (238 lb 1.6 oz)   03/30/22 1459 105.2 kg (232 lb)   03/30/22 1400 105.2 kg (232 lb)   01/21/22 0918 107.5 kg (237 lb)   01/13/22 0850 107.5 kg (237 lb)       PHYSICAL EXAM  GENERAL: well built, well nourished, well-developed in no apparent distress alert and oriented.   HEENT: Normocephalic. Pupils normal and conjunctivae normal.  Mucous membranes normal, no cyanosis or icterus, trachea central,no pallor or icterus is noted..   NECK: No JVD. No bruit..   THYROID: Thyroid not enlarged. No nodules present..   CARDIAC: Regular rate and rhythm. S1 is normal.S2 is normal.No gallops, clicks or murmurs noted at this time.  CHEST ANATOMY: normal.   LUNGS: Clear to auscultation. No wheezing or rhonchi..   ABDOMEN: Soft no masses or organomegaly.  No abdomen pulsations or bruits.  Normal bowel sounds. No pulsations and no masses felt, No guarding or rebound.   URINARY: No fitzgerald catheter   EXTREMITIES: No cyanosis, clubbing or edema noted at this time., no calf tenderness bilaterally.   PERIPHERAL VASCULAR SYSTEM: Good palpable distal pulses.   CENTRAL NERVOUS SYSTEM: No focal motor or sensory deficits noted.   SKIN: Skin without lesions, moist, well perfused.   MUSCLE STRENGTH & TONE: No noteable weakness, atrophy or abnormal movement.     HOME MEDICATIONS:  No current facility-administered medications on file prior to encounter.     Current Outpatient Medications on File Prior to Encounter   Medication Sig Dispense Refill    atenoloL (TENORMIN) 25 MG tablet Take 1 tablet (25 mg total) by mouth once daily. 90 tablet 1    atorvastatin (LIPITOR) 20 MG tablet Take 1 tablet (20 mg total) by mouth once daily. (Patient taking differently: Take 20 mg by mouth every evening.) 90  tablet 1    levothyroxine (SYNTHROID) 75 MCG tablet Take 1 tablet (75 mcg total) by mouth once daily. 90 tablet 1    lisinopriL 10 MG tablet Take 1 tablet (10 mg total) by mouth once daily. 90 tablet 1    metFORMIN (GLUCOPHAGE) 500 MG tablet Take 1 tablet (500 mg total) by mouth 3 (three) times daily. 270 tablet 1    omeprazole (PRILOSEC) 10 MG capsule Take 10 mg by mouth once daily.      rOPINIRole (REQUIP) 0.5 MG tablet Take 1 tablet (0.5 mg total) by mouth 2 (two) times a day. 180 tablet 1    HYDROcodone-acetaminophen (NORCO) 5-325 mg per tablet Take 1 tablet by mouth every 6 (six) hours as needed.      nystatin (MYCOSTATIN) cream Apply topically 2 (two) times daily. 30 g 2    tiZANidine 4 mg Cap Take by mouth.         SCHEDULED MEDS:   apixaban  5 mg Oral BID    atorvastatin  20 mg Oral Daily    levothyroxine  100 mcg Oral Before breakfast    lisinopriL  10 mg Oral Daily    metoprolol tartrate  12.5 mg Oral BID    pantoprazole  40 mg Oral Before breakfast    polyethylene glycol  17 g Oral Daily    rOPINIRole  0.5 mg Oral BID       CONTINUOUS INFUSIONS:    PRN MEDS:acetaminophen, acetaminophen, dextrose 50%, dextrose 50%, glucagon (human recombinant), glucose, glucose, HYDROcodone-acetaminophen, melatonin, morphine, naloxone, sodium chloride 0.9%    LABS AND DIAGNOSTICS     CBC LAST 3 DAYS  Recent Labs   Lab 03/30/22  1518 03/31/22  0417   WBC 10.20  10.20 5.56   RBC 5.70  5.70 5.00   HGB 12.7*  12.7* 11.1*   HCT 41.7  41.7 37.9*   MCV 73*  73* 76*   MCH 22.3*  22.3* 22.2*   MCHC 30.5*  30.5* 29.3*   RDW 15.5*  15.5* 15.4*     298 248   MPV 11.7  11.7 12.1   GRAN 65.1  65.1  6.7  6.7 52.8  2.9   LYMPH 22.3  22.3  2.3  2.3 32.0  1.8   MONO 9.5  9.5  1.0  1.0 10.4  0.6   BASO 0.05  0.05 0.03   NRBC 0  0 0       COAGULATION LAST 3 DAYS  Recent Labs   Lab 03/30/22  1518   LABPT 12.6   INR 1.0   APTT 28.0       CHEMISTRY LAST 3 DAYS  Recent Labs   Lab 03/30/22  1517  03/31/22  0417    139   K 4.1 4.1    104   CO2 24 23   ANIONGAP 12 12   BUN 28* 26*   CREATININE 1.4 1.1   * 130*   CALCIUM 9.5 9.0   MG 1.8 1.7   ALBUMIN 4.5 3.9   PROT 7.7 6.9   ALKPHOS 61 49*   ALT 19 16   AST 18 17   BILITOT 0.6 0.4       CARDIAC PROFILE LAST 3 DAYS  Recent Labs   Lab 03/30/22  1518   BNP 34  34   *   TROPONINI <0.030       ENDOCRINE LAST 3 DAYS  Recent Labs   Lab 03/30/22  1518   TSH 5.930*       LAST ARTERIAL BLOOD GAS  ABG  No results for input(s): PH, PO2, PCO2, HCO3, BE in the last 168 hours.    LAST 7 DAYS MICROBIOLOGY   Microbiology Results (last 7 days)     ** No results found for the last 168 hours. **          MOST RECENT IMAGING  Echo  · The left ventricle is normal in size with concentric remodeling and   normal systolic function.  · Grade III left ventricular diastolic dysfunction.  · Normal right ventricular size with normal right ventricular systolic   function.  · Normal central venous pressure (3 mmHg).  · The estimated ejection fraction is 60%.  · Mild left atrial enlargement.  · Mild mitral regurgitation.         ECHOCARDIOGRAM RESULTS (last 5)  Results for orders placed during the hospital encounter of 03/30/22    Echo    Interpretation Summary  · The left ventricle is normal in size with concentric remodeling and normal systolic function.  · Grade III left ventricular diastolic dysfunction.  · Normal right ventricular size with normal right ventricular systolic function.  · Normal central venous pressure (3 mmHg).  · The estimated ejection fraction is 60%.  · Mild left atrial enlargement.  · Mild mitral regurgitation.      CURRENT/PREVIOUS VISIT EKG  Results for orders placed or performed during the hospital encounter of 03/30/22   EKG 12-lead    Collection Time: 03/30/22  5:16 PM    Narrative    Test Reason : R07.9,    Vent. Rate : 066 BPM     Atrial Rate : 066 BPM     P-R Int : 154 ms          QRS Dur : 100 ms      QT Int : 388 ms       P-R-T  Axes : 037 076 021 degrees     QTc Int : 406 ms    Sinus rhythm with Premature supraventricular complexes  Incomplete right bundle branch block  Borderline Abnormal ECG  When compared with ECG of 30-MAR-2022 15:11,  Sinus rhythm has replaced Atrial flutter  Vent. rate has decreased BY  87 BPM  ST elevation has replaced ST depression in Inferior leads  ST no longer depressed in Anterior leads  T wave inversion less evident in Inferior leads  Nonspecific T wave abnormality no longer evident in Anterior leads    Referred By: [email or web address]   SELF           Confirmed By:            ASSESSMENT/PLAN:     There are no active hospital problems to display for this patient.      ASSESSMENT & PLAN:   1. A flutter now normal sinus rhythm  2. Essential hypertension  3. Hypothyroidism  4. diastolic dysfunction  5. Type 2 diabetes on insulin      RECOMMENDATIONS:  Agree with Lopressor and Eliquis  Outpatient referral to see Dr. Hinds  Can follow-up in Dr. Muller's clinic in 4 weeks time  Diastolic dysfunction is asymptomatic at this time we will follow outpatient      Emelyn Barnes PA-C  [facility]  Department of Cardiology  Date of Service: 03/31/2022        I have personally interviewed and examined the patient, I have reviewed the Physician Assistant's history and physical, assessment, and plan. I agree with the findings and plan.      ECHO Muller M.D.  [facility]  Department of Cardiology  Date of Service: 03/31/2022  9:34 AM

## 2022-04-04 ENCOUNTER — CLINICAL SUPPORT (OUTPATIENT)
Dept: FAMILY MEDICINE | Facility: CLINIC | Age: 52
End: 2022-04-04
Payer: MEDICARE

## 2022-04-04 ENCOUNTER — TELEPHONE (OUTPATIENT)
Dept: FAMILY MEDICINE | Facility: CLINIC | Age: 52
End: 2022-04-04
Payer: MEDICARE

## 2022-04-04 DIAGNOSIS — M54.50 ACUTE MIDLINE LOW BACK PAIN WITHOUT SCIATICA: Primary | ICD-10-CM

## 2022-04-04 PROCEDURE — 96372 PR INJECTION,THERAP/PROPH/DIAG2ST, IM OR SUBCUT: ICD-10-PCS | Mod: S$GLB,,, | Performed by: NURSE PRACTITIONER

## 2022-04-04 PROCEDURE — 96372 THER/PROPH/DIAG INJ SC/IM: CPT | Mod: S$GLB,,, | Performed by: NURSE PRACTITIONER

## 2022-04-04 RX ORDER — KETOROLAC TROMETHAMINE 30 MG/ML
60 INJECTION, SOLUTION INTRAMUSCULAR; INTRAVENOUS ONCE
Status: COMPLETED | OUTPATIENT
Start: 2022-04-04 | End: 2022-04-04

## 2022-04-04 RX ADMIN — KETOROLAC TROMETHAMINE 60 MG: 30 INJECTION, SOLUTION INTRAMUSCULAR; INTRAVENOUS at 02:04

## 2022-04-04 NOTE — TELEPHONE ENCOUNTER
It looks like he is already scheduled with Elvin for next week. I'm fine if he wants to come in for a NV for a Toradol injection

## 2022-04-04 NOTE — TELEPHONE ENCOUNTER
----- Message from Moira Foster sent at 4/4/2022 10:49 AM CDT -----  Pt came in recently and was sent to the hospital but the issue of his back pain was not addressed at that time.  He is still needing to be seen and hopefully get a shot but was not sure since his he was having heart issues.  # [phone]

## 2022-04-12 ENCOUNTER — OFFICE VISIT (OUTPATIENT)
Dept: FAMILY MEDICINE | Facility: CLINIC | Age: 52
End: 2022-04-12
Payer: MEDICARE

## 2022-04-12 VITALS
WEIGHT: 236 LBS | DIASTOLIC BLOOD PRESSURE: 76 MMHG | HEART RATE: 64 BPM | BODY MASS INDEX: 31.97 KG/M2 | SYSTOLIC BLOOD PRESSURE: 124 MMHG | OXYGEN SATURATION: 100 % | HEIGHT: 72 IN

## 2022-04-12 DIAGNOSIS — I48.92 ATRIAL FLUTTER, UNSPECIFIED TYPE: ICD-10-CM

## 2022-04-12 DIAGNOSIS — S39.012A STRAIN OF LUMBAR REGION, INITIAL ENCOUNTER: Primary | ICD-10-CM

## 2022-04-12 PROCEDURE — 3078F DIAST BP <80 MM HG: CPT | Mod: S$GLB,,, | Performed by: PHYSICIAN ASSISTANT

## 2022-04-12 PROCEDURE — 3044F HG A1C LEVEL LT 7.0%: CPT | Mod: S$GLB,,, | Performed by: PHYSICIAN ASSISTANT

## 2022-04-12 PROCEDURE — 4010F PR ACE/ARB THEARPY RXD/TAKEN: ICD-10-PCS | Mod: S$GLB,,, | Performed by: PHYSICIAN ASSISTANT

## 2022-04-12 PROCEDURE — 3008F BODY MASS INDEX DOCD: CPT | Mod: S$GLB,,, | Performed by: PHYSICIAN ASSISTANT

## 2022-04-12 PROCEDURE — 99215 OFFICE O/P EST HI 40 MIN: CPT | Mod: S$GLB,,, | Performed by: PHYSICIAN ASSISTANT

## 2022-04-12 PROCEDURE — 3074F PR MOST RECENT SYSTOLIC BLOOD PRESSURE < 130 MM HG: ICD-10-PCS | Mod: S$GLB,,, | Performed by: PHYSICIAN ASSISTANT

## 2022-04-12 PROCEDURE — 4010F ACE/ARB THERAPY RXD/TAKEN: CPT | Mod: S$GLB,,, | Performed by: PHYSICIAN ASSISTANT

## 2022-04-12 PROCEDURE — 99215 PR OFFICE/OUTPT VISIT, EST, LEVL V, 40-54 MIN: ICD-10-PCS | Mod: S$GLB,,, | Performed by: PHYSICIAN ASSISTANT

## 2022-04-12 PROCEDURE — 3044F PR MOST RECENT HEMOGLOBIN A1C LEVEL <7.0%: ICD-10-PCS | Mod: S$GLB,,, | Performed by: PHYSICIAN ASSISTANT

## 2022-04-12 PROCEDURE — 3008F PR BODY MASS INDEX (BMI) DOCUMENTED: ICD-10-PCS | Mod: S$GLB,,, | Performed by: PHYSICIAN ASSISTANT

## 2022-04-12 PROCEDURE — 3078F PR MOST RECENT DIASTOLIC BLOOD PRESSURE < 80 MM HG: ICD-10-PCS | Mod: S$GLB,,, | Performed by: PHYSICIAN ASSISTANT

## 2022-04-12 PROCEDURE — 3074F SYST BP LT 130 MM HG: CPT | Mod: S$GLB,,, | Performed by: PHYSICIAN ASSISTANT

## 2022-04-12 RX ORDER — KETOROLAC TROMETHAMINE 30 MG/ML
60 INJECTION, SOLUTION INTRAMUSCULAR; INTRAVENOUS
Status: DISCONTINUED | OUTPATIENT
Start: 2022-04-12 | End: 2022-04-12

## 2022-04-12 RX ORDER — METOPROLOL TARTRATE 25 MG/1
12.5 TABLET, FILM COATED ORAL 2 TIMES DAILY
Qty: 90 TABLET | Refills: 3 | Status: SHIPPED | OUTPATIENT
Start: 2022-04-12 | End: 2022-07-11 | Stop reason: SDUPTHER

## 2022-04-12 RX ORDER — METHOCARBAMOL 750 MG/1
750 TABLET, FILM COATED ORAL 4 TIMES DAILY
Qty: 40 TABLET | Refills: 0 | Status: SHIPPED | OUTPATIENT
Start: 2022-04-12 | End: 2022-04-22

## 2022-04-12 RX ORDER — METHOCARBAMOL 750 MG/1
750 TABLET, FILM COATED ORAL 2 TIMES DAILY
COMMUNITY
End: 2022-07-11

## 2022-04-12 NOTE — PROGRESS NOTES
SUBJECTIVE:    Patient ID: John Corrales is a 51 y.o. male.    Chief Complaint: Annual Exam (Annual wellness exam//no med bottles//tc)    This is a 51-year-old male who presents today for annual wellness exam.  Recently completed annual labs.  Was seen in clinic last week and sent by 1 of my colleagues to the ER after heart rate in the 160s.  Ultimately admitted for a flutter.  The discharge summary is pasted below:    Hospital Course:   Patient presented with palpitations dyspnea and diaphoresis found to have a flutter with RVR.  Patient was started on beta-blockers with improvement in his heart rate.  He converted to sinus rhythm in the 60s overnight.  It sounds like the patient has been experiencing intermittent palpitations the past several months.  He will continue p.o. metoprolol and apixaban upon discharge.  He will follow-up with his cardiologist in 4 weeks and see electrophysiology via outpatient referral to Dr. Morocho at [facility].    Today he reports that the sxs have gotten better but he has felt palpitations at times. He is aware that he has got to get the apt set up with cardiology. Has been maintaining the eliquis and metoprolol. In NSR now. Main concern today is actually in regard to the back. He was lifting and straining and the sxs started abruptly a few weeks ago. L4-S1 fusion but known disc disease above the level.       Admission on 03/30/2022, Discharged on 03/31/2022   Component Date Value Ref Range Status    WBC 03/30/2022 10.20  3.90 - 12.70 K/uL Final    RBC 03/30/2022 5.70  4.60 - 6.20 M/uL Final    Hemoglobin 03/30/2022 12.7 (A) 14.0 - 18.0 g/dL Final    Hematocrit 03/30/2022 41.7  40.0 - 54.0 % Final    MCV 03/30/2022 73 (A) 82 - 98 fL Final    MCH 03/30/2022 22.3 (A) 27.0 - 31.0 pg Final    MCHC 03/30/2022 30.5 (A) 32.0 - 36.0 g/dL Final    RDW 03/30/2022 15.5 (A) 11.5 - 14.5 % Final    Platelets 03/30/2022 298  150 - 450 K/uL Final    MPV 03/30/2022 11.7  9.2 -  12.9 fL Final    Immature Granulocytes 03/30/2022 0.3  0.0 - 0.5 % Final    Gran # (ANC) 03/30/2022 6.7  1.8 - 7.7 K/uL Final    Immature Grans (Abs) 03/30/2022 0.03  0.00 - 0.04 K/uL Final    Lymph # 03/30/2022 2.3  1.0 - 4.8 K/uL Final    Mono # 03/30/2022 1.0  0.3 - 1.0 K/uL Final    Eos # 03/30/2022 0.2  0.0 - 0.5 K/uL Final    Baso # 03/30/2022 0.05  0.00 - 0.20 K/uL Final    nRBC 03/30/2022 0  0 /100 WBC Final    Gran % 03/30/2022 65.1  38.0 - 73.0 % Final    Lymph % 03/30/2022 22.3  18.0 - 48.0 % Final    Mono % 03/30/2022 9.5  4.0 - 15.0 % Final    Eosinophil % 03/30/2022 2.3  0.0 - 8.0 % Final    Basophil % 03/30/2022 0.5  0.0 - 1.9 % Final    Differential Method 03/30/2022 Automated   Final    Sodium 03/30/2022 136  136 - 145 mmol/L Final    Potassium 03/30/2022 4.1  3.5 - 5.1 mmol/L Final    Chloride 03/30/2022 100  95 - 110 mmol/L Final    CO2 03/30/2022 24  23 - 29 mmol/L Final    Glucose 03/30/2022 111 (A) 70 - 110 mg/dL Final    BUN 03/30/2022 28 (A) 6 - 20 mg/dL Final    Creatinine 03/30/2022 1.4  0.5 - 1.4 mg/dL Final    Calcium 03/30/2022 9.5  8.7 - 10.5 mg/dL Final    Total Protein 03/30/2022 7.7  6.0 - 8.4 g/dL Final    Albumin 03/30/2022 4.5  3.5 - 5.2 g/dL Final    Total Bilirubin 03/30/2022 0.6  0.1 - 1.0 mg/dL Final    Alkaline Phosphatase 03/30/2022 61  55 - 135 U/L Final    AST 03/30/2022 18  10 - 40 U/L Final    ALT 03/30/2022 19  10 - 44 U/L Final    Anion Gap 03/30/2022 12  8 - 16 mmol/L Final    eGFR if African American 03/30/2022 >60.0  >60 mL/min/1.73 m^2 Final    eGFR if non  03/30/2022 58.2 (A) >60 mL/min/1.73 m^2 Final    BNP 03/30/2022 34  0 - 99 pg/mL Final    Troponin I 03/30/2022 <0.030  <=0.040 ng/mL Final    PT 03/30/2022 12.6  11.4 - 13.7 sec Final    INR 03/30/2022 1.0   Final    aPTT 03/30/2022 28.0  23.3 - 35.1 sec Final    BNP 03/30/2022 34  0 - 99 pg/mL Final    WBC 03/30/2022 10.20  3.90 - 12.70 K/uL Final    RBC  03/30/2022 5.70  4.60 - 6.20 M/uL Final    Hemoglobin 03/30/2022 12.7 (A) 14.0 - 18.0 g/dL Final    Hematocrit 03/30/2022 41.7  40.0 - 54.0 % Final    MCV 03/30/2022 73 (A) 82 - 98 fL Final    MCH 03/30/2022 22.3 (A) 27.0 - 31.0 pg Final    MCHC 03/30/2022 30.5 (A) 32.0 - 36.0 g/dL Final    RDW 03/30/2022 15.5 (A) 11.5 - 14.5 % Final    Platelets 03/30/2022 298  150 - 450 K/uL Final    MPV 03/30/2022 11.7  9.2 - 12.9 fL Final    Immature Granulocytes 03/30/2022 0.3  0.0 - 0.5 % Final    Gran # (ANC) 03/30/2022 6.7  1.8 - 7.7 K/uL Final    Immature Grans (Abs) 03/30/2022 0.03  0.00 - 0.04 K/uL Final    Lymph # 03/30/2022 2.3  1.0 - 4.8 K/uL Final    Mono # 03/30/2022 1.0  0.3 - 1.0 K/uL Final    Eos # 03/30/2022 0.2  0.0 - 0.5 K/uL Final    Baso # 03/30/2022 0.05  0.00 - 0.20 K/uL Final    nRBC 03/30/2022 0  0 /100 WBC Final    Gran % 03/30/2022 65.1  38.0 - 73.0 % Final    Lymph % 03/30/2022 22.3  18.0 - 48.0 % Final    Mono % 03/30/2022 9.5  4.0 - 15.0 % Final    Eosinophil % 03/30/2022 2.3  0.0 - 8.0 % Final    Basophil % 03/30/2022 0.5  0.0 - 1.9 % Final    Differential Method 03/30/2022 Automated   Final    Benzodiazepines 03/30/2022 Negative  Negative Final    Cocaine (Metab.) 03/30/2022 Negative  Negative Final    Opiate Scrn, Ur 03/30/2022 Presumptive Positive (A) Negative Final    Barbiturate Screen, Ur 03/30/2022 Negative  Negative Final    Amphetamine Screen, Ur 03/30/2022 Negative  Negative Final    THC 03/30/2022 Presumptive Positive (A) Negative Final    Phencyclidine 03/30/2022 Negative  Negative Final    Creatinine, Urine 03/30/2022 210.0  23.0 - 375.0 mg/dL Final    Toxicology Information 03/30/2022 SEE COMMENT   Final    Lipase 03/30/2022 43  4 - 60 U/L Final    Magnesium 03/30/2022 1.8  1.6 - 2.6 mg/dL Final    TSH 03/30/2022 5.930 (A) 0.340 - 5.600 uIU/mL Final    Specimen UA 03/30/2022 Urine, Clean CatchUrine, Clean Catch   Final    Color, UA 03/30/2022 Yellow   Yellow, Straw, Bibi Final    Appearance, UA 03/30/2022 Clear  Clear Final    pH, UA 03/30/2022 6.0  5.0 - 8.0 Final    Specific Prentice, UA 03/30/2022 1.030  1.005 - 1.030 Final    Protein, UA 03/30/2022 Trace (A) Negative Final    Glucose, UA 03/30/2022 Negative  Negative Final    Ketones, UA 03/30/2022 Negative  Negative Final    Bilirubin (UA) 03/30/2022 Negative  Negative Final    Occult Blood UA 03/30/2022 Negative  Negative Final    Nitrite, UA 03/30/2022 Negative  Negative Final    Urobilinogen, UA 03/30/2022 Negative  Negative EU/dL Final    Leukocytes, UA 03/30/2022 Negative  Negative Final    CPK 03/30/2022 272 (A) 20 - 200 U/L Final    SARS-CoV-2 RNA, Amplification, Qual 03/30/2022 Negative  Negative Final    Free T4 03/30/2022 0.90  0.71 - 1.51 ng/dL Final    BSA 03/31/2022 2.34  m2 Final    TDI SEPTAL 03/31/2022 0.08  m/s Final    LV LATERAL E/E' RATIO 03/31/2022 8.70  m/s Final    LV SEPTAL E/E' RATIO 03/31/2022 10.88  m/s Final    Right Atrial Pressure (from IVC) 03/31/2022 3  mmHg Final    EF 03/31/2022 60  % Final    AORTIC VALVE CUSP SEPERATION 03/31/2022 2.40  cm Final    TDI LATERAL 03/31/2022 0.10  m/s Final    PV PEAK VELOCITY 03/31/2022 83.11  cm/s Final    LVIDd 03/31/2022 4.82  3.5 - 6.0 cm Final    IVS 03/31/2022 1.10  0.6 - 1.1 cm Final    Posterior Wall 03/31/2022 1.09  0.6 - 1.1 cm Final    Ao root annulus 03/31/2022 3.63  cm Final    LVIDs 03/31/2022 3.28  2.1 - 4.0 cm Final    FS 03/31/2022 32  28 - 44 % Final    LV mass 03/31/2022 194.03  g Final    LA size 03/31/2022 4.20  cm Final    RVDD 03/31/2022 270.00  cm Final    Left Ventricle Relative Wall Thick* 03/31/2022 0.45  cm Final    AV mean gradient 03/31/2022 3  mmHg Final    AV valve area 03/31/2022 3.51  cm2 Final    AV Velocity Ratio 03/31/2022 85.32   Final    AV index (prosthetic) 03/31/2022 0.92   Final    E/A ratio 03/31/2022 1.85   Final    Mean e' 03/31/2022 0.09  m/s Final    E  wave deceleration time 03/31/2022 211.34  msec Final    LVOT diameter 03/31/2022 2.21  cm Final    LVOT area 03/31/2022 3.8  cm2 Final    LVOT peak delio 03/31/2022 90.44  m/s Final    LVOT peak VTI 03/31/2022 20.70  cm Final    Ao peak delio 03/31/2022 1.06  m/s Final    Ao VTI 03/31/2022 22.61  cm Final    LVOT stroke volume 03/31/2022 79.36  cm3 Final    AV peak gradient 03/31/2022 4  mmHg Final    E/E' ratio 03/31/2022 9.67  m/s Final    MV Peak E Delio 03/31/2022 0.87  m/s Final    MV Peak A Delio 03/31/2022 0.47  m/s Final    LV Mass Index 03/31/2022 85  g/m2 Final    Hemoglobin A1C 03/31/2022 6.4 (A) 4.5 - 6.2 % Final    Estimated Avg Glucose 03/31/2022 137 (A) 68 - 131 mg/dL Final    POC Glucose 03/30/2022 92  70 - 110 Final    Cholesterol 03/31/2022 148  120 - 199 mg/dL Final    Triglycerides 03/31/2022 64  30 - 150 mg/dL Final    HDL 03/31/2022 50  40 - 75 mg/dL Final    LDL Cholesterol 03/31/2022 85.2  63.0 - 159.0 mg/dL Final    HDL/Cholesterol Ratio 03/31/2022 33.8  20.0 - 50.0 % Final    Total Cholesterol/HDL Ratio 03/31/2022 3.0  2.0 - 5.0 Final    Non-HDL Cholesterol 03/31/2022 98  mg/dL Final    Sodium 03/31/2022 139  136 - 145 mmol/L Final    Potassium 03/31/2022 4.1  3.5 - 5.1 mmol/L Final    Chloride 03/31/2022 104  95 - 110 mmol/L Final    CO2 03/31/2022 23  23 - 29 mmol/L Final    Glucose 03/31/2022 130 (A) 70 - 110 mg/dL Final    BUN 03/31/2022 26 (A) 6 - 20 mg/dL Final    Creatinine 03/31/2022 1.1  0.5 - 1.4 mg/dL Final    Calcium 03/31/2022 9.0  8.7 - 10.5 mg/dL Final    Total Protein 03/31/2022 6.9  6.0 - 8.4 g/dL Final    Albumin 03/31/2022 3.9  3.5 - 5.2 g/dL Final    Total Bilirubin 03/31/2022 0.4  0.1 - 1.0 mg/dL Final    Alkaline Phosphatase 03/31/2022 49 (A) 55 - 135 U/L Final    AST 03/31/2022 17  10 - 40 U/L Final    ALT 03/31/2022 16  10 - 44 U/L Final    Anion Gap 03/31/2022 12  8 - 16 mmol/L Final    eGFR if African American 03/31/2022 >60.0  >60  mL/min/1.73 m^2 Final    eGFR if non African American 03/31/2022 >60.0  >60 mL/min/1.73 m^2 Final    Magnesium 03/31/2022 1.7  1.6 - 2.6 mg/dL Final    WBC 03/31/2022 5.56  3.90 - 12.70 K/uL Final    RBC 03/31/2022 5.00  4.60 - 6.20 M/uL Final    Hemoglobin 03/31/2022 11.1 (A) 14.0 - 18.0 g/dL Final    Hematocrit 03/31/2022 37.9 (A) 40.0 - 54.0 % Final    MCV 03/31/2022 76 (A) 82 - 98 fL Final    MCH 03/31/2022 22.2 (A) 27.0 - 31.0 pg Final    MCHC 03/31/2022 29.3 (A) 32.0 - 36.0 g/dL Final    RDW 03/31/2022 15.4 (A) 11.5 - 14.5 % Final    Platelets 03/31/2022 248  150 - 450 K/uL Final    MPV 03/31/2022 12.1  9.2 - 12.9 fL Final    Immature Granulocytes 03/31/2022 0.2  0.0 - 0.5 % Final    Gran # (ANC) 03/31/2022 2.9  1.8 - 7.7 K/uL Final    Immature Grans (Abs) 03/31/2022 0.01  0.00 - 0.04 K/uL Final    Lymph # 03/31/2022 1.8  1.0 - 4.8 K/uL Final    Mono # 03/31/2022 0.6  0.3 - 1.0 K/uL Final    Eos # 03/31/2022 0.2  0.0 - 0.5 K/uL Final    Baso # 03/31/2022 0.03  0.00 - 0.20 K/uL Final    nRBC 03/31/2022 0  0 /100 WBC Final    Gran % 03/31/2022 52.8  38.0 - 73.0 % Final    Lymph % 03/31/2022 32.0  18.0 - 48.0 % Final    Mono % 03/31/2022 10.4  4.0 - 15.0 % Final    Eosinophil % 03/31/2022 4.1  0.0 - 8.0 % Final    Basophil % 03/31/2022 0.5  0.0 - 1.9 % Final    Differential Method 03/31/2022 Automated   Final    POC Glucose 03/31/2022 130 (A) 70 - 110 Final    POC Glucose 03/31/2022 118 (A) 70 - 110 Final    POC Glucose 03/31/2022 109  70 - 110 Final   Admission on 01/31/2022, Discharged on 01/31/2022   Component Date Value Ref Range Status    POCT Glucose 01/31/2022 112 (A) 70 - 110 mg/dL Final   Lab Visit on 01/18/2022   Component Date Value Ref Range Status    SARS-CoV2 (COVID-19) Qualitative P* 01/18/2022 Detected (A) Not Detected Final    SARS-COV-2- Cycle Number 01/18/2022 18   Final       Past Medical History:   Diagnosis Date    Diabetes mellitus, type 2     General  anesthetics causing adverse effect in therapeutic use 2007    During general anesthesia for back surgery , experienced severe pain, heard people talking and felt like his heart was about to explode.    GERD (gastroesophageal reflux disease)     Hyperlipidemia     Hypothyroidism     Nasal septal deviation      Past Surgical History:   Procedure Laterality Date    BACK SURGERY  01/01/2007    artificial disc L4-5    CYSTOSCOPY W/ STONE MANIPULATION  01/01/2002    Kidney stones removed    EPIDURAL STEROID INJECTION  01/31/2022    KNEE ARTHROPLASTY Right 06/15/2020    Procedure: ARTHROPLASTY, KNEE;  Surgeon: Alok Rowe MD;  Location: [facility];  Service: Orthopedics;  Laterality: Right;  SAMEERA FAYE    KNEE ARTHROSCOPY W/ DEBRIDEMENT  2000 and 2001    Right knee     Family History   Problem Relation Age of Onset    Diabetes Father     Heart disease Father     Cancer Father         prostate       Marital Status:   Alcohol History:  reports no history of alcohol use.  Tobacco History:  reports that he has quit smoking. He has never used smokeless tobacco.  Drug History:  reports no history of drug use.    Review of patient's allergies indicates:   Allergen Reactions    Demerol [meperidine] Itching       Current Outpatient Medications:     apixaban (ELIQUIS) 5 mg Tab, Take 1 tablet (5 mg total) by mouth 2 (two) times daily., Disp: 60 tablet, Rfl: 1    atorvastatin (LIPITOR) 20 MG tablet, Take 1 tablet (20 mg total) by mouth once daily. (Patient taking differently: Take 20 mg by mouth every evening.), Disp: 90 tablet, Rfl: 1    HYDROcodone-acetaminophen (NORCO) 5-325 mg per tablet, Take 1 tablet by mouth every 6 (six) hours as needed., Disp: , Rfl:     levothyroxine (SYNTHROID) 75 MCG tablet, Take 1 tablet (75 mcg total) by mouth once daily., Disp: 90 tablet, Rfl: 1    lisinopriL 10 MG tablet, Take 1 tablet (10 mg total) by mouth once daily., Disp: 90 tablet, Rfl: 1    metFORMIN (GLUCOPHAGE) 500  MG tablet, Take 1 tablet (500 mg total) by mouth 3 (three) times daily., Disp: 270 tablet, Rfl: 1    methocarbamoL (ROBAXIN) 750 MG Tab, Take 750 mg by mouth 2 (two) times daily., Disp: , Rfl:     methocarbamoL (ROBAXIN) 750 MG Tab, Take 1 tablet (750 mg total) by mouth 4 (four) times daily. for 10 days, Disp: 40 tablet, Rfl: 0    metoprolol tartrate (LOPRESSOR) 25 MG tablet, Take 0.5 tablets (12.5 mg total) by mouth 2 (two) times daily., Disp: 90 tablet, Rfl: 3    nystatin (MYCOSTATIN) cream, Apply topically 2 (two) times daily., Disp: 30 g, Rfl: 2    omeprazole (PRILOSEC) 10 MG capsule, Take 10 mg by mouth once daily., Disp: , Rfl:     rOPINIRole (REQUIP) 0.5 MG tablet, Take 1 tablet (0.5 mg total) by mouth 2 (two) times a day., Disp: 180 tablet, Rfl: 1    tiZANidine 4 mg Cap, Take by mouth., Disp: , Rfl:     Current Facility-Administered Medications:     ketorolac injection 60 mg, 60 mg, Intramuscular, 1 time in Clinic/HOD, SILVIA GarciaC    Review of Systems   Constitutional: Negative for activity change, fatigue, fever and unexpected weight change.   HENT: Negative for congestion.    Respiratory: Negative for apnea, cough, chest tightness and shortness of breath.    Cardiovascular: Negative for chest pain and palpitations.   Gastrointestinal: Negative for abdominal distention and abdominal pain.   Genitourinary: Negative for difficulty urinating and dysuria.   Musculoskeletal: Positive for arthralgias, back pain and myalgias.   Neurological: Negative for dizziness and weakness.          Objective:      Vitals:    04/12/22 0917   BP: 124/76   Pulse: 64   SpO2: 100%   Weight: 107 kg (236 lb)   Height: 6' (1.829 m)     Physical Exam  Constitutional:       General: He is not in acute distress.     Appearance: He is well-developed.   HENT:      Head: Normocephalic and atraumatic.   Eyes:      Pupils: Pupils are equal, round, and reactive to light.   Neck:      Thyroid: No thyromegaly.    Cardiovascular:      Rate and Rhythm: Normal rate and regular rhythm.      Heart sounds: Normal heart sounds.   Pulmonary:      Effort: Pulmonary effort is normal.      Breath sounds: Normal breath sounds.   Abdominal:      General: Bowel sounds are normal. There is no distension.      Palpations: Abdomen is soft.      Tenderness: There is no abdominal tenderness.   Musculoskeletal:      Cervical back: Normal range of motion and neck supple.      Lumbar back: Spasms and tenderness present. Decreased range of motion. Negative right straight leg raise test and negative left straight leg raise test.   Skin:     General: Skin is warm and dry.      Findings: No erythema or rash.   Neurological:      Mental Status: He is alert and oriented to person, place, and time.      Cranial Nerves: No cranial nerve deficit.           Assessment:       1. Strain of lumbar region, initial encounter    2. Atrial flutter, unspecified type         Plan:       Strain of lumbar region, initial encounter  Comments:  toradol today. robaxin to use for muscle relaxant effect. Will consider further eval with Dr. Herrera if pain persists. MRI?  Orders:  -     ketorolac injection 60 mg  -     methocarbamoL (ROBAXIN) 750 MG Tab; Take 1 tablet (750 mg total) by mouth 4 (four) times daily. for 10 days  Dispense: 40 tablet; Refill: 0    Atrial flutter, unspecified type  Comments:  worrisome. refills of meds now. In NSR today but imperative that he schedule with EP cardiology to discuss further management.  Orders:  -     apixaban (ELIQUIS) 5 mg Tab; Take 1 tablet (5 mg total) by mouth 2 (two) times daily.  Dispense: 60 tablet; Refill: 1  -     metoprolol tartrate (LOPRESSOR) 25 MG tablet; Take 0.5 tablets (12.5 mg total) by mouth 2 (two) times daily.  Dispense: 90 tablet; Refill: 3      Follow up in about 3 months (around 7/12/2022) for checkup.        4/12/2022 Gavin Sandoval PA-C

## 2022-04-19 LAB
11OH-THC SPEC-MCNC: 6.9 NG/ML
AMPHETAMINES SERPL QL SCN: NEGATIVE NG/ML
BARBITURATES SERPL QL SCN: NEGATIVE UG/ML
BENZODIAZ SERPL QL SCN: NEGATIVE NG/ML
BENZODIAZ SERPL QL SCN: NEGATIVE NG/ML
CANNABIDIOL SERPLBLD CFM-MCNC: NEGATIVE NG/ML
CANNABINOIDS SERPL QL SCN: ABNORMAL NG/ML
CANNABINOIDS SPEC QL CFM: POSITIVE
CANNABINOL SERPLBLD CFM-MCNC: NEGATIVE NG/ML
CARBOXYTHC SPEC-MCNC: NORMAL NG/ML
METHADONE SERPL QL SCN: NEGATIVE NG/ML
OPIATES SERPL QL SCN: NEGATIVE NG/ML
OXYCODONE+OXYMORPHONE SERPLBLD QL SCN: NEGATIVE NG/ML
PCP SERPL QL SCN: NEGATIVE NG/ML
PROPOXYPH SERPL QL SCN: NEGATIVE NG/ML
THC SERPLBLD CFM-MCNC: 14.8 NG/ML

## 2022-04-20 ENCOUNTER — TELEPHONE (OUTPATIENT)
Dept: CARDIOLOGY | Facility: CLINIC | Age: 52
End: 2022-04-20
Payer: MEDICARE

## 2022-04-20 NOTE — TELEPHONE ENCOUNTER
----- Message from Veronica Morris MA sent at 4/20/2022  9:56 AM CDT -----  Contact: DAVIS LUCIO [[id number]]  Type: Needs Medical Advice    Who Called:DAVIS LUCIO [[id number]]  Best Call Back Number: [phone]  Inquiry/Question: Would you kindly call DAVIS LUCIO [[id number]]  regarding new pt appt for hospital follow up  Thank you~

## 2022-06-08 ENCOUNTER — OFFICE VISIT (OUTPATIENT)
Dept: CARDIOLOGY | Facility: CLINIC | Age: 52
End: 2022-06-08
Payer: MEDICARE

## 2022-06-08 VITALS
WEIGHT: 235.88 LBS | BODY MASS INDEX: 31.95 KG/M2 | OXYGEN SATURATION: 99 % | HEART RATE: 88 BPM | SYSTOLIC BLOOD PRESSURE: 136 MMHG | DIASTOLIC BLOOD PRESSURE: 80 MMHG | HEIGHT: 72 IN

## 2022-06-08 DIAGNOSIS — I48.92 ATRIAL FLUTTER, UNSPECIFIED TYPE: Primary | ICD-10-CM

## 2022-06-08 DIAGNOSIS — I47.10 SVT (SUPRAVENTRICULAR TACHYCARDIA): ICD-10-CM

## 2022-06-08 DIAGNOSIS — I10 PRIMARY HYPERTENSION: ICD-10-CM

## 2022-06-08 PROCEDURE — 4010F ACE/ARB THERAPY RXD/TAKEN: CPT | Mod: CPTII,S$GLB,, | Performed by: INTERNAL MEDICINE

## 2022-06-08 PROCEDURE — 3008F PR BODY MASS INDEX (BMI) DOCUMENTED: ICD-10-PCS | Mod: CPTII,S$GLB,, | Performed by: INTERNAL MEDICINE

## 2022-06-08 PROCEDURE — 3044F PR MOST RECENT HEMOGLOBIN A1C LEVEL <7.0%: ICD-10-PCS | Mod: CPTII,S$GLB,, | Performed by: INTERNAL MEDICINE

## 2022-06-08 PROCEDURE — 4010F PR ACE/ARB THEARPY RXD/TAKEN: ICD-10-PCS | Mod: CPTII,S$GLB,, | Performed by: INTERNAL MEDICINE

## 2022-06-08 PROCEDURE — 93000 ELECTROCARDIOGRAM COMPLETE: CPT | Mod: S$GLB,,, | Performed by: GENERAL PRACTICE

## 2022-06-08 PROCEDURE — 3075F SYST BP GE 130 - 139MM HG: CPT | Mod: CPTII,S$GLB,, | Performed by: INTERNAL MEDICINE

## 2022-06-08 PROCEDURE — 3075F PR MOST RECENT SYSTOLIC BLOOD PRESS GE 130-139MM HG: ICD-10-PCS | Mod: CPTII,S$GLB,, | Performed by: INTERNAL MEDICINE

## 2022-06-08 PROCEDURE — 99214 OFFICE O/P EST MOD 30 MIN: CPT | Mod: S$GLB,,, | Performed by: INTERNAL MEDICINE

## 2022-06-08 PROCEDURE — 3079F DIAST BP 80-89 MM HG: CPT | Mod: CPTII,S$GLB,, | Performed by: INTERNAL MEDICINE

## 2022-06-08 PROCEDURE — 3079F PR MOST RECENT DIASTOLIC BLOOD PRESSURE 80-89 MM HG: ICD-10-PCS | Mod: CPTII,S$GLB,, | Performed by: INTERNAL MEDICINE

## 2022-06-08 PROCEDURE — 93000 EKG 12-LEAD: ICD-10-PCS | Mod: S$GLB,,, | Performed by: GENERAL PRACTICE

## 2022-06-08 PROCEDURE — 3008F BODY MASS INDEX DOCD: CPT | Mod: CPTII,S$GLB,, | Performed by: INTERNAL MEDICINE

## 2022-06-08 PROCEDURE — 3044F HG A1C LEVEL LT 7.0%: CPT | Mod: CPTII,S$GLB,, | Performed by: INTERNAL MEDICINE

## 2022-06-08 PROCEDURE — 99214 PR OFFICE/OUTPT VISIT, EST, LEVL IV, 30-39 MIN: ICD-10-PCS | Mod: S$GLB,,, | Performed by: INTERNAL MEDICINE

## 2022-06-08 NOTE — PROGRESS NOTES
[facility] - Cardiology    Subjective:     Patient ID:  John Corrales is a 52 y.o. male patient here for evaluation Hospital Follow Up (Atrial flutter )      HPI:  52-year-old male here for follow-up after his hospital discharge very was admitted with palpitations and was found to have flutter versus AVNRT.  Patient reports doing well.  Reports he has had occasional palpitations.    Review of Systems   All other systems reviewed and are negative.       Past Medical History:   Diagnosis Date    Diabetes mellitus, type 2     General anesthetics causing adverse effect in therapeutic use 2007    During general anesthesia for back surgery , experienced severe pain, heard people talking and felt like his heart was about to explode.    GERD (gastroesophageal reflux disease)     Hyperlipidemia     Hypothyroidism     Nasal septal deviation        Past Surgical History:   Procedure Laterality Date    BACK SURGERY  01/01/2007    artificial disc L4-5    CYSTOSCOPY W/ STONE MANIPULATION  01/01/2002    Kidney stones removed    EPIDURAL STEROID INJECTION  01/31/2022    KNEE ARTHROPLASTY Right 06/15/2020    Procedure: ARTHROPLASTY, KNEE;  Surgeon: Alok Rowe MD;  Location: [facility];  Service: Orthopedics;  Laterality: Right;  SAMEERA FAYE    KNEE ARTHROSCOPY W/ DEBRIDEMENT  2000 and 2001    Right knee       Family History   Problem Relation Age of Onset    Diabetes Father     Heart disease Father     Cancer Father         prostate       Social History     Socioeconomic History    Marital status:    Tobacco Use    Smoking status: Former Smoker    Smokeless tobacco: Never Used   Substance and Sexual Activity    Alcohol use: No    Drug use: No     Social Determinants of Health     Financial Resource Strain: Unknown    Difficulty of Paying Living Expenses: Patient refused   Food Insecurity: Unknown    Worried About Running Out of Food in the Last Year: Patient refused    Ran Out of Food in the Last Year:  Patient refused   Transportation Needs: Unknown    Lack of Transportation (Medical): Patient refused    Lack of Transportation (Non-Medical): Patient refused   Physical Activity: Unknown    Days of Exercise per Week: Patient refused    Minutes of Exercise per Session: Patient refused   Stress: Unknown    Feeling of Stress : Patient refused   Social Connections: Unknown    Frequency of Communication with Friends and Family: More than three times a week    Frequency of Social Gatherings with Friends and Family: Twice a week    Active Member of Clubs or Organizations: Yes    Attends Club or Organization Meetings: 1 to 4 times per year    Marital Status:    Housing Stability: Unknown    Unable to Pay for Housing in the Last Year: Patient refused    Number of Places Lived in the Last Year: 0    Unstable Housing in the Last Year: Patient refused       Current Outpatient Medications   Medication Sig Dispense Refill    apixaban (ELIQUIS) 5 mg Tab Take 1 tablet (5 mg total) by mouth 2 (two) times daily. 60 tablet 1    atorvastatin (LIPITOR) 20 MG tablet Take 1 tablet (20 mg total) by mouth once daily. (Patient taking differently: Take 20 mg by mouth every evening.) 90 tablet 1    lisinopriL 10 MG tablet Take 1 tablet (10 mg total) by mouth once daily. 90 tablet 1    metFORMIN (GLUCOPHAGE) 500 MG tablet Take 1 tablet (500 mg total) by mouth 3 (three) times daily. 270 tablet 1    methocarbamoL (ROBAXIN) 750 MG Tab Take 750 mg by mouth 2 (two) times daily.      metoprolol tartrate (LOPRESSOR) 25 MG tablet Take 0.5 tablets (12.5 mg total) by mouth 2 (two) times daily. 90 tablet 3    omeprazole (PRILOSEC) 10 MG capsule Take 10 mg by mouth once daily.      rOPINIRole (REQUIP) 0.5 MG tablet Take 1 tablet (0.5 mg total) by mouth 2 (two) times a day. 180 tablet 1    levothyroxine (SYNTHROID) 75 MCG tablet Take 1 tablet (75 mcg total) by mouth once daily. 90 tablet 1     No current facility-administered  medications for this visit.       Review of patient's allergies indicates:   Allergen Reactions    Demerol [meperidine] Itching         Objective:        Vitals:    06/08/22 1349   BP: 136/80   Pulse: 88       Physical Exam  Vitals reviewed.   Constitutional:       Appearance: Normal appearance.   HENT:      Mouth/Throat:      Mouth: Mucous membranes are moist.   Eyes:      Extraocular Movements: Extraocular movements intact.      Pupils: Pupils are equal, round, and reactive to light.   Cardiovascular:      Rate and Rhythm: Normal rate and regular rhythm.      Pulses: Normal pulses.      Heart sounds: Normal heart sounds. No murmur heard.    No gallop.   Pulmonary:      Effort: Pulmonary effort is normal.      Breath sounds: Normal breath sounds.   Abdominal:      General: Bowel sounds are normal.      Palpations: Abdomen is soft.   Musculoskeletal:         General: Normal range of motion.      Cervical back: Normal range of motion.   Skin:     General: Skin is warm and dry.   Neurological:      General: No focal deficit present.      Mental Status: He is alert and oriented to person, place, and time.   Psychiatric:         Mood and Affect: Mood normal.         LIPIDS - LAST 2   Lab Results   Component Value Date    CHOL 148 03/31/2022    CHOL 156 05/14/2021    HDL 50 03/31/2022    HDL 57 05/14/2021    LDLCALC 85.2 03/31/2022    LDLCALC 86 05/14/2021    TRIG 64 03/31/2022    TRIG 52 05/14/2021    CHOLHDL 33.8 03/31/2022    CHOLHDL 2.7 05/14/2021       CBC - LAST 2  Lab Results   Component Value Date    WBC 5.56 03/31/2022    WBC 10.20 03/30/2022    WBC 10.20 03/30/2022    RBC 5.00 03/31/2022    RBC 5.70 03/30/2022    RBC 5.70 03/30/2022    HGB 11.1 (L) 03/31/2022    HGB 12.7 (L) 03/30/2022    HGB 12.7 (L) 03/30/2022    HCT 37.9 (L) 03/31/2022    HCT 41.7 03/30/2022    HCT 41.7 03/30/2022    MCV 76 (L) 03/31/2022    MCV 73 (L) 03/30/2022    MCV 73 (L) 03/30/2022    MCH 22.2 (L) 03/31/2022    MCH 22.3 (L) 03/30/2022     MCH 22.3 (L) 03/30/2022    MCHC 29.3 (L) 03/31/2022    MCHC 30.5 (L) 03/30/2022    MCHC 30.5 (L) 03/30/2022    RDW 15.4 (H) 03/31/2022    RDW 15.5 (H) 03/30/2022    RDW 15.5 (H) 03/30/2022     03/31/2022     03/30/2022     03/30/2022    MPV 12.1 03/31/2022    MPV 11.7 03/30/2022    MPV 11.7 03/30/2022    GRAN 2.9 03/31/2022    GRAN 52.8 03/31/2022    LYMPH 1.8 03/31/2022    LYMPH 32.0 03/31/2022    MONO 0.6 03/31/2022    MONO 10.4 03/31/2022    BASO 0.03 03/31/2022    BASO 0.05 03/30/2022    BASO 0.05 03/30/2022    NRBC 0 03/31/2022    NRBC 0 03/30/2022    NRBC 0 03/30/2022       CHEMISTRY & LIVER FUNCTION - LAST 2  Lab Results   Component Value Date     03/31/2022     03/30/2022    K 4.1 03/31/2022    K 4.1 03/30/2022     03/31/2022     03/30/2022    CO2 23 03/31/2022    CO2 24 03/30/2022    ANIONGAP 12 03/31/2022    ANIONGAP 12 03/30/2022    BUN 26 (H) 03/31/2022    BUN 28 (H) 03/30/2022    CREATININE 1.1 03/31/2022    CREATININE 1.4 03/30/2022     (H) 03/31/2022     (H) 03/30/2022    CALCIUM 9.0 03/31/2022    CALCIUM 9.5 03/30/2022    MG 1.7 03/31/2022    MG 1.8 03/30/2022    ALBUMIN 3.9 03/31/2022    ALBUMIN 4.5 03/30/2022    PROT 6.9 03/31/2022    PROT 7.7 03/30/2022    ALKPHOS 49 (L) 03/31/2022    ALKPHOS 61 03/30/2022    ALT 16 03/31/2022    ALT 19 03/30/2022    AST 17 03/31/2022    AST 18 03/30/2022    BILITOT 0.4 03/31/2022    BILITOT 0.6 03/30/2022        CARDIAC PROFILE - LAST 2  Lab Results   Component Value Date    BNP 34 03/30/2022    BNP 34 03/30/2022     (H) 03/30/2022    TROPONINI <0.030 03/30/2022        COAGULATION - LAST 2  Lab Results   Component Value Date    LABPT 12.6 03/30/2022    INR 1.0 03/30/2022    APTT 28.0 03/30/2022       ENDOCRINE & PSA - LAST 2  Lab Results   Component Value Date    HGBA1C 6.4 (H) 03/31/2022    HGBA1C 6.7 (H) 08/12/2021    MICROALBUR 1.4 05/14/2021    TSH 5.930 (H) 03/30/2022    TSH 2.539  08/12/2021        ECHOCARDIOGRAM RESULTS  Results for orders placed during the hospital encounter of 03/30/22    Echo    Interpretation Summary  · The left ventricle is normal in size with concentric remodeling and normal systolic function.  · Grade III left ventricular diastolic dysfunction.  · Normal right ventricular size with normal right ventricular systolic function.  · Normal central venous pressure (3 mmHg).  · The estimated ejection fraction is 60%.  · Mild left atrial enlargement.  · Mild mitral regurgitation.      CURRENT/PREVIOUS VISIT EKG  Results for orders placed or performed during the hospital encounter of 03/30/22   EKG 12-lead    Collection Time: 03/30/22  5:16 PM    Narrative    Test Reason : R07.9,    Vent. Rate : 066 BPM     Atrial Rate : 066 BPM     P-R Int : 154 ms          QRS Dur : 100 ms      QT Int : 388 ms       P-R-T Axes : 037 076 021 degrees     QTc Int : 406 ms    Sinus rhythm with Premature supraventricular complexes  Incomplete right bundle branch block  Borderline Abnormal ECG  When compared with ECG of 30-MAR-2022 15:11,  Sinus rhythm has replaced Atrial flutter  Vent. rate has decreased BY  87 BPM  ST elevation has replaced ST depression in Inferior leads  ST no longer depressed in Anterior leads  T wave inversion less evident in Inferior leads  Nonspecific T wave abnormality no longer evident in Anterior leads  Confirmed by Danie BACA, Syed MCGINNIS ([id number]) on 4/7/2022 8:35:50 PM    Referred By: [email or web address]   SELF           Confirmed By:Syed Helton MD     No valid procedures specified.   No results found for this or any previous visit.    No valid procedures specified.        Assessment:       1. Atrial flutter, unspecified type    2. SVT (supraventricular tachycardia)    3. Primary hypertension           Plan:       Atrial flutter, unspecified type  -     IN OFFICE EKG 12-LEAD (to Muse)    SVT (supraventricular tachycardia)  -     Ambulatory referral/consult to Electrophysiology;  Future; Expected date: 06/15/2022    Primary hypertension    Continue with metoprolol and Eliquis.  Will get an urgent consult with Dr. Hinds for further evaluation.  Hypertension is well controlled, continue current therapy.  No symptoms of angina.  Normal EF.    Follow up in about 3 months (around 9/8/2022) for svt.          Jacob Muller MD  [facility] - Cardiology

## 2022-06-20 NOTE — PROGRESS NOTES
Subjective:     HPI    I had the pleasure of seeing John Corrales in consultation at your request for the evaluation of atrial flutter. He is a 52M with HTN, HLD, hypothyroidism, DM2, who presented to [facility] in 3/2022 with palpitations, dyspnea, and diaphoresis and was found to be in atrial flutter with RVR. He was rate controlled, and converted to sinus rhythm. He was discharged on eliquis and lopressor, and presents to me to discuss management options.    Mr. Corrales continues to feel occasional palpitations lasting minutes.    Echo in 3/2022 showed an EF of 60% and mild LAE.    My interpretation of 6/8/2022 ECG is sinus rhythm at 66 bpm with occasional PACs.    Review of Systems   Constitutional: Negative for decreased appetite, malaise/fatigue, weight gain and weight loss.   HENT: Negative for sore throat.    Eyes: Negative for blurred vision.   Cardiovascular: Positive for palpitations. Negative for chest pain, dyspnea on exertion, irregular heartbeat, leg swelling, near-syncope, orthopnea, paroxysmal nocturnal dyspnea and syncope.   Respiratory: Negative for shortness of breath.    Skin: Negative for rash.   Musculoskeletal: Negative for arthritis.   Gastrointestinal: Negative for abdominal pain.   Neurological: Negative for focal weakness.   Psychiatric/Behavioral: Negative for altered mental status.        Objective:    Physical Exam  Vitals and nursing note reviewed.   Constitutional:       General: He is not in acute distress.     Appearance: He is well-developed.   HENT:      Head: Normocephalic and atraumatic.   Eyes:      General: No scleral icterus.     Pupils: Pupils are equal, round, and reactive to light.   Neck:      Thyroid: No thyromegaly.   Cardiovascular:      Rate and Rhythm: Regular rhythm.      Pulses: Normal pulses.      Heart sounds: Normal heart sounds. No murmur heard.    No friction rub. No gallop.   Pulmonary:      Effort: Pulmonary effort is normal.      Breath sounds: Normal breath  sounds.   Abdominal:      General: Bowel sounds are normal. There is no distension.      Palpations: Abdomen is soft.      Tenderness: There is no abdominal tenderness.   Musculoskeletal:      Cervical back: Neck supple.   Skin:     General: Skin is warm and dry.      Findings: No rash.   Neurological:      Mental Status: He is alert and oriented to person, place, and time.   Psychiatric:         Behavior: Behavior normal.           Assessment:       1. Atrial flutter, unspecified type    2. Primary hypertension    3. Mixed hyperlipidemia    4. Acquired hypothyroidism    5. Type 2 diabetes mellitus without complication, without long-term current use of insulin         Plan:       In summary, oJhn Corrales is a 52M with symptomatic atrial flutter. We discussed the pathophysiology of atrial flutter as well as treatment options including antiarrhythmics and ablation. We specifically discussed the risks, benefits, indications, and alternatives to invasive EPS and CTI-line. After considering his options he has agreed to proceed.    CARTO. Hold eliquis and lopressor AM of procedure. Provided pt presents in sinus rhythm plan will be to stop eliquis and lopressor post-procedure.    Thank you for allowing me to participate in the care of this patient. Please do not hesitate to call me with any questions or concerns.

## 2022-06-22 ENCOUNTER — OFFICE VISIT (OUTPATIENT)
Dept: CARDIOLOGY | Facility: CLINIC | Age: 52
End: 2022-06-22
Payer: MEDICARE

## 2022-06-22 VITALS
OXYGEN SATURATION: 98 % | WEIGHT: 234.88 LBS | HEART RATE: 70 BPM | DIASTOLIC BLOOD PRESSURE: 82 MMHG | SYSTOLIC BLOOD PRESSURE: 138 MMHG | HEIGHT: 72 IN | RESPIRATION RATE: 16 BRPM | BODY MASS INDEX: 31.81 KG/M2

## 2022-06-22 DIAGNOSIS — E03.9 ACQUIRED HYPOTHYROIDISM: ICD-10-CM

## 2022-06-22 DIAGNOSIS — I48.92 ATRIAL FLUTTER, UNSPECIFIED TYPE: Primary | ICD-10-CM

## 2022-06-22 DIAGNOSIS — E78.2 MIXED HYPERLIPIDEMIA: ICD-10-CM

## 2022-06-22 DIAGNOSIS — I10 PRIMARY HYPERTENSION: ICD-10-CM

## 2022-06-22 DIAGNOSIS — I47.10 SVT (SUPRAVENTRICULAR TACHYCARDIA): ICD-10-CM

## 2022-06-22 DIAGNOSIS — E11.9 TYPE 2 DIABETES MELLITUS WITHOUT COMPLICATION, WITHOUT LONG-TERM CURRENT USE OF INSULIN: ICD-10-CM

## 2022-06-22 PROCEDURE — 1160F PR REVIEW ALL MEDS BY PRESCRIBER/CLIN PHARMACIST DOCUMENTED: ICD-10-PCS | Mod: CPTII,S$GLB,, | Performed by: INTERNAL MEDICINE

## 2022-06-22 PROCEDURE — 4010F ACE/ARB THERAPY RXD/TAKEN: CPT | Mod: CPTII,S$GLB,, | Performed by: INTERNAL MEDICINE

## 2022-06-22 PROCEDURE — 3075F SYST BP GE 130 - 139MM HG: CPT | Mod: CPTII,S$GLB,, | Performed by: INTERNAL MEDICINE

## 2022-06-22 PROCEDURE — 3008F BODY MASS INDEX DOCD: CPT | Mod: CPTII,S$GLB,, | Performed by: INTERNAL MEDICINE

## 2022-06-22 PROCEDURE — 1159F MED LIST DOCD IN RCRD: CPT | Mod: CPTII,S$GLB,, | Performed by: INTERNAL MEDICINE

## 2022-06-22 PROCEDURE — 3075F PR MOST RECENT SYSTOLIC BLOOD PRESS GE 130-139MM HG: ICD-10-PCS | Mod: CPTII,S$GLB,, | Performed by: INTERNAL MEDICINE

## 2022-06-22 PROCEDURE — 3044F PR MOST RECENT HEMOGLOBIN A1C LEVEL <7.0%: ICD-10-PCS | Mod: CPTII,S$GLB,, | Performed by: INTERNAL MEDICINE

## 2022-06-22 PROCEDURE — 3044F HG A1C LEVEL LT 7.0%: CPT | Mod: CPTII,S$GLB,, | Performed by: INTERNAL MEDICINE

## 2022-06-22 PROCEDURE — 3079F DIAST BP 80-89 MM HG: CPT | Mod: CPTII,S$GLB,, | Performed by: INTERNAL MEDICINE

## 2022-06-22 PROCEDURE — 99205 PR OFFICE/OUTPT VISIT, NEW, LEVL V, 60-74 MIN: ICD-10-PCS | Mod: S$GLB,,, | Performed by: INTERNAL MEDICINE

## 2022-06-22 PROCEDURE — 3079F PR MOST RECENT DIASTOLIC BLOOD PRESSURE 80-89 MM HG: ICD-10-PCS | Mod: CPTII,S$GLB,, | Performed by: INTERNAL MEDICINE

## 2022-06-22 PROCEDURE — 99205 OFFICE O/P NEW HI 60 MIN: CPT | Mod: S$GLB,,, | Performed by: INTERNAL MEDICINE

## 2022-06-22 PROCEDURE — 1159F PR MEDICATION LIST DOCUMENTED IN MEDICAL RECORD: ICD-10-PCS | Mod: CPTII,S$GLB,, | Performed by: INTERNAL MEDICINE

## 2022-06-22 PROCEDURE — 3008F PR BODY MASS INDEX (BMI) DOCUMENTED: ICD-10-PCS | Mod: CPTII,S$GLB,, | Performed by: INTERNAL MEDICINE

## 2022-06-22 PROCEDURE — 4010F PR ACE/ARB THEARPY RXD/TAKEN: ICD-10-PCS | Mod: CPTII,S$GLB,, | Performed by: INTERNAL MEDICINE

## 2022-06-22 PROCEDURE — 1160F RVW MEDS BY RX/DR IN RCRD: CPT | Mod: CPTII,S$GLB,, | Performed by: INTERNAL MEDICINE

## 2022-06-24 ENCOUNTER — TELEPHONE (OUTPATIENT)
Dept: ELECTROPHYSIOLOGY | Facility: CLINIC | Age: 52
End: 2022-06-24
Payer: MEDICARE

## 2022-06-27 ENCOUNTER — TELEPHONE (OUTPATIENT)
Dept: ELECTROPHYSIOLOGY | Facility: CLINIC | Age: 52
End: 2022-06-27
Payer: MEDICARE

## 2022-06-27 NOTE — TELEPHONE ENCOUNTER
Returned call to pt. CTI cardiac ablation scheduled for 9/01/2022 with Dr soto.        ----- Message from Krish Breen MA sent at 6/27/2022 12:43 PM CDT -----  Regarding: FW: schedule procedure  Good afternoon Nancy   See below please advise.   Thanks    ----- Message -----  From: Kindra Reyna  Sent: 6/27/2022  11:52 AM CDT  To: Lizet Busby Staff  Subject: schedule procedure                               The pt is calling to schedule his procedure. Please call him back @ [phone]. Thanks, Kindra

## 2022-06-28 DIAGNOSIS — I49.9 CARDIAC ARRHYTHMIA, UNSPECIFIED CARDIAC ARRHYTHMIA TYPE: ICD-10-CM

## 2022-06-28 DIAGNOSIS — I48.3 TYPICAL ATRIAL FLUTTER: ICD-10-CM

## 2022-06-28 DIAGNOSIS — I48.0 PAROXYSMAL ATRIAL FIBRILLATION: Primary | ICD-10-CM

## 2022-06-28 DIAGNOSIS — Z01.818 PRE-OP TESTING: ICD-10-CM

## 2022-07-11 ENCOUNTER — OFFICE VISIT (OUTPATIENT)
Dept: FAMILY MEDICINE | Facility: CLINIC | Age: 52
End: 2022-07-11
Payer: MEDICARE

## 2022-07-11 VITALS
BODY MASS INDEX: 31.83 KG/M2 | SYSTOLIC BLOOD PRESSURE: 110 MMHG | DIASTOLIC BLOOD PRESSURE: 60 MMHG | WEIGHT: 235 LBS | HEIGHT: 72 IN | HEART RATE: 68 BPM

## 2022-07-11 DIAGNOSIS — E03.9 HYPOTHYROIDISM, UNSPECIFIED TYPE: ICD-10-CM

## 2022-07-11 DIAGNOSIS — I48.92 ATRIAL FLUTTER, UNSPECIFIED TYPE: ICD-10-CM

## 2022-07-11 DIAGNOSIS — I10 HYPERTENSION, UNSPECIFIED TYPE: ICD-10-CM

## 2022-07-11 DIAGNOSIS — G25.81 RLS (RESTLESS LEGS SYNDROME): Primary | ICD-10-CM

## 2022-07-11 DIAGNOSIS — E78.5 HYPERLIPIDEMIA, UNSPECIFIED HYPERLIPIDEMIA TYPE: ICD-10-CM

## 2022-07-11 DIAGNOSIS — E11.9 TYPE 2 DIABETES MELLITUS WITHOUT COMPLICATION, WITHOUT LONG-TERM CURRENT USE OF INSULIN: ICD-10-CM

## 2022-07-11 PROCEDURE — 3074F SYST BP LT 130 MM HG: CPT | Mod: CPTII,S$GLB,, | Performed by: PHYSICIAN ASSISTANT

## 2022-07-11 PROCEDURE — 3008F BODY MASS INDEX DOCD: CPT | Mod: CPTII,S$GLB,, | Performed by: PHYSICIAN ASSISTANT

## 2022-07-11 PROCEDURE — 4010F ACE/ARB THERAPY RXD/TAKEN: CPT | Mod: CPTII,S$GLB,, | Performed by: PHYSICIAN ASSISTANT

## 2022-07-11 PROCEDURE — 3078F DIAST BP <80 MM HG: CPT | Mod: CPTII,S$GLB,, | Performed by: PHYSICIAN ASSISTANT

## 2022-07-11 PROCEDURE — 1159F MED LIST DOCD IN RCRD: CPT | Mod: CPTII,S$GLB,, | Performed by: PHYSICIAN ASSISTANT

## 2022-07-11 PROCEDURE — 4010F PR ACE/ARB THEARPY RXD/TAKEN: ICD-10-PCS | Mod: CPTII,S$GLB,, | Performed by: PHYSICIAN ASSISTANT

## 2022-07-11 PROCEDURE — 1159F PR MEDICATION LIST DOCUMENTED IN MEDICAL RECORD: ICD-10-PCS | Mod: CPTII,S$GLB,, | Performed by: PHYSICIAN ASSISTANT

## 2022-07-11 PROCEDURE — 3044F PR MOST RECENT HEMOGLOBIN A1C LEVEL <7.0%: ICD-10-PCS | Mod: CPTII,S$GLB,, | Performed by: PHYSICIAN ASSISTANT

## 2022-07-11 PROCEDURE — 99214 PR OFFICE/OUTPT VISIT, EST, LEVL IV, 30-39 MIN: ICD-10-PCS | Mod: S$GLB,,, | Performed by: PHYSICIAN ASSISTANT

## 2022-07-11 PROCEDURE — 3044F HG A1C LEVEL LT 7.0%: CPT | Mod: CPTII,S$GLB,, | Performed by: PHYSICIAN ASSISTANT

## 2022-07-11 PROCEDURE — 3008F PR BODY MASS INDEX (BMI) DOCUMENTED: ICD-10-PCS | Mod: CPTII,S$GLB,, | Performed by: PHYSICIAN ASSISTANT

## 2022-07-11 PROCEDURE — 99214 OFFICE O/P EST MOD 30 MIN: CPT | Mod: S$GLB,,, | Performed by: PHYSICIAN ASSISTANT

## 2022-07-11 PROCEDURE — 3078F PR MOST RECENT DIASTOLIC BLOOD PRESSURE < 80 MM HG: ICD-10-PCS | Mod: CPTII,S$GLB,, | Performed by: PHYSICIAN ASSISTANT

## 2022-07-11 PROCEDURE — 3074F PR MOST RECENT SYSTOLIC BLOOD PRESSURE < 130 MM HG: ICD-10-PCS | Mod: CPTII,S$GLB,, | Performed by: PHYSICIAN ASSISTANT

## 2022-07-11 RX ORDER — LISINOPRIL 10 MG/1
10 TABLET ORAL DAILY
Qty: 90 TABLET | Refills: 1 | Status: SHIPPED | OUTPATIENT
Start: 2022-07-11 | End: 2022-12-29 | Stop reason: SDUPTHER

## 2022-07-11 RX ORDER — METFORMIN HYDROCHLORIDE 500 MG/1
500 TABLET ORAL 3 TIMES DAILY
Qty: 270 TABLET | Refills: 1 | Status: SHIPPED | OUTPATIENT
Start: 2022-07-11 | End: 2023-02-14 | Stop reason: SDUPTHER

## 2022-07-11 RX ORDER — ATORVASTATIN CALCIUM 20 MG/1
20 TABLET, FILM COATED ORAL NIGHTLY
Qty: 90 TABLET | Refills: 1 | Status: SHIPPED | OUTPATIENT
Start: 2022-07-11 | End: 2022-12-29 | Stop reason: SDUPTHER

## 2022-07-11 RX ORDER — ROPINIROLE 2 MG/1
2 TABLET, FILM COATED ORAL NIGHTLY
Qty: 90 TABLET | Refills: 1 | Status: SHIPPED | OUTPATIENT
Start: 2022-07-11 | End: 2022-12-29 | Stop reason: SDUPTHER

## 2022-07-11 RX ORDER — METOPROLOL TARTRATE 25 MG/1
12.5 TABLET, FILM COATED ORAL 2 TIMES DAILY
Qty: 90 TABLET | Refills: 3 | Status: ON HOLD | OUTPATIENT
Start: 2022-07-11 | End: 2022-09-16 | Stop reason: HOSPADM

## 2022-07-11 RX ORDER — LEVOTHYROXINE SODIUM 75 UG/1
75 TABLET ORAL DAILY
Qty: 90 TABLET | Refills: 1 | Status: SHIPPED | OUTPATIENT
Start: 2022-07-11 | End: 2022-12-29 | Stop reason: SDUPTHER

## 2022-07-22 ENCOUNTER — PATIENT MESSAGE (OUTPATIENT)
Dept: ELECTROPHYSIOLOGY | Facility: CLINIC | Age: 52
End: 2022-07-22
Payer: MEDICARE

## 2022-07-22 DIAGNOSIS — Z01.818 PRE-OP TESTING: ICD-10-CM

## 2022-08-24 ENCOUNTER — PATIENT MESSAGE (OUTPATIENT)
Dept: FAMILY MEDICINE | Facility: CLINIC | Age: 52
End: 2022-08-24

## 2022-08-24 NOTE — TELEPHONE ENCOUNTER
Absolutely fine to excuse since he has an important medical procedure that I do not want rescheduled.

## 2022-08-24 NOTE — LETTER
[facility] [place]  Family Medicine  [address] [facility] [address]  [facility] [address]  Phone: [phone]  Fax: [phone] August 24, 2022    John Corrales  [address] [place] [id number]      To Whom It May Concern:    John Corrales is unable to participate in jury duty due to on going medical concerns.    If you have any questions or concerns, please feel free to call my office.    Sincerely,    Electronically Signed By: REBECA Nelson PA-C

## 2022-08-29 ENCOUNTER — LAB VISIT (OUTPATIENT)
Dept: PRIMARY CARE CLINIC | Facility: CLINIC | Age: 52
End: 2022-08-29
Payer: MEDICARE

## 2022-08-29 DIAGNOSIS — Z01.818 PRE-OP TESTING: ICD-10-CM

## 2022-08-29 PROCEDURE — U0005 INFEC AGEN DETEC AMPLI PROBE: HCPCS | Performed by: INTERNAL MEDICINE

## 2022-08-29 PROCEDURE — U0003 INFECTIOUS AGENT DETECTION BY NUCLEIC ACID (DNA OR RNA); SEVERE ACUTE RESPIRATORY SYNDROME CORONAVIRUS 2 (SARS-COV-2) (CORONAVIRUS DISEASE [COVID-19]), AMPLIFIED PROBE TECHNIQUE, MAKING USE OF HIGH THROUGHPUT TECHNOLOGIES AS DESCRIBED BY CMS-2020-01-R: HCPCS | Performed by: INTERNAL MEDICINE

## 2022-08-29 NOTE — PROGRESS NOTES
Pt presented for Pre-procedure drive thru testing. Patient identified using two patient identifiers prior to specimen collection. Questions answered prior to departure and education given.

## 2022-08-30 ENCOUNTER — TELEPHONE (OUTPATIENT)
Dept: ELECTROPHYSIOLOGY | Facility: CLINIC | Age: 52
End: 2022-08-30
Payer: MEDICARE

## 2022-08-30 LAB
SARS-COV-2 RNA RESP QL NAA+PROBE: DETECTED
SARS-COV-2- CYCLE NUMBER: 17

## 2022-08-30 NOTE — TELEPHONE ENCOUNTER
Called pt to advise he tested positive for covid and his procedure would have to be pushed back 10 days.  Pt voiced understanding. Will call pt back with new date.

## 2022-09-02 ENCOUNTER — TELEPHONE (OUTPATIENT)
Dept: ELECTROPHYSIOLOGY | Facility: CLINIC | Age: 52
End: 2022-09-02
Payer: MEDICARE

## 2022-09-02 NOTE — TELEPHONE ENCOUNTER
Contacted pt to advise procedure moved to 9/16/2022 at 5:15am. Pt will be >10 days from positive covid test

## 2022-09-06 ENCOUNTER — PATIENT MESSAGE (OUTPATIENT)
Dept: ELECTROPHYSIOLOGY | Facility: CLINIC | Age: 52
End: 2022-09-06
Payer: MEDICARE

## 2022-09-15 ENCOUNTER — ANESTHESIA EVENT (OUTPATIENT)
Dept: MEDSURG UNIT | Facility: HOSPITAL | Age: 52
End: 2022-09-15
Payer: MEDICARE

## 2022-09-15 ENCOUNTER — TELEPHONE (OUTPATIENT)
Dept: ELECTROPHYSIOLOGY | Facility: CLINIC | Age: 52
End: 2022-09-15
Payer: MEDICARE

## 2022-09-15 NOTE — TELEPHONE ENCOUNTER
Spoke to Mr Pérez    CONFIRMED procedure arrival time on 9/16/2022 at 5:15 am    Reiterated instructions including:  -Directions to check in desk  -NPO after midnight night prior to procedure  -High importance of HOLDING ELIQUIS and METOPROLOL in the morning  -Pre-procedure LABS not done. To be done on admit.  -COVID test completed on 8/29/2022 - Covid +  -Confirmed no fever, cough, or shortness of breath in the past 30 days  -Confirmed no redness, rash, irritation, or yeast infection to groin area.   -Reviewed current visitor policy    Patient verbalized understanding of above and appreciated the call.

## 2022-09-16 ENCOUNTER — ANESTHESIA (OUTPATIENT)
Dept: MEDSURG UNIT | Facility: HOSPITAL | Age: 52
End: 2022-09-16
Payer: MEDICARE

## 2022-09-16 ENCOUNTER — HOSPITAL ENCOUNTER (OUTPATIENT)
Facility: HOSPITAL | Age: 52
Discharge: HOME | End: 2022-09-16
Attending: INTERNAL MEDICINE | Admitting: INTERNAL MEDICINE
Payer: MEDICARE

## 2022-09-16 VITALS
HEIGHT: 72 IN | OXYGEN SATURATION: 98 % | SYSTOLIC BLOOD PRESSURE: 142 MMHG | BODY MASS INDEX: 30.48 KG/M2 | HEART RATE: 64 BPM | DIASTOLIC BLOOD PRESSURE: 76 MMHG | RESPIRATION RATE: 20 BRPM | WEIGHT: 225 LBS | TEMPERATURE: 99 F

## 2022-09-16 DIAGNOSIS — Z98.890 STATUS POST CATHETER ABLATION OF ATRIAL FLUTTER: ICD-10-CM

## 2022-09-16 DIAGNOSIS — I49.9 ARRHYTHMIA: ICD-10-CM

## 2022-09-16 DIAGNOSIS — I48.92 ATRIAL FLUTTER, UNSPECIFIED TYPE: Primary | ICD-10-CM

## 2022-09-16 DIAGNOSIS — I48.92 ATRIAL FLUTTER: ICD-10-CM

## 2022-09-16 DIAGNOSIS — Z01.812 ENCOUNTER FOR PRE-OPERATIVE LABORATORY TESTING: ICD-10-CM

## 2022-09-16 DIAGNOSIS — I48.3 TYPICAL ATRIAL FLUTTER: ICD-10-CM

## 2022-09-16 LAB
ANION GAP SERPL CALC-SCNC: 8 MMOL/L (ref 8–16)
APTT BLDCRRT: 27.2 SEC (ref 21–32)
BASOPHILS # BLD AUTO: 0.03 K/UL (ref 0–0.2)
BASOPHILS NFR BLD: 0.5 % (ref 0–1.9)
BUN SERPL-MCNC: 18 MG/DL (ref 6–20)
CALCIUM SERPL-MCNC: 9.4 MG/DL (ref 8.7–10.5)
CHLORIDE SERPL-SCNC: 105 MMOL/L (ref 95–110)
CO2 SERPL-SCNC: 25 MMOL/L (ref 23–29)
CREAT SERPL-MCNC: 1 MG/DL (ref 0.5–1.4)
DIFFERENTIAL METHOD: ABNORMAL
EOSINOPHIL # BLD AUTO: 0.1 K/UL (ref 0–0.5)
EOSINOPHIL NFR BLD: 2.2 % (ref 0–8)
ERYTHROCYTE [DISTWIDTH] IN BLOOD BY AUTOMATED COUNT: 15.5 % (ref 11.5–14.5)
EST. GFR  (NO RACE VARIABLE): >60 ML/MIN/1.73 M^2
GLUCOSE SERPL-MCNC: 99 MG/DL (ref 70–110)
HCT VFR BLD AUTO: 35.1 % (ref 40–54)
HGB BLD-MCNC: 10.9 G/DL (ref 14–18)
IMM GRANULOCYTES # BLD AUTO: 0.01 K/UL (ref 0–0.04)
IMM GRANULOCYTES NFR BLD AUTO: 0.2 % (ref 0–0.5)
INR PPP: 1 (ref 0.8–1.2)
LYMPHOCYTES # BLD AUTO: 1.4 K/UL (ref 1–4.8)
LYMPHOCYTES NFR BLD: 22 % (ref 18–48)
MCH RBC QN AUTO: 23.1 PG (ref 27–31)
MCHC RBC AUTO-ENTMCNC: 31.1 G/DL (ref 32–36)
MCV RBC AUTO: 75 FL (ref 82–98)
MONOCYTES # BLD AUTO: 0.6 K/UL (ref 0.3–1)
MONOCYTES NFR BLD: 9.5 % (ref 4–15)
NEUTROPHILS # BLD AUTO: 4.1 K/UL (ref 1.8–7.7)
NEUTROPHILS NFR BLD: 65.6 % (ref 38–73)
NRBC BLD-RTO: 0 /100 WBC
PLATELET # BLD AUTO: 230 K/UL (ref 150–450)
PMV BLD AUTO: 11.5 FL (ref 9.2–12.9)
POCT GLUCOSE: 106 MG/DL (ref 70–110)
POCT GLUCOSE: 109 MG/DL (ref 70–110)
POTASSIUM SERPL-SCNC: 3.8 MMOL/L (ref 3.5–5.1)
PROTHROMBIN TIME: 10.5 SEC (ref 9–12.5)
RBC # BLD AUTO: 4.71 M/UL (ref 4.6–6.2)
SODIUM SERPL-SCNC: 138 MMOL/L (ref 136–145)
WBC # BLD AUTO: 6.23 K/UL (ref 3.9–12.7)

## 2022-09-16 PROCEDURE — 93653 PR ELECTROPHYS EVAL, COMPREHEN, W/SUPRAVENT TACHYCARD TRMT: ICD-10-PCS | Mod: ,,, | Performed by: INTERNAL MEDICINE

## 2022-09-16 PROCEDURE — C1894 INTRO/SHEATH, NON-LASER: HCPCS | Performed by: INTERNAL MEDICINE

## 2022-09-16 PROCEDURE — 25000003 PHARM REV CODE 250: Performed by: INTERNAL MEDICINE

## 2022-09-16 PROCEDURE — 27201423 OPTIME MED/SURG SUP & DEVICES STERILE SUPPLY: Performed by: INTERNAL MEDICINE

## 2022-09-16 PROCEDURE — 93005 ELECTROCARDIOGRAM TRACING: CPT

## 2022-09-16 PROCEDURE — D9220A PRA ANESTHESIA: ICD-10-PCS | Mod: CRNA,,, | Performed by: NURSE ANESTHETIST, CERTIFIED REGISTERED

## 2022-09-16 PROCEDURE — 63600175 PHARM REV CODE 636 W HCPCS: Performed by: INTERNAL MEDICINE

## 2022-09-16 PROCEDURE — C1730 CATH, EP, 19 OR FEW ELECT: HCPCS | Performed by: INTERNAL MEDICINE

## 2022-09-16 PROCEDURE — 93653 COMPRE EP EVAL TX SVT: CPT | Mod: ,,, | Performed by: INTERNAL MEDICINE

## 2022-09-16 PROCEDURE — D9220A PRA ANESTHESIA: ICD-10-PCS | Mod: ANES,,, | Performed by: ANESTHESIOLOGY

## 2022-09-16 PROCEDURE — 37000008 HC ANESTHESIA 1ST 15 MINUTES: Performed by: INTERNAL MEDICINE

## 2022-09-16 PROCEDURE — 85025 COMPLETE CBC W/AUTO DIFF WBC: CPT | Performed by: INTERNAL MEDICINE

## 2022-09-16 PROCEDURE — C1732 CATH, EP, DIAG/ABL, 3D/VECT: HCPCS | Performed by: INTERNAL MEDICINE

## 2022-09-16 PROCEDURE — 93653 COMPRE EP EVAL TX SVT: CPT | Performed by: INTERNAL MEDICINE

## 2022-09-16 PROCEDURE — 93010 ELECTROCARDIOGRAM REPORT: CPT | Mod: 76,,, | Performed by: INTERNAL MEDICINE

## 2022-09-16 PROCEDURE — 63600175 PHARM REV CODE 636 W HCPCS: Performed by: NURSE ANESTHETIST, CERTIFIED REGISTERED

## 2022-09-16 PROCEDURE — 82962 GLUCOSE BLOOD TEST: CPT | Performed by: INTERNAL MEDICINE

## 2022-09-16 PROCEDURE — 85610 PROTHROMBIN TIME: CPT | Performed by: INTERNAL MEDICINE

## 2022-09-16 PROCEDURE — 93010 EKG 12-LEAD: ICD-10-PCS | Mod: ,,, | Performed by: INTERNAL MEDICINE

## 2022-09-16 PROCEDURE — 37000009 HC ANESTHESIA EA ADD 15 MINS: Performed by: INTERNAL MEDICINE

## 2022-09-16 PROCEDURE — D9220A PRA ANESTHESIA: Mod: CRNA,,, | Performed by: NURSE ANESTHETIST, CERTIFIED REGISTERED

## 2022-09-16 PROCEDURE — 85730 THROMBOPLASTIN TIME PARTIAL: CPT | Performed by: INTERNAL MEDICINE

## 2022-09-16 PROCEDURE — 80048 BASIC METABOLIC PNL TOTAL CA: CPT | Performed by: INTERNAL MEDICINE

## 2022-09-16 PROCEDURE — 25000003 PHARM REV CODE 250: Performed by: NURSE ANESTHETIST, CERTIFIED REGISTERED

## 2022-09-16 PROCEDURE — D9220A PRA ANESTHESIA: Mod: ANES,,, | Performed by: ANESTHESIOLOGY

## 2022-09-16 RX ORDER — MIDAZOLAM HYDROCHLORIDE 1 MG/ML
INJECTION, SOLUTION INTRAMUSCULAR; INTRAVENOUS
Status: DISCONTINUED | OUTPATIENT
Start: 2022-09-16 | End: 2022-09-16

## 2022-09-16 RX ORDER — KETAMINE HCL IN 0.9 % NACL 50 MG/5 ML
SYRINGE (ML) INTRAVENOUS
Status: DISCONTINUED | OUTPATIENT
Start: 2022-09-16 | End: 2022-09-16

## 2022-09-16 RX ORDER — HYDROMORPHONE HYDROCHLORIDE 1 MG/ML
0.2 INJECTION, SOLUTION INTRAMUSCULAR; INTRAVENOUS; SUBCUTANEOUS EVERY 5 MIN PRN
Status: DISCONTINUED | OUTPATIENT
Start: 2022-09-16 | End: 2022-09-16 | Stop reason: HOSPADM

## 2022-09-16 RX ORDER — DEXAMETHASONE SODIUM PHOSPHATE 4 MG/ML
INJECTION, SOLUTION INTRA-ARTICULAR; INTRALESIONAL; INTRAMUSCULAR; INTRAVENOUS; SOFT TISSUE
Status: DISCONTINUED | OUTPATIENT
Start: 2022-09-16 | End: 2022-09-16

## 2022-09-16 RX ORDER — FENTANYL CITRATE 50 UG/ML
25 INJECTION, SOLUTION INTRAMUSCULAR; INTRAVENOUS EVERY 5 MIN PRN
Status: DISCONTINUED | OUTPATIENT
Start: 2022-09-16 | End: 2022-09-16 | Stop reason: HOSPADM

## 2022-09-16 RX ORDER — ASPIRIN 81 MG/1
81 TABLET ORAL DAILY
Refills: 0
Start: 2022-09-16 | End: 2023-05-23

## 2022-09-16 RX ORDER — ONDANSETRON 2 MG/ML
INJECTION INTRAMUSCULAR; INTRAVENOUS
Status: DISCONTINUED | OUTPATIENT
Start: 2022-09-16 | End: 2022-09-16

## 2022-09-16 RX ORDER — PROPOFOL 10 MG/ML
VIAL (ML) INTRAVENOUS
Status: DISCONTINUED | OUTPATIENT
Start: 2022-09-16 | End: 2022-09-16

## 2022-09-16 RX ORDER — ACETAMINOPHEN 325 MG/1
650 TABLET ORAL EVERY 4 HOURS PRN
Status: DISCONTINUED | OUTPATIENT
Start: 2022-09-16 | End: 2022-09-16 | Stop reason: HOSPADM

## 2022-09-16 RX ORDER — PROPOFOL 10 MG/ML
VIAL (ML) INTRAVENOUS CONTINUOUS PRN
Status: DISCONTINUED | OUTPATIENT
Start: 2022-09-16 | End: 2022-09-16

## 2022-09-16 RX ORDER — LIDOCAINE HYDROCHLORIDE 10 MG/ML
INJECTION INFILTRATION; PERINEURAL
Status: DISCONTINUED | OUTPATIENT
Start: 2022-09-16 | End: 2022-09-16 | Stop reason: HOSPADM

## 2022-09-16 RX ORDER — HEPARIN SOD,PORCINE/0.9 % NACL 1000/500ML
INTRAVENOUS SOLUTION INTRAVENOUS
Status: DISCONTINUED | OUTPATIENT
Start: 2022-09-16 | End: 2022-09-16 | Stop reason: HOSPADM

## 2022-09-16 RX ORDER — FENTANYL CITRATE 50 UG/ML
INJECTION, SOLUTION INTRAMUSCULAR; INTRAVENOUS
Status: DISCONTINUED | OUTPATIENT
Start: 2022-09-16 | End: 2022-09-16

## 2022-09-16 RX ORDER — DIPHENHYDRAMINE HYDROCHLORIDE 50 MG/ML
25 INJECTION INTRAMUSCULAR; INTRAVENOUS EVERY 6 HOURS PRN
Status: DISCONTINUED | OUTPATIENT
Start: 2022-09-16 | End: 2022-09-16 | Stop reason: HOSPADM

## 2022-09-16 RX ORDER — ONDANSETRON 2 MG/ML
4 INJECTION INTRAMUSCULAR; INTRAVENOUS ONCE AS NEEDED
Status: DISCONTINUED | OUTPATIENT
Start: 2022-09-16 | End: 2022-09-16 | Stop reason: HOSPADM

## 2022-09-16 RX ADMIN — SODIUM CHLORIDE: 9 INJECTION, SOLUTION INTRAVENOUS at 07:09

## 2022-09-16 RX ADMIN — Medication 150 MCG/KG/MIN: at 07:09

## 2022-09-16 RX ADMIN — Medication 20 MG: at 07:09

## 2022-09-16 RX ADMIN — FENTANYL CITRATE 50 MCG: 50 INJECTION, SOLUTION INTRAMUSCULAR; INTRAVENOUS at 08:09

## 2022-09-16 RX ADMIN — ACETAMINOPHEN 650 MG: 325 TABLET ORAL at 10:09

## 2022-09-16 RX ADMIN — DEXAMETHASONE SODIUM PHOSPHATE 4 MG: 4 INJECTION INTRA-ARTICULAR; INTRALESIONAL; INTRAMUSCULAR; INTRAVENOUS; SOFT TISSUE at 09:09

## 2022-09-16 RX ADMIN — Medication 10 MG: at 08:09

## 2022-09-16 RX ADMIN — MIDAZOLAM HYDROCHLORIDE 6 MG: 1 INJECTION, SOLUTION INTRAMUSCULAR; INTRAVENOUS at 07:09

## 2022-09-16 RX ADMIN — PROPOFOL 100 MG: 10 INJECTION, EMULSION INTRAVENOUS at 07:09

## 2022-09-16 RX ADMIN — ONDANSETRON 4 MG: 2 INJECTION INTRAMUSCULAR; INTRAVENOUS at 09:09

## 2022-09-16 RX ADMIN — FENTANYL CITRATE 50 MCG: 50 INJECTION, SOLUTION INTRAMUSCULAR; INTRAVENOUS at 07:09

## 2022-09-16 NOTE — PLAN OF CARE
Received report from MIC Pizarro. Patient s/p RFA, AAOx4. VSS, no c/o pain or discomfort at this time, resp even and unlabored. Right  groin gauze/tegaderm dressing is CDI. Pulses 2+.  No active bleeding. No hematoma noted. Post procedure protocol reviewed with patient and patient's wife. Understanding verbalized. wife at bedside. Nurse call bell within reach. Will continue to monitor per post procedure protocol.

## 2022-09-16 NOTE — PLAN OF CARE
Patient discharged per MD orders. Instructions given on medications, wound care, activity, signs of infection, when to call MD, and follow up appointments. Pt verbalized understanding.  Patient AAOx4, right groin gauze/transparent dressing c/d/I. No active bleeding. No hematoma noted. VSS, no c/o pain or discomfort at this time. Telemetry and PIV removed. Patient left unit via wheelchair with transport and his wife.

## 2022-09-16 NOTE — BRIEF OP NOTE
Attending: Kehinde Hinds MD  Date of Procedure: 09/16/2022    Post-operative Diagnosis: CTI-dependent atrial flutter    Procedure Performed: Radiofrequency ablation of the CTI.     Description of Procedure: The patient was brought to the EP lab in the fasting state. Prepped and draped in sterile fashion. Safety timeout was performed. Sedation administered by anesthesia staff. Ultrasound guided venous access of the right femoral vein was performed x3. HALO, CS, and ablation catheters placed. RFA to the CTI with confirmation of bidirectional block.     EBL: <10 mL    Specimens: none  Complications: no immediate    Plan:  Bedrest x 4 hours  ECG on upon arrival to PACU  Resume oral anticoagulation following bedrest if there is no evidence of access site bleeding or hematoma  Medication changes:  d/c avn blockade and OAC . Start ASA 81 mg daily for 30 days  Plan for discharge following bedrest if patient tolerating PO intake, voiding, and ambulatory without evidence of complications     The attending physician was present for entire duration of the procedure    Padilla Young MD, PGY7  Electrophysiology

## 2022-09-16 NOTE — H&P
[facility] - Short Stay Cardiac Unit  Cardiac Electrophysiology  History and Physical     Admission Date: 9/16/2022  Code Status: Prior   Attending Provider: Kehinde Hinds MD   Principal Problem:<principal problem not specified>    Subjective:     Chief Complaint:  AFL     HPI:  He is a 52M with HTN, HLD, hypothyroidism, DM2, who presented to [facility] in 3/2022 with palpitations, dyspnea, and diaphoresis and was found to be in atrial flutter with RVR. He was rate controlled, and converted to sinus rhythm. He was discharged on eliquis and lopressor, and presents to me to discuss management options.     Mr. Corrales continues to feel occasional palpitations lasting minutes.     Echo in 3/2022 showed an EF of 60% and mild LAE.     ECG shows NSR. Last dose of Eliquis was yesterday evening.      Past Medical History:   Diagnosis Date    Diabetes mellitus, type 2     General anesthetics causing adverse effect in therapeutic use 2007    During general anesthesia for back surgery , experienced severe pain, heard people talking and felt like his heart was about to explode.    GERD (gastroesophageal reflux disease)     Hyperlipidemia     Hypothyroidism     Nasal septal deviation        Past Surgical History:   Procedure Laterality Date    BACK SURGERY  01/01/2007    artificial disc L4-5    CYSTOSCOPY W/ STONE MANIPULATION  01/01/2002    Kidney stones removed    EPIDURAL STEROID INJECTION  01/31/2022    KNEE ARTHROPLASTY Right 06/15/2020    Procedure: ARTHROPLASTY, KNEE;  Surgeon: Alok Rowe MD;  [facility];  Service: Orthopedics;  Laterality: Right;  SAMEERA FAYE    KNEE ARTHROSCOPY W/ DEBRIDEMENT  2000 and 2001    Right knee       Review of patient's allergies indicates:   Allergen Reactions    Demerol [meperidine] Itching       No current facility-administered medications on file prior to encounter.     Current Outpatient Medications on File Prior to Encounter   Medication Sig    omeprazole (PRILOSEC) 10  MG capsule Take 10 mg by mouth once daily.    [DISCONTINUED] atenoloL (TENORMIN) 25 MG tablet Take 1 tablet (25 mg total) by mouth once daily.     Family History       Problem Relation (Age of Onset)    Cancer Father    Diabetes Father    Heart disease Father          Tobacco Use    Smoking status: Former    Smokeless tobacco: Never   Substance and Sexual Activity    Alcohol use: No    Drug use: No    Sexual activity: Not on file     Review of Systems   All other systems reviewed and are negative.  Objective:     Vital Signs (Most Recent):  Temp: 98.2 °F (36.8 °C) (09/16/22 0607)  Pulse: 63 (09/16/22 0607)  Resp: 18 (09/16/22 0607)  BP: 131/74 (09/16/22 0607) Vital Signs (24h Range):  Temp:  [98.2 °F (36.8 °C)] 98.2 °F (36.8 °C)  Pulse:  [63] 63  Resp:  [18] 18  BP: (131)/(74) 131/74       Weight: 102.1 kg (225 lb)  Body mass index is 30.52 kg/m².       O2 Device (Oxygen Therapy): room air    Physical Exam  General: NAD. AAO  HENT: EOMI  Neck: supple. No JVD  CV: RRR. Normal S1/S2. No gallops, rubs, or murmurs. 2+ radial pulses  Resp: CTAB. No increased work of breathing  Ext: warm. No edema.      Significant Labs: All pertinent lab results from the last 24 hours have been reviewed.    Significant Imaging:  Reviewed    Assessment and Plan:     Atrial flutter  Patient here for CTI RFA for CTI dependent atrial flutter    Anticoagulation: apixiban  EF (most recent): wnl  AAD/AVN agents: metoprolol    The risks, benefits and alternatives of the procedure were explained to the patient, patient's family and/or surrogate decision maker. Risks include (but not limited to) bleeding, hematoma, infection, pain, vascular damage, damage to structures surrounding the vasculature, myocardial damage [perforation, valvular damage], cardiac tamponade, CVA, MI, and death. Patient is understanding that repeat ablations may be required. Patient is understanding that they are at risk for developing or being diagnosed with atrial  fibrillation which may requiring AAD therapy and/or ablation(s) in the future. All questions were answered. Patient is understanding of these risks, and would like to proceed. Consents signed.        Eriberto Young MD  Cardiac Electrophysiology  [facility] - Short Stay Cardiac Unit

## 2022-09-16 NOTE — PLAN OF CARE
Vital signs stable. Afebrile. Alert, oriented and following commands. Denies pain/nausea. Groin site remains CDI with checks per MD order. Pulses 2+ distally. POC reviewed and understanding verbalized.

## 2022-09-16 NOTE — TRANSFER OF CARE
Anesthesia Transfer of Care Note    Patient: John Corrales    Procedure(s) Performed: Procedure(s) (LRB):  ABLATION, ATRIAL FLUTTER, TYPICAL (N/A)    Patient location: PACU    Anesthesia Type: general    Transport from OR: Transported from OR on 6-10 L/min O2 by face mask with adequate spontaneous ventilation    Post pain: adequate analgesia    Post assessment: no apparent anesthetic complications    Post vital signs: stable    Level of consciousness: responds to stimulation and sedated    Nausea/Vomiting: no nausea/vomiting    Complications: none    Transfer of care protocol was followed      Last vitals:   Visit Vitals  BP (!) 140/70 (BP Location: Right arm, Patient Position: Lying)   Pulse 63   Temp 36.8 °C (98.2 °F) (Temporal)   Resp 18   Ht 6' (1.829 m)   Wt 102.1 kg (225 lb)   SpO2 98%   BMI 30.52 kg/m²

## 2022-09-16 NOTE — DISCHARGE INSTRUCTIONS
Take aspirin 81 mg daily for 30 days  Stop taking metoprolol  Do not take baths or submerge your groin area or at least 1 week or when the puncture sites in your groin have completely healed  Do not lift anything over 10 lbs for the first week after your procedure, and avoid strenuous activity during this time to allow for the groin sites to heal.  After 1 week, there are no activity restrictions.  If oozing occurs from your groin sites, apply pressure for 15 minutes without letting up. If bleeding stops, you can continue to monitor. If bleeding continues, please go to the nearest ER for evaluation  Please contact the electrophysiology clinic or go to the ER if you experience: severe chest pain, shortness of breath, bleeding or swelling of the groin sites, or any other concerns.

## 2022-09-16 NOTE — ANESTHESIA PREPROCEDURE EVALUATION
09/16/2022  John Corrales is a 52 y.o., male.  Patient Active Problem List   Diagnosis    Primary osteoarthritis of right knee    Hypothyroidism    Hypertension    Hyperlipidemia    Gastroesophageal reflux disease    Type 2 diabetes mellitus without complication, without long-term current use of insulin    Lumbar disc disease    Degenerative arthritis of right knee    Atrial flutter           Pre-op Assessment          Review of Systems      Physical Exam    Airway:  No airway management difficulties anticipated  Dental:No active dental issues noted  Chest/Lungs:  Clear to auscultation    Heart:  Rate: Normal  Rhythm: Regular Rhythm  Sounds: Normal        Anesthesia Plan  Type of Anesthesia, risks & benefits discussed:    Anesthesia Type: Gen Natural Airway  Informed Consent: Informed consent signed with the Patient and all parties understand the risks and agree with anesthesia plan.  All questions answered.   ASA Score: 3  Anesthesia Plan Notes: Chart reviewed. Patient seen and examined. Anesthesia plan discussed and questions answered. E-consent signed. Cullen Gonzalez MD    Ready For Surgery From Anesthesia Perspective.     .

## 2022-09-16 NOTE — SUBJECTIVE & OBJECTIVE
Past Medical History:   Diagnosis Date    Diabetes mellitus, type 2     General anesthetics causing adverse effect in therapeutic use 2007    During general anesthesia for back surgery , experienced severe pain, heard people talking and felt like his heart was about to explode.    GERD (gastroesophageal reflux disease)     Hyperlipidemia     Hypothyroidism     Nasal septal deviation        Past Surgical History:   Procedure Laterality Date    BACK SURGERY  01/01/2007    artificial disc L4-5    CYSTOSCOPY W/ STONE MANIPULATION  01/01/2002    Kidney stones removed    EPIDURAL STEROID INJECTION  01/31/2022    KNEE ARTHROPLASTY Right 06/15/2020    Procedure: ARTHROPLASTY, KNEE;  Surgeon: Alok Rowe MD;  Location: [facility];  Service: Orthopedics;  Laterality: Right;  SAMEERA FAYE    KNEE ARTHROSCOPY W/ DEBRIDEMENT  2000 and 2001    Right knee       Review of patient's allergies indicates:   Allergen Reactions    Demerol [meperidine] Itching       No current facility-administered medications on file prior to encounter.     Current Outpatient Medications on File Prior to Encounter   Medication Sig    omeprazole (PRILOSEC) 10 MG capsule Take 10 mg by mouth once daily.    [DISCONTINUED] atenoloL (TENORMIN) 25 MG tablet Take 1 tablet (25 mg total) by mouth once daily.     Family History       Problem Relation (Age of Onset)    Cancer Father    Diabetes Father    Heart disease Father          Tobacco Use    Smoking status: Former    Smokeless tobacco: Never   Substance and Sexual Activity    Alcohol use: No    Drug use: No    Sexual activity: Not on file     Review of Systems   All other systems reviewed and are negative.  Objective:     Vital Signs (Most Recent):  Temp: 98.2 °F (36.8 °C) (09/16/22 0607)  Pulse: 63 (09/16/22 0607)  Resp: 18 (09/16/22 0607)  BP: 131/74 (09/16/22 0607) Vital Signs (24h Range):  Temp:  [98.2 °F (36.8 °C)] 98.2 °F (36.8 °C)  Pulse:  [63] 63  Resp:  [18] 18  BP: (131)/(74) 131/74       Weight:  102.1 kg (225 lb)  Body mass index is 30.52 kg/m².       O2 Device (Oxygen Therapy): room air    Physical Exam  General: NAD. AAO  HENT: EOMI  Neck: supple. No JVD  CV: RRR. Normal S1/S2. No gallops, rubs, or murmurs. 2+ radial pulses  Resp: CTAB. No increased work of breathing  Ext: warm. No edema.      Significant Labs: All pertinent lab results from the last 24 hours have been reviewed.    Significant Imaging:  Reviewed

## 2022-09-16 NOTE — HPI
He is a 52M with HTN, HLD, hypothyroidism, DM2, who presented to [facility] in 3/2022 with palpitations, dyspnea, and diaphoresis and was found to be in atrial flutter with RVR. He was rate controlled, and converted to sinus rhythm. He was discharged on eliquis and lopressor, and presents to me to discuss management options.     Mr. Corrales continues to feel occasional palpitations lasting minutes.     Echo in 3/2022 showed an EF of 60% and mild LAE.     ECG shows NSR. Last dose of Eliquis was yesterday evening.

## 2022-09-16 NOTE — HOSPITAL COURSE
Patient underwent successful RFA of the CTI for treatment of  AFL . No evidence of intra- or post-procedure complications. Post-ablation ECG shows NSR, and no acute abnormalities.     EP medications at discharge:  Antiarrhythmics and/or AVN agents:  stop metoprolol .   Stop OAC, and start ASA 81 mg daily x 30 days.     Groin access sites without hematoma or bleeding. Activity restrictions given to patient. Instructed to seek medical attention for shortness of breath, chest discomfort not alleviated with NSAIDs, bleeding/hematoma formation at the access sites, acute onset of neurologic symptoms, N/V, or hematemesis. At discharge the patient denied CP, SOB, access site bleeding/hematoma, or any other complaints or evidence of complications.    ----------------------------------------------------------------------------------------

## 2022-09-16 NOTE — ANESTHESIA POSTPROCEDURE EVALUATION
Anesthesia Post Evaluation    Patient: John Corrales    Procedure(s) Performed: Procedure(s) (LRB):  ABLATION, ATRIAL FLUTTER, TYPICAL (N/A)    Final Anesthesia Type: general      Level of consciousness: awake and alert  Post-procedure vital signs: reviewed and stable  Pain control: Pain has been treated.  Airway patency: patent    PONV status: Absent or treated.  Anesthetic complications: no      Cardiovascular status: hemodynamically stable  Respiratory status: unassisted  Hydration status: euvolemic            Vitals Value Taken Time   /78 09/16/22 1300   Temp 36.9 °C (98.5 °F) 09/16/22 1230   Pulse 52 09/16/22 1300   Resp 20 09/16/22 1300   SpO2 98 % 09/16/22 1300         No case tracking events are documented in the log.      Pain/Sukumar Score: Pain Rating Prior to Med Admin: 3 (9/16/2022 10:21 AM)  Pain Rating Post Med Admin: 0 (9/16/2022 11:15 AM)  Sukumar Score: 10 (9/16/2022  1:00 PM)

## 2022-09-16 NOTE — ANESTHESIA PROCEDURE NOTES
Intubation    Date/Time: 9/16/2022 8:34 AM  Performed by: Eufemia Ndiaye CRNA  Authorized by: Cullen Gonzalez MD     Intubation:     Induction:  Inhalational - mask    Mask Ventilation:  Easy mask    Attempts:  1    Attempted By:  Staff anesthesiologist    Difficult Airway Encountered?: No      Complications:  None    Airway Device Size:  3.5    Placement Verified By:  Capnometry    Complicating Factors:  None    Findings Post-Intubation:  BS equal bilateral and atraumatic/condition of teeth unchanged

## 2022-09-16 NOTE — ASSESSMENT & PLAN NOTE
Patient here for CTI RFA for CTI dependent atrial flutter    Anticoagulation: apixiban  EF (most recent): wnl  AAD/AVN agents: metoprolol    The risks, benefits and alternatives of the procedure were explained to the patient, patient's family and/or surrogate decision maker. Risks include (but not limited to) bleeding, hematoma, infection, pain, vascular damage, damage to structures surrounding the vasculature, myocardial damage [perforation, valvular damage], cardiac tamponade, CVA, MI, and death. Patient is understanding that repeat ablations may be required. Patient is understanding that they are at risk for developing or being diagnosed with atrial fibrillation which may requiring AAD therapy and/or ablation(s) in the future. All questions were answered. Patient is understanding of these risks, and would like to proceed. Consents signed.

## 2022-09-16 NOTE — NURSING TRANSFER
Nursing Transfer Note      9/16/2022     Reason patient is being transferred: per md order    Transfer To: [facility]    Transfer via stretcher    Transfer with cardiac monitoring    Transported by pct    Medicines sent: n/a    Any special needs or follow-up needed: n/a    Chart send with patient: Yes    Notified: spouse

## 2022-09-16 NOTE — DISCHARGE SUMMARY
[facility] - Short Stay Cardiac Unit  Cardiac Electrophysiology  Discharge Summary      Patient Name: John Corrales  MRN: [id number]  Admission Date: 9/16/2022  Hospital Length of Stay: 0 days  Discharge Date and Time:  09/16/2022 4:00 PM  Attending Physician: No att. providers found    Discharging Provider: Eriberto Young MD  Primary Care Physician: Ralf Saunders MD    Hospital Course:   Patient underwent successful RFA of the CTI for treatment of  AFL . No evidence of intra- or post-procedure complications. Post-ablation ECG shows NSR, and no acute abnormalities.     EP medications at discharge:   Antiarrhythmics and/or AVN agents:  stop metoprolol .    Stop OAC, and start ASA 81 mg daily x 30 days.     Groin access sites without hematoma or bleeding. Activity restrictions given to patient. Instructed to seek medical attention for shortness of breath, chest discomfort not alleviated with NSAIDs, bleeding/hematoma formation at the access sites, acute onset of neurologic symptoms, N/V, or hematemesis. At discharge the patient denied CP, SOB, access site bleeding/hematoma, or any other complaints or evidence of complications.    ----------------------------------------------------------------------------------------     HPI:   He is a 52M with HTN, HLD, hypothyroidism, DM2, who presented to [facility] in 3/2022 with palpitations, dyspnea, and diaphoresis and was found to be in atrial flutter with RVR. He was rate controlled, and converted to sinus rhythm. He was discharged on eliquis and lopressor, and presents to me to discuss management options.     Mr. Corrales continues to feel occasional palpitations lasting minutes.     Echo in 3/2022 showed an EF of 60% and mild LAE.     ECG shows NSR. Last dose of Eliquis was yesterday evening.      Procedure(s) (LRB):  ABLATION, ATRIAL FLUTTER, TYPICAL (N/A)     Indwelling Lines/Drains at time of discharge:  Lines/Drains/Airways     None               Goals of Care Treatment  Preferences:  Code Status: Full Code    Significant Diagnostic Studies: Labs: All labs within the past 24 hours have been reviewed    Pending Diagnostic Studies:     Procedure Component Value Units Date/Time    Transesophageal echo ([place]) [[id number]]     Order Status: Sent Lab Status: No result           Final Active Diagnoses:    Diagnosis Date Noted POA    Atrial flutter [I48.92] 03/31/2022 Yes      Problems Resolved During this Admission:     No new Assessment & Plan notes have been filed under this hospital service since the last note was generated.  Service: Arrhythmia      Discharged Condition: stable    Disposition: Home or Self Care    Medications:  Reconciled Home Medications:      Medication List      START taking these medications    aspirin 81 MG EC tablet  Commonly known as: ECOTRIN  Take 1 tablet (81 mg total) by mouth once daily.        CONTINUE taking these medications    atorvastatin 20 MG tablet  Commonly known as: LIPITOR  Take 1 tablet (20 mg total) by mouth every evening.     levothyroxine 75 MCG tablet  Commonly known as: SYNTHROID  Take 1 tablet (75 mcg total) by mouth once daily.     lisinopriL 10 MG tablet  Take 1 tablet (10 mg total) by mouth once daily.     metFORMIN 500 MG tablet  Commonly known as: GLUCOPHAGE  Take 1 tablet (500 mg total) by mouth 3 (three) times daily.     omeprazole 10 MG capsule  Commonly known as: PRILOSEC  Take 10 mg by mouth once daily.     rOPINIRole 2 MG tablet  Commonly known as: REQUIP  Take 1 tablet (2 mg total) by mouth every evening.        STOP taking these medications    apixaban 5 mg Tab  Commonly known as: ELIQUIS     atenoloL 25 MG tablet  Commonly known as: TENORMIN     metoprolol tartrate 25 MG tablet  Commonly known as: LOPRESSOR            Time spent on the discharge of patient: 15 minutes    Eriberto Young MD  Cardiac Electrophysiology  [facility] Short Stay Cardiac Unit

## 2022-09-16 NOTE — PLAN OF CARE
Pt arrived to unit accompanied by spouse.  Pre op orders and assessment initiated.  Pt remains npo. Call bell within reach.

## 2022-09-23 ENCOUNTER — TELEPHONE (OUTPATIENT)
Dept: ELECTROPHYSIOLOGY | Facility: CLINIC | Age: 52
End: 2022-09-23
Payer: MEDICARE

## 2022-09-23 NOTE — TELEPHONE ENCOUNTER
Called patient to follow up after his procedure.  No answer, left message. Called to inquire if he is having any symptoms, complications post ABLATION, or any questions.  Call back number provided

## 2022-09-29 ENCOUNTER — OFFICE VISIT (OUTPATIENT)
Dept: FAMILY MEDICINE | Facility: CLINIC | Age: 52
End: 2022-09-29
Payer: MEDICARE

## 2022-09-29 VITALS
HEART RATE: 70 BPM | BODY MASS INDEX: 30.79 KG/M2 | SYSTOLIC BLOOD PRESSURE: 110 MMHG | DIASTOLIC BLOOD PRESSURE: 82 MMHG | RESPIRATION RATE: 19 BRPM | WEIGHT: 227 LBS

## 2022-09-29 DIAGNOSIS — I48.92 ATRIAL FLUTTER, UNSPECIFIED TYPE: ICD-10-CM

## 2022-09-29 DIAGNOSIS — M77.11 LATERAL EPICONDYLITIS OF RIGHT ELBOW: Primary | ICD-10-CM

## 2022-09-29 DIAGNOSIS — E78.2 MIXED HYPERLIPIDEMIA: ICD-10-CM

## 2022-09-29 DIAGNOSIS — I10 PRIMARY HYPERTENSION: ICD-10-CM

## 2022-09-29 PROCEDURE — 3079F PR MOST RECENT DIASTOLIC BLOOD PRESSURE 80-89 MM HG: ICD-10-PCS | Mod: CPTII,S$GLB,, | Performed by: PHYSICIAN ASSISTANT

## 2022-09-29 PROCEDURE — 3074F PR MOST RECENT SYSTOLIC BLOOD PRESSURE < 130 MM HG: ICD-10-PCS | Mod: CPTII,S$GLB,, | Performed by: PHYSICIAN ASSISTANT

## 2022-09-29 PROCEDURE — 99214 PR OFFICE/OUTPT VISIT, EST, LEVL IV, 30-39 MIN: ICD-10-PCS | Mod: S$GLB,,, | Performed by: PHYSICIAN ASSISTANT

## 2022-09-29 PROCEDURE — 4010F PR ACE/ARB THEARPY RXD/TAKEN: ICD-10-PCS | Mod: CPTII,S$GLB,, | Performed by: PHYSICIAN ASSISTANT

## 2022-09-29 PROCEDURE — 3044F PR MOST RECENT HEMOGLOBIN A1C LEVEL <7.0%: ICD-10-PCS | Mod: CPTII,S$GLB,, | Performed by: PHYSICIAN ASSISTANT

## 2022-09-29 PROCEDURE — 3044F HG A1C LEVEL LT 7.0%: CPT | Mod: CPTII,S$GLB,, | Performed by: PHYSICIAN ASSISTANT

## 2022-09-29 PROCEDURE — 3079F DIAST BP 80-89 MM HG: CPT | Mod: CPTII,S$GLB,, | Performed by: PHYSICIAN ASSISTANT

## 2022-09-29 PROCEDURE — 3008F BODY MASS INDEX DOCD: CPT | Mod: CPTII,S$GLB,, | Performed by: PHYSICIAN ASSISTANT

## 2022-09-29 PROCEDURE — 3008F PR BODY MASS INDEX (BMI) DOCUMENTED: ICD-10-PCS | Mod: CPTII,S$GLB,, | Performed by: PHYSICIAN ASSISTANT

## 2022-09-29 PROCEDURE — 3074F SYST BP LT 130 MM HG: CPT | Mod: CPTII,S$GLB,, | Performed by: PHYSICIAN ASSISTANT

## 2022-09-29 PROCEDURE — 99214 OFFICE O/P EST MOD 30 MIN: CPT | Mod: S$GLB,,, | Performed by: PHYSICIAN ASSISTANT

## 2022-09-29 PROCEDURE — 4010F ACE/ARB THERAPY RXD/TAKEN: CPT | Mod: CPTII,S$GLB,, | Performed by: PHYSICIAN ASSISTANT

## 2022-09-29 RX ORDER — DICLOFENAC SODIUM 75 MG/1
75 TABLET, DELAYED RELEASE ORAL 2 TIMES DAILY
Qty: 60 TABLET | Refills: 0 | Status: SHIPPED | OUTPATIENT
Start: 2022-09-29 | End: 2022-10-29

## 2022-09-29 NOTE — PROGRESS NOTES
SUBJECTIVE:    Patient ID: John Corrales is a 52 y.o. male.    Chief Complaint: Arm Pain (Elbow pain - months, )    This is a 52-year-old male who presents today for complaint of arm pain.  Ongoing for the past several months.  Also of note he recently had ablation per Dr. Hinds. Did great! No fluttering or palpitations since then. Just on baby asa for 1 month. Off eliquis now. Does follow up again in Dec with him. Main complaint today is in regard to RT elbow. Overactivity with bowling of late. Tenderness to the lateral epicondyle. Pain noticed with flexion and extension of the wrist. Has been compressing and icing with only minimal relief. Has not yet seen ortho.      Admission on 09/16/2022, Discharged on 09/16/2022   Component Date Value Ref Range Status    WBC 09/16/2022 6.23  3.90 - 12.70 K/uL Final    RBC 09/16/2022 4.71  4.60 - 6.20 M/uL Final    Hemoglobin 09/16/2022 10.9 (L)  14.0 - 18.0 g/dL Final    Hematocrit 09/16/2022 35.1 (L)  40.0 - 54.0 % Final    MCV 09/16/2022 75 (L)  82 - 98 fL Final    MCH 09/16/2022 23.1 (L)  27.0 - 31.0 pg Final    MCHC 09/16/2022 31.1 (L)  32.0 - 36.0 g/dL Final    RDW 09/16/2022 15.5 (H)  11.5 - 14.5 % Final    Platelets 09/16/2022 230  150 - 450 K/uL Final    MPV 09/16/2022 11.5  9.2 - 12.9 fL Final    Immature Granulocytes 09/16/2022 0.2  0.0 - 0.5 % Final    Gran # (ANC) 09/16/2022 4.1  1.8 - 7.7 K/uL Final    Immature Grans (Abs) 09/16/2022 0.01  0.00 - 0.04 K/uL Final    Lymph # 09/16/2022 1.4  1.0 - 4.8 K/uL Final    Mono # 09/16/2022 0.6  0.3 - 1.0 K/uL Final    Eos # 09/16/2022 0.1  0.0 - 0.5 K/uL Final    Baso # 09/16/2022 0.03  0.00 - 0.20 K/uL Final    nRBC 09/16/2022 0  0 /100 WBC Final    Gran % 09/16/2022 65.6  38.0 - 73.0 % Final    Lymph % 09/16/2022 22.0  18.0 - 48.0 % Final    Mono % 09/16/2022 9.5  4.0 - 15.0 % Final    Eosinophil % 09/16/2022 2.2  0.0 - 8.0 % Final    Basophil % 09/16/2022 0.5  0.0 - 1.9 % Final    Differential Method 09/16/2022  Automated   Final    Sodium 09/16/2022 138  136 - 145 mmol/L Final    Potassium 09/16/2022 3.8  3.5 - 5.1 mmol/L Final    Chloride 09/16/2022 105  95 - 110 mmol/L Final    CO2 09/16/2022 25  23 - 29 mmol/L Final    Glucose 09/16/2022 99  70 - 110 mg/dL Final    BUN 09/16/2022 18  6 - 20 mg/dL Final    Creatinine 09/16/2022 1.0  0.5 - 1.4 mg/dL Final    Calcium 09/16/2022 9.4  8.7 - 10.5 mg/dL Final    Anion Gap 09/16/2022 8  8 - 16 mmol/L Final    eGFR 09/16/2022 >60.0  >60 mL/min/1.73 m^2 Final    Prothrombin Time 09/16/2022 10.5  9.0 - 12.5 sec Final    INR 09/16/2022 1.0  0.8 - 1.2 Final    aPTT 09/16/2022 27.2  21.0 - 32.0 sec Final    POCT Glucose 09/16/2022 106  70 - 110 mg/dL Final    POCT Glucose 09/16/2022 109  70 - 110 mg/dL Final   Lab Visit on 08/29/2022   Component Date Value Ref Range Status    SARS-CoV2 (COVID-19) Qualitative P* 08/29/2022 Detected (A)  Not Detected Final    SARS-COV-2- Cycle Number 08/29/2022 17   Final   No results displayed because visit has over 200 results.          Past Medical History:   Diagnosis Date    Diabetes mellitus, type 2     General anesthetics causing adverse effect in therapeutic use 2007    During general anesthesia for back surgery , experienced severe pain, heard people talking and felt like his heart was about to explode.    GERD (gastroesophageal reflux disease)     Hyperlipidemia     Hypothyroidism     Nasal septal deviation      Past Surgical History:   Procedure Laterality Date    BACK SURGERY  01/01/2007    artificial disc L4-5    CYSTOSCOPY W/ STONE MANIPULATION  01/01/2002    Kidney stones removed    EPIDURAL STEROID INJECTION  01/31/2022    KNEE ARTHROPLASTY Right 06/15/2020    Procedure: ARTHROPLASTY, KNEE;  Surgeon: Alok Rowe MD;  Location: [facility];  Service: Orthopedics;  Laterality: Right;  SAMEERA FAYE    KNEE ARTHROSCOPY W/ DEBRIDEMENT  2000 and 2001    Right knee     Family History   Problem Relation Age of Onset    Diabetes Father     Heart  disease Father     Cancer Father         prostate       Marital Status:   Alcohol History:  reports no history of alcohol use.  Tobacco History:  reports that he has quit smoking. He has never used smokeless tobacco.  Drug History:  reports no history of drug use.    Review of patient's allergies indicates:   Allergen Reactions    Demerol [meperidine] Itching       Current Outpatient Medications:     aspirin (ECOTRIN) 81 MG EC tablet, Take 1 tablet (81 mg total) by mouth once daily., Disp: , Rfl: 0    atorvastatin (LIPITOR) 20 MG tablet, Take 1 tablet (20 mg total) by mouth every evening., Disp: 90 tablet, Rfl: 1    levothyroxine (SYNTHROID) 75 MCG tablet, Take 1 tablet (75 mcg total) by mouth once daily., Disp: 90 tablet, Rfl: 1    lisinopriL 10 MG tablet, Take 1 tablet (10 mg total) by mouth once daily., Disp: 90 tablet, Rfl: 1    metFORMIN (GLUCOPHAGE) 500 MG tablet, Take 1 tablet (500 mg total) by mouth 3 (three) times daily., Disp: 270 tablet, Rfl: 1    omeprazole (PRILOSEC) 10 MG capsule, Take 10 mg by mouth once daily., Disp: , Rfl:     rOPINIRole (REQUIP) 2 MG tablet, Take 1 tablet (2 mg total) by mouth every evening., Disp: 90 tablet, Rfl: 1    diclofenac (VOLTAREN) 75 MG EC tablet, Take 1 tablet (75 mg total) by mouth 2 (two) times daily., Disp: 60 tablet, Rfl: 0    Review of Systems   Constitutional:  Negative for activity change, fatigue, fever and unexpected weight change.   HENT:  Negative for congestion.    Respiratory:  Negative for apnea, cough, chest tightness and shortness of breath.    Cardiovascular:  Negative for chest pain and palpitations.   Gastrointestinal:  Negative for abdominal distention and abdominal pain.   Genitourinary:  Negative for difficulty urinating and dysuria.   Musculoskeletal:  Positive for arthralgias. Negative for back pain.   Neurological:  Negative for dizziness and weakness.        Objective:      Vitals:    09/29/22 1102 09/29/22 1151   BP: (!) 142/60 110/82    Pulse: 70 70   Resp: 19    Weight: 103 kg (227 lb)      Physical Exam  Constitutional:       General: He is not in acute distress.     Appearance: He is well-developed.   HENT:      Head: Normocephalic and atraumatic.   Eyes:      Pupils: Pupils are equal, round, and reactive to light.   Neck:      Thyroid: No thyromegaly.   Cardiovascular:      Rate and Rhythm: Normal rate and regular rhythm.      Heart sounds: Normal heart sounds.   Pulmonary:      Effort: Pulmonary effort is normal.      Breath sounds: Normal breath sounds.   Abdominal:      General: Bowel sounds are normal. There is no distension.      Palpations: Abdomen is soft.      Tenderness: There is no abdominal tenderness.   Musculoskeletal:      Right elbow: Decreased range of motion. Tenderness present in lateral epicondyle.      Cervical back: Normal range of motion and neck supple.   Skin:     General: Skin is warm and dry.      Findings: No erythema or rash.   Neurological:      Mental Status: He is alert and oriented to person, place, and time.      Cranial Nerves: No cranial nerve deficit.         Assessment:       1. Lateral epicondylitis of right elbow    2. Atrial flutter, unspecified type    3. Primary hypertension    4. Mixed hyperlipidemia         Plan:       Lateral epicondylitis of right elbow  Comments:  has tried all conservative measures (ice compression, rest). will add daily NSAID for the next 10 days. refer to ortho for further eval.  Orders:  -     diclofenac (VOLTAREN) 75 MG EC tablet; Take 1 tablet (75 mg total) by mouth 2 (two) times daily.  Dispense: 60 tablet; Refill: 0  -     Ambulatory referral/consult to Orthopedics; Future; Expected date: 09/29/2022    Atrial flutter, unspecified type  Comments:  Doing well s/p ablation. In NSR today! continue as is with baby asa for a month    Primary hypertension  Comments:  at goal. will continue to monitor at home for me.    Mixed hyperlipidemia  Comments:  stable, continue as  is.    Follow up if symptoms worsen or fail to improve, for as scheduled.        9/29/2022 Gavin Sandoval PA-C

## 2022-10-03 ENCOUNTER — OFFICE VISIT (OUTPATIENT)
Dept: ORTHOPEDICS | Facility: CLINIC | Age: 52
End: 2022-10-03
Payer: MEDICARE

## 2022-10-03 VITALS
WEIGHT: 227 LBS | SYSTOLIC BLOOD PRESSURE: 124 MMHG | HEIGHT: 72 IN | BODY MASS INDEX: 30.75 KG/M2 | DIASTOLIC BLOOD PRESSURE: 74 MMHG

## 2022-10-03 DIAGNOSIS — M77.11 LATERAL EPICONDYLITIS OF RIGHT ELBOW: Primary | ICD-10-CM

## 2022-10-03 PROCEDURE — 1159F MED LIST DOCD IN RCRD: CPT | Mod: CPTII,S$GLB,, | Performed by: PHYSICIAN ASSISTANT

## 2022-10-03 PROCEDURE — 20551 NJX 1 TENDON ORIGIN/INSJ: CPT | Mod: RT,S$GLB,, | Performed by: PHYSICIAN ASSISTANT

## 2022-10-03 PROCEDURE — 99213 OFFICE O/P EST LOW 20 MIN: CPT | Mod: 25,S$GLB,, | Performed by: PHYSICIAN ASSISTANT

## 2022-10-03 PROCEDURE — 3008F BODY MASS INDEX DOCD: CPT | Mod: CPTII,S$GLB,, | Performed by: PHYSICIAN ASSISTANT

## 2022-10-03 PROCEDURE — 99213 PR OFFICE/OUTPT VISIT, EST, LEVL III, 20-29 MIN: ICD-10-PCS | Mod: 25,S$GLB,, | Performed by: PHYSICIAN ASSISTANT

## 2022-10-03 PROCEDURE — 1160F PR REVIEW ALL MEDS BY PRESCRIBER/CLIN PHARMACIST DOCUMENTED: ICD-10-PCS | Mod: CPTII,S$GLB,, | Performed by: PHYSICIAN ASSISTANT

## 2022-10-03 PROCEDURE — 3008F PR BODY MASS INDEX (BMI) DOCUMENTED: ICD-10-PCS | Mod: CPTII,S$GLB,, | Performed by: PHYSICIAN ASSISTANT

## 2022-10-03 PROCEDURE — 4010F ACE/ARB THERAPY RXD/TAKEN: CPT | Mod: CPTII,S$GLB,, | Performed by: PHYSICIAN ASSISTANT

## 2022-10-03 PROCEDURE — 1159F PR MEDICATION LIST DOCUMENTED IN MEDICAL RECORD: ICD-10-PCS | Mod: CPTII,S$GLB,, | Performed by: PHYSICIAN ASSISTANT

## 2022-10-03 PROCEDURE — 4010F PR ACE/ARB THEARPY RXD/TAKEN: ICD-10-PCS | Mod: CPTII,S$GLB,, | Performed by: PHYSICIAN ASSISTANT

## 2022-10-03 PROCEDURE — 20551 TENDON ORIGIN: R ELBOW: ICD-10-PCS | Mod: RT,S$GLB,, | Performed by: PHYSICIAN ASSISTANT

## 2022-10-03 PROCEDURE — 3074F PR MOST RECENT SYSTOLIC BLOOD PRESSURE < 130 MM HG: ICD-10-PCS | Mod: CPTII,S$GLB,, | Performed by: PHYSICIAN ASSISTANT

## 2022-10-03 PROCEDURE — 3078F PR MOST RECENT DIASTOLIC BLOOD PRESSURE < 80 MM HG: ICD-10-PCS | Mod: CPTII,S$GLB,, | Performed by: PHYSICIAN ASSISTANT

## 2022-10-03 PROCEDURE — 3078F DIAST BP <80 MM HG: CPT | Mod: CPTII,S$GLB,, | Performed by: PHYSICIAN ASSISTANT

## 2022-10-03 PROCEDURE — 3044F HG A1C LEVEL LT 7.0%: CPT | Mod: CPTII,S$GLB,, | Performed by: PHYSICIAN ASSISTANT

## 2022-10-03 PROCEDURE — 3074F SYST BP LT 130 MM HG: CPT | Mod: CPTII,S$GLB,, | Performed by: PHYSICIAN ASSISTANT

## 2022-10-03 PROCEDURE — 1160F RVW MEDS BY RX/DR IN RCRD: CPT | Mod: CPTII,S$GLB,, | Performed by: PHYSICIAN ASSISTANT

## 2022-10-03 PROCEDURE — 3044F PR MOST RECENT HEMOGLOBIN A1C LEVEL <7.0%: ICD-10-PCS | Mod: CPTII,S$GLB,, | Performed by: PHYSICIAN ASSISTANT

## 2022-10-03 RX ORDER — TRIAMCINOLONE ACETONIDE 40 MG/ML
40 INJECTION, SUSPENSION INTRA-ARTICULAR; INTRAMUSCULAR
Status: DISCONTINUED | OUTPATIENT
Start: 2022-10-03 | End: 2022-10-03 | Stop reason: HOSPADM

## 2022-10-03 RX ADMIN — TRIAMCINOLONE ACETONIDE 40 MG: 40 INJECTION, SUSPENSION INTRA-ARTICULAR; INTRAMUSCULAR at 09:10

## 2022-10-03 NOTE — PROCEDURES
Tendon Origin: R elbow    Date/Time: 10/3/2022 9:00 AM  Performed by: Syed Traylor PA-C  Authorized by: Syed Traylor PA-C     Consent Done?:  Yes (Verbal)  Timeout: prior to procedure the correct patient, procedure, and site was verified    Indications:  Pain and arthritis  Site marked: the procedure site was marked    Timeout: prior to procedure the correct patient, procedure, and site was verified    Location:  Elbow  Site:  R elbow  Prep: patient was prepped and draped in usual sterile fashion    Needle size:  25 G  Medications:  40 mg triamcinolone acetonide 40 mg/mL  Patient tolerance:  Patient tolerated the procedure well with no immediate complications

## 2022-10-03 NOTE — PROGRESS NOTES
[facility] ORTHOPEDICS  [address]  [place] [address]  Phone: [phone]   [phone]    Patient's PCP: Ralf Saunders MD  Referring Provider: Gavin Sandoval    Subjective:      Chief Complaint:   Chief Complaint   Patient presents with    Right Elbow - Pain     RT elbow pain for a few months, no significant injury. PC gave some antiinflammatories which do help some. Pain with lifting objects a certain way       Past Medical History:   Diagnosis Date    Atrial flutter     Diabetes mellitus, type 2     General anesthetics causing adverse effect in therapeutic use 2007    During general anesthesia for back surgery , experienced severe pain, heard people talking and felt like his heart was about to explode.    GERD (gastroesophageal reflux disease)     Hyperlipidemia     Hypothyroidism     Nasal septal deviation        Past Surgical History:   Procedure Laterality Date    BACK SURGERY  01/01/2007    artificial disc L4-5    CYSTOSCOPY W/ STONE MANIPULATION  01/01/2002    Kidney stones removed    EPIDURAL STEROID INJECTION  01/31/2022    KNEE ARTHROPLASTY Right 06/15/2020    Procedure: ARTHROPLASTY, KNEE;  Surgeon: Alok Rowe MD;  Location: [facility];  Service: Orthopedics;  Laterality: Right;  SAMEERA FAYE    KNEE ARTHROSCOPY W/ DEBRIDEMENT  2000 and 2001    Right knee       Current Outpatient Medications   Medication Sig    aspirin (ECOTRIN) 81 MG EC tablet Take 1 tablet (81 mg total) by mouth once daily.    atorvastatin (LIPITOR) 20 MG tablet Take 1 tablet (20 mg total) by mouth every evening.    diclofenac (VOLTAREN) 75 MG EC tablet Take 1 tablet (75 mg total) by mouth 2 (two) times daily.    levothyroxine (SYNTHROID) 75 MCG tablet Take 1 tablet (75 mcg total) by mouth once daily.    lisinopriL 10 MG tablet Take 1 tablet (10 mg total) by mouth once daily.    metFORMIN (GLUCOPHAGE) 500 MG tablet Take 1 tablet (500 mg total) by mouth 3 (three) times daily.    omeprazole (PRILOSEC) 10 MG  capsule Take 10 mg by mouth once daily.    rOPINIRole (REQUIP) 2 MG tablet Take 1 tablet (2 mg total) by mouth every evening.     No current facility-administered medications for this visit.       Review of patient's allergies indicates:   Allergen Reactions    Demerol [meperidine] Itching       Family History   Problem Relation Age of Onset    Diabetes Father     Heart disease Father     Cancer Father         prostate       Social History     Socioeconomic History    Marital status:    Tobacco Use    Smoking status: Former    Smokeless tobacco: Never   Substance and Sexual Activity    Alcohol use: No    Drug use: No     Social Determinants of Health     Financial Resource Strain: Unknown    Difficulty of Paying Living Expenses: Patient refused   Food Insecurity: Unknown    Worried About Running Out of Food in the Last Year: Patient refused    Ran Out of Food in the Last Year: Patient refused   Transportation Needs: Unknown    Lack of Transportation (Medical): Patient refused    Lack of Transportation (Non-Medical): Patient refused   Physical Activity: Unknown    Days of Exercise per Week: 5 days    Minutes of Exercise per Session: Patient refused   Stress: Unknown    Feeling of Stress : Patient refused   Social Connections: Unknown    Frequency of Communication with Friends and Family: Patient refused    Frequency of Social Gatherings with Friends and Family: Patient refused    Active Member of Clubs or Organizations: Yes    Attends Club or Organization Meetings: More than 4 times per year    Marital Status: Patient refused   Housing Stability: Unknown    Unable to Pay for Housing in the Last Year: Patient refused    Number of Places Lived in the Last Year: 0    Unstable Housing in the Last Year: Patient refused       History of present illness:  John presents to the clinic today as an established patient with a new complaint of right lateral elbow pain has been going on for several months.  He does not  recollect any specific injury or trauma to the elbow.  He is right-hand dominant.  His PCP has given him a tennis elbow strap in oral Voltaren which has provided some relief.  They referred him to Orthopedics for further evaluation and treatment recommendations.    Review of Systems:    Constitutional: Negative for chills, fever and weight loss.   HENT: Negative for congestion.    Eyes: Negative for discharge and redness.   Respiratory: Negative for cough and shortness of breath.    Cardiovascular: Negative for chest pain.   Gastrointestinal: Negative for nausea and vomiting.   Musculoskeletal: See HPI.   Skin: Negative for rash.   Neurological: Negative for headaches.   Endo/Heme/Allergies: Does not bruise/bleed easily.   Psychiatric/Behavioral: The patient is not nervous/anxious.    All other systems reviewed and are negative.       Objective:      Physical Examination:    Vital Signs:    Vitals:    10/03/22 0901   BP: 124/74       Body mass index is 30.79 kg/m².    This a well-developed, well nourished patient in no acute distress.  They are alert and oriented and cooperative to examination.     Right elbow exam:  Skin to right elbow is clean dry and intact.  There is no erythema or ecchymosis.  There are no signs or symptoms of infection.  Patient has full active range of motion of the right elbow with flexion/extension right forearm pronation/supination.  He can fully open close right hand into a fist.  He can oppose his right thumb all digits in his right hand.  He is tender to palpation of his right lateral epicondyle.  He has no tenderness to palpation of his medial epicondyle on the right.  He does have a reproduction of pain over the right lateral epicondyle with resisted right wrist extension.  He is neurovascularly intact throughout the right upper extremity.    Pertinent New Results:        XRAY Report / Interpretation:   Two views were taken of the right elbow today:  AP and lateral views.  They reveal  no acute fractures or dislocations.  Visualized soft tissues are unremarkable.      Assessment:       1. Lateral epicondylitis of right elbow      Plan:     Lateral epicondylitis of right elbow  Comments:  has tried all conservative measures (ice compression, rest). will add daily NSAID for the next 10 days. refer to ortho for further eval.  Orders:  -     X-Ray Elbow 2 Views Right  -     Ambulatory referral/consult to Orthopedics  -     Tendon Origin: R elbow      Follow up in about 6 weeks (around 11/14/2022) for 6 week Right elbow inj 10/3/22 f/up.    I injected his right elbow lateral epicondylar region today with 40 mg of Kenalog and lidocaine.  He tolerated this well.  Advised him to resume wear of his right tennis elbow strap at all times while awake.  I was specific in telling him to not wear while sleeping.  He is to continue his Voltaren 75 mg by mouth twice a day with food.  He will Internet research tennis elbow stretching exercises.  Will have follow-up in 6 weeks see how he is responding to these treatment modalities.  If he is not much improved, consideration of the addition of physical therapy may be the next step.        Syed Traylor, LORRIES, PA-C    This note was created using [employer] voice recognition software that occasionally misinterprets words or phrases.

## 2022-11-03 ENCOUNTER — PATIENT MESSAGE (OUTPATIENT)
Dept: FAMILY MEDICINE | Facility: CLINIC | Age: 52
End: 2022-11-03

## 2022-11-03 DIAGNOSIS — M77.11 LATERAL EPICONDYLITIS OF RIGHT ELBOW: Primary | ICD-10-CM

## 2022-11-03 RX ORDER — DICLOFENAC SODIUM 75 MG/1
75 TABLET, DELAYED RELEASE ORAL 2 TIMES DAILY
Qty: 60 TABLET | Refills: 0 | Status: SHIPPED | OUTPATIENT
Start: 2022-11-03 | End: 2022-12-03

## 2022-12-29 DIAGNOSIS — E78.5 HYPERLIPIDEMIA, UNSPECIFIED HYPERLIPIDEMIA TYPE: ICD-10-CM

## 2022-12-29 DIAGNOSIS — G25.81 RLS (RESTLESS LEGS SYNDROME): ICD-10-CM

## 2022-12-29 DIAGNOSIS — E03.9 HYPOTHYROIDISM, UNSPECIFIED TYPE: ICD-10-CM

## 2022-12-29 DIAGNOSIS — I10 HYPERTENSION, UNSPECIFIED TYPE: ICD-10-CM

## 2022-12-29 RX ORDER — LISINOPRIL 10 MG/1
10 TABLET ORAL DAILY
Qty: 90 TABLET | Refills: 1 | Status: SHIPPED | OUTPATIENT
Start: 2022-12-29 | End: 2023-02-14 | Stop reason: SDUPTHER

## 2022-12-29 RX ORDER — LEVOTHYROXINE SODIUM 75 UG/1
75 TABLET ORAL DAILY
Qty: 90 TABLET | Refills: 1 | Status: SHIPPED | OUTPATIENT
Start: 2022-12-29 | End: 2023-02-14 | Stop reason: SDUPTHER

## 2022-12-29 RX ORDER — ATORVASTATIN CALCIUM 20 MG/1
20 TABLET, FILM COATED ORAL NIGHTLY
Qty: 90 TABLET | Refills: 1 | Status: SHIPPED | OUTPATIENT
Start: 2022-12-29 | End: 2023-02-14 | Stop reason: SDUPTHER

## 2022-12-29 RX ORDER — ROPINIROLE 2 MG/1
2 TABLET, FILM COATED ORAL NIGHTLY
Qty: 90 TABLET | Refills: 1 | Status: SHIPPED | OUTPATIENT
Start: 2022-12-29 | End: 2023-02-14 | Stop reason: SDUPTHER

## 2023-01-30 NOTE — PROGRESS NOTES
Subjective:     HPI    I had the pleasure of seeing John Corrales in follow-up for his history of atrial flutter. He is a 52M with HTN, HLD, hypothyroidism, DM2, who presented to [facility] in 3/2022 with palpitations, dyspnea, and diaphoresis and was found to be in atrial flutter with RVR. He was rate controlled, and converted to sinus rhythm. He was discharged on eliquis and lopressor, and presents to me to discuss management options.    Mr. Corrales continues to feel occasional palpitations lasting minutes.    Echo in 3/2022 showed an EF of 60% and mild LAE.    On 9/1/2022 a CTI-line was performed. Eliquis was stopped post-procedure.    Mr. Corrales denies recurrent palpitations since the ablation.    My interpretation of today's ECG is sinus bradycardia at 48 bpm.    Review of Systems   Constitutional: Negative for decreased appetite, malaise/fatigue, weight gain and weight loss.   HENT:  Negative for sore throat.    Eyes:  Negative for blurred vision.   Cardiovascular:  Negative for chest pain, dyspnea on exertion, irregular heartbeat, leg swelling, near-syncope, orthopnea, palpitations, paroxysmal nocturnal dyspnea and syncope.   Respiratory:  Negative for shortness of breath.    Skin:  Negative for rash.   Musculoskeletal:  Negative for arthritis.   Gastrointestinal:  Negative for abdominal pain.   Neurological:  Negative for focal weakness.   Psychiatric/Behavioral:  Negative for altered mental status.       Objective:    Physical Exam  Vitals and nursing note reviewed.   Constitutional:       General: He is not in acute distress.     Appearance: He is well-developed.   HENT:      Head: Normocephalic and atraumatic.   Eyes:      General: No scleral icterus.     Pupils: Pupils are equal, round, and reactive to light.   Neck:      Thyroid: No thyromegaly.   Cardiovascular:      Rate and Rhythm: Regular rhythm.      Pulses: Normal pulses.      Heart sounds: Normal heart sounds. No murmur heard.    No friction rub.  No gallop.   Pulmonary:      Effort: Pulmonary effort is normal.      Breath sounds: Normal breath sounds.   Abdominal:      General: Bowel sounds are normal. There is no distension.      Palpations: Abdomen is soft.      Tenderness: There is no abdominal tenderness.   Musculoskeletal:      Cervical back: Neck supple.   Skin:     General: Skin is warm and dry.      Findings: No rash.   Neurological:      Mental Status: He is alert and oriented to person, place, and time.   Psychiatric:         Behavior: Behavior normal.         Assessment:       1. Atrial flutter, unspecified type    2. Primary hypertension    3. Mixed hyperlipidemia    4. Type 2 diabetes mellitus without complication, without long-term current use of insulin         Plan:       In summary, John Corrales is a 52M with symptomatic atrial flutter. He is status-post CTI-line, and is off eliquis. No history of AF    Discussed importance of regularly checking his pulse and calling my office if it is found to be irregular or inappropriately fast. PRN follow-up.    Thank you for allowing me to participate in the care of this patient. Please do not hesitate to call me with any questions or concerns.

## 2023-02-01 ENCOUNTER — OFFICE VISIT (OUTPATIENT)
Dept: CARDIOLOGY | Facility: CLINIC | Age: 53
End: 2023-02-01
Payer: MEDICARE

## 2023-02-01 VITALS
RESPIRATION RATE: 16 BRPM | DIASTOLIC BLOOD PRESSURE: 80 MMHG | HEIGHT: 72 IN | WEIGHT: 237.13 LBS | SYSTOLIC BLOOD PRESSURE: 130 MMHG | OXYGEN SATURATION: 98 % | HEART RATE: 74 BPM | BODY MASS INDEX: 32.12 KG/M2

## 2023-02-01 DIAGNOSIS — E78.2 MIXED HYPERLIPIDEMIA: ICD-10-CM

## 2023-02-01 DIAGNOSIS — E11.9 TYPE 2 DIABETES MELLITUS WITHOUT COMPLICATION, WITHOUT LONG-TERM CURRENT USE OF INSULIN: ICD-10-CM

## 2023-02-01 DIAGNOSIS — I10 PRIMARY HYPERTENSION: ICD-10-CM

## 2023-02-01 DIAGNOSIS — I48.92 ATRIAL FLUTTER, UNSPECIFIED TYPE: Primary | ICD-10-CM

## 2023-02-01 PROCEDURE — 3075F SYST BP GE 130 - 139MM HG: CPT | Mod: CPTII,S$GLB,, | Performed by: INTERNAL MEDICINE

## 2023-02-01 PROCEDURE — 1160F PR REVIEW ALL MEDS BY PRESCRIBER/CLIN PHARMACIST DOCUMENTED: ICD-10-PCS | Mod: CPTII,S$GLB,, | Performed by: INTERNAL MEDICINE

## 2023-02-01 PROCEDURE — 93005 EKG 12-LEAD: ICD-10-PCS | Mod: S$GLB,,, | Performed by: INTERNAL MEDICINE

## 2023-02-01 PROCEDURE — 93010 EKG 12-LEAD: ICD-10-PCS | Mod: S$GLB,,, | Performed by: INTERNAL MEDICINE

## 2023-02-01 PROCEDURE — 1159F PR MEDICATION LIST DOCUMENTED IN MEDICAL RECORD: ICD-10-PCS | Mod: CPTII,S$GLB,, | Performed by: INTERNAL MEDICINE

## 2023-02-01 PROCEDURE — 1160F RVW MEDS BY RX/DR IN RCRD: CPT | Mod: CPTII,S$GLB,, | Performed by: INTERNAL MEDICINE

## 2023-02-01 PROCEDURE — 3079F PR MOST RECENT DIASTOLIC BLOOD PRESSURE 80-89 MM HG: ICD-10-PCS | Mod: CPTII,S$GLB,, | Performed by: INTERNAL MEDICINE

## 2023-02-01 PROCEDURE — 93005 ELECTROCARDIOGRAM TRACING: CPT | Mod: S$GLB,,, | Performed by: INTERNAL MEDICINE

## 2023-02-01 PROCEDURE — 3079F DIAST BP 80-89 MM HG: CPT | Mod: CPTII,S$GLB,, | Performed by: INTERNAL MEDICINE

## 2023-02-01 PROCEDURE — 3008F PR BODY MASS INDEX (BMI) DOCUMENTED: ICD-10-PCS | Mod: CPTII,S$GLB,, | Performed by: INTERNAL MEDICINE

## 2023-02-01 PROCEDURE — 3075F PR MOST RECENT SYSTOLIC BLOOD PRESS GE 130-139MM HG: ICD-10-PCS | Mod: CPTII,S$GLB,, | Performed by: INTERNAL MEDICINE

## 2023-02-01 PROCEDURE — 1159F MED LIST DOCD IN RCRD: CPT | Mod: CPTII,S$GLB,, | Performed by: INTERNAL MEDICINE

## 2023-02-01 PROCEDURE — 93010 ELECTROCARDIOGRAM REPORT: CPT | Mod: S$GLB,,, | Performed by: INTERNAL MEDICINE

## 2023-02-01 PROCEDURE — 99214 OFFICE O/P EST MOD 30 MIN: CPT | Mod: S$GLB,,, | Performed by: INTERNAL MEDICINE

## 2023-02-01 PROCEDURE — 99214 PR OFFICE/OUTPT VISIT, EST, LEVL IV, 30-39 MIN: ICD-10-PCS | Mod: S$GLB,,, | Performed by: INTERNAL MEDICINE

## 2023-02-01 PROCEDURE — 3008F BODY MASS INDEX DOCD: CPT | Mod: CPTII,S$GLB,, | Performed by: INTERNAL MEDICINE

## 2023-02-02 ENCOUNTER — TELEPHONE (OUTPATIENT)
Dept: FAMILY MEDICINE | Facility: CLINIC | Age: 53
End: 2023-02-02

## 2023-02-02 DIAGNOSIS — E78.2 MIXED HYPERLIPIDEMIA: ICD-10-CM

## 2023-02-02 DIAGNOSIS — Z79.899 ENCOUNTER FOR LONG-TERM (CURRENT) USE OF OTHER MEDICATIONS: Primary | ICD-10-CM

## 2023-02-02 DIAGNOSIS — E11.9 TYPE 2 DIABETES MELLITUS WITHOUT COMPLICATION, WITHOUT LONG-TERM CURRENT USE OF INSULIN: ICD-10-CM

## 2023-02-02 DIAGNOSIS — I10 PRIMARY HYPERTENSION: ICD-10-CM

## 2023-02-02 DIAGNOSIS — E03.9 ACQUIRED HYPOTHYROIDISM: ICD-10-CM

## 2023-02-04 LAB
ALBUMIN SERPL-MCNC: 4.5 G/DL (ref 3.6–5.1)
ALBUMIN/CREAT UR: 6 MCG/MG CREAT
ALBUMIN/GLOB SERPL: 1.8 (CALC) (ref 1–2.5)
ALP SERPL-CCNC: 62 U/L (ref 35–144)
ALT SERPL-CCNC: 17 U/L (ref 9–46)
APPEARANCE UR: CLEAR
AST SERPL-CCNC: 15 U/L (ref 10–35)
BACTERIA #/AREA URNS HPF: ABNORMAL /HPF
BACTERIA UR CULT: ABNORMAL
BASOPHILS # BLD AUTO: 30 CELLS/UL (ref 0–200)
BASOPHILS NFR BLD AUTO: 0.5 %
BILIRUB SERPL-MCNC: 0.6 MG/DL (ref 0.2–1.2)
BILIRUB UR QL STRIP: NEGATIVE
BUN SERPL-MCNC: 20 MG/DL (ref 7–25)
BUN/CREAT SERPL: ABNORMAL (CALC) (ref 6–22)
CALCIUM SERPL-MCNC: 9.2 MG/DL (ref 8.6–10.3)
CHLORIDE SERPL-SCNC: 102 MMOL/L (ref 98–110)
CHOLEST SERPL-MCNC: 148 MG/DL
CHOLEST/HDLC SERPL: 2.3 (CALC)
CO2 SERPL-SCNC: 29 MMOL/L (ref 20–32)
COLOR UR: YELLOW
CREAT SERPL-MCNC: 1.05 MG/DL (ref 0.7–1.3)
CREAT UR-MCNC: 209 MG/DL (ref 20–320)
EGFR: 86 ML/MIN/1.73M2
EOSINOPHIL # BLD AUTO: 159 CELLS/UL (ref 15–500)
EOSINOPHIL NFR BLD AUTO: 2.7 %
ERYTHROCYTE [DISTWIDTH] IN BLOOD BY AUTOMATED COUNT: 14 % (ref 11–15)
GLOBULIN SER CALC-MCNC: 2.5 G/DL (CALC) (ref 1.9–3.7)
GLUCOSE SERPL-MCNC: 100 MG/DL (ref 65–99)
GLUCOSE UR QL STRIP: NEGATIVE
HBA1C MFR BLD: 6 % OF TOTAL HGB
HCT VFR BLD AUTO: 42.6 % (ref 38.5–50)
HDLC SERPL-MCNC: 63 MG/DL
HGB BLD-MCNC: 13.6 G/DL (ref 13.2–17.1)
HGB UR QL STRIP: NEGATIVE
HYALINE CASTS #/AREA URNS LPF: ABNORMAL /LPF
KETONES UR QL STRIP: NEGATIVE
LDLC SERPL CALC-MCNC: 74 MG/DL (CALC)
LEUKOCYTE ESTERASE UR QL STRIP: NEGATIVE
LYMPHOCYTES # BLD AUTO: 1463 CELLS/UL (ref 850–3900)
LYMPHOCYTES NFR BLD AUTO: 24.8 %
MCH RBC QN AUTO: 25.7 PG (ref 27–33)
MCHC RBC AUTO-ENTMCNC: 31.9 G/DL (ref 32–36)
MCV RBC AUTO: 80.4 FL (ref 80–100)
MICROALBUMIN UR-MCNC: 1.3 MG/DL
MONOCYTES # BLD AUTO: 543 CELLS/UL (ref 200–950)
MONOCYTES NFR BLD AUTO: 9.2 %
NEUTROPHILS # BLD AUTO: 3705 CELLS/UL (ref 1500–7800)
NEUTROPHILS NFR BLD AUTO: 62.8 %
NITRITE UR QL STRIP: NEGATIVE
NONHDLC SERPL-MCNC: 85 MG/DL (CALC)
PH UR STRIP: 7 [PH] (ref 5–8)
PLATELET # BLD AUTO: 224 THOUSAND/UL (ref 140–400)
PMV BLD REES-ECKER: 11.9 FL (ref 7.5–12.5)
POTASSIUM SERPL-SCNC: 4.1 MMOL/L (ref 3.5–5.3)
PROT SERPL-MCNC: 7 G/DL (ref 6.1–8.1)
PROT UR QL STRIP: ABNORMAL
RBC # BLD AUTO: 5.3 MILLION/UL (ref 4.2–5.8)
RBC #/AREA URNS HPF: ABNORMAL /HPF
SERVICE CMNT-IMP: ABNORMAL
SODIUM SERPL-SCNC: 141 MMOL/L (ref 135–146)
SP GR UR STRIP: 1.03 (ref 1–1.03)
SQUAMOUS #/AREA URNS HPF: ABNORMAL /HPF
TRIGL SERPL-MCNC: 40 MG/DL
TSH SERPL-ACNC: 3.87 MIU/L (ref 0.4–4.5)
WBC # BLD AUTO: 5.9 THOUSAND/UL (ref 3.8–10.8)
WBC #/AREA URNS HPF: ABNORMAL /HPF

## 2023-02-14 ENCOUNTER — OFFICE VISIT (OUTPATIENT)
Dept: FAMILY MEDICINE | Facility: CLINIC | Age: 53
End: 2023-02-14
Payer: MEDICARE

## 2023-02-14 VITALS
DIASTOLIC BLOOD PRESSURE: 69 MMHG | HEIGHT: 72 IN | WEIGHT: 237 LBS | BODY MASS INDEX: 32.1 KG/M2 | HEART RATE: 61 BPM | SYSTOLIC BLOOD PRESSURE: 112 MMHG

## 2023-02-14 DIAGNOSIS — N40.1 BENIGN PROSTATIC HYPERPLASIA WITH NOCTURIA: ICD-10-CM

## 2023-02-14 DIAGNOSIS — E11.9 TYPE 2 DIABETES MELLITUS WITHOUT COMPLICATION, WITHOUT LONG-TERM CURRENT USE OF INSULIN: Primary | ICD-10-CM

## 2023-02-14 DIAGNOSIS — R35.1 BENIGN PROSTATIC HYPERPLASIA WITH NOCTURIA: ICD-10-CM

## 2023-02-14 DIAGNOSIS — G25.81 RLS (RESTLESS LEGS SYNDROME): ICD-10-CM

## 2023-02-14 DIAGNOSIS — I10 HYPERTENSION, UNSPECIFIED TYPE: ICD-10-CM

## 2023-02-14 DIAGNOSIS — E78.5 HYPERLIPIDEMIA, UNSPECIFIED HYPERLIPIDEMIA TYPE: ICD-10-CM

## 2023-02-14 DIAGNOSIS — Z12.5 SCREENING FOR PROSTATE CANCER: ICD-10-CM

## 2023-02-14 DIAGNOSIS — E03.9 HYPOTHYROIDISM, UNSPECIFIED TYPE: ICD-10-CM

## 2023-02-14 PROCEDURE — 1159F MED LIST DOCD IN RCRD: CPT | Mod: CPTII,S$GLB,, | Performed by: PHYSICIAN ASSISTANT

## 2023-02-14 PROCEDURE — 3078F DIAST BP <80 MM HG: CPT | Mod: CPTII,S$GLB,, | Performed by: PHYSICIAN ASSISTANT

## 2023-02-14 PROCEDURE — 1159F PR MEDICATION LIST DOCUMENTED IN MEDICAL RECORD: ICD-10-PCS | Mod: CPTII,S$GLB,, | Performed by: PHYSICIAN ASSISTANT

## 2023-02-14 PROCEDURE — 3061F NEG MICROALBUMINURIA REV: CPT | Mod: CPTII,S$GLB,, | Performed by: PHYSICIAN ASSISTANT

## 2023-02-14 PROCEDURE — 3008F BODY MASS INDEX DOCD: CPT | Mod: CPTII,S$GLB,, | Performed by: PHYSICIAN ASSISTANT

## 2023-02-14 PROCEDURE — 3044F HG A1C LEVEL LT 7.0%: CPT | Mod: CPTII,S$GLB,, | Performed by: PHYSICIAN ASSISTANT

## 2023-02-14 PROCEDURE — 3066F PR DOCUMENTATION OF TREATMENT FOR NEPHROPATHY: ICD-10-PCS | Mod: CPTII,S$GLB,, | Performed by: PHYSICIAN ASSISTANT

## 2023-02-14 PROCEDURE — 3074F SYST BP LT 130 MM HG: CPT | Mod: CPTII,S$GLB,, | Performed by: PHYSICIAN ASSISTANT

## 2023-02-14 PROCEDURE — 3078F PR MOST RECENT DIASTOLIC BLOOD PRESSURE < 80 MM HG: ICD-10-PCS | Mod: CPTII,S$GLB,, | Performed by: PHYSICIAN ASSISTANT

## 2023-02-14 PROCEDURE — 3074F PR MOST RECENT SYSTOLIC BLOOD PRESSURE < 130 MM HG: ICD-10-PCS | Mod: CPTII,S$GLB,, | Performed by: PHYSICIAN ASSISTANT

## 2023-02-14 PROCEDURE — 3061F PR NEG MICROALBUMINURIA RESULT DOCUMENTED/REVIEW: ICD-10-PCS | Mod: CPTII,S$GLB,, | Performed by: PHYSICIAN ASSISTANT

## 2023-02-14 PROCEDURE — 99214 OFFICE O/P EST MOD 30 MIN: CPT | Mod: S$GLB,,, | Performed by: PHYSICIAN ASSISTANT

## 2023-02-14 PROCEDURE — 3044F PR MOST RECENT HEMOGLOBIN A1C LEVEL <7.0%: ICD-10-PCS | Mod: CPTII,S$GLB,, | Performed by: PHYSICIAN ASSISTANT

## 2023-02-14 PROCEDURE — 3066F NEPHROPATHY DOC TX: CPT | Mod: CPTII,S$GLB,, | Performed by: PHYSICIAN ASSISTANT

## 2023-02-14 PROCEDURE — 3008F PR BODY MASS INDEX (BMI) DOCUMENTED: ICD-10-PCS | Mod: CPTII,S$GLB,, | Performed by: PHYSICIAN ASSISTANT

## 2023-02-14 PROCEDURE — 99214 PR OFFICE/OUTPT VISIT, EST, LEVL IV, 30-39 MIN: ICD-10-PCS | Mod: S$GLB,,, | Performed by: PHYSICIAN ASSISTANT

## 2023-02-14 RX ORDER — ATORVASTATIN CALCIUM 20 MG/1
20 TABLET, FILM COATED ORAL NIGHTLY
Qty: 90 TABLET | Refills: 1 | Status: SHIPPED | OUTPATIENT
Start: 2023-02-14 | End: 2023-05-23 | Stop reason: SDUPTHER

## 2023-02-14 RX ORDER — METFORMIN HYDROCHLORIDE 500 MG/1
500 TABLET ORAL 3 TIMES DAILY
Qty: 270 TABLET | Refills: 1 | Status: SHIPPED | OUTPATIENT
Start: 2023-02-14 | End: 2023-05-23 | Stop reason: SDUPTHER

## 2023-02-14 RX ORDER — ROPINIROLE 2 MG/1
2 TABLET, FILM COATED ORAL NIGHTLY
Qty: 90 TABLET | Refills: 1 | Status: SHIPPED | OUTPATIENT
Start: 2023-02-14 | End: 2023-05-23 | Stop reason: SDUPTHER

## 2023-02-14 RX ORDER — LEVOTHYROXINE SODIUM 75 UG/1
75 TABLET ORAL DAILY
Qty: 90 TABLET | Refills: 1 | Status: SHIPPED | OUTPATIENT
Start: 2023-02-14 | End: 2023-05-23 | Stop reason: SDUPTHER

## 2023-02-14 RX ORDER — TAMSULOSIN HYDROCHLORIDE 0.4 MG/1
0.4 CAPSULE ORAL DAILY
Qty: 90 CAPSULE | Refills: 1 | Status: SHIPPED | OUTPATIENT
Start: 2023-02-14 | End: 2023-05-23 | Stop reason: SINTOL

## 2023-02-14 RX ORDER — LISINOPRIL 10 MG/1
10 TABLET ORAL DAILY
Qty: 90 TABLET | Refills: 1 | Status: SHIPPED | OUTPATIENT
Start: 2023-02-14 | End: 2023-05-23 | Stop reason: SDUPTHER

## 2023-02-14 NOTE — PROGRESS NOTES
SUBJECTIVE:    Patient ID: John Corrales is a 52 y.o. male.    Chief Complaint: Follow-up (Follow up for med refills//brought med bottles//patient will schedule diabetic eye exam with his dr.//foot exam ordered//tc)    This is a 52 year old male who presents today for regular follow up. A1c at 6%. Blood pressure well managed. Patient reports waking at night to urinate more than usual, decreased flow as well. Patient has not experienced palpitations like he previously was with the atrial flutter. Denies chest pain, shortness of breath, or bowel habits.     His only concern today is regarding nocturia at 2-3 times per night. Flow issues. Does not think he is able to completely empty the bladder. Dad with prostate ca later in life. But has not been screened himself.      Telephone on 02/02/2023   Component Date Value Ref Range Status    WBC 02/03/2023 5.9  3.8 - 10.8 Thousand/uL Final    RBC 02/03/2023 5.30  4.20 - 5.80 Million/uL Final    Hemoglobin 02/03/2023 13.6  13.2 - 17.1 g/dL Final    Hematocrit 02/03/2023 42.6  38.5 - 50.0 % Final    MCV 02/03/2023 80.4  80.0 - 100.0 fL Final    MCH 02/03/2023 25.7 (L)  27.0 - 33.0 pg Final    MCHC 02/03/2023 31.9 (L)  32.0 - 36.0 g/dL Final    RDW 02/03/2023 14.0  11.0 - 15.0 % Final    Platelets 02/03/2023 224  140 - 400 Thousand/uL Final    MPV 02/03/2023 11.9  7.5 - 12.5 fL Final    Neutrophils, Abs 02/03/2023 3,705  1,500 - 7,800 cells/uL Final    Lymph # 02/03/2023 1,463  850 - 3,900 cells/uL Final    Mono # 02/03/2023 543  200 - 950 cells/uL Final    Eos # 02/03/2023 159  15 - 500 cells/uL Final    Baso # 02/03/2023 30  0 - 200 cells/uL Final    Neutrophils Relative 02/03/2023 62.8  % Final    Lymph % 02/03/2023 24.8  % Final    Mono % 02/03/2023 9.2  % Final    Eosinophil % 02/03/2023 2.7  % Final    Basophil % 02/03/2023 0.5  % Final    Glucose 02/03/2023 100 (H)  65 - 99 mg/dL Final    BUN 02/03/2023 20  7 - 25 mg/dL Final    Creatinine 02/03/2023 1.05  0.70  - 1.30 mg/dL Final    eGFR 02/03/2023 86  > OR = 60 mL/min/1.73m2 Final    BUN/Creatinine Ratio 02/03/2023 NOT APPLICABLE  6 - 22 (calc) Final    Sodium 02/03/2023 141  135 - 146 mmol/L Final    Potassium 02/03/2023 4.1  3.5 - 5.3 mmol/L Final    Chloride 02/03/2023 102  98 - 110 mmol/L Final    CO2 02/03/2023 29  20 - 32 mmol/L Final    Calcium 02/03/2023 9.2  8.6 - 10.3 mg/dL Final    Total Protein 02/03/2023 7.0  6.1 - 8.1 g/dL Final    Albumin 02/03/2023 4.5  3.6 - 5.1 g/dL Final    Globulin, Total 02/03/2023 2.5  1.9 - 3.7 g/dL (calc) Final    Albumin/Globulin Ratio 02/03/2023 1.8  1.0 - 2.5 (calc) Final    Total Bilirubin 02/03/2023 0.6  0.2 - 1.2 mg/dL Final    Alkaline Phosphatase 02/03/2023 62  35 - 144 U/L Final    AST 02/03/2023 15  10 - 35 U/L Final    ALT 02/03/2023 17  9 - 46 U/L Final    Hemoglobin A1C 02/03/2023 6.0 (H)  <5.7 % of total Hgb Final    Cholesterol 02/03/2023 148  <200 mg/dL Final    HDL 02/03/2023 63  > OR = 40 mg/dL Final    Triglycerides 02/03/2023 40  <150 mg/dL Final    LDL Cholesterol 02/03/2023 74  mg/dL (calc) Final    HDL/Cholesterol Ratio 02/03/2023 2.3  <5.0 (calc) Final    Non HDL Chol. (LDL+VLDL) 02/03/2023 85  <130 mg/dL (calc) Final    Creatinine, Urine 02/03/2023 209  20 - 320 mg/dL Final    Microalb, Ur 02/03/2023 1.3  See Note: mg/dL Final    Microalb/Creat Ratio 02/03/2023 6  <30 mcg/mg creat Final    TSH w/reflex to FT4 02/03/2023 3.87  0.40 - 4.50 mIU/L Final    Color, UA 02/03/2023 YELLOW  YELLOW Final    Appearance, UA 02/03/2023 CLEAR  CLEAR Final    Specific Gravity, UA 02/03/2023 1.027  1.001 - 1.035 Final    pH, UA 02/03/2023 7.0  5.0 - 8.0 Final    Glucose, UA 02/03/2023 NEGATIVE  NEGATIVE Final    Bilirubin, UA 02/03/2023 NEGATIVE  NEGATIVE Final    Ketones, UA 02/03/2023 NEGATIVE  NEGATIVE Final    Occult Blood UA 02/03/2023 NEGATIVE  NEGATIVE Final    Protein, UA 02/03/2023 TRACE (A)  NEGATIVE Final    Nitrite, UA 02/03/2023 NEGATIVE  NEGATIVE Final     Leukocytes, UA 02/03/2023 NEGATIVE  NEGATIVE Final    WBC Casts, UA 02/03/2023 NONE SEEN  < OR = 5 /HPF Final    RBC Casts, UA 02/03/2023 NONE SEEN  < OR = 2 /HPF Final    Squam Epithel, UA 02/03/2023 NONE SEEN  < OR = 5 /HPF Final    Bacteria, UA 02/03/2023 NONE SEEN  NONE SEEN /HPF Final    Hyaline Casts, UA 02/03/2023 NONE SEEN  NONE SEEN /LPF Final    Service Cmt: 02/03/2023    Final    Reflexive Urine Culture 02/03/2023    Final   Admission on 09/16/2022, Discharged on 09/16/2022   Component Date Value Ref Range Status    WBC 09/16/2022 6.23  3.90 - 12.70 K/uL Final    RBC 09/16/2022 4.71  4.60 - 6.20 M/uL Final    Hemoglobin 09/16/2022 10.9 (L)  14.0 - 18.0 g/dL Final    Hematocrit 09/16/2022 35.1 (L)  40.0 - 54.0 % Final    MCV 09/16/2022 75 (L)  82 - 98 fL Final    MCH 09/16/2022 23.1 (L)  27.0 - 31.0 pg Final    MCHC 09/16/2022 31.1 (L)  32.0 - 36.0 g/dL Final    RDW 09/16/2022 15.5 (H)  11.5 - 14.5 % Final    Platelets 09/16/2022 230  150 - 450 K/uL Final    MPV 09/16/2022 11.5  9.2 - 12.9 fL Final    Immature Granulocytes 09/16/2022 0.2  0.0 - 0.5 % Final    Gran # (ANC) 09/16/2022 4.1  1.8 - 7.7 K/uL Final    Immature Grans (Abs) 09/16/2022 0.01  0.00 - 0.04 K/uL Final    Lymph # 09/16/2022 1.4  1.0 - 4.8 K/uL Final    Mono # 09/16/2022 0.6  0.3 - 1.0 K/uL Final    Eos # 09/16/2022 0.1  0.0 - 0.5 K/uL Final    Baso # 09/16/2022 0.03  0.00 - 0.20 K/uL Final    nRBC 09/16/2022 0  0 /100 WBC Final    Gran % 09/16/2022 65.6  38.0 - 73.0 % Final    Lymph % 09/16/2022 22.0  18.0 - 48.0 % Final    Mono % 09/16/2022 9.5  4.0 - 15.0 % Final    Eosinophil % 09/16/2022 2.2  0.0 - 8.0 % Final    Basophil % 09/16/2022 0.5  0.0 - 1.9 % Final    Differential Method 09/16/2022 Automated   Final    Sodium 09/16/2022 138  136 - 145 mmol/L Final    Potassium 09/16/2022 3.8  3.5 - 5.1 mmol/L Final    Chloride 09/16/2022 105  95 - 110 mmol/L Final    CO2 09/16/2022 25  23 - 29 mmol/L Final    Glucose 09/16/2022 99  70 - 110  mg/dL Final    BUN 09/16/2022 18  6 - 20 mg/dL Final    Creatinine 09/16/2022 1.0  0.5 - 1.4 mg/dL Final    Calcium 09/16/2022 9.4  8.7 - 10.5 mg/dL Final    Anion Gap 09/16/2022 8  8 - 16 mmol/L Final    eGFR 09/16/2022 >60.0  >60 mL/min/1.73 m^2 Final    Prothrombin Time 09/16/2022 10.5  9.0 - 12.5 sec Final    INR 09/16/2022 1.0  0.8 - 1.2 Final    aPTT 09/16/2022 27.2  21.0 - 32.0 sec Final    POCT Glucose 09/16/2022 106  70 - 110 mg/dL Final    POCT Glucose 09/16/2022 109  70 - 110 mg/dL Final   Lab Visit on 08/29/2022   Component Date Value Ref Range Status    SARS-CoV2 (COVID-19) Qualitative P* 08/29/2022 Detected (A)  Not Detected Final    SARS-COV-2- Cycle Number 08/29/2022 17   Final       Past Medical History:   Diagnosis Date    Atrial flutter     Diabetes mellitus, type 2     General anesthetics causing adverse effect in therapeutic use 2007    During general anesthesia for back surgery , experienced severe pain, heard people talking and felt like his heart was about to explode.    GERD (gastroesophageal reflux disease)     Hyperlipidemia     Hypothyroidism     Nasal septal deviation      Past Surgical History:   Procedure Laterality Date    BACK SURGERY  01/01/2007    artificial disc L4-5    CARDIAC ELECTROPHYSIOLOGY STUDY AND ABLATION  09/16/2022    CYSTOSCOPY W/ STONE MANIPULATION  01/01/2002    Kidney stones removed    EPIDURAL STEROID INJECTION  01/31/2022    KNEE ARTHROPLASTY Right 06/15/2020    Procedure: ARTHROPLASTY, KNEE;  Surgeon: Alok Rowe MD;  Location: [facility];  Service: Orthopedics;  Laterality: Right;  SAMEERA FAYE    KNEE ARTHROSCOPY W/ DEBRIDEMENT  2000 and 2001    Right knee     Family History   Problem Relation Age of Onset    Diabetes Father     Heart disease Father     Cancer Father         prostate       Marital Status:   Alcohol History:  reports no history of alcohol use.  Tobacco History:  reports that he has quit smoking. He has never used smokeless tobacco.  Drug  History:  reports no history of drug use.    Review of patient's allergies indicates:   Allergen Reactions    Demerol [meperidine] Itching       Current Outpatient Medications:     omeprazole (PRILOSEC) 10 MG capsule, Take 10 mg by mouth once daily., Disp: , Rfl:     aspirin (ECOTRIN) 81 MG EC tablet, Take 1 tablet (81 mg total) by mouth once daily., Disp: , Rfl: 0    atorvastatin (LIPITOR) 20 MG tablet, Take 1 tablet (20 mg total) by mouth every evening., Disp: 90 tablet, Rfl: 1    levothyroxine (SYNTHROID) 75 MCG tablet, Take 1 tablet (75 mcg total) by mouth once daily., Disp: 90 tablet, Rfl: 1    lisinopriL 10 MG tablet, Take 1 tablet (10 mg total) by mouth once daily., Disp: 90 tablet, Rfl: 1    metFORMIN (GLUCOPHAGE) 500 MG tablet, Take 1 tablet (500 mg total) by mouth 3 (three) times daily., Disp: 270 tablet, Rfl: 1    rOPINIRole (REQUIP) 2 MG tablet, Take 1 tablet (2 mg total) by mouth every evening., Disp: 90 tablet, Rfl: 1    tamsulosin (FLOMAX) 0.4 mg Cap, Take 1 capsule (0.4 mg total) by mouth once daily., Disp: 90 capsule, Rfl: 1    Review of Systems   Constitutional:  Negative for activity change, fatigue, fever and unexpected weight change.   HENT:  Negative for congestion.    Respiratory:  Negative for apnea, cough, chest tightness and shortness of breath.    Cardiovascular:  Negative for chest pain and palpitations.   Gastrointestinal:  Negative for abdominal distention, abdominal pain, constipation and diarrhea.   Endocrine: Positive for polyuria.   Genitourinary:  Positive for urgency. Negative for difficulty urinating and dysuria.   Musculoskeletal:  Negative for arthralgias and back pain.   Neurological:  Negative for dizziness, weakness, light-headedness and headaches.        Objective:      Vitals:    02/14/23 1124   BP: 112/69   Pulse: 61   Weight: 107.5 kg (237 lb)   Height: 6' (1.829 m)     Physical Exam  Constitutional:       General: He is not in acute distress.     Appearance: He is  well-developed.   HENT:      Head: Normocephalic and atraumatic.   Eyes:      Pupils: Pupils are equal, round, and reactive to light.   Neck:      Thyroid: No thyromegaly.   Cardiovascular:      Rate and Rhythm: Normal rate and regular rhythm.      Heart sounds: No murmur heard.    No friction rub. No gallop.   Pulmonary:      Effort: Pulmonary effort is normal.      Breath sounds: Normal breath sounds. No wheezing, rhonchi or rales.   Abdominal:      General: Bowel sounds are normal. There is no distension.      Palpations: Abdomen is soft.      Tenderness: There is no abdominal tenderness.   Musculoskeletal:         General: Normal range of motion.      Cervical back: Normal range of motion and neck supple.   Skin:     General: Skin is warm and dry.      Findings: No erythema or rash.   Neurological:      Mental Status: He is alert and oriented to person, place, and time.      Cranial Nerves: No cranial nerve deficit.         Assessment:       1. Type 2 diabetes mellitus without complication, without long-term current use of insulin    2. Hyperlipidemia, unspecified hyperlipidemia type    3. Hypothyroidism, unspecified type    4. Hypertension, unspecified type    5. RLS (restless legs syndrome)    6. Screening for prostate cancer    7. Benign prostatic hyperplasia with nocturia           Plan:       Type 2 diabetes mellitus without complication, without long-term current use of insulin  Comments:  A1c at 6% and well managed. continue as is.  Orders:  -     Foot Exam Performed  -     lisinopriL 10 MG tablet; Take 1 tablet (10 mg total) by mouth once daily.  Dispense: 90 tablet; Refill: 1  -     metFORMIN (GLUCOPHAGE) 500 MG tablet; Take 1 tablet (500 mg total) by mouth 3 (three) times daily.  Dispense: 270 tablet; Refill: 1    Hyperlipidemia, unspecified hyperlipidemia type  -     atorvastatin (LIPITOR) 20 MG tablet; Take 1 tablet (20 mg total) by mouth every evening.  Dispense: 90 tablet; Refill:  1    Hypothyroidism, unspecified type  Comments:  Continue as is  Orders:  -     levothyroxine (SYNTHROID) 75 MCG tablet; Take 1 tablet (75 mcg total) by mouth once daily.  Dispense: 90 tablet; Refill: 1    Hypertension, unspecified type  Comments:  Better managed today. continue as is.  Orders:  -     lisinopriL 10 MG tablet; Take 1 tablet (10 mg total) by mouth once daily.  Dispense: 90 tablet; Refill: 1    RLS (restless legs syndrome)  Comments:  trial with ropinirol did great! will titrate up as effective  Orders:  -     rOPINIRole (REQUIP) 2 MG tablet; Take 1 tablet (2 mg total) by mouth every evening.  Dispense: 90 tablet; Refill: 1    Screening for prostate cancer  Comments:  add psa and will get drawn now.  Orders:  -     PSA, Screening; Future; Expected date: 02/14/2023    Benign prostatic hyperplasia with nocturia  Comments:  No major sxs but sometimes bothersome at night. would trial with flomax, check PSA and if persistent we would evaluate further with urology.  Orders:  -     tamsulosin (FLOMAX) 0.4 mg Cap; Take 1 capsule (0.4 mg total) by mouth once daily.  Dispense: 90 capsule; Refill: 1      Follow up in about 3 months (around 5/14/2023) for f/u medication.        2/14/2023 Gavin Sandoval PA-C

## 2023-02-15 LAB — PSA SERPL-MCNC: 0.73 NG/ML

## 2023-05-12 ENCOUNTER — TELEPHONE (OUTPATIENT)
Dept: FAMILY MEDICINE | Facility: CLINIC | Age: 53
End: 2023-05-12

## 2023-05-12 DIAGNOSIS — E78.5 HYPERLIPIDEMIA, UNSPECIFIED HYPERLIPIDEMIA TYPE: ICD-10-CM

## 2023-05-12 DIAGNOSIS — E11.9 TYPE 2 DIABETES MELLITUS WITHOUT COMPLICATION, WITHOUT LONG-TERM CURRENT USE OF INSULIN: Primary | ICD-10-CM

## 2023-05-12 DIAGNOSIS — Z79.899 ENCOUNTER FOR LONG-TERM (CURRENT) USE OF OTHER MEDICATIONS: ICD-10-CM

## 2023-05-23 ENCOUNTER — OFFICE VISIT (OUTPATIENT)
Dept: FAMILY MEDICINE | Facility: CLINIC | Age: 53
End: 2023-05-23
Payer: MEDICARE

## 2023-05-23 ENCOUNTER — TELEPHONE (OUTPATIENT)
Dept: FAMILY MEDICINE | Facility: CLINIC | Age: 53
End: 2023-05-23

## 2023-05-23 VITALS
SYSTOLIC BLOOD PRESSURE: 134 MMHG | DIASTOLIC BLOOD PRESSURE: 70 MMHG | HEIGHT: 72 IN | HEART RATE: 72 BPM | WEIGHT: 243 LBS | BODY MASS INDEX: 32.91 KG/M2

## 2023-05-23 DIAGNOSIS — I10 HYPERTENSION, UNSPECIFIED TYPE: ICD-10-CM

## 2023-05-23 DIAGNOSIS — G25.81 RLS (RESTLESS LEGS SYNDROME): ICD-10-CM

## 2023-05-23 DIAGNOSIS — E78.5 HYPERLIPIDEMIA, UNSPECIFIED HYPERLIPIDEMIA TYPE: ICD-10-CM

## 2023-05-23 DIAGNOSIS — E03.9 HYPOTHYROIDISM, UNSPECIFIED TYPE: ICD-10-CM

## 2023-05-23 DIAGNOSIS — E11.9 TYPE 2 DIABETES MELLITUS WITHOUT COMPLICATION, WITHOUT LONG-TERM CURRENT USE OF INSULIN: ICD-10-CM

## 2023-05-23 DIAGNOSIS — I48.0 PAROXYSMAL A-FIB: Primary | ICD-10-CM

## 2023-05-23 LAB
EKG 12-LEAD: NORMAL
PR INTERVAL: NORMAL
PRT AXES: NORMAL
QRS DURATION: NORMAL
QT/QTC: NORMAL
VENTRICULAR RATE: NORMAL

## 2023-05-23 PROCEDURE — 3061F NEG MICROALBUMINURIA REV: CPT | Mod: CPTII,S$GLB,, | Performed by: PHYSICIAN ASSISTANT

## 2023-05-23 PROCEDURE — 3066F PR DOCUMENTATION OF TREATMENT FOR NEPHROPATHY: ICD-10-PCS | Mod: CPTII,S$GLB,, | Performed by: PHYSICIAN ASSISTANT

## 2023-05-23 PROCEDURE — 3044F HG A1C LEVEL LT 7.0%: CPT | Mod: CPTII,S$GLB,, | Performed by: PHYSICIAN ASSISTANT

## 2023-05-23 PROCEDURE — 3008F PR BODY MASS INDEX (BMI) DOCUMENTED: ICD-10-PCS | Mod: CPTII,S$GLB,, | Performed by: PHYSICIAN ASSISTANT

## 2023-05-23 PROCEDURE — 3008F BODY MASS INDEX DOCD: CPT | Mod: CPTII,S$GLB,, | Performed by: PHYSICIAN ASSISTANT

## 2023-05-23 PROCEDURE — 99214 OFFICE O/P EST MOD 30 MIN: CPT | Mod: 25,S$GLB,, | Performed by: PHYSICIAN ASSISTANT

## 2023-05-23 PROCEDURE — 1159F MED LIST DOCD IN RCRD: CPT | Mod: CPTII,S$GLB,, | Performed by: PHYSICIAN ASSISTANT

## 2023-05-23 PROCEDURE — 93000 POCT EKG 12-LEAD: ICD-10-PCS | Mod: S$GLB,,, | Performed by: PHYSICIAN ASSISTANT

## 2023-05-23 PROCEDURE — 3066F NEPHROPATHY DOC TX: CPT | Mod: CPTII,S$GLB,, | Performed by: PHYSICIAN ASSISTANT

## 2023-05-23 PROCEDURE — 4010F ACE/ARB THERAPY RXD/TAKEN: CPT | Mod: CPTII,S$GLB,, | Performed by: PHYSICIAN ASSISTANT

## 2023-05-23 PROCEDURE — 4010F PR ACE/ARB THEARPY RXD/TAKEN: ICD-10-PCS | Mod: CPTII,S$GLB,, | Performed by: PHYSICIAN ASSISTANT

## 2023-05-23 PROCEDURE — 93000 ELECTROCARDIOGRAM COMPLETE: CPT | Mod: S$GLB,,, | Performed by: PHYSICIAN ASSISTANT

## 2023-05-23 PROCEDURE — 3044F PR MOST RECENT HEMOGLOBIN A1C LEVEL <7.0%: ICD-10-PCS | Mod: CPTII,S$GLB,, | Performed by: PHYSICIAN ASSISTANT

## 2023-05-23 PROCEDURE — 3061F PR NEG MICROALBUMINURIA RESULT DOCUMENTED/REVIEW: ICD-10-PCS | Mod: CPTII,S$GLB,, | Performed by: PHYSICIAN ASSISTANT

## 2023-05-23 PROCEDURE — 99214 PR OFFICE/OUTPT VISIT, EST, LEVL IV, 30-39 MIN: ICD-10-PCS | Mod: 25,S$GLB,, | Performed by: PHYSICIAN ASSISTANT

## 2023-05-23 PROCEDURE — 3075F PR MOST RECENT SYSTOLIC BLOOD PRESS GE 130-139MM HG: ICD-10-PCS | Mod: CPTII,S$GLB,, | Performed by: PHYSICIAN ASSISTANT

## 2023-05-23 PROCEDURE — 3078F DIAST BP <80 MM HG: CPT | Mod: CPTII,S$GLB,, | Performed by: PHYSICIAN ASSISTANT

## 2023-05-23 PROCEDURE — 3075F SYST BP GE 130 - 139MM HG: CPT | Mod: CPTII,S$GLB,, | Performed by: PHYSICIAN ASSISTANT

## 2023-05-23 PROCEDURE — 1159F PR MEDICATION LIST DOCUMENTED IN MEDICAL RECORD: ICD-10-PCS | Mod: CPTII,S$GLB,, | Performed by: PHYSICIAN ASSISTANT

## 2023-05-23 PROCEDURE — 3078F PR MOST RECENT DIASTOLIC BLOOD PRESSURE < 80 MM HG: ICD-10-PCS | Mod: CPTII,S$GLB,, | Performed by: PHYSICIAN ASSISTANT

## 2023-05-23 RX ORDER — ATORVASTATIN CALCIUM 20 MG/1
20 TABLET, FILM COATED ORAL NIGHTLY
Qty: 90 TABLET | Refills: 1 | Status: SHIPPED | OUTPATIENT
Start: 2023-05-23 | End: 2023-09-26 | Stop reason: SDUPTHER

## 2023-05-23 RX ORDER — METFORMIN HYDROCHLORIDE 500 MG/1
500 TABLET ORAL 3 TIMES DAILY
Qty: 270 TABLET | Refills: 1 | Status: SHIPPED | OUTPATIENT
Start: 2023-05-23 | End: 2023-09-26 | Stop reason: SDUPTHER

## 2023-05-23 RX ORDER — ROPINIROLE 2 MG/1
2 TABLET, FILM COATED ORAL NIGHTLY
Qty: 90 TABLET | Refills: 1 | Status: SHIPPED | OUTPATIENT
Start: 2023-05-23 | End: 2023-09-26 | Stop reason: SDUPTHER

## 2023-05-23 RX ORDER — LEVOTHYROXINE SODIUM 75 UG/1
75 TABLET ORAL DAILY
Qty: 90 TABLET | Refills: 1 | Status: SHIPPED | OUTPATIENT
Start: 2023-05-23 | End: 2023-09-26 | Stop reason: SDUPTHER

## 2023-05-23 RX ORDER — LISINOPRIL 10 MG/1
10 TABLET ORAL DAILY
Qty: 90 TABLET | Refills: 1 | Status: SHIPPED | OUTPATIENT
Start: 2023-05-23 | End: 2023-09-26 | Stop reason: SDUPTHER

## 2023-05-23 NOTE — TELEPHONE ENCOUNTER
----- Message from Celia Lynn MA sent at 5/23/2023 12:12 PM CDT -----  Regarding: FW: 4 month f/u    ----- Message -----  From: Rosa Dewitt MA  Sent: 5/23/2023  11:49 AM CDT  To: Ralf Saunders MD  Subject: 4 month f/u                                      [phone] pt needs a 4 month f/u with  please call the pt when a slot is assigned

## 2023-05-23 NOTE — TELEPHONE ENCOUNTER
----- Message from Soledad Cr MA sent at 5/23/2023  2:25 PM CDT -----  Regarding: retunring your call  Patient returning guy call for appt.  [phone]

## 2023-05-23 NOTE — PROGRESS NOTES
SUBJECTIVE:    Patient ID: John Corrales is a 53 y.o. male.    Chief Complaint: Diabetes (3mth, went over meds verbally, Eye exam pt will sched// SW)    This is a 53-year-old male who presents today for regular diabetic checkup.  A1c very well managed at 6.3%. Was not able to tolerate medication due to retrograde ejaculation sxs. And now actually finds that he is only getting up about 2 times at night.     Other concern today is in regards to EKG readings through his Iwatch that do indicate runs of afib as recent as 5/19. He was feeling chest palpitations every time that he ran the ekg strip.       Telephone on 05/12/2023   Component Date Value Ref Range Status    Hemoglobin A1C 05/22/2023 6.3 (H)  <5.7 % of total Hgb Final   Office Visit on 02/14/2023   Component Date Value Ref Range Status    PROSTATE SPECIFIC ANTIGEN, SCR - Q* 02/14/2023 0.73  < OR = 4.00 ng/mL Final   Telephone on 02/02/2023   Component Date Value Ref Range Status    WBC 02/03/2023 5.9  3.8 - 10.8 Thousand/uL Final    RBC 02/03/2023 5.30  4.20 - 5.80 Million/uL Final    Hemoglobin 02/03/2023 13.6  13.2 - 17.1 g/dL Final    Hematocrit 02/03/2023 42.6  38.5 - 50.0 % Final    MCV 02/03/2023 80.4  80.0 - 100.0 fL Final    MCH 02/03/2023 25.7 (L)  27.0 - 33.0 pg Final    MCHC 02/03/2023 31.9 (L)  32.0 - 36.0 g/dL Final    RDW 02/03/2023 14.0  11.0 - 15.0 % Final    Platelets 02/03/2023 224  140 - 400 Thousand/uL Final    MPV 02/03/2023 11.9  7.5 - 12.5 fL Final    Neutrophils, Abs 02/03/2023 3,705  1,500 - 7,800 cells/uL Final    Lymph # 02/03/2023 1,463  850 - 3,900 cells/uL Final    Mono # 02/03/2023 543  200 - 950 cells/uL Final    Eos # 02/03/2023 159  15 - 500 cells/uL Final    Baso # 02/03/2023 30  0 - 200 cells/uL Final    Neutrophils Relative 02/03/2023 62.8  % Final    Lymph % 02/03/2023 24.8  % Final    Mono % 02/03/2023 9.2  % Final    Eosinophil % 02/03/2023 2.7  % Final    Basophil % 02/03/2023 0.5  % Final    Glucose 02/03/2023  100 (H)  65 - 99 mg/dL Final    BUN 02/03/2023 20  7 - 25 mg/dL Final    Creatinine 02/03/2023 1.05  0.70 - 1.30 mg/dL Final    eGFR 02/03/2023 86  > OR = 60 mL/min/1.73m2 Final    BUN/Creatinine Ratio 02/03/2023 NOT APPLICABLE  6 - 22 (calc) Final    Sodium 02/03/2023 141  135 - 146 mmol/L Final    Potassium 02/03/2023 4.1  3.5 - 5.3 mmol/L Final    Chloride 02/03/2023 102  98 - 110 mmol/L Final    CO2 02/03/2023 29  20 - 32 mmol/L Final    Calcium 02/03/2023 9.2  8.6 - 10.3 mg/dL Final    Total Protein 02/03/2023 7.0  6.1 - 8.1 g/dL Final    Albumin 02/03/2023 4.5  3.6 - 5.1 g/dL Final    Globulin, Total 02/03/2023 2.5  1.9 - 3.7 g/dL (calc) Final    Albumin/Globulin Ratio 02/03/2023 1.8  1.0 - 2.5 (calc) Final    Total Bilirubin 02/03/2023 0.6  0.2 - 1.2 mg/dL Final    Alkaline Phosphatase 02/03/2023 62  35 - 144 U/L Final    AST 02/03/2023 15  10 - 35 U/L Final    ALT 02/03/2023 17  9 - 46 U/L Final    Hemoglobin A1C 02/03/2023 6.0 (H)  <5.7 % of total Hgb Final    Cholesterol 02/03/2023 148  <200 mg/dL Final    HDL 02/03/2023 63  > OR = 40 mg/dL Final    Triglycerides 02/03/2023 40  <150 mg/dL Final    LDL Cholesterol 02/03/2023 74  mg/dL (calc) Final    HDL/Cholesterol Ratio 02/03/2023 2.3  <5.0 (calc) Final    Non HDL Chol. (LDL+VLDL) 02/03/2023 85  <130 mg/dL (calc) Final    Creatinine, Urine 02/03/2023 209  20 - 320 mg/dL Final    Microalb, Ur 02/03/2023 1.3  See Note: mg/dL Final    Microalb/Creat Ratio 02/03/2023 6  <30 mcg/mg creat Final    TSH w/reflex to FT4 02/03/2023 3.87  0.40 - 4.50 mIU/L Final    Color, UA 02/03/2023 YELLOW  YELLOW Final    Appearance, UA 02/03/2023 CLEAR  CLEAR Final    Specific Gravity, UA 02/03/2023 1.027  1.001 - 1.035 Final    pH, UA 02/03/2023 7.0  5.0 - 8.0 Final    Glucose, UA 02/03/2023 NEGATIVE  NEGATIVE Final    Bilirubin, UA 02/03/2023 NEGATIVE  NEGATIVE Final    Ketones, UA 02/03/2023 NEGATIVE  NEGATIVE Final    Occult Blood UA 02/03/2023 NEGATIVE  NEGATIVE Final     Protein, UA 02/03/2023 TRACE (A)  NEGATIVE Final    Nitrite, UA 02/03/2023 NEGATIVE  NEGATIVE Final    Leukocytes, UA 02/03/2023 NEGATIVE  NEGATIVE Final    WBC Casts, UA 02/03/2023 NONE SEEN  < OR = 5 /HPF Final    RBC Casts, UA 02/03/2023 NONE SEEN  < OR = 2 /HPF Final    Squam Epithel, UA 02/03/2023 NONE SEEN  < OR = 5 /HPF Final    Bacteria, UA 02/03/2023 NONE SEEN  NONE SEEN /HPF Final    Hyaline Casts, UA 02/03/2023 NONE SEEN  NONE SEEN /LPF Final    Service Cmt: 02/03/2023    Final    Reflexive Urine Culture 02/03/2023    Final       Past Medical History:   Diagnosis Date    Atrial flutter     Diabetes mellitus, type 2     General anesthetics causing adverse effect in therapeutic use 2007    During general anesthesia for back surgery , experienced severe pain, heard people talking and felt like his heart was about to explode.    GERD (gastroesophageal reflux disease)     Hyperlipidemia     Hypothyroidism     Nasal septal deviation      Past Surgical History:   Procedure Laterality Date    BACK SURGERY  01/01/2007    artificial disc L4-5    CARDIAC ELECTROPHYSIOLOGY STUDY AND ABLATION  09/16/2022    CYSTOSCOPY W/ STONE MANIPULATION  01/01/2002    Kidney stones removed    EPIDURAL STEROID INJECTION  01/31/2022    KNEE ARTHROPLASTY Right 06/15/2020    Procedure: ARTHROPLASTY, KNEE;  Surgeon: Alok Rowe MD;  Location: [facility];  Service: Orthopedics;  Laterality: Right;  SAMEERA FAYE    KNEE ARTHROSCOPY W/ DEBRIDEMENT  2000 and 2001    Right knee     Family History   Problem Relation Age of Onset    Diabetes Father     Heart disease Father     Cancer Father         prostate       Marital Status:   Alcohol History:  reports no history of alcohol use.  Tobacco History:  reports that he has quit smoking. He has been exposed to tobacco smoke. He has never used smokeless tobacco.  Drug History:  reports no history of drug use.    Review of patient's allergies indicates:   Allergen Reactions    Demerol  [meperidine] Itching       Current Outpatient Medications:     omeprazole (PRILOSEC) 10 MG capsule, Take 10 mg by mouth once daily., Disp: , Rfl:     apixaban (ELIQUIS) 5 mg Tab, Take 1 tablet (5 mg total) by mouth 2 (two) times daily., Disp: 60 tablet, Rfl: 2    atorvastatin (LIPITOR) 20 MG tablet, Take 1 tablet (20 mg total) by mouth every evening., Disp: 90 tablet, Rfl: 1    levothyroxine (SYNTHROID) 75 MCG tablet, Take 1 tablet (75 mcg total) by mouth once daily., Disp: 90 tablet, Rfl: 1    lisinopriL 10 MG tablet, Take 1 tablet (10 mg total) by mouth once daily., Disp: 90 tablet, Rfl: 1    metFORMIN (GLUCOPHAGE) 500 MG tablet, Take 1 tablet (500 mg total) by mouth 3 (three) times daily., Disp: 270 tablet, Rfl: 1    rOPINIRole (REQUIP) 2 MG tablet, Take 1 tablet (2 mg total) by mouth every evening., Disp: 90 tablet, Rfl: 1    Review of Systems   Constitutional:  Negative for activity change and diaphoresis.   Respiratory:  Negative for chest tightness and shortness of breath.    Cardiovascular:  Positive for palpitations (off and on). Negative for chest pain.   Musculoskeletal:  Positive for back pain.   Neurological:  Negative for dizziness and light-headedness.   Psychiatric/Behavioral:  The patient is not nervous/anxious.         Objective:      Vitals:    05/23/23 1043   BP: 134/70   Pulse: 72   Weight: 110.2 kg (243 lb)   Height: 6' (1.829 m)     Physical Exam  Constitutional:       General: He is not in acute distress.     Appearance: He is well-developed.   HENT:      Head: Normocephalic and atraumatic.   Eyes:      Pupils: Pupils are equal, round, and reactive to light.   Neck:      Thyroid: No thyromegaly.   Cardiovascular:      Rate and Rhythm: Normal rate and regular rhythm.      Heart sounds: Normal heart sounds.   Pulmonary:      Effort: Pulmonary effort is normal.      Breath sounds: Normal breath sounds.   Abdominal:      General: Bowel sounds are normal. There is no distension.      Palpations:  Abdomen is soft.      Tenderness: There is no abdominal tenderness.   Musculoskeletal:         General: Normal range of motion.      Cervical back: Normal range of motion and neck supple.   Skin:     General: Skin is warm and dry.      Findings: No erythema or rash.   Neurological:      Mental Status: He is alert and oriented to person, place, and time.      Cranial Nerves: No cranial nerve deficit.         Assessment:       1. Paroxysmal A-fib    2. Hyperlipidemia, unspecified hyperlipidemia type    3. Hypothyroidism, unspecified type    4. Type 2 diabetes mellitus without complication, without long-term current use of insulin    5. Hypertension, unspecified type    6. RLS (restless legs syndrome)         Plan:       Paroxysmal A-fib  Comments:  appears to be in and out of afib. reviewed watch strips. EKG today sinus jc. will need to see Dr. Hinds and discuss treatment. start eliquis today.  Orders:  -     apixaban (ELIQUIS) 5 mg Tab; Take 1 tablet (5 mg total) by mouth 2 (two) times daily.  Dispense: 60 tablet; Refill: 2  -     POCT EKG 12-LEAD (Manually Resulted by Ordering Provider)    Hyperlipidemia, unspecified hyperlipidemia type  -     atorvastatin (LIPITOR) 20 MG tablet; Take 1 tablet (20 mg total) by mouth every evening.  Dispense: 90 tablet; Refill: 1    Hypothyroidism, unspecified type  Comments:  Continue as is  Orders:  -     levothyroxine (SYNTHROID) 75 MCG tablet; Take 1 tablet (75 mcg total) by mouth once daily.  Dispense: 90 tablet; Refill: 1    Type 2 diabetes mellitus without complication, without long-term current use of insulin  Comments:  A1c at 6.3% and well managed. continue as is.  Orders:  -     lisinopriL 10 MG tablet; Take 1 tablet (10 mg total) by mouth once daily.  Dispense: 90 tablet; Refill: 1  -     metFORMIN (GLUCOPHAGE) 500 MG tablet; Take 1 tablet (500 mg total) by mouth 3 (three) times daily.  Dispense: 270 tablet; Refill: 1    Hypertension, unspecified  type  Comments:  Better managed today. continue as is.  Orders:  -     lisinopriL 10 MG tablet; Take 1 tablet (10 mg total) by mouth once daily.  Dispense: 90 tablet; Refill: 1    RLS (restless legs syndrome)  Comments:  trial with ropinirol did great! will titrate up as effective  Orders:  -     rOPINIRole (REQUIP) 2 MG tablet; Take 1 tablet (2 mg total) by mouth every evening.  Dispense: 90 tablet; Refill: 1      Follow up in about 4 months (around 9/23/2023) for Diabetic Check-Up.        5/23/2023 Gavin Sandoval PA-C

## 2023-05-24 LAB
ALBUMIN SERPL-MCNC: 4.4 G/DL (ref 3.6–5.1)
ALBUMIN/GLOB SERPL: 1.9 (CALC) (ref 1–2.5)
ALP SERPL-CCNC: 62 U/L (ref 35–144)
ALT SERPL-CCNC: 15 U/L (ref 9–46)
AST SERPL-CCNC: 13 U/L (ref 10–35)
BILIRUB SERPL-MCNC: 0.6 MG/DL (ref 0.2–1.2)
BUN SERPL-MCNC: 15 MG/DL (ref 7–25)
BUN/CREAT SERPL: ABNORMAL (CALC) (ref 6–22)
CALCIUM SERPL-MCNC: 9.5 MG/DL (ref 8.6–10.3)
CHLORIDE SERPL-SCNC: 104 MMOL/L (ref 98–110)
CHOLEST SERPL-MCNC: 161 MG/DL
CHOLEST/HDLC SERPL: 2.6 (CALC)
CO2 SERPL-SCNC: 27 MMOL/L (ref 20–32)
CREAT SERPL-MCNC: 1.07 MG/DL (ref 0.7–1.3)
EGFR: 83 ML/MIN/1.73M2
GLOBULIN SER CALC-MCNC: 2.3 G/DL (CALC) (ref 1.9–3.7)
GLUCOSE SERPL-MCNC: 127 MG/DL (ref 65–99)
HBA1C MFR BLD: 6.3 % OF TOTAL HGB
HDLC SERPL-MCNC: 62 MG/DL
LDLC SERPL CALC-MCNC: 86 MG/DL (CALC)
NONHDLC SERPL-MCNC: 99 MG/DL (CALC)
POTASSIUM SERPL-SCNC: 5.2 MMOL/L (ref 3.5–5.3)
PROT SERPL-MCNC: 6.7 G/DL (ref 6.1–8.1)
SODIUM SERPL-SCNC: 142 MMOL/L (ref 135–146)
TRIGL SERPL-MCNC: 45 MG/DL

## 2023-08-14 NOTE — PROGRESS NOTES
Subjective:     HPI    I had the pleasure of seeing John Corrales in follow-up for his history of atrial flutter. Last saw me in 2/2023. He is a 53M with HTN, HLD, hypothyroidism, DM2, who presented to [facility] in 3/2022 with palpitations, dyspnea, and diaphoresis and was found to be in atrial flutter with RVR. He was rate controlled, and converted to sinus rhythm. He was discharged on eliquis and lopressor, and presents to me to discuss management options.    Mr. Corrales continues to feel occasional palpitations lasting minutes.    Echo in 3/2022 showed an EF of 60% and mild LAE.    On 9/1/2022 a CTI-line was performed. Eliquis was stopped post-procedure. At his 2/2023 visit Mr. Corrales denied recurrent palpitations. Plan at that time was PRN follow-up.    Pt states that he has had several episodes of AF noted on [employer] since purchasing it in 3/2023. Several strips from 5/2023 show what appears to be AF although sinus with PVC salvos cannot be excluded. Pt now on xarelto. Pt states that he has alerts for AF roughly every few days. Pt feeling skipped and extra beats, no sustained palpitations.    My interpretation of today's ECG is sinus rhythm at 62 bpm.    Review of Systems   Constitutional: Negative for decreased appetite, malaise/fatigue, weight gain and weight loss.   HENT:  Negative for sore throat.    Eyes:  Negative for blurred vision.   Cardiovascular:  Negative for chest pain, dyspnea on exertion, irregular heartbeat, leg swelling, near-syncope, orthopnea, palpitations, paroxysmal nocturnal dyspnea and syncope.   Respiratory:  Negative for shortness of breath.    Skin:  Negative for rash.   Musculoskeletal:  Negative for arthritis.   Gastrointestinal:  Negative for abdominal pain.   Neurological:  Negative for focal weakness.   Psychiatric/Behavioral:  Negative for altered mental status.         Objective:    Physical Exam  Vitals and nursing note reviewed.   Constitutional:       General: He is not  in acute distress.     Appearance: He is well-developed.   HENT:      Head: Normocephalic and atraumatic.   Eyes:      General: No scleral icterus.     Pupils: Pupils are equal, round, and reactive to light.   Neck:      Thyroid: No thyromegaly.   Cardiovascular:      Rate and Rhythm: Regular rhythm.      Pulses: Normal pulses.      Heart sounds: Normal heart sounds. No murmur heard.     No friction rub. No gallop.   Pulmonary:      Effort: Pulmonary effort is normal.      Breath sounds: Normal breath sounds.   Abdominal:      General: Bowel sounds are normal. There is no distension.      Palpations: Abdomen is soft.      Tenderness: There is no abdominal tenderness.   Musculoskeletal:      Cervical back: Neck supple.   Skin:     General: Skin is warm and dry.      Findings: No rash.   Neurological:      Mental Status: He is alert and oriented to person, place, and time.   Psychiatric:         Behavior: Behavior normal.           Assessment:       1. Atrial flutter, unspecified type    2. Type 2 diabetes mellitus without complication, without long-term current use of insulin    3. Acquired hypothyroidism    4. Primary hypertension    5. Mixed hyperlipidemia         Plan:       In summary, John Corrales is a 53M with symptomatic atrial flutter. He is status-post CTI-line, and is off eliquis. No history of AF although recent alerts on [employer] that he is having AF. Now on xarelto.    Plan is for a 2 week event monitor. Also instructed pt to make sure that all AF events have strips saved with them. Follow-up 2 months.    Thank you for allowing me to participate in the care of this patient. Please do not hesitate to call me with any questions or concerns.

## 2023-08-16 ENCOUNTER — OFFICE VISIT (OUTPATIENT)
Dept: CARDIOLOGY | Facility: CLINIC | Age: 53
End: 2023-08-16
Payer: MEDICARE

## 2023-08-16 VITALS
OXYGEN SATURATION: 99 % | WEIGHT: 243.81 LBS | RESPIRATION RATE: 16 BRPM | SYSTOLIC BLOOD PRESSURE: 138 MMHG | BODY MASS INDEX: 33.02 KG/M2 | HEIGHT: 72 IN | HEART RATE: 47 BPM | DIASTOLIC BLOOD PRESSURE: 80 MMHG

## 2023-08-16 DIAGNOSIS — E78.2 MIXED HYPERLIPIDEMIA: ICD-10-CM

## 2023-08-16 DIAGNOSIS — I48.92 ATRIAL FLUTTER, UNSPECIFIED TYPE: Primary | ICD-10-CM

## 2023-08-16 DIAGNOSIS — E03.9 ACQUIRED HYPOTHYROIDISM: ICD-10-CM

## 2023-08-16 DIAGNOSIS — E11.9 TYPE 2 DIABETES MELLITUS WITHOUT COMPLICATION, WITHOUT LONG-TERM CURRENT USE OF INSULIN: ICD-10-CM

## 2023-08-16 DIAGNOSIS — I10 PRIMARY HYPERTENSION: ICD-10-CM

## 2023-08-16 DIAGNOSIS — I48.0 PAROXYSMAL ATRIAL FIBRILLATION: ICD-10-CM

## 2023-08-16 PROCEDURE — 3075F SYST BP GE 130 - 139MM HG: CPT | Mod: CPTII,S$GLB,, | Performed by: INTERNAL MEDICINE

## 2023-08-16 PROCEDURE — 93005 ELECTROCARDIOGRAM TRACING: CPT | Mod: S$GLB,,, | Performed by: INTERNAL MEDICINE

## 2023-08-16 PROCEDURE — 3066F PR DOCUMENTATION OF TREATMENT FOR NEPHROPATHY: ICD-10-PCS | Mod: CPTII,S$GLB,, | Performed by: INTERNAL MEDICINE

## 2023-08-16 PROCEDURE — 3079F PR MOST RECENT DIASTOLIC BLOOD PRESSURE 80-89 MM HG: ICD-10-PCS | Mod: CPTII,S$GLB,, | Performed by: INTERNAL MEDICINE

## 2023-08-16 PROCEDURE — 3044F PR MOST RECENT HEMOGLOBIN A1C LEVEL <7.0%: ICD-10-PCS | Mod: CPTII,S$GLB,, | Performed by: INTERNAL MEDICINE

## 2023-08-16 PROCEDURE — 3075F PR MOST RECENT SYSTOLIC BLOOD PRESS GE 130-139MM HG: ICD-10-PCS | Mod: CPTII,S$GLB,, | Performed by: INTERNAL MEDICINE

## 2023-08-16 PROCEDURE — 1159F PR MEDICATION LIST DOCUMENTED IN MEDICAL RECORD: ICD-10-PCS | Mod: CPTII,S$GLB,, | Performed by: INTERNAL MEDICINE

## 2023-08-16 PROCEDURE — 4010F ACE/ARB THERAPY RXD/TAKEN: CPT | Mod: CPTII,S$GLB,, | Performed by: INTERNAL MEDICINE

## 2023-08-16 PROCEDURE — 99214 PR OFFICE/OUTPT VISIT, EST, LEVL IV, 30-39 MIN: ICD-10-PCS | Mod: S$GLB,,, | Performed by: INTERNAL MEDICINE

## 2023-08-16 PROCEDURE — 99214 OFFICE O/P EST MOD 30 MIN: CPT | Mod: S$GLB,,, | Performed by: INTERNAL MEDICINE

## 2023-08-16 PROCEDURE — 3066F NEPHROPATHY DOC TX: CPT | Mod: CPTII,S$GLB,, | Performed by: INTERNAL MEDICINE

## 2023-08-16 PROCEDURE — 93005 EKG 12-LEAD: ICD-10-PCS | Mod: S$GLB,,, | Performed by: INTERNAL MEDICINE

## 2023-08-16 PROCEDURE — 3079F DIAST BP 80-89 MM HG: CPT | Mod: CPTII,S$GLB,, | Performed by: INTERNAL MEDICINE

## 2023-08-16 PROCEDURE — 3008F PR BODY MASS INDEX (BMI) DOCUMENTED: ICD-10-PCS | Mod: CPTII,S$GLB,, | Performed by: INTERNAL MEDICINE

## 2023-08-16 PROCEDURE — 3061F NEG MICROALBUMINURIA REV: CPT | Mod: CPTII,S$GLB,, | Performed by: INTERNAL MEDICINE

## 2023-08-16 PROCEDURE — 3061F PR NEG MICROALBUMINURIA RESULT DOCUMENTED/REVIEW: ICD-10-PCS | Mod: CPTII,S$GLB,, | Performed by: INTERNAL MEDICINE

## 2023-08-16 PROCEDURE — 1159F MED LIST DOCD IN RCRD: CPT | Mod: CPTII,S$GLB,, | Performed by: INTERNAL MEDICINE

## 2023-08-16 PROCEDURE — 93010 ELECTROCARDIOGRAM REPORT: CPT | Mod: S$GLB,,, | Performed by: INTERNAL MEDICINE

## 2023-08-16 PROCEDURE — 93010 EKG 12-LEAD: ICD-10-PCS | Mod: S$GLB,,, | Performed by: INTERNAL MEDICINE

## 2023-08-16 PROCEDURE — 3008F BODY MASS INDEX DOCD: CPT | Mod: CPTII,S$GLB,, | Performed by: INTERNAL MEDICINE

## 2023-08-16 PROCEDURE — 4010F PR ACE/ARB THEARPY RXD/TAKEN: ICD-10-PCS | Mod: CPTII,S$GLB,, | Performed by: INTERNAL MEDICINE

## 2023-08-16 PROCEDURE — 3044F HG A1C LEVEL LT 7.0%: CPT | Mod: CPTII,S$GLB,, | Performed by: INTERNAL MEDICINE

## 2023-09-11 ENCOUNTER — TELEPHONE (OUTPATIENT)
Dept: CARDIOLOGY | Facility: CLINIC | Age: 53
End: 2023-09-11
Payer: MEDICARE

## 2023-09-12 ENCOUNTER — TELEPHONE (OUTPATIENT)
Dept: ELECTROPHYSIOLOGY | Facility: CLINIC | Age: 53
End: 2023-09-12
Payer: MEDICARE

## 2023-09-12 DIAGNOSIS — I48.0 PAROXYSMAL ATRIAL FIBRILLATION: Primary | ICD-10-CM

## 2023-09-12 RX ORDER — DRONEDARONE 400 MG/1
400 TABLET, FILM COATED ORAL 2 TIMES DAILY WITH MEALS
Qty: 180 TABLET | Refills: 3 | Status: SHIPPED | OUTPATIENT
Start: 2023-09-12 | End: 2023-09-20 | Stop reason: ALTCHOICE

## 2023-09-12 RX ORDER — DRONEDARONE 400 MG/1
400 TABLET, FILM COATED ORAL 2 TIMES DAILY WITH MEALS
COMMUNITY
End: 2023-09-12 | Stop reason: SDUPTHER

## 2023-09-12 NOTE — TELEPHONE ENCOUNTER
----- Message from Kehinde Hinds MD sent at 9/12/2023  1:43 PM CDT -----  Can you please let this pt know we indeed see some afib on his recent monitor. I would like to start multaq 400 mg bid. He should already be on a blood thinner. He should have an upcoming appt with me.    GP    ----- Message -----  From: Kehinde Hinds MD  Sent: 9/12/2023   7:57 AM CDT  To: Kehinde Hinds MD      
Called patient and discussed results of holter monitor. Informed patient that afib was seen on the monitor and that Dr. Hinds would like for him to remain on the Eliquis and to start Multaq 400mg PO BID. Patient verbalized understanding. Prescription sent to his [facility] pharmacy.   
12-Jan-2023

## 2023-09-19 ENCOUNTER — PATIENT MESSAGE (OUTPATIENT)
Dept: CARDIOLOGY | Facility: CLINIC | Age: 53
End: 2023-09-19
Payer: MEDICARE

## 2023-09-20 ENCOUNTER — TELEPHONE (OUTPATIENT)
Dept: ELECTROPHYSIOLOGY | Facility: CLINIC | Age: 53
End: 2023-09-20
Payer: MEDICARE

## 2023-09-20 DIAGNOSIS — I48.0 PAROXYSMAL ATRIAL FIBRILLATION: Primary | ICD-10-CM

## 2023-09-20 RX ORDER — FLECAINIDE ACETATE 100 MG/1
100 TABLET ORAL EVERY 12 HOURS
COMMUNITY
End: 2023-09-20 | Stop reason: SDUPTHER

## 2023-09-20 RX ORDER — FLECAINIDE ACETATE 100 MG/1
100 TABLET ORAL EVERY 12 HOURS
Qty: 60 TABLET | Refills: 12 | Status: SHIPPED | OUTPATIENT
Start: 2023-09-20 | End: 2023-11-03 | Stop reason: ALTCHOICE

## 2023-09-20 NOTE — TELEPHONE ENCOUNTER
Returned patients call regarding a message received about him being unable to get Multaq. Patient started on Multaq due to afib seen on his monitor. Patient called and stated that the Multaq would be $600 for a 3 month supply and patient cannot afford that. I spoke with Dr. Escobar who is covering for Dr. Hinds. Orders given for patient to not take Multaq and to start Flecainide 100mg PO twice daily. I called the patient and gave him these instructions. Patient did not start Multaq and verbalized understanding of instructions. Prescription for Flecainide sent to his pharmacy.

## 2023-09-26 ENCOUNTER — OFFICE VISIT (OUTPATIENT)
Dept: FAMILY MEDICINE | Facility: CLINIC | Age: 53
End: 2023-09-26
Attending: FAMILY MEDICINE
Payer: MEDICARE

## 2023-09-26 VITALS
WEIGHT: 239 LBS | DIASTOLIC BLOOD PRESSURE: 74 MMHG | HEIGHT: 71 IN | BODY MASS INDEX: 33.46 KG/M2 | HEART RATE: 60 BPM | SYSTOLIC BLOOD PRESSURE: 110 MMHG

## 2023-09-26 DIAGNOSIS — Z96.651 HISTORY OF TOTAL RIGHT KNEE REPLACEMENT: ICD-10-CM

## 2023-09-26 DIAGNOSIS — E11.9 TYPE 2 DIABETES MELLITUS WITHOUT COMPLICATION, WITHOUT LONG-TERM CURRENT USE OF INSULIN: ICD-10-CM

## 2023-09-26 DIAGNOSIS — K21.9 GASTROESOPHAGEAL REFLUX DISEASE, UNSPECIFIED WHETHER ESOPHAGITIS PRESENT: ICD-10-CM

## 2023-09-26 DIAGNOSIS — I48.3 TYPICAL ATRIAL FLUTTER: ICD-10-CM

## 2023-09-26 DIAGNOSIS — Z23 NEED FOR VACCINATION: ICD-10-CM

## 2023-09-26 DIAGNOSIS — I10 HYPERTENSION, UNSPECIFIED TYPE: ICD-10-CM

## 2023-09-26 DIAGNOSIS — E03.9 HYPOTHYROIDISM, UNSPECIFIED TYPE: ICD-10-CM

## 2023-09-26 DIAGNOSIS — G25.81 RLS (RESTLESS LEGS SYNDROME): ICD-10-CM

## 2023-09-26 DIAGNOSIS — E11.9 TYPE 2 DIABETES MELLITUS WITHOUT COMPLICATION, WITHOUT LONG-TERM CURRENT USE OF INSULIN: Primary | ICD-10-CM

## 2023-09-26 DIAGNOSIS — E78.5 HYPERLIPIDEMIA, UNSPECIFIED HYPERLIPIDEMIA TYPE: ICD-10-CM

## 2023-09-26 DIAGNOSIS — M51.9 LUMBAR DISC DISEASE: ICD-10-CM

## 2023-09-26 DIAGNOSIS — Z12.5 SPECIAL SCREENING FOR MALIGNANT NEOPLASM OF PROSTATE: ICD-10-CM

## 2023-09-26 LAB — HBA1C MFR BLD: 6.1 %

## 2023-09-26 PROCEDURE — 3061F NEG MICROALBUMINURIA REV: CPT | Mod: CPTII,S$GLB,, | Performed by: FAMILY MEDICINE

## 2023-09-26 PROCEDURE — G0008 FLU VACCINE (QUAD) GREATER THAN OR EQUAL TO 3YO PRESERVATIVE FREE IM: ICD-10-PCS | Mod: S$GLB,,, | Performed by: FAMILY MEDICINE

## 2023-09-26 PROCEDURE — 90686 FLU VACCINE (QUAD) GREATER THAN OR EQUAL TO 3YO PRESERVATIVE FREE IM: ICD-10-PCS | Mod: S$GLB,,, | Performed by: FAMILY MEDICINE

## 2023-09-26 PROCEDURE — 99214 OFFICE O/P EST MOD 30 MIN: CPT | Mod: S$GLB,,, | Performed by: FAMILY MEDICINE

## 2023-09-26 PROCEDURE — 3074F PR MOST RECENT SYSTOLIC BLOOD PRESSURE < 130 MM HG: ICD-10-PCS | Mod: CPTII,S$GLB,, | Performed by: FAMILY MEDICINE

## 2023-09-26 PROCEDURE — 99214 PR OFFICE/OUTPT VISIT, EST, LEVL IV, 30-39 MIN: ICD-10-PCS | Mod: S$GLB,,, | Performed by: FAMILY MEDICINE

## 2023-09-26 PROCEDURE — 3044F PR MOST RECENT HEMOGLOBIN A1C LEVEL <7.0%: ICD-10-PCS | Mod: CPTII,S$GLB,, | Performed by: FAMILY MEDICINE

## 2023-09-26 PROCEDURE — 4010F ACE/ARB THERAPY RXD/TAKEN: CPT | Mod: CPTII,S$GLB,, | Performed by: FAMILY MEDICINE

## 2023-09-26 PROCEDURE — 3074F SYST BP LT 130 MM HG: CPT | Mod: CPTII,S$GLB,, | Performed by: FAMILY MEDICINE

## 2023-09-26 PROCEDURE — 3078F DIAST BP <80 MM HG: CPT | Mod: CPTII,S$GLB,, | Performed by: FAMILY MEDICINE

## 2023-09-26 PROCEDURE — 1159F MED LIST DOCD IN RCRD: CPT | Mod: CPTII,S$GLB,, | Performed by: FAMILY MEDICINE

## 2023-09-26 PROCEDURE — 3066F NEPHROPATHY DOC TX: CPT | Mod: CPTII,S$GLB,, | Performed by: FAMILY MEDICINE

## 2023-09-26 PROCEDURE — 3044F HG A1C LEVEL LT 7.0%: CPT | Mod: CPTII,S$GLB,, | Performed by: FAMILY MEDICINE

## 2023-09-26 PROCEDURE — 83036 POCT HEMOGLOBIN A1C: ICD-10-PCS | Mod: QW,,, | Performed by: FAMILY MEDICINE

## 2023-09-26 PROCEDURE — 83036 HEMOGLOBIN GLYCOSYLATED A1C: CPT | Mod: QW,,, | Performed by: FAMILY MEDICINE

## 2023-09-26 PROCEDURE — 1159F PR MEDICATION LIST DOCUMENTED IN MEDICAL RECORD: ICD-10-PCS | Mod: CPTII,S$GLB,, | Performed by: FAMILY MEDICINE

## 2023-09-26 PROCEDURE — 3066F PR DOCUMENTATION OF TREATMENT FOR NEPHROPATHY: ICD-10-PCS | Mod: CPTII,S$GLB,, | Performed by: FAMILY MEDICINE

## 2023-09-26 PROCEDURE — 4010F PR ACE/ARB THEARPY RXD/TAKEN: ICD-10-PCS | Mod: CPTII,S$GLB,, | Performed by: FAMILY MEDICINE

## 2023-09-26 PROCEDURE — 3008F BODY MASS INDEX DOCD: CPT | Mod: CPTII,S$GLB,, | Performed by: FAMILY MEDICINE

## 2023-09-26 PROCEDURE — 3078F PR MOST RECENT DIASTOLIC BLOOD PRESSURE < 80 MM HG: ICD-10-PCS | Mod: CPTII,S$GLB,, | Performed by: FAMILY MEDICINE

## 2023-09-26 PROCEDURE — 3008F PR BODY MASS INDEX (BMI) DOCUMENTED: ICD-10-PCS | Mod: CPTII,S$GLB,, | Performed by: FAMILY MEDICINE

## 2023-09-26 PROCEDURE — 3061F PR NEG MICROALBUMINURIA RESULT DOCUMENTED/REVIEW: ICD-10-PCS | Mod: CPTII,S$GLB,, | Performed by: FAMILY MEDICINE

## 2023-09-26 PROCEDURE — 90686 IIV4 VACC NO PRSV 0.5 ML IM: CPT | Mod: S$GLB,,, | Performed by: FAMILY MEDICINE

## 2023-09-26 PROCEDURE — G0008 ADMIN INFLUENZA VIRUS VAC: HCPCS | Mod: S$GLB,,, | Performed by: FAMILY MEDICINE

## 2023-09-26 RX ORDER — LISINOPRIL 10 MG/1
10 TABLET ORAL DAILY
Qty: 90 TABLET | Refills: 3 | Status: SHIPPED | OUTPATIENT
Start: 2023-09-26

## 2023-09-26 RX ORDER — LEVOTHYROXINE SODIUM 75 UG/1
75 TABLET ORAL DAILY
Qty: 90 TABLET | Refills: 3 | Status: SHIPPED | OUTPATIENT
Start: 2023-09-26 | End: 2024-03-24

## 2023-09-26 RX ORDER — ROPINIROLE 2 MG/1
2 TABLET, FILM COATED ORAL NIGHTLY
Qty: 90 TABLET | Refills: 3 | Status: SHIPPED | OUTPATIENT
Start: 2023-09-26 | End: 2024-03-28 | Stop reason: SDUPTHER

## 2023-09-26 RX ORDER — ATORVASTATIN CALCIUM 20 MG/1
20 TABLET, FILM COATED ORAL NIGHTLY
Qty: 90 TABLET | Refills: 3 | Status: SHIPPED | OUTPATIENT
Start: 2023-09-26

## 2023-09-26 RX ORDER — METFORMIN HYDROCHLORIDE 500 MG/1
500 TABLET ORAL 3 TIMES DAILY
Qty: 270 TABLET | Refills: 3 | Status: SHIPPED | OUTPATIENT
Start: 2023-09-26

## 2023-09-26 NOTE — PROGRESS NOTES
SUBJECTIVE:    Patient ID: John Corrales is a 53 y.o. male.    Chief Complaint: Diabetes (No bottles, A1c and Flu vaccine ordered, discuss about medication, need refills, abc )    53-year-old male with diabetes and atrial flutter.  He is status post ablation in September of 2022 with Dr. Hinds, [facility].  He was unable to afford Multaq so he is now on flecainide 100 mg b.i.d. Eliquis 5 mg b.i.d. still gets palpitations occasionally that he sees on his apple watch.  No syncope or prolonged tachycardia.    He has lost 4 lb recently.  Does not drink much alcohol, nonsmoker.  Does drink caffeine 1 cup of coffee and several glasses of ice tea per day.    Bowling 2 to 3 times a week for exercise    Diabetes type 2-metformin 500 mg t.i.d., well-controlled    2021-colonoscopy with Dr. Kimbrough[facility] 5 years    Needs diabetic eye exam    Interested in the shingles vaccine, flu vaccine        Office Visit on 09/26/2023   Component Date Value Ref Range Status    Hemoglobin A1C, POC 09/26/2023 6.1  % Final   Hospital Outpatient Visit on 08/17/2023   Component Date Value Ref Range Status    Holter Hookup Date 08/17/2023 [id number]   Final    Holter Hookup Time 08/17/2023 [id number]   Final    Holter Study End Date 08/17/2023 [id number]   Final    Holter Study End Time 08/17/2023 [id number]   Final    Holter Scan Date 08/17/2023 [id number]   Final    Sinus min HR 08/17/2023 34  bpm Final    Sinus max hr 08/17/2023 261  bpm Final    Sinus avg hr 08/17/2023 69  bpm Final    Afib min HR 08/17/2023 58  bpm Final    Afib max hr 08/17/2023 229  bpm Final    Afib avg hr 08/17/2023 111  bpm Final    Event Monitor Day 08/17/2023 13   Final    Holter length hours 08/17/2023 8   Final    holter length minutes 08/17/2023 0   Final    holter length dec hours 08/17/2023 320.00   Final   Telephone on 05/12/2023   Component Date Value Ref Range Status    Glucose 05/22/2023 127 (H)  65 - 99 mg/dL Final    BUN 05/22/2023 15  7 - 25 mg/dL Final     Creatinine 05/22/2023 1.07  0.70 - 1.30 mg/dL Final    eGFR 05/22/2023 83  > OR = 60 mL/min/1.73m2 Final    BUN/Creatinine Ratio 05/22/2023 NOT APPLICABLE  6 - 22 (calc) Final    Sodium 05/22/2023 142  135 - 146 mmol/L Final    Potassium 05/22/2023 5.2  3.5 - 5.3 mmol/L Final    Chloride 05/22/2023 104  98 - 110 mmol/L Final    CO2 05/22/2023 27  20 - 32 mmol/L Final    Calcium 05/22/2023 9.5  8.6 - 10.3 mg/dL Final    Total Protein 05/22/2023 6.7  6.1 - 8.1 g/dL Final    Albumin 05/22/2023 4.4  3.6 - 5.1 g/dL Final    Globulin, Total 05/22/2023 2.3  1.9 - 3.7 g/dL (calc) Final    Albumin/Globulin Ratio 05/22/2023 1.9  1.0 - 2.5 (calc) Final    Total Bilirubin 05/22/2023 0.6  0.2 - 1.2 mg/dL Final    Alkaline Phosphatase 05/22/2023 62  35 - 144 U/L Final    AST 05/22/2023 13  10 - 35 U/L Final    ALT 05/22/2023 15  9 - 46 U/L Final    Cholesterol 05/22/2023 161  <200 mg/dL Final    HDL 05/22/2023 62  > OR = 40 mg/dL Final    Triglycerides 05/22/2023 45  <150 mg/dL Final    LDL Cholesterol 05/22/2023 86  mg/dL (calc) Final    HDL/Cholesterol Ratio 05/22/2023 2.6  <5.0 (calc) Final    Non HDL Chol. (LDL+VLDL) 05/22/2023 99  <130 mg/dL (calc) Final    Hemoglobin A1C 05/22/2023 6.3 (H)  <5.7 % of total Hgb Final       Past Medical History:   Diagnosis Date    Atrial flutter     Diabetes mellitus, type 2     General anesthetics causing adverse effect in therapeutic use 2007    During general anesthesia for back surgery , experienced severe pain, heard people talking and felt like his heart was about to explode.    GERD (gastroesophageal reflux disease)     Hyperlipidemia     Hypothyroidism     Nasal septal deviation      Social History     Socioeconomic History    Marital status:    Tobacco Use    Smoking status: Former     Passive exposure: Past    Smokeless tobacco: Never   Substance and Sexual Activity    Alcohol use: No    Drug use: No     Social Determinants of Health     Financial Resource Strain: Unknown  (9/28/2022)    Overall Financial Resource Strain (CARDIA)     Difficulty of Paying Living Expenses: Patient refused   Food Insecurity: Unknown (9/28/2022)    Hunger Vital Sign     Worried About Running Out of Food in the Last Year: Patient refused     Ran Out of Food in the Last Year: Patient refused   Transportation Needs: Unknown (9/28/2022)    PRAPARE - Transportation     Lack of Transportation (Medical): Patient refused     Lack of Transportation (Non-Medical): Patient refused   Physical Activity: Unknown (5/22/2023)    Exercise Vital Sign     Days of Exercise per Week: 5 days     Minutes of Exercise per Session: Patient refused   Stress: Unknown (9/28/2022)    [employer] [place] [employer] - Occupational Stress Questionnaire     Feeling of Stress : Patient refused   Social Connections: Unknown (5/22/2023)    Social Connection and Isolation Panel [NHANES]     Frequency of Communication with Friends and Family: Patient refused     Frequency of Social Gatherings with Friends and Family: Patient refused     Active Member of Clubs or Organizations: Yes     Attends Club or Organization Meetings: More than 4 times per year     Marital Status:    Housing Stability: Unknown (9/28/2022)    Housing Stability Vital Sign     Unable to Pay for Housing in the Last Year: Patient refused     Unstable Housing in the Last Year: Patient refused     Past Surgical History:   Procedure Laterality Date    BACK SURGERY  01/01/2007    artificial disc L4-5    CARDIAC ELECTROPHYSIOLOGY STUDY AND ABLATION  09/16/2022    CYSTOSCOPY W/ STONE MANIPULATION  01/01/2002    Kidney stones removed    EPIDURAL STEROID INJECTION  01/31/2022    KNEE ARTHROPLASTY Right 06/15/2020    Procedure: ARTHROPLASTY, KNEE;  Surgeon: Alok Rowe MD;  Location: [facility];  Service: Orthopedics;  Laterality: Right;  SAMEERA FAYE    KNEE ARTHROSCOPY W/ DEBRIDEMENT  2000 and 2001    Right knee     Family History   Problem Relation Age of Onset     Diabetes Father     Heart disease Father     Cancer Father         prostate       The 10-year CVD risk score (Valeriano, et al., 2008) is: 10.5%    Values used to calculate the score:      Age: 53 years      Sex: Male      Diabetic: Yes      Tobacco smoker: No      Systolic Blood Pressure: 110 mmHg      Is BP treated: Yes      HDL Cholesterol: 62 mg/dL      Total Cholesterol: 161 mg/dL    Tests to Keep You Healthy    Eye Exam: ORDERED BUT NOT SCHEDULED  Colon Cancer Screening: Met on 9/10/2021  Last Blood Pressure <= 139/89 (9/26/2023): Yes  Last HbA1c < 8 (09/26/2023): Yes      Review of patient's allergies indicates:   Allergen Reactions    Demerol [meperidine] Itching       Current Outpatient Medications:     apixaban (ELIQUIS) 5 mg Tab, Take 1 tablet (5 mg total) by mouth 2 (two) times daily., Disp: 180 tablet, Rfl: 3    flecainide (TAMBOCOR) 100 MG Tab, Take 1 tablet (100 mg total) by mouth every 12 (twelve) hours., Disp: 60 tablet, Rfl: 12    omeprazole (PRILOSEC) 10 MG capsule, Take 10 mg by mouth once daily., Disp: , Rfl:     atorvastatin (LIPITOR) 20 MG tablet, Take 1 tablet (20 mg total) by mouth every evening., Disp: 90 tablet, Rfl: 3    levothyroxine (SYNTHROID) 75 MCG tablet, Take 1 tablet (75 mcg total) by mouth once daily., Disp: 90 tablet, Rfl: 3    lisinopriL 10 MG tablet, Take 1 tablet (10 mg total) by mouth once daily., Disp: 90 tablet, Rfl: 3    metFORMIN (GLUCOPHAGE) 500 MG tablet, Take 1 tablet (500 mg total) by mouth 3 (three) times daily., Disp: 270 tablet, Rfl: 3    rOPINIRole (REQUIP) 2 MG tablet, Take 1 tablet (2 mg total) by mouth every evening., Disp: 90 tablet, Rfl: 3    Review of Systems   Constitutional:  Negative for appetite change, chills, fatigue, fever and unexpected weight change.   HENT:  Negative for ear pain and trouble swallowing.    Eyes:  Negative for pain, discharge and visual disturbance.   Respiratory:  Negative for apnea, cough, shortness of breath and wheezing.   "  Cardiovascular:  Positive for palpitations (A flutter intermittently). Negative for chest pain and leg swelling.   Gastrointestinal:  Negative for abdominal pain, blood in stool, constipation, diarrhea, nausea, vomiting and reflux.   Endocrine: Negative for cold intolerance, heat intolerance and polydipsia.   Genitourinary:  Negative for bladder incontinence, dysuria, erectile dysfunction, frequency, hematuria, testicular pain and urgency.   Musculoskeletal:  Negative for gait problem, joint swelling and myalgias.   Neurological:  Negative for dizziness, seizures and numbness.   Psychiatric/Behavioral:  Negative for agitation, behavioral problems and hallucinations. The patient is not nervous/anxious.            Objective:      Vitals:    09/26/23 0813   BP: 110/74   Pulse: 60   Weight: 108.4 kg (239 lb)   Height: 5' 11" (1.803 m)     Physical Exam  Vitals and nursing note reviewed.   Constitutional:       General: He is not in acute distress.     Appearance: He is well-developed. He is obese. He is not toxic-appearing.   HENT:      Head: Normocephalic and atraumatic.      Right Ear: Tympanic membrane and external ear normal.      Left Ear: Tympanic membrane and external ear normal.      Nose: Nose normal.      Mouth/Throat:      Pharynx: Oropharynx is clear.   Eyes:      Pupils: Pupils are equal, round, and reactive to light.   Neck:      Thyroid: No thyromegaly.      Vascular: No carotid bruit.   Cardiovascular:      Rate and Rhythm: Normal rate and regular rhythm.      Heart sounds: Normal heart sounds. No murmur heard.  Pulmonary:      Effort: Pulmonary effort is normal.      Breath sounds: Normal breath sounds. No wheezing or rales.   Abdominal:      General: Bowel sounds are normal. There is no distension.      Palpations: Abdomen is soft.      Tenderness: There is no abdominal tenderness.   Musculoskeletal:         General: No tenderness or deformity. Normal range of motion.      Cervical back: Normal range " of motion and neck supple.      Lumbar back: Normal. No spasms.      Comments: Bends 90 degrees at  waist, shoulders and knees good range of motion without crepitance.  Right TKR has good flexion and extension, no pitting edema to lower extremities   Lymphadenopathy:      Cervical: No cervical adenopathy.   Skin:     General: Skin is warm and dry.      Findings: No rash.   Neurological:      Mental Status: He is alert and oriented to person, place, and time. Mental status is at baseline.      Cranial Nerves: No cranial nerve deficit.      Coordination: Coordination normal.      Gait: Gait normal.   Psychiatric:         Mood and Affect: Mood normal.         Behavior: Behavior normal.         Thought Content: Thought content normal.         Judgment: Judgment normal.           Assessment:       1. Type 2 diabetes mellitus without complication, without long-term current use of insulin    2. Need for vaccination    3. Hyperlipidemia, unspecified hyperlipidemia type    4. Hypothyroidism, unspecified type    5. Type 2 diabetes mellitus without complication, without long-term current use of insulin    6. Hypertension, unspecified type    7. RLS (restless legs syndrome)    8. Lumbar disc disease    9. Typical atrial flutter    10. History of total right knee replacement    11. Gastroesophageal reflux disease, unspecified whether esophagitis present         Plan:       Type 2 diabetes mellitus without complication, without long-term current use of insulin  -     Hemoglobin A1C, POCT  -     lisinopriL 10 MG tablet; Take 1 tablet (10 mg total) by mouth once daily.  Dispense: 90 tablet; Refill: 3  -     metFORMIN (GLUCOPHAGE) 500 MG tablet; Take 1 tablet (500 mg total) by mouth 3 (three) times daily.  Dispense: 270 tablet; Refill: 3  -     Ambulatory referral/consult to Ophthalmology; Future; Expected date: 10/03/2023  A1c is 6.1 showing excellent control diabetes.  Continue current medications and refer to Ophthalmology for  diabetic eye exam.  Need for vaccination  -     Influenza - Quadrivalent *Preferred* (6 months+) (PF)  Flu shot today  Hyperlipidemia, unspecified hyperlipidemia type  -     atorvastatin (LIPITOR) 20 MG tablet; Take 1 tablet (20 mg total) by mouth every evening.  Dispense: 90 tablet; Refill: 3  Cholesterol 161 TG 45 LDL 86, excellent lipid  Hypothyroidism, unspecified type  Comments:  Continue as is  Orders:  -     levothyroxine (SYNTHROID) 75 MCG tablet; Take 1 tablet (75 mcg total) by mouth once daily.  Dispense: 90 tablet; Refill: 3    Type 2 diabetes mellitus without complication, without long-term current use of insulin  Comments:   well managed. continue as is.  Orders:  -     Hemoglobin A1C, POCT  -     lisinopriL 10 MG tablet; Take 1 tablet (10 mg total) by mouth once daily.  Dispense: 90 tablet; Refill: 3  -     metFORMIN (GLUCOPHAGE) 500 MG tablet; Take 1 tablet (500 mg total) by mouth 3 (three) times daily.  Dispense: 270 tablet; Refill: 3  -     Ambulatory referral/consult to Ophthalmology; Future; Expected date: 10/03/2023    Hypertension, unspecified type  Comments:  Better managed today. continue as is.  Orders:  -     lisinopriL 10 MG tablet; Take 1 tablet (10 mg total) by mouth once daily.  Dispense: 90 tablet; Refill: 3    RLS (restless legs syndrome)  Comments:  trial with ropinirol did great! will titrate up as effective  Orders:  -     rOPINIRole (REQUIP) 2 MG tablet; Take 1 tablet (2 mg total) by mouth every evening.  Dispense: 90 tablet; Refill: 3    Lumbar disc disease    Typical atrial flutter  Currently controlled on flecainide 100 mg b.i.d., follow-up with Dr. Hinds in 1-2 months  History of total right knee replacement  Doing well  Gastroesophageal reflux disease, unspecified whether esophagitis present  Omeprazole working well OTC  Recommend Shingrix vaccine this fall at a local pharmacy    Follow up in about 6 months (around 3/26/2024), or Elvin, for Diabetic Check-Up.        9/26/2023  Ralf Saunders

## 2023-10-10 ENCOUNTER — TELEPHONE (OUTPATIENT)
Dept: CARDIOLOGY | Facility: CLINIC | Age: 53
End: 2023-10-10
Payer: MEDICARE

## 2023-10-26 ENCOUNTER — PATIENT MESSAGE (OUTPATIENT)
Dept: CARDIOLOGY | Facility: CLINIC | Age: 53
End: 2023-10-26
Payer: MEDICARE

## 2023-10-27 ENCOUNTER — PATIENT MESSAGE (OUTPATIENT)
Dept: CARDIOLOGY | Facility: CLINIC | Age: 53
End: 2023-10-27
Payer: MEDICARE

## 2023-11-03 ENCOUNTER — TELEPHONE (OUTPATIENT)
Dept: FAMILY MEDICINE | Facility: CLINIC | Age: 53
End: 2023-11-03

## 2023-11-03 ENCOUNTER — PATIENT MESSAGE (OUTPATIENT)
Dept: CARDIOLOGY | Facility: CLINIC | Age: 53
End: 2023-11-03
Payer: MEDICARE

## 2023-11-03 DIAGNOSIS — I48.3 TYPICAL ATRIAL FLUTTER: Primary | ICD-10-CM

## 2023-11-03 RX ORDER — SOTALOL HYDROCHLORIDE 80 MG/1
80 TABLET ORAL 2 TIMES DAILY
Qty: 180 TABLET | Refills: 3 | Status: SHIPPED | OUTPATIENT
Start: 2023-11-08 | End: 2024-01-10

## 2023-11-03 NOTE — TELEPHONE ENCOUNTER
Patient wanting to know if one of his new medications could be causing these symptoms or if he needs OV            Taste buds on the tip of my tongue.   Causing discomfort. Possibly new medication causing these symptoms. Fells as if you ate something that burned your tongue.       ----- Message -----       From:Nurse Palencia       Sent:11/2/2023  5:04 PM CDT         To:John Corrales    Subject:Appointment Request    Good Afternoon, what kind of pain is it? When exactly is the pain ?       ----- Message -----       From:John Corrales       Sent:11/2/2023  3:42 PM CDT         To:Gavin Sandoval PA-C    Subject:Appointment Request    Appointment Request From: John Corrales    With Provider: Gavin Sandoval PA-C [[facility]]    Preferred Date Range: Any    Preferred Times: Any Time    Reason for visit: Prolonged tongue discomfort    Comments:  What is causing this new daily discomfort.

## 2023-11-03 NOTE — TELEPHONE ENCOUNTER
Spoke to pt and let him know per Elvin verbatim below. Pt verbalized understanding and is going call Dr. Hinds's office

## 2023-11-09 ENCOUNTER — CLINICAL SUPPORT (OUTPATIENT)
Dept: CARDIOLOGY | Facility: CLINIC | Age: 53
End: 2023-11-09
Payer: MEDICARE

## 2023-11-09 ENCOUNTER — TELEPHONE (OUTPATIENT)
Dept: CARDIOLOGY | Facility: CLINIC | Age: 53
End: 2023-11-09

## 2023-11-09 ENCOUNTER — PATIENT MESSAGE (OUTPATIENT)
Dept: CARDIOLOGY | Facility: CLINIC | Age: 53
End: 2023-11-09
Payer: MEDICARE

## 2023-11-09 ENCOUNTER — TELEPHONE (OUTPATIENT)
Dept: ELECTROPHYSIOLOGY | Facility: CLINIC | Age: 53
End: 2023-11-09
Payer: MEDICARE

## 2023-11-09 DIAGNOSIS — R55 SYNCOPE AND COLLAPSE: Primary | ICD-10-CM

## 2023-11-09 PROCEDURE — 93005 EKG 12-LEAD: ICD-10-PCS | Mod: S$GLB,,, | Performed by: INTERNAL MEDICINE

## 2023-11-09 PROCEDURE — 99211 OFF/OP EST MAY X REQ PHY/QHP: CPT | Mod: S$GLB,,, | Performed by: INTERNAL MEDICINE

## 2023-11-09 PROCEDURE — 93010 EKG 12-LEAD: ICD-10-PCS | Mod: S$GLB,,, | Performed by: GENERAL PRACTICE

## 2023-11-09 PROCEDURE — 99211 PR OFFICE/OUTPT VISIT, EST, LEVL I: ICD-10-PCS | Mod: S$GLB,,, | Performed by: INTERNAL MEDICINE

## 2023-11-09 PROCEDURE — 93010 ELECTROCARDIOGRAM REPORT: CPT | Mod: S$GLB,,, | Performed by: GENERAL PRACTICE

## 2023-11-09 PROCEDURE — 93005 ELECTROCARDIOGRAM TRACING: CPT | Mod: S$GLB,,, | Performed by: INTERNAL MEDICINE

## 2023-11-09 NOTE — TELEPHONE ENCOUNTER
Reviewed EKG with Dr. Hinds. Recommended to discontinue Sotalol and follow up at next clinic visit (currently scheduled for 1/10/2024). Pt verb understanding. Encouraged patient to reach out to our office for any questions or concerns, and that if he feels the need to be seen sooner, we can coordinate a virtual visit with Dr. Hinds.

## 2023-11-09 NOTE — TELEPHONE ENCOUNTER
Spoke with patient, reports losing consciousness yesterday around 3 PM, unsure for how long. Pt refuses to go to the ER at this time. Will go to lab to have EKG performed today. Reports taking first dose of Sotalol 80mg yesterday morning and did all of his normal activities that day. According to his watch, his HR was around 40-50 bpm at the time of this episode. Instructed patient to hold medication for now.

## 2024-01-08 NOTE — PROGRESS NOTES
Subjective:     HPI    I had the pleasure of seeing John Corrales in follow-up for his history of atrial flutter. Last saw me in 8/2023. He is a 53M with HTN, HLD, hypothyroidism, DM2, who presented to [facility] in 3/2022 with palpitations, dyspnea, and diaphoresis and was found to be in atrial flutter with RVR. He was rate controlled, and converted to sinus rhythm. He was discharged on eliquis and lopressor, and presents to me to discuss management options.    Mr. Corrales continues to feel occasional palpitations lasting minutes.    Echo in 3/2022 showed an EF of 60% and mild LAE.    On 9/1/2022 a CTI-line was performed. Eliquis was stopped post-procedure. At his 2/2023 visit Mr. Corrales denied recurrent palpitations. Plan at that time was PRN follow-up.    At 8/2023 visit, pt stated that he has had several episodes of AF noted on AppleWatch since purchasing it in 3/2023. Several strips from 5/2023 showed what appeared to be AF although sinus with PVC salvos could not be excluded. Pt now on xarelto. Pt stated that he has alerts for AF roughly every few days. Pt feeling skipped and extra beats, no sustained palpitations.    Pt wore a 2 week monitor in 8/2023, showing a 4% AF burden (average rate 111 bpm, longest episode 7.5 hrs). Prescribed multaq but prohibitively expensive. Prescribed sotalol but passed out (he thought 2/2 bradycardia). Continues to have episodes of AF, longest lasting 12 hours.    Review of Systems   Constitutional: Negative for decreased appetite, malaise/fatigue, weight gain and weight loss.   HENT:  Negative for sore throat.    Eyes:  Negative for blurred vision.   Cardiovascular:  Positive for palpitations. Negative for chest pain, dyspnea on exertion, irregular heartbeat, leg swelling, near-syncope, orthopnea, paroxysmal nocturnal dyspnea and syncope.   Respiratory:  Negative for shortness of breath.    Skin:  Negative for rash.   Musculoskeletal:  Negative for arthritis.   Gastrointestinal:   Negative for abdominal pain.   Neurological:  Negative for focal weakness.   Psychiatric/Behavioral:  Negative for altered mental status.         Objective:    Physical Exam  Vitals and nursing note reviewed.   Constitutional:       General: He is not in acute distress.     Appearance: He is well-developed.   HENT:      Head: Normocephalic and atraumatic.   Eyes:      General: No scleral icterus.     Pupils: Pupils are equal, round, and reactive to light.   Neck:      Thyroid: No thyromegaly.   Cardiovascular:      Rate and Rhythm: Regular rhythm.      Pulses: Normal pulses.      Heart sounds: Normal heart sounds. No murmur heard.     No friction rub. No gallop.   Pulmonary:      Effort: Pulmonary effort is normal.      Breath sounds: Normal breath sounds.   Abdominal:      General: Bowel sounds are normal. There is no distension.      Palpations: Abdomen is soft.      Tenderness: There is no abdominal tenderness.   Musculoskeletal:      Cervical back: Neck supple.   Skin:     General: Skin is warm and dry.      Findings: No rash.   Neurological:      Mental Status: He is alert and oriented to person, place, and time.   Psychiatric:         Behavior: Behavior normal.           Assessment:       1. Atrial flutter, unspecified type    2. Type 2 diabetes mellitus without complication, without long-term current use of insulin    3. Acquired hypothyroidism    4. Primary hypertension    5. Pure hypertriglyceridemia         Plan:       In summary, John Corrales is a 53M with symptomatic atrial flutter. He is status-post CTI-line, but now has 4% AF burden. Failed sotalol, multaq prohibitvely expensive. Remains on eliquis.    We discussed in detail the pathophysiology of AF as well as the myriad of treatment options available to manage it including antiarrhythmics (tikosyn being the only reasonable option given bradycardia on sotalol) and catheter ablation. We specifically discussed the risks, benefits, indications,  and alternatives to PVI. Risks discussed include bleeding, hematoma, vascular damage, cardiac tamponade, stroke, PV stenosis, AE fistula, phrenic nerve damage, and death.  After considering his options he has decided to proceed.     CARTO. Hold eliquis AM of procedure. [place]--cancel if sinus. Post-procedure will continue eliquis. No AA post-ablation.    Thank you for allowing me to participate in the care of this patient. Please do not hesitate to call me with any questions or concerns.

## 2024-01-10 ENCOUNTER — OFFICE VISIT (OUTPATIENT)
Dept: CARDIOLOGY | Facility: CLINIC | Age: 54
End: 2024-01-10
Payer: MEDICARE

## 2024-01-10 VITALS
BODY MASS INDEX: 34.57 KG/M2 | WEIGHT: 246.94 LBS | HEIGHT: 71 IN | HEART RATE: 65 BPM | DIASTOLIC BLOOD PRESSURE: 81 MMHG | SYSTOLIC BLOOD PRESSURE: 137 MMHG

## 2024-01-10 DIAGNOSIS — E78.1 PURE HYPERTRIGLYCERIDEMIA: ICD-10-CM

## 2024-01-10 DIAGNOSIS — I10 PRIMARY HYPERTENSION: ICD-10-CM

## 2024-01-10 DIAGNOSIS — E11.9 TYPE 2 DIABETES MELLITUS WITHOUT COMPLICATION, WITHOUT LONG-TERM CURRENT USE OF INSULIN: ICD-10-CM

## 2024-01-10 DIAGNOSIS — I48.92 ATRIAL FLUTTER, UNSPECIFIED TYPE: Primary | ICD-10-CM

## 2024-01-10 DIAGNOSIS — E03.9 ACQUIRED HYPOTHYROIDISM: ICD-10-CM

## 2024-01-10 PROCEDURE — 99999 PR PBB SHADOW E&M-EST. PATIENT-LVL III: CPT | Mod: PBBFAC,,, | Performed by: INTERNAL MEDICINE

## 2024-01-10 PROCEDURE — 99215 OFFICE O/P EST HI 40 MIN: CPT | Mod: S$GLB,,, | Performed by: INTERNAL MEDICINE

## 2024-01-18 ENCOUNTER — PATIENT MESSAGE (OUTPATIENT)
Dept: ELECTROPHYSIOLOGY | Facility: CLINIC | Age: 54
End: 2024-01-18
Payer: MEDICARE

## 2024-01-18 DIAGNOSIS — I48.0 PAROXYSMAL ATRIAL FIBRILLATION: Primary | ICD-10-CM

## 2024-02-19 ENCOUNTER — PATIENT MESSAGE (OUTPATIENT)
Dept: CARDIOLOGY | Facility: CLINIC | Age: 54
End: 2024-02-19
Payer: MEDICARE

## 2024-02-20 ENCOUNTER — LAB VISIT (OUTPATIENT)
Dept: LAB | Facility: HOSPITAL | Age: 54
End: 2024-02-20
Attending: INTERNAL MEDICINE
Payer: MEDICARE

## 2024-02-20 DIAGNOSIS — I48.0 PAROXYSMAL ATRIAL FIBRILLATION: ICD-10-CM

## 2024-02-20 LAB
ANION GAP SERPL CALC-SCNC: 9 MMOL/L (ref 8–16)
APTT PPP: 28.2 SEC (ref 21–32)
BUN SERPL-MCNC: 15 MG/DL (ref 6–20)
CALCIUM SERPL-MCNC: 9.7 MG/DL (ref 8.7–10.5)
CHLORIDE SERPL-SCNC: 104 MMOL/L (ref 95–110)
CO2 SERPL-SCNC: 27 MMOL/L (ref 23–29)
CREAT SERPL-MCNC: 1.2 MG/DL (ref 0.5–1.4)
ERYTHROCYTE [DISTWIDTH] IN BLOOD BY AUTOMATED COUNT: 12.3 % (ref 11.5–14.5)
EST. GFR  (NO RACE VARIABLE): >60 ML/MIN/1.73 M^2
GLUCOSE SERPL-MCNC: 110 MG/DL (ref 70–110)
HCT VFR BLD AUTO: 45 % (ref 40–54)
HGB BLD-MCNC: 14.7 G/DL (ref 14–18)
INR PPP: 1 (ref 0.8–1.2)
MCH RBC QN AUTO: 27.9 PG (ref 27–31)
MCHC RBC AUTO-ENTMCNC: 32.7 G/DL (ref 32–36)
MCV RBC AUTO: 85 FL (ref 82–98)
PLATELET # BLD AUTO: 190 K/UL (ref 150–450)
PMV BLD AUTO: 11 FL (ref 9.2–12.9)
POTASSIUM SERPL-SCNC: 4.1 MMOL/L (ref 3.5–5.1)
PROTHROMBIN TIME: 11.3 SEC (ref 9–12.5)
RBC # BLD AUTO: 5.27 M/UL (ref 4.6–6.2)
SODIUM SERPL-SCNC: 140 MMOL/L (ref 136–145)
WBC # BLD AUTO: 6.85 K/UL (ref 3.9–12.7)

## 2024-02-20 PROCEDURE — 36415 COLL VENOUS BLD VENIPUNCTURE: CPT | Performed by: INTERNAL MEDICINE

## 2024-02-20 PROCEDURE — 85610 PROTHROMBIN TIME: CPT | Performed by: INTERNAL MEDICINE

## 2024-02-20 PROCEDURE — 85027 COMPLETE CBC AUTOMATED: CPT | Performed by: INTERNAL MEDICINE

## 2024-02-20 PROCEDURE — 85730 THROMBOPLASTIN TIME PARTIAL: CPT | Performed by: INTERNAL MEDICINE

## 2024-02-20 PROCEDURE — 80048 BASIC METABOLIC PNL TOTAL CA: CPT | Performed by: INTERNAL MEDICINE

## 2024-02-26 ENCOUNTER — ANESTHESIA EVENT (OUTPATIENT)
Dept: MEDSURG UNIT | Facility: HOSPITAL | Age: 54
End: 2024-02-26
Payer: MEDICARE

## 2024-02-26 ENCOUNTER — TELEPHONE (OUTPATIENT)
Dept: ELECTROPHYSIOLOGY | Facility: CLINIC | Age: 54
End: 2024-02-26
Payer: MEDICARE

## 2024-02-26 NOTE — TELEPHONE ENCOUNTER
Spoke to Patient    CONFIRMED procedure arrival time of 5:15 AM    Reiterated instructions including:  -Directions to check in desk  -NPO after midnight night prior to procedure  -High importance of HOLDING Eliquis and Metformin AM of procedure  -Confirmed compliance of Eliquis  -Pre-procedure LABS reviewed  -Confirmed absence of implanted device/stimulator   -Confirmed no redness, rash, irritation, or yeast infection to groin area.   -Bathe night prior and morning prior to procedure with Hibiclens solution or an antibacterial soap  -Reviewed current visitor policy    Patient verbalized understanding of above and appreciated the call.

## 2024-02-27 ENCOUNTER — HOSPITAL ENCOUNTER (OUTPATIENT)
Facility: HOSPITAL | Age: 54
Discharge: HOME | End: 2024-02-27
Attending: INTERNAL MEDICINE | Admitting: INTERNAL MEDICINE
Payer: MEDICARE

## 2024-02-27 ENCOUNTER — ANESTHESIA (OUTPATIENT)
Dept: MEDSURG UNIT | Facility: HOSPITAL | Age: 54
End: 2024-02-27
Payer: MEDICARE

## 2024-02-27 VITALS
SYSTOLIC BLOOD PRESSURE: 137 MMHG | TEMPERATURE: 97 F | OXYGEN SATURATION: 98 % | DIASTOLIC BLOOD PRESSURE: 69 MMHG | HEART RATE: 62 BPM | HEIGHT: 72 IN | RESPIRATION RATE: 18 BRPM | WEIGHT: 250 LBS | BODY MASS INDEX: 33.86 KG/M2

## 2024-02-27 DIAGNOSIS — I49.9 ARRHYTHMIA: ICD-10-CM

## 2024-02-27 DIAGNOSIS — I48.91 ATRIAL FIBRILLATION: Primary | ICD-10-CM

## 2024-02-27 DIAGNOSIS — I48.0 PAROXYSMAL ATRIAL FIBRILLATION: ICD-10-CM

## 2024-02-27 PROBLEM — T88.4XXA HARD TO INTUBATE: Status: ACTIVE | Noted: 2024-02-27

## 2024-02-27 LAB
OHS QRS DURATION: 102 MS
OHS QRS DURATION: 106 MS
OHS QTC CALCULATION: 407 MS
OHS QTC CALCULATION: 436 MS
POC ACTIVATED CLOTTING TIME K: 142 SEC (ref 74–137)
POC ACTIVATED CLOTTING TIME K: 163 SEC (ref 74–137)
POC ACTIVATED CLOTTING TIME K: 261 SEC (ref 74–137)
POC ACTIVATED CLOTTING TIME K: 298 SEC (ref 74–137)
POC ACTIVATED CLOTTING TIME K: 325 SEC (ref 74–137)
POC ACTIVATED CLOTTING TIME K: 363 SEC (ref 74–137)
POCT GLUCOSE: 131 MG/DL (ref 70–110)
POCT GLUCOSE: 156 MG/DL (ref 70–110)
SAMPLE: ABNORMAL

## 2024-02-27 PROCEDURE — C1894 INTRO/SHEATH, NON-LASER: HCPCS | Performed by: INTERNAL MEDICINE

## 2024-02-27 PROCEDURE — C1753 CATH, INTRAVAS ULTRASOUND: HCPCS | Performed by: INTERNAL MEDICINE

## 2024-02-27 PROCEDURE — 63600175 PHARM REV CODE 636 W HCPCS: Performed by: INTERNAL MEDICINE

## 2024-02-27 PROCEDURE — 93657 TX L/R ATRIAL FIB ADDL: CPT | Performed by: INTERNAL MEDICINE

## 2024-02-27 PROCEDURE — 63600175 PHARM REV CODE 636 W HCPCS: Performed by: NURSE ANESTHETIST, CERTIFIED REGISTERED

## 2024-02-27 PROCEDURE — 63600175 PHARM REV CODE 636 W HCPCS: Performed by: ANESTHESIOLOGY

## 2024-02-27 PROCEDURE — 36620 INSERTION CATHETER ARTERY: CPT | Mod: 59,,, | Performed by: ANESTHESIOLOGY

## 2024-02-27 PROCEDURE — 37000009 HC ANESTHESIA EA ADD 15 MINS: Performed by: INTERNAL MEDICINE

## 2024-02-27 PROCEDURE — 27201423 OPTIME MED/SURG SUP & DEVICES STERILE SUPPLY: Performed by: INTERNAL MEDICINE

## 2024-02-27 PROCEDURE — 25000003 PHARM REV CODE 250: Performed by: NURSE ANESTHETIST, CERTIFIED REGISTERED

## 2024-02-27 PROCEDURE — 93005 ELECTROCARDIOGRAM TRACING: CPT

## 2024-02-27 PROCEDURE — 25000003 PHARM REV CODE 250: Performed by: ANESTHESIOLOGY

## 2024-02-27 PROCEDURE — D9220A PRA ANESTHESIA: Mod: CRNA,,, | Performed by: NURSE ANESTHETIST, CERTIFIED REGISTERED

## 2024-02-27 PROCEDURE — 93010 ELECTROCARDIOGRAM REPORT: CPT | Mod: ,,, | Performed by: INTERNAL MEDICINE

## 2024-02-27 PROCEDURE — 93656 COMPRE EP EVAL ABLTJ ATR FIB: CPT | Performed by: INTERNAL MEDICINE

## 2024-02-27 PROCEDURE — 93005 ELECTROCARDIOGRAM TRACING: CPT | Mod: 59

## 2024-02-27 PROCEDURE — 37000008 HC ANESTHESIA 1ST 15 MINUTES: Performed by: INTERNAL MEDICINE

## 2024-02-27 PROCEDURE — 93656 COMPRE EP EVAL ABLTJ ATR FIB: CPT | Mod: ,,, | Performed by: INTERNAL MEDICINE

## 2024-02-27 PROCEDURE — C1732 CATH, EP, DIAG/ABL, 3D/VECT: HCPCS | Performed by: INTERNAL MEDICINE

## 2024-02-27 PROCEDURE — 93010 ELECTROCARDIOGRAM REPORT: CPT | Mod: 76,,, | Performed by: INTERNAL MEDICINE

## 2024-02-27 PROCEDURE — D9220A PRA ANESTHESIA: Mod: ANES,,, | Performed by: ANESTHESIOLOGY

## 2024-02-27 PROCEDURE — C1730 CATH, EP, 19 OR FEW ELECT: HCPCS | Performed by: INTERNAL MEDICINE

## 2024-02-27 PROCEDURE — 25000003 PHARM REV CODE 250: Performed by: INTERNAL MEDICINE

## 2024-02-27 PROCEDURE — C1766 INTRO/SHEATH,STRBLE,NON-PEEL: HCPCS | Performed by: INTERNAL MEDICINE

## 2024-02-27 PROCEDURE — 93657 TX L/R ATRIAL FIB ADDL: CPT | Mod: ,,, | Performed by: INTERNAL MEDICINE

## 2024-02-27 PROCEDURE — 82962 GLUCOSE BLOOD TEST: CPT | Performed by: INTERNAL MEDICINE

## 2024-02-27 RX ORDER — FUROSEMIDE 10 MG/ML
20 INJECTION INTRAMUSCULAR; INTRAVENOUS ONCE
Status: COMPLETED | OUTPATIENT
Start: 2024-02-27 | End: 2024-02-27

## 2024-02-27 RX ORDER — HEPARIN SOD,PORCINE/0.9 % NACL 1000/500ML
INTRAVENOUS SOLUTION INTRAVENOUS
Status: DISCONTINUED | OUTPATIENT
Start: 2024-02-27 | End: 2024-02-27 | Stop reason: HOSPADM

## 2024-02-27 RX ORDER — ROCURONIUM BROMIDE 10 MG/ML
INJECTION, SOLUTION INTRAVENOUS
Status: DISCONTINUED | OUTPATIENT
Start: 2024-02-27 | End: 2024-02-27

## 2024-02-27 RX ORDER — ONDANSETRON HYDROCHLORIDE 2 MG/ML
4 INJECTION, SOLUTION INTRAVENOUS DAILY PRN
Status: DISCONTINUED | OUTPATIENT
Start: 2024-02-27 | End: 2024-02-27 | Stop reason: HOSPADM

## 2024-02-27 RX ORDER — SODIUM CHLORIDE 0.9 % (FLUSH) 0.9 %
3 SYRINGE (ML) INJECTION
Status: DISCONTINUED | OUTPATIENT
Start: 2024-02-27 | End: 2024-02-27 | Stop reason: HOSPADM

## 2024-02-27 RX ORDER — FENTANYL CITRATE 50 UG/ML
INJECTION, SOLUTION INTRAMUSCULAR; INTRAVENOUS
Status: DISCONTINUED | OUTPATIENT
Start: 2024-02-27 | End: 2024-02-27

## 2024-02-27 RX ORDER — HEPARIN SODIUM 1000 [USP'U]/ML
INJECTION, SOLUTION INTRAVENOUS; SUBCUTANEOUS
Status: DISCONTINUED | OUTPATIENT
Start: 2024-02-27 | End: 2024-02-27

## 2024-02-27 RX ORDER — DEXMEDETOMIDINE HYDROCHLORIDE 100 UG/ML
INJECTION, SOLUTION INTRAVENOUS
Status: DISCONTINUED | OUTPATIENT
Start: 2024-02-27 | End: 2024-02-27

## 2024-02-27 RX ORDER — HYDROMORPHONE HYDROCHLORIDE 1 MG/ML
0.2 INJECTION, SOLUTION INTRAMUSCULAR; INTRAVENOUS; SUBCUTANEOUS EVERY 5 MIN PRN
Status: DISCONTINUED | OUTPATIENT
Start: 2024-02-27 | End: 2024-02-27 | Stop reason: HOSPADM

## 2024-02-27 RX ORDER — LIDOCAINE HYDROCHLORIDE 20 MG/ML
INJECTION INTRAVENOUS
Status: DISCONTINUED | OUTPATIENT
Start: 2024-02-27 | End: 2024-02-27

## 2024-02-27 RX ORDER — HEPARIN SODIUM 10000 [USP'U]/100ML
INJECTION, SOLUTION INTRAVENOUS CONTINUOUS PRN
Status: DISCONTINUED | OUTPATIENT
Start: 2024-02-27 | End: 2024-02-27

## 2024-02-27 RX ORDER — PROPOFOL 10 MG/ML
VIAL (ML) INTRAVENOUS
Status: DISCONTINUED | OUTPATIENT
Start: 2024-02-27 | End: 2024-02-27

## 2024-02-27 RX ORDER — FUROSEMIDE 20 MG/1
20 TABLET ORAL DAILY PRN
Qty: 20 TABLET | Refills: 0 | Status: SHIPPED | OUTPATIENT
Start: 2024-02-27 | End: 2024-05-13

## 2024-02-27 RX ORDER — KETOROLAC TROMETHAMINE 15 MG/ML
15 INJECTION, SOLUTION INTRAMUSCULAR; INTRAVENOUS ONCE
Status: COMPLETED | OUTPATIENT
Start: 2024-02-27 | End: 2024-02-27

## 2024-02-27 RX ORDER — ACETAMINOPHEN 325 MG/1
650 TABLET ORAL EVERY 4 HOURS PRN
Status: DISCONTINUED | OUTPATIENT
Start: 2024-02-27 | End: 2024-02-27 | Stop reason: HOSPADM

## 2024-02-27 RX ORDER — MIDAZOLAM HYDROCHLORIDE 1 MG/ML
INJECTION INTRAMUSCULAR; INTRAVENOUS
Status: DISCONTINUED | OUTPATIENT
Start: 2024-02-27 | End: 2024-02-27

## 2024-02-27 RX ORDER — PROTAMINE SULFATE 10 MG/ML
INJECTION, SOLUTION INTRAVENOUS
Status: DISCONTINUED | OUTPATIENT
Start: 2024-02-27 | End: 2024-02-27

## 2024-02-27 RX ORDER — HALOPERIDOL 5 MG/ML
0.5 INJECTION INTRAMUSCULAR EVERY 10 MIN PRN
Status: DISCONTINUED | OUTPATIENT
Start: 2024-02-27 | End: 2024-02-27 | Stop reason: HOSPADM

## 2024-02-27 RX ORDER — SUCCINYLCHOLINE CHLORIDE 20 MG/ML
INJECTION INTRAMUSCULAR; INTRAVENOUS
Status: DISCONTINUED | OUTPATIENT
Start: 2024-02-27 | End: 2024-02-27

## 2024-02-27 RX ORDER — OXYCODONE AND ACETAMINOPHEN 5; 325 MG/1; MG/1
1 TABLET ORAL
Status: DISCONTINUED | OUTPATIENT
Start: 2024-02-27 | End: 2024-02-27 | Stop reason: HOSPADM

## 2024-02-27 RX ORDER — DEXAMETHASONE SODIUM PHOSPHATE 4 MG/ML
INJECTION, SOLUTION INTRA-ARTICULAR; INTRALESIONAL; INTRAMUSCULAR; INTRAVENOUS; SOFT TISSUE
Status: DISCONTINUED | OUTPATIENT
Start: 2024-02-27 | End: 2024-02-27

## 2024-02-27 RX ORDER — LIDOCAINE HYDROCHLORIDE 20 MG/ML
INJECTION, SOLUTION INFILTRATION; PERINEURAL
Status: DISCONTINUED | OUTPATIENT
Start: 2024-02-27 | End: 2024-02-27 | Stop reason: HOSPADM

## 2024-02-27 RX ORDER — ONDANSETRON HYDROCHLORIDE 2 MG/ML
INJECTION, SOLUTION INTRAVENOUS
Status: DISCONTINUED | OUTPATIENT
Start: 2024-02-27 | End: 2024-02-27

## 2024-02-27 RX ADMIN — PROTAMINE SULFATE 85 MG: 10 INJECTION, SOLUTION INTRAVENOUS at 09:02

## 2024-02-27 RX ADMIN — HYDROMORPHONE HYDROCHLORIDE 0.2 MG: 1 INJECTION, SOLUTION INTRAMUSCULAR; INTRAVENOUS; SUBCUTANEOUS at 11:02

## 2024-02-27 RX ADMIN — DEXMEDETOMIDINE 12 MCG: 100 INJECTION, SOLUTION, CONCENTRATE INTRAVENOUS at 10:02

## 2024-02-27 RX ADMIN — SODIUM CHLORIDE 0.3 MCG/KG/MIN: 9 INJECTION, SOLUTION INTRAVENOUS at 08:02

## 2024-02-27 RX ADMIN — PROPOFOL 150 MG: 10 INJECTION, EMULSION INTRAVENOUS at 07:02

## 2024-02-27 RX ADMIN — SODIUM CHLORIDE, SODIUM GLUCONATE, SODIUM ACETATE, POTASSIUM CHLORIDE, MAGNESIUM CHLORIDE, SODIUM PHOSPHATE, DIBASIC, AND POTASSIUM PHOSPHATE: .53; .5; .37; .037; .03; .012; .00082 INJECTION, SOLUTION INTRAVENOUS at 07:02

## 2024-02-27 RX ADMIN — FENTANYL CITRATE 50 MCG: 50 INJECTION, SOLUTION INTRAMUSCULAR; INTRAVENOUS at 07:02

## 2024-02-27 RX ADMIN — MIDAZOLAM HYDROCHLORIDE 2 MG: 1 INJECTION, SOLUTION INTRAMUSCULAR; INTRAVENOUS at 07:02

## 2024-02-27 RX ADMIN — HEPARIN SODIUM 7000 UNITS: 1000 INJECTION, SOLUTION INTRAVENOUS; SUBCUTANEOUS at 09:02

## 2024-02-27 RX ADMIN — HYDROMORPHONE HYDROCHLORIDE 0.2 MG: 1 INJECTION, SOLUTION INTRAMUSCULAR; INTRAVENOUS; SUBCUTANEOUS at 10:02

## 2024-02-27 RX ADMIN — SUCCINYLCHOLINE CHLORIDE 120 MG: 20 INJECTION, SOLUTION INTRAMUSCULAR; INTRAVENOUS at 07:02

## 2024-02-27 RX ADMIN — KETOROLAC TROMETHAMINE 15 MG: 15 INJECTION, SOLUTION INTRAMUSCULAR; INTRAVENOUS at 12:02

## 2024-02-27 RX ADMIN — PROPOFOL 50 MG: 10 INJECTION, EMULSION INTRAVENOUS at 07:02

## 2024-02-27 RX ADMIN — SODIUM CHLORIDE: 0.9 INJECTION, SOLUTION INTRAVENOUS at 07:02

## 2024-02-27 RX ADMIN — DEXAMETHASONE SODIUM PHOSPHATE 12 MG: 4 INJECTION, SOLUTION INTRAMUSCULAR; INTRAVENOUS at 08:02

## 2024-02-27 RX ADMIN — ROCURONIUM BROMIDE 5 MG: 10 INJECTION, SOLUTION INTRAVENOUS at 07:02

## 2024-02-27 RX ADMIN — FUROSEMIDE 20 MG: 10 INJECTION, SOLUTION INTRAMUSCULAR; INTRAVENOUS at 03:02

## 2024-02-27 RX ADMIN — HEPARIN SODIUM AND DEXTROSE 12 UNITS/KG/HR: 10000; 5 INJECTION INTRAVENOUS at 08:02

## 2024-02-27 RX ADMIN — LIDOCAINE HYDROCHLORIDE 100 MG: 20 INJECTION INTRAVENOUS at 07:02

## 2024-02-27 RX ADMIN — ONDANSETRON 8 MG: 2 INJECTION INTRAMUSCULAR; INTRAVENOUS at 09:02

## 2024-02-27 RX ADMIN — HEPARIN SODIUM 17000 UNITS: 1000 INJECTION, SOLUTION INTRAVENOUS; SUBCUTANEOUS at 08:02

## 2024-02-27 RX ADMIN — HEPARIN SODIUM 7000 UNITS: 1000 INJECTION, SOLUTION INTRAVENOUS; SUBCUTANEOUS at 08:02

## 2024-02-27 RX ADMIN — OXYCODONE HYDROCHLORIDE AND ACETAMINOPHEN 1 TABLET: 5; 325 TABLET ORAL at 10:02

## 2024-02-27 NOTE — NURSING
Pt ambulated around unit without difficulty.  Right and left groins soft.  No bleeding or hematoma noted.  Denies pain or SOB.  Voided without difficulty.  Pt and pt spouse both verbalized an understanding of d/c instructions.

## 2024-02-27 NOTE — ANESTHESIA PREPROCEDURE EVALUATION
02/27/2024  John Corrales is a 53 y.o., male.    WANTS ETT LUBED VERY WELL    Procedures:       Ablation atrial fibrillation (Chest) - AF, [place] (cx if SR), PVI, RFA, Carto, Gen, GP, 3 Prep       ECHOCARDIOGRAM, TRANSESOPHAGEAL (Chest) Anesthesia type: General Diagnosis: Paroxysmal atrial fibrillation [I48.0]     Pre-operative evaluation for Procedure(s) (LRB):  Ablation atrial fibrillation (N/A)  ECHOCARDIOGRAM, TRANSESOPHAGEAL (N/A)    @[id number]@@    Encounter Diagnoses   Name Primary?    Paroxysmal atrial fibrillation     Atrial fibrillation     Arrhythmia        Review of patient's allergies indicates:   Allergen Reactions    Demerol [meperidine] Itching       Medications Prior to Admission   Medication Sig Dispense Refill Last Dose    apixaban (ELIQUIS) 5 mg Tab Take 1 tablet (5 mg total) by mouth 2 (two) times daily. 180 tablet 3 2/26/2024 at 0800    atorvastatin (LIPITOR) 20 MG tablet Take 1 tablet (20 mg total) by mouth every evening. 90 tablet 3 2/26/2024 at 2130    levothyroxine (SYNTHROID) 75 MCG tablet Take 1 tablet (75 mcg total) by mouth once daily. 90 tablet 3 2/27/2024 at 0330    lisinopriL 10 MG tablet Take 1 tablet (10 mg total) by mouth once daily. 90 tablet 3 2/27/2024 at 0330    metFORMIN (GLUCOPHAGE) 500 MG tablet Take 1 tablet (500 mg total) by mouth 3 (three) times daily. 270 tablet 3 2/26/2024 at 2130    omeprazole (PRILOSEC) 10 MG capsule Take 10 mg by mouth once daily.   2/26/2024 at 1800    rOPINIRole (REQUIP) 2 MG tablet Take 1 tablet (2 mg total) by mouth every evening. 90 tablet 3 2/27/2024 at 0330            No current facility-administered medications on file prior to encounter.     Current Outpatient Medications on File Prior to Encounter   Medication Sig Dispense Refill    apixaban (ELIQUIS) 5 mg Tab Take 1 tablet (5 mg total) by mouth 2 (two) times daily. 180 tablet  3    atorvastatin (LIPITOR) 20 MG tablet Take 1 tablet (20 mg total) by mouth every evening. 90 tablet 3    levothyroxine (SYNTHROID) 75 MCG tablet Take 1 tablet (75 mcg total) by mouth once daily. 90 tablet 3    lisinopriL 10 MG tablet Take 1 tablet (10 mg total) by mouth once daily. 90 tablet 3    metFORMIN (GLUCOPHAGE) 500 MG tablet Take 1 tablet (500 mg total) by mouth 3 (three) times daily. 270 tablet 3    omeprazole (PRILOSEC) 10 MG capsule Take 10 mg by mouth once daily.      rOPINIRole (REQUIP) 2 MG tablet Take 1 tablet (2 mg total) by mouth every evening. 90 tablet 3    [DISCONTINUED] atenoloL (TENORMIN) 25 MG tablet Take 1 tablet (25 mg total) by mouth once daily. 90 tablet 1       Past Medical History:  No date: Atrial flutter  No date: Diabetes mellitus, type 2  2007: General anesthetics causing adverse effect in therapeutic use      Comment:  During general anesthesia for back surgery , experienced               severe pain, heard people talking and felt like his heart               was about to explode.  No date: GERD (gastroesophageal reflux disease)  No date: Hyperlipidemia  No date: Hypothyroidism  No date: Nasal septal deviation    Past Surgical History:   Procedure Laterality Date    BACK SURGERY  01/01/2007    artificial disc L4-5    CARDIAC ELECTROPHYSIOLOGY STUDY AND ABLATION  09/16/2022    CYSTOSCOPY W/ STONE MANIPULATION  01/01/2002    Kidney stones removed    EPIDURAL STEROID INJECTION  01/31/2022    KNEE ARTHROPLASTY Right 06/15/2020    Procedure: ARTHROPLASTY, KNEE;  Surgeon: Alok Rowe MD;  Location: [facility];  Service: Orthopedics;  Laterality: Right;  SAMEERA FAYE    KNEE ARTHROSCOPY W/ DEBRIDEMENT  2000 and 2001    Right knee       Social History     Tobacco Use   Smoking Status Former    Passive exposure: Past   Smokeless Tobacco Never       Social History     Substance and Sexual Activity   Alcohol Use No       Physical Activity: Unknown (5/22/2023)    Exercise Vital Sign     Days  "of Exercise per Week: 5 days     Minutes of Exercise per Session: Not on file         No results for input(s): "HCT" in the last 72 hours.  No results for input(s): "PLT" in the last 72 hours.  No results for input(s): "K" in the last 72 hours.  No results for input(s): "CREATININE" in the last 72 hours.  No results for input(s): "GLU" in the last 72 hours.  No results for input(s): "PT" in the last 72 hours.                    Pre-op Assessment          Review of Systems  Anesthesia Hx:  No problems with previous Anesthesia  With back surgery had tachycardia and felt heartbeat in head, not sure at which point.  Has done ok since              Hematology/Oncology:  Hematology Normal   Oncology Normal                Hematology Comments: On eliquis for atrial fib, took yesterday                    Cardiovascular:     Hypertension, well controlled   Denies MI.     Dysrhythmias (started years ago, only diagnosed 2020) atrial fibrillation  Denies Angina.         Rides bike a mile most mornings, bowls.                   Hypertension     Atrial Flutter     Pulmonary:    Denies COPD.  Denies Asthma. (as child onlly, no sequelae)  Shortness of breath   Mild SOB with afib attacks, once or twice a week, symcopal if he stands up quickly, no angina.  Lasts 10 minutes max, no meds taken for acute episodes               Renal/:   Denies Chronic Renal Disease.                Hepatic/GI:     GERD, well controlled Denies Liver Disease.     Gerd          Musculoskeletal:  Arthritis        Arthritis          Neurological:  Denies TIA.  Denies CVA.    Denies Seizures.        Arthritis                           Endocrine:  Diabetes, type 2 Hypothyroidism   Diabetes                   Hypothyroidism              Physical Exam  General: Well nourished, Cooperative, Alert and Oriented    Airway:  Mallampati: II   Mouth Opening: Normal  TM Distance: Normal  Tongue: Normal  Neck ROM: Normal ROM        Anesthesia Plan  Type of Anesthesia, " risks & benefits discussed:    Anesthesia Type: Gen ETT  Intra-op Monitoring Plan: Standard ASA Monitors and Art Line  Post Op Pain Control Plan: multimodal analgesia and IV/PO Opioids PRN  Induction:  IV  Informed Consent: Informed consent signed with the Patient and all parties understand the risks and agree with anesthesia plan.  All questions answered.   ASA Score: 3  Day of Surgery Review of History & Physical: H&P Update referred to the surgeon/provider.    Ready For Surgery From Anesthesia Perspective.     .  Date/Time: 9/16/2022 8:34 AM  Performed by: Eufemia Ndiaye CRNA  Authorized by: Cullen Gonzalez MD      Intubation:     Induction:  Inhalational - mask    Mask Ventilation:  Easy mask    Attempts:  1    Attempted By:  Staff anesthesiologist    Difficult Airway Encountered?: No      Complications:  None    Airway Device Size:  3.5    Placement Verified By:  Capnometry    Complicating Factors:  None    Findings Post-Intubation:  BS equal bilateral and atraumatic/condition of teeth unchanged      Method of Intubation: Direct laryngoscopy; Inserted by: Anesthesia; Airway Device Size: 7.5; Style: Cuffed; Inflation Amount: 6; Placement Verified By: Auscultation, Capnometry;  Depth of Insertion: 21; Breath Sounds: Equal Bilateral; Insertion Attempts: 2; Tolerance: Well; Removal Date: 02/16/12;  Removal Time: 1120       The left ventricle is normal in size with concentric remodeling and normal systolic function.  Grade III left ventricular diastolic dysfunction.  Normal right ventricular size with normal right ventricular systolic function.  Normal central venous pressure (3 mmHg).  The estimated ejection fraction is 60%.  Mild left atrial enlargement.  Mild mitral regurgitation.

## 2024-02-27 NOTE — NURSING TRANSFER
Nursing Transfer Note      2/27/2024   1:02 PM    Nurse giving handoff:matt,rn   Nurse receiving handoff:blake sscu rn    Reason patient is being transferred: ep pacu 3 to sscu 03/home    Transfer To: ep pacu 3 to sscu 03/home    Transfer via stretcher    Transfer with cardiac monitoring, tele box on confirmed by tele tech    Transported by matt,rn    Transfer Vital Signs:  Blood Pressure:107/59  Heart Rate:63  O2:98%  Temperature:97.8  Respirations:18    Telemetry: Box Number [id number], Rate 60, Rhythm sr, and Telemetry  ale  Order for Tele Monitor? Yes    Additional Lines: condom cath    4eyes on Skin: yes, foam drsg dave heels, sacrum    Medicines sent: lasix iv when ambulating, see mar.  Chloraseptic spray for throat    Any special needs or follow-up needed: bedrest x6hrs. Sutures removal at 1053    Patient belongings [facility] with [facility]  [facility]    Chart send with patient: Yes    Notified: daughter    Patient reassessed at: 2/27/24 1230. Next due 1300    Upon arrival to floor: cardiac monitor applied, patient oriented to room, call bell in reach, and bed in lowest position

## 2024-02-27 NOTE — ANESTHESIA POSTPROCEDURE EVALUATION
Anesthesia Post Evaluation    Patient: John Corrales    Procedure(s) Performed: Procedure(s) (LRB):  Ablation atrial fibrillation (N/A)    Final Anesthesia Type: general      Patient location during evaluation: PACU  Patient participation: Yes- Able to Participate  Level of consciousness: awake and alert and oriented  Post-procedure vital signs: reviewed and stable  Pain management: adequate  Airway patency: patent    PONV status at discharge: No PONV  Anesthetic complications: yes  Perioperative Events: difficult intubation        Cardiovascular status: stable  Respiratory status: unassisted, spontaneous ventilation and room air  Hydration status: euvolemic  Follow-up not needed.  Comments: Extremely tight airway,  Originally esophageal intubation with 2 Milller DL.  Exposed with Darnell 3, arytenoids and half of cords could be seen, though view difficult due to redundant peripharyngeal tissue.  7.5 ETT passed good CO2 at first, but tube backed out to 12 cm and CO2 waveform lost.  Patient had begun to move after recovering from succinylcholine block.  Darnell attempted without success due to recovery from neuromuscular blockade..  Patient deepend under inhallational sevoflurane.  Once deep 4 Darnell inserted.  Good view of back half of cords and arytenoids 7.5 ETT able to pass into glottis but not past cords.  Tube exchanger put through ETT and the tube was advanced with considerable difficulty.  Recommend a 6.0 ETT in future.              Vitals Value Taken Time   /76 02/27/24 1147   Temp 36.6 °C (97.9 °F) 02/27/24 1100   Pulse 60 02/27/24 1200   Resp 17 02/27/24 1200   SpO2 100 % 02/27/24 1200   Vitals shown include unvalidated device data.      No case tracking events are documented in the log.      Pain/Sukumar Score: Pain Rating Prior to Med Admin: 8 (2/27/2024 11:10 AM)  Sukumar Score: 9 (2/27/2024 10:45 AM)

## 2024-02-27 NOTE — BRIEF OP NOTE
: Kehinde Hinds MD  Date of Procedure: 02/27/2024    Post-operative Diagnosis: AF    Procedure Performed: Pulmonary vein isolation of all 4 pulmonary veins via radiofrequency ablation.     Description of Procedure: The patient was brought to the EP lab in the fasting state. Prepped and draped in sterile fashion. Safety timeout was performed. Sedation administered by anesthesia staff. Ultrasound guided venous access of the bilateral femoral veins was performed. ICE and CS catheters placed via left femoral vein access. Persistent block across the CTI was confirmed. Long sheaths via right femoral venous access. Heparin bolus and continuous infusion per protocol. Two transseptal punctures performed using combination of fluoroscopic and ICE guidance. Map created. RFA to isolate all pulmonary veins. ICE confirmed no significant pericardial effusion.     EBL: <10 mL    Specimens: none  Complications: no immediate    Plan:  Bedrest x 6 hrs -- ends at 4 PM   ECG on arrival to recovery  Patient to resume OAC this evening. If bleeding or hematoma formation, please notify EP service before holding OAC  PPI x 1 month  Plan for discharge following bedrest if patient tolerating PO intake, voiding, and ambulatory without evidence of complications       Dorian Sosa MD  [facility]  Cardiovascular Disease, PGY-VI

## 2024-02-27 NOTE — PLAN OF CARE
"Vss. Sr noted on cm. Tele box on confirmed by tele tech. S/p pvi ablation.  Pt arrived to ep pacu 3 with dave groin sutures nataliia, well approx. Sutures placed at 1000, 1400 sutures removal time, 1600 bedrest done.  Pt did have an episode of clearing throat, coughing. Right groin oozed with sang drainage around sutures site. Manual pressure held by ep pacu rn and dr serna ep fellow.  No bleeding noted afterwards.  Palpable pulses noted. Condom cath in place. Due to void later. Lasix to be given per mar per md order. 12 lead EKG done and in chart. Pt has "chronic back pain noted. " Per pt "I don't take anything at home, just sleep on my side".  Prn pain meds and iv pain meds given per md order. Pt complaints of "headache".  Dr serna and dr hoskins anesthesia aware.  Per dr serna wanted toradol iv to be given x1.  Cool cloth over forehead and eyes. Pt's states "I am having light senstivity." Md aware.  Pt tele box on confirmed by [employer]. Sscu 03.  Once arrived to sscu 3 room, pt states "toradol has relieved my headache, taken the edge off".  Dr serna to see pt in sscu. Pt's wife at bedside as well. See flowsheet for full assessment. Pt denies blurry vision, no pupil change, denies n/v. Will continue to monitor in sscu.    "

## 2024-02-27 NOTE — TRANSFER OF CARE
Anesthesia Transfer of Care Note    Patient: John Corrales    Procedure(s) Performed: Procedure(s) (LRB):  Ablation atrial fibrillation (N/A)    Patient location: PACU    Anesthesia Type: general    Transport from OR: Transported from OR on 6-10 L/min O2 by face mask with adequate spontaneous ventilation    Post pain: adequate analgesia    Post assessment: no apparent anesthetic complications    Post vital signs: stable    Level of consciousness: sedated    Nausea/Vomiting: no nausea/vomiting    Complications: none    Transfer of care protocol was followed      Last vitals: Visit Vitals  /79 (BP Location: Left arm, Patient Position: Lying)   Pulse (!) 53   Temp 36.9 °C (98.4 °F) (Temporal)   Resp 16   Ht 6' (1.829 m)   Wt 113.4 kg (250 lb)   SpO2 98%   BMI 33.91 kg/m²

## 2024-02-27 NOTE — PLAN OF CARE
Received report from MIC Kang. Patient s/p ablation, AAOx3. VSS, no c/o pain or discomfort at this time, resp even and unlabored. Sutures to dave groin are CDI. No active bleeding. No hematoma noted. Post procedure protocol reviewed with patient and patient's family. Understanding verbalized. Family members at bedside. Nurse call bell within reach. Will continue to monitor per post procedure protocol.

## 2024-02-27 NOTE — DISCHARGE SUMMARY
[facility] - Cardiology  Cardiac Electrophysiology  Discharge Summary        Patient Name: John Corrales,   MRN: [id number]   Admission Date: 2/27/2024    Hospital Length of Stay: 0   Discharge Date: 02/27/2024   Attending Physician: Kehinde Hinds MD   Discharging Provider: Dorian Sosa MD      HPI:   Mr John Corrales is a 53 year old male with PMH of HTN, HLD, hypothyroidism, DM, paroxysmal atrial fibrillation/flutter who presents to [facility] for PVI with Dr. Hinds. Initially dx with atrial flutter in 2022 and underwent CTI ablation 9/2022. Developed AF a few months later with increasing frequency. He was recently seen in EP clinic by Dr. Hinds and offered PVI to which he agreed to proceed.         Presenting EKG: Sinus rhythm   CHADS-VASc: 2 (HTN, DM)   Anticoagulants: Apixaban 5 mg BID   Antiarrhythmics: none   LV EF: 60% (TTE 3/2022)  Pertinent labs: Hgb 14.7, INR 1.0, Cr 1.2    Hospital Course:   Mr Corrales underwent successful pulmonary vein and posterior wall isolation. He tolerated the procedure well with no immediate complications. He completed 6 hours of bed rest and ambulated without evidence of bleeding or hematoma. He was discharged home in stable condition.     Follow up:   Follow up with Dr. Leyva in 3 months     Disposition:   Home or Self Care         Medication List        START taking these medications      furosemide 20 MG tablet  Commonly known as: LASIX  Take 1 tablet (20 mg total) by mouth daily as needed (leg swelling, shortness of breath, weight gain >3 lbs in 1 day or >5 lbs in 3 days).            CONTINUE taking these medications      apixaban 5 mg Tab  Commonly known as: ELIQUIS  Take 1 tablet (5 mg total) by mouth 2 (two) times daily.     atorvastatin 20 MG tablet  Commonly known as: LIPITOR  Take 1 tablet (20 mg total) by mouth every evening.     levothyroxine 75 MCG tablet  Commonly known as: SYNTHROID  Take 1 tablet (75 mcg total) by mouth once daily.     lisinopriL 10 MG tablet  Take  1 tablet (10 mg total) by mouth once daily.     metFORMIN 500 MG tablet  Commonly known as: GLUCOPHAGE  Take 1 tablet (500 mg total) by mouth 3 (three) times daily.     omeprazole 10 MG capsule  Commonly known as: PRILOSEC     rOPINIRole 2 MG tablet  Commonly known as: REQUIP  Take 1 tablet (2 mg total) by mouth every evening.               Where to Get Your Medications        These medications were sent to [facility]  [address] [address]      Hours: Mon-Fri 7a-7p, Sat-Sun 10a-4p Phone: [phone]   furosemide 20 MG tablet           Dorian Sosa MD  [facility]  Cardiovascular Disease, PGY-VI

## 2024-02-27 NOTE — H&P
[facility] [place]  Electrophysiology  H&P      John Corrales   YOB: 1970   Medical Record Number: [id number]   Attending Physician:    Date of Admission: 02/27/2024       Hospital Day:  0  Current Principal Problem:  <principal problem not specified>      History     Cc: atrial fibrillation     HPI  Mr John Corrales is a 53 year old male with PMH of HTN, HLD, hypothyroidism, DM, paroxysmal atrial fibrillation/flutter who presents to [facility] for PVI with Dr. Hinds. Initially dx with atrial flutter in 2022 and underwent CTI ablation 9/2022. Developed AF a few months later with increasing frequency. He was recently seen in EP clinic by Dr. Hinds and offered PVI to which he agreed to proceed.       Presenting EKG: Sinus rhythm   CHADS-VASc: 2 (HTN, DM)   Anticoagulants: Apixaban 5 mg BID   Antiarrhythmics: none   LV EF: 60% (TTE 3/2022)  Pertinent labs: Hgb 14.7, INR 1.0, Cr 1.2        Medications - Outpatient  Prior to Admission medications    Medication Sig Start Date End Date Taking? Authorizing Provider   apixaban (ELIQUIS) 5 mg Tab Take 1 tablet (5 mg total) by mouth 2 (two) times daily. 9/12/23  Yes Kehinde Hinds MD   atorvastatin (LIPITOR) 20 MG tablet Take 1 tablet (20 mg total) by mouth every evening. 9/26/23  Yes Ralf Saunders MD   levothyroxine (SYNTHROID) 75 MCG tablet Take 1 tablet (75 mcg total) by mouth once daily. 9/26/23 3/24/24 Yes Ralf Saunders MD   lisinopriL 10 MG tablet Take 1 tablet (10 mg total) by mouth once daily. 9/26/23  Yes Ralf Saunders MD   metFORMIN (GLUCOPHAGE) 500 MG tablet Take 1 tablet (500 mg total) by mouth 3 (three) times daily. 9/26/23  Yes Ralf Saunders MD   omeprazole (PRILOSEC) 10 MG capsule Take 10 mg by mouth once daily.   Yes Provider, Historical   rOPINIRole (REQUIP) 2 MG tablet Take 1 tablet (2 mg total) by mouth every evening. 9/26/23 3/24/24 Yes Ralf Saunders MD   atenoloL (TENORMIN) 25 MG tablet Take 1 tablet (25  mg total) by mouth once daily. 10/12/21 3/31/22  Gavin Sandoval PA-C         Medications - Current  Scheduled Meds:  Continuous Infusions:  PRN Meds:.      Allergies  Review of patient's allergies indicates:   Allergen Reactions    Demerol [meperidine] Itching         Past Medical History  Past Medical History:   Diagnosis Date    Atrial flutter     Diabetes mellitus, type 2     General anesthetics causing adverse effect in therapeutic use 2007    During general anesthesia for back surgery , experienced severe pain, heard people talking and felt like his heart was about to explode.    GERD (gastroesophageal reflux disease)     Hyperlipidemia     Hypothyroidism     Nasal septal deviation          Past Surgical History  Past Surgical History:   Procedure Laterality Date    BACK SURGERY  01/01/2007    artificial disc L4-5    CARDIAC ELECTROPHYSIOLOGY STUDY AND ABLATION  09/16/2022    CYSTOSCOPY W/ STONE MANIPULATION  01/01/2002    Kidney stones removed    EPIDURAL STEROID INJECTION  01/31/2022    KNEE ARTHROPLASTY Right 06/15/2020    Procedure: ARTHROPLASTY, KNEE;  Surgeon: Alok Rowe MD;  [facility]: [facility];  Service: Orthopedics;  Laterality: Right;  SAMEERA FAYE    KNEE ARTHROSCOPY W/ DEBRIDEMENT  2000 and 2001    Right knee         Social History  Social History     Socioeconomic History    Marital status:    Tobacco Use    Smoking status: Former     Passive exposure: Past    Smokeless tobacco: Never   Substance and Sexual Activity    Alcohol use: No    Drug use: No     Social Determinants of Health     Financial Resource Strain: Unknown (9/28/2022)    Overall Financial Resource Strain (CARDIA)     Difficulty of Paying Living Expenses: Patient declined   Food Insecurity: Unknown (9/28/2022)    Hunger Vital Sign     Worried About Running Out of Food in the Last Year: Patient declined     Ran Out of Food in the Last Year: Patient declined   Transportation Needs: Unknown (9/28/2022)    PRAPARE -  Transportation     Lack of Transportation (Medical): Patient declined     Lack of Transportation (Non-Medical): Patient declined   Physical Activity: Unknown (5/22/2023)    Exercise Vital Sign     Days of Exercise per Week: 5 days   Stress: Unknown (9/28/2022)    [place] [place] [employer] - Occupational Stress Questionnaire     Feeling of Stress : Patient declined   Social Connections: Unknown (5/22/2023)    Social Connection and Isolation Panel [NHANES]     Active Member of Clubs or Organizations: Yes     Attends Club or Organization Meetings: More than 4 times per year     Marital Status:    Housing Stability: Unknown (9/28/2022)    Housing Stability Vital Sign     Unable to Pay for Housing in the Last Year: Patient refused     Unstable Housing in the Last Year: Patient refused         ROS  10 point ROS performed and negative except as stated in HPI     Physical Examination         Vital Signs  Vitals  Temp: 98.4 °F (36.9 °C)  Temp Source: Temporal  Pulse: (!) 53  Resp: 16  SpO2: 98 %  BP: 130/79  MAP (mmHg): 99  BP Location: Left arm  BP Method: Automatic  Patient Position: Lying          24 Hour VS Range    Temp:  [98.4 °F (36.9 °C)]   Pulse:  [53]   Resp:  [16]   BP: (130-135)/(79-83)   SpO2:  [98 %]   No intake or output data in the 24 hours ending 02/27/24 0647        Physical Exam:   Constitutional: no acute distress  HEENT: NCAT, EOMI, no scleral icterus  Cardiovascular: Regular rate and rhythm  Pulmonary: Normal respiratory effort   Abdomen: nontender, non-distended   Neuro: alert and oriented, no focal deficits  Extremities: warm, no edema   MSK: no deformities  Integument: intact, no rashes       Data       Recent Labs   Lab 02/20/24  1018   WBC 6.85   HGB 14.7   HCT 45.0           Recent Labs   Lab 02/20/24  1018   INR 1.0        Recent Labs   Lab 02/20/24  1018      K 4.1      CO2 27   BUN 15   CREATININE 1.2   ANIONGAP 9   CALCIUM 9.7        No results for  "input(s): "PROT", "ALBUMIN", "BILITOT", "ALKPHOS", "AST", "ALT" in the last 168 hours.     No results for input(s): "TROPONINI" in the last 168 hours.     BNP (pg/mL)   Date Value   03/30/2022 34   03/30/2022 34       No results for input(s): "LABBLOO" in the last 168 hours.         Assessment & Plan   53 year old male with PMH of HTN, HLD, hypothyroidism, DM, paroxysmal atrial fibrillation/flutter who presents to [facility] for PVI with Dr. Hinds.     Atrial fibrillation:   Risks/benefits/alternatives discussed with patient and he agrees to proceed. Consents signed.   -To EP lab for PVI   -Cancel [place] Sosa MD  [facility]   Cardiovascular Disease PGY-VI     "

## 2024-02-27 NOTE — ANESTHESIA PROCEDURE NOTES
Intubation    Date/Time: 2/27/2024 7:39 AM    Performed by: Pearl Alvarez CRNA  Authorized by: Pearl Alvarez CRNA    Intubation:     Induction:  Intravenous    Intubated:  Postinduction    Mask Ventilation:  Easy mask    Attempts:  3    Attempted By:  CRNA    Method of Intubation:  Direct    Blade:  Demarco 2    Laryngeal View Grade: Grade III - only epiglottis visible      Attempted By (2nd Attempt):  Staff anesthesiologist    Method of Intubation (2nd Attempt):  Video laryngoscopy    Blade (2nd Attempt):  Darnell 3    Laryngeal View Grade (2nd Attempt): Grade IIb - only the arytenoids and epiglottis seen      Attempted By (3rd Attempt):  Staff anesthesiologist    Method of Intubation (3rd Attempt):  Video laryngoscopy and bougie    Blade (3rd Attempt):  Darnell 4    Laryngeal View Grade (3rd Attempt): Grade IIa - cords partially seen      Difficult Airway Encountered?: Yes      Future Airway Recommendations:  Mcgrath4 with bougie, 6.0 ett    Complications:  None    Airway Device:  Oral endotracheal tube    Airway Device Size:  7.5    Style/Cuff Inflation:  Cuffed (inflated to minimal occlusive pressure)    Tube secured:  24    Secured at:  The lips    Placement Verified By:  Capnometry    Complicating Factors:  None    Findings Post-Intubation:  BS equal bilateral and atraumatic/condition of teeth unchanged  Notes:      Extremely tight airway,  Originally esophageal intubation with 2 Milller DL.  Exposed with Darnell 3, arytenoids and half of cords could be seen, though view difficult due to redundant peripharyngeal tissue.  7.5 ETT passed good CO2 at first, but tube backed out to 12 cm and CO2 waveform lost.  Patient had begun to move after recovering from succinylcholine block.  Darnell attempted without success due to recovery from neuromuscular blockade..  Patient deepend under inhallational sevoflurane.  Once deep 4 Darnell inserted.  Good view of back half of cords and arytenoids 7.5 ETT able to pass  into glottis but not past cords.  Tube exchanger put through ETT and the tube was advanced with considerable difficulty.  Recommend a 6.0 ETT in future.

## 2024-02-27 NOTE — PROGRESS NOTES
Dr serna ep fellow at bedside. Pt coughed and right groin sang drainage noted. Manual pressure originally held by ep rn and then md held from [id number].  Sutures nataliia, well approx. No hematoma noted. Kiran groins nataliia.  No drainage at this time. Will continue to monitor.  Pt's wife updated by ep pacu rn and md.

## 2024-02-27 NOTE — PROGRESS NOTES
Pt's wife able to visit in ep pacu 3.  Dr Gilliam anesthesia at bedside updating pt and wife.  Both verbalizes understanding.

## 2024-02-27 NOTE — NURSING
IV's d/c'd x 2 with cath tips intact.  Off unit via wheelchair for discharge to home. Pt received by wife.

## 2024-02-27 NOTE — ANESTHESIA PROCEDURE NOTES
Arterial    Diagnosis: atrial fibrillation    Patient location during procedure: done in OR  Procedure end time: 2/27/2024 7:50 AM    Staffing  Authorizing Provider: Rolly Gilliam Jr., MD  Performing Provider: Rolly Gilliam Jr., MD    Staffing  Performed by: Rolly Gilliam Jr., MD  Authorized by: Rloly Gilliam Jr., MD    Anesthesiologist was present at the time of the procedure.    Preanesthetic Checklist  Completed: patient identified, IV checked, site marked, risks and benefits discussed, surgical consent, monitors and equipment checked, pre-op evaluation, timeout performed and anesthesia consent givenArterial  Skin Prep: chlorhexidine gluconate  Local Infiltration: none  Orientation: right  Location: radial    Catheter Size: 20 G  Catheter placement by Ultrasound guidance. Heme positive aspiration all ports.   Vessel Caliber: medium, patent, compressibility normal  Needle advanced into vessel with real time Ultrasound guidance.  Guidewire confirmed in vessel.  Sterile sheath used.Insertion Attempts: 2  Assessment  Dressing: secured with tape and tegaderm  Patient: Tolerated well

## 2024-02-27 NOTE — LETTER
This communication is flagged as high priority.    02/27/2024     John Corrales   [address] [place] [id number]       Dear John Corrales,     During your recent anesthetic, your anesthesiologist noted that you have a difficult airway.    An unexpected difficult airway is a known potential concern with anesthesia and can be dangerous. If you should need anesthesia or a respirator in the future, it is important that you know this. If you need to have surgery and anesthesia in the future, inform your anesthesiologist and surgeon of the potential for a difficult airway. Give them this letter to review. Tell family members or close friends about your difficult airway.    Procedure(s):  Ablation atrial fibrillation  Date: 2/27/2024  [facility]: [facility]    Details of airway management:      Extremely tight airway,  Originally esophageal intubation with 2 Milller DL.  Exposed with Darnell 3, arytenoids and half of cords could be seen, though view difficult due to redundant peripharyngeal tissue.  7.5 ETT passed good CO2 at first, but tube backed out to 12 cm and CO2 waveform lost.  Patient had begun to move after recovering from succinylcholine block.  Darnell attempted without success due to recovery from neuromuscular blockade..  Patient deepend under inhallational sevoflurane.  Once deep 4 Darnell inserted.  Good view of back half of cords and arytenoids 7.5 ETT able to pass into glottis but not past cords.  Tube exchanger put through ETT and the tube was advanced with considerable difficulty.  Recommend a 6.0 ETT in future.    Recommendations for future airway management:  Use a video laryngoscope and a 6.0 endotracheal tube    Complications  Although a minor sore throat is common after general anesthesia, and usually can be treated with cough drops, if you experience a persistent severe sore throat, difficulty swallowing or fever, immediately contact your surgeon and the anesthesiologist  on call at the facility.    Sincerely,    Rolly Blair Jr. MD     02/27/2024 11:40 AM

## 2024-03-08 ENCOUNTER — PATIENT MESSAGE (OUTPATIENT)
Dept: CARDIOLOGY | Facility: CLINIC | Age: 54
End: 2024-03-08
Payer: MEDICARE

## 2024-03-21 ENCOUNTER — TELEPHONE (OUTPATIENT)
Dept: FAMILY MEDICINE | Facility: CLINIC | Age: 54
End: 2024-03-21
Payer: MEDICARE

## 2024-03-28 ENCOUNTER — PATIENT MESSAGE (OUTPATIENT)
Dept: ADMINISTRATIVE | Facility: HOSPITAL | Age: 54
End: 2024-03-28
Payer: MEDICARE

## 2024-03-28 DIAGNOSIS — G25.81 RLS (RESTLESS LEGS SYNDROME): ICD-10-CM

## 2024-03-28 RX ORDER — ROPINIROLE 2 MG/1
2 TABLET, FILM COATED ORAL NIGHTLY
Qty: 90 TABLET | Refills: 3 | Status: SHIPPED | OUTPATIENT
Start: 2024-03-28 | End: 2024-09-24

## 2024-04-02 LAB
ALBUMIN SERPL-MCNC: 4.5 G/DL (ref 3.6–5.1)
ALBUMIN/CREAT UR: 3 MG/G CREAT
ALBUMIN/GLOB SERPL: 1.8 (CALC) (ref 1–2.5)
ALP SERPL-CCNC: 76 U/L (ref 35–144)
ALT SERPL-CCNC: 20 U/L (ref 9–46)
APPEARANCE UR: CLEAR
AST SERPL-CCNC: 16 U/L (ref 10–35)
BACTERIA #/AREA URNS HPF: NORMAL /HPF
BACTERIA UR CULT: NORMAL
BASOPHILS # BLD AUTO: 38 CELLS/UL (ref 0–200)
BASOPHILS NFR BLD AUTO: 0.6 %
BILIRUB SERPL-MCNC: 0.7 MG/DL (ref 0.2–1.2)
BILIRUB UR QL STRIP: NEGATIVE
BUN SERPL-MCNC: 15 MG/DL (ref 7–25)
BUN/CREAT SERPL: ABNORMAL (CALC) (ref 6–22)
CALCIUM SERPL-MCNC: 9.7 MG/DL (ref 8.6–10.3)
CHLORIDE SERPL-SCNC: 101 MMOL/L (ref 98–110)
CHOLEST SERPL-MCNC: 181 MG/DL
CHOLEST/HDLC SERPL: 2.8 (CALC)
CO2 SERPL-SCNC: 29 MMOL/L (ref 20–32)
COLOR UR: YELLOW
CREAT SERPL-MCNC: 1.12 MG/DL (ref 0.7–1.3)
CREAT UR-MCNC: 182 MG/DL (ref 20–320)
EGFR: 79 ML/MIN/1.73M2
EOSINOPHIL # BLD AUTO: 158 CELLS/UL (ref 15–500)
EOSINOPHIL NFR BLD AUTO: 2.5 %
ERYTHROCYTE [DISTWIDTH] IN BLOOD BY AUTOMATED COUNT: 12.2 % (ref 11–15)
GLOBULIN SER CALC-MCNC: 2.5 G/DL (CALC) (ref 1.9–3.7)
GLUCOSE SERPL-MCNC: 116 MG/DL (ref 65–99)
GLUCOSE UR QL STRIP: NEGATIVE
HBA1C MFR BLD: 6.6 % OF TOTAL HGB
HCT VFR BLD AUTO: 46.2 % (ref 38.5–50)
HDLC SERPL-MCNC: 65 MG/DL
HGB BLD-MCNC: 14.7 G/DL (ref 13.2–17.1)
HGB UR QL STRIP: NEGATIVE
HYALINE CASTS #/AREA URNS LPF: NORMAL /LPF
KETONES UR QL STRIP: NEGATIVE
LDLC SERPL CALC-MCNC: 102 MG/DL (CALC)
LEUKOCYTE ESTERASE UR QL STRIP: NEGATIVE
LYMPHOCYTES # BLD AUTO: 1354.5 CELLS/UL (ref 850–3900)
LYMPHOCYTES NFR BLD AUTO: 21.5 %
MCH RBC QN AUTO: 27.4 PG (ref 27–33)
MCHC RBC AUTO-ENTMCNC: 31.8 G/DL (ref 32–36)
MCV RBC AUTO: 86.2 FL (ref 80–100)
MICROALBUMIN UR-MCNC: 0.5 MG/DL
MONOCYTES # BLD AUTO: 580 CELLS/UL (ref 200–950)
MONOCYTES NFR BLD AUTO: 9.2 %
NEUTROPHILS # BLD AUTO: 4171 CELLS/UL (ref 1500–7800)
NEUTROPHILS NFR BLD AUTO: 66.2 %
NITRITE UR QL STRIP: NEGATIVE
NONHDLC SERPL-MCNC: 116 MG/DL (CALC)
PH UR STRIP: 6.5 [PH] (ref 5–8)
PLATELET # BLD AUTO: 207 THOUSAND/UL (ref 140–400)
PMV BLD REES-ECKER: 11.6 FL (ref 7.5–12.5)
POTASSIUM SERPL-SCNC: 5.3 MMOL/L (ref 3.5–5.3)
PROT SERPL-MCNC: 7 G/DL (ref 6.1–8.1)
PROT UR QL STRIP: NEGATIVE
PSA SERPL-MCNC: 0.71 NG/ML
RBC # BLD AUTO: 5.36 MILLION/UL (ref 4.2–5.8)
RBC #/AREA URNS HPF: NORMAL /HPF
SERVICE CMNT-IMP: NORMAL
SODIUM SERPL-SCNC: 141 MMOL/L (ref 135–146)
SP GR UR STRIP: 1.02 (ref 1–1.03)
SQUAMOUS #/AREA URNS HPF: NORMAL /HPF
TRIGL SERPL-MCNC: 53 MG/DL
TSH SERPL-ACNC: 2.41 MIU/L (ref 0.4–4.5)
WBC # BLD AUTO: 6.3 THOUSAND/UL (ref 3.8–10.8)
WBC #/AREA URNS HPF: NORMAL /HPF

## 2024-04-04 ENCOUNTER — OFFICE VISIT (OUTPATIENT)
Dept: FAMILY MEDICINE | Facility: CLINIC | Age: 54
End: 2024-04-04
Attending: FAMILY MEDICINE
Payer: MEDICARE

## 2024-04-04 ENCOUNTER — PATIENT MESSAGE (OUTPATIENT)
Dept: FAMILY MEDICINE | Facility: CLINIC | Age: 54
End: 2024-04-04

## 2024-04-04 ENCOUNTER — TELEPHONE (OUTPATIENT)
Dept: FAMILY MEDICINE | Facility: CLINIC | Age: 54
End: 2024-04-04

## 2024-04-04 VITALS
WEIGHT: 247 LBS | HEART RATE: 64 BPM | BODY MASS INDEX: 33.46 KG/M2 | DIASTOLIC BLOOD PRESSURE: 80 MMHG | HEIGHT: 72 IN | SYSTOLIC BLOOD PRESSURE: 122 MMHG | OXYGEN SATURATION: 98 %

## 2024-04-04 DIAGNOSIS — E11.9 TYPE 2 DIABETES MELLITUS WITHOUT COMPLICATION, WITHOUT LONG-TERM CURRENT USE OF INSULIN: ICD-10-CM

## 2024-04-04 DIAGNOSIS — E03.9 ACQUIRED HYPOTHYROIDISM: ICD-10-CM

## 2024-04-04 DIAGNOSIS — T88.4XXD HARD TO INTUBATE, SUBSEQUENT ENCOUNTER: ICD-10-CM

## 2024-04-04 DIAGNOSIS — Z96.651 HISTORY OF TOTAL RIGHT KNEE REPLACEMENT: ICD-10-CM

## 2024-04-04 DIAGNOSIS — B37.0 ORAL CANDIDIASIS: ICD-10-CM

## 2024-04-04 DIAGNOSIS — J30.1 SEASONAL ALLERGIC RHINITIS DUE TO POLLEN: ICD-10-CM

## 2024-04-04 DIAGNOSIS — E78.2 MIXED HYPERLIPIDEMIA: ICD-10-CM

## 2024-04-04 DIAGNOSIS — M51.9 LUMBAR DISC DISEASE: ICD-10-CM

## 2024-04-04 DIAGNOSIS — K21.9 GASTROESOPHAGEAL REFLUX DISEASE WITHOUT ESOPHAGITIS: ICD-10-CM

## 2024-04-04 DIAGNOSIS — I10 PRIMARY HYPERTENSION: ICD-10-CM

## 2024-04-04 DIAGNOSIS — I48.3 TYPICAL ATRIAL FLUTTER: ICD-10-CM

## 2024-04-04 DIAGNOSIS — Z00.00 WELLNESS EXAMINATION: Primary | ICD-10-CM

## 2024-04-04 PROCEDURE — 4010F ACE/ARB THERAPY RXD/TAKEN: CPT | Mod: CPTII,S$GLB,, | Performed by: FAMILY MEDICINE

## 2024-04-04 PROCEDURE — 3066F NEPHROPATHY DOC TX: CPT | Mod: CPTII,S$GLB,, | Performed by: FAMILY MEDICINE

## 2024-04-04 PROCEDURE — 3008F BODY MASS INDEX DOCD: CPT | Mod: CPTII,S$GLB,, | Performed by: FAMILY MEDICINE

## 2024-04-04 PROCEDURE — 3079F DIAST BP 80-89 MM HG: CPT | Mod: CPTII,S$GLB,, | Performed by: FAMILY MEDICINE

## 2024-04-04 PROCEDURE — 3061F NEG MICROALBUMINURIA REV: CPT | Mod: CPTII,S$GLB,, | Performed by: FAMILY MEDICINE

## 2024-04-04 PROCEDURE — 1159F MED LIST DOCD IN RCRD: CPT | Mod: CPTII,S$GLB,, | Performed by: FAMILY MEDICINE

## 2024-04-04 PROCEDURE — 3074F SYST BP LT 130 MM HG: CPT | Mod: CPTII,S$GLB,, | Performed by: FAMILY MEDICINE

## 2024-04-04 PROCEDURE — 3044F HG A1C LEVEL LT 7.0%: CPT | Mod: CPTII,S$GLB,, | Performed by: FAMILY MEDICINE

## 2024-04-04 PROCEDURE — 99396 PREV VISIT EST AGE 40-64: CPT | Mod: S$GLB,,, | Performed by: FAMILY MEDICINE

## 2024-04-04 RX ORDER — SEMAGLUTIDE 0.68 MG/ML
0.25 INJECTION, SOLUTION SUBCUTANEOUS
Qty: 3 ML | Refills: 0 | Status: SHIPPED | OUTPATIENT
Start: 2024-04-04 | End: 2024-04-18

## 2024-04-04 RX ORDER — AZELASTINE 1 MG/ML
2 SPRAY, METERED NASAL 2 TIMES DAILY
Qty: 30 ML | Refills: 3 | Status: SHIPPED | OUTPATIENT
Start: 2024-04-04 | End: 2025-04-04

## 2024-04-04 RX ORDER — NYSTATIN 100000 [USP'U]/ML
4 SUSPENSION ORAL
Qty: 160 ML | Refills: 1 | Status: SHIPPED | OUTPATIENT
Start: 2024-04-04 | End: 2024-04-14

## 2024-04-04 RX ORDER — SOTALOL HYDROCHLORIDE 80 MG/1
80 TABLET ORAL 2 TIMES DAILY
COMMUNITY
Start: 2024-02-11 | End: 2024-05-13

## 2024-04-04 NOTE — PROGRESS NOTES
SUBJECTIVE:    Patient ID: John Corrales is a 53 y.o. male.    Chief Complaint: Follow-up (Brought bottles ; Needs refills on all medications; Go over labs ; Patient here for follow up 6 months; due for a foot exam ; Saw eye dr last year )    53 yr old s/p cardiac  ablation Dr Hinds- IAN fib corrected in Feb 2024. Off cardiac meds  except Eliquis 5 mg  bid     On disability for  back and  knee. Rt TKR doing well , Lumbar disc is  stable.    DM - on metformin 500 mg  3 a  day.     Tongue irritated  w/ white coating.    Follow-up  Pertinent negatives include no abdominal pain, chest pain, chills, coughing, fatigue, fever, joint swelling, myalgias, nausea, numbness or vomiting.   Diabetes  Pertinent negatives for hypoglycemia include no dizziness, nervousness/anxiousness or seizures. Pertinent negatives for diabetes include no chest pain, no fatigue and no polydipsia.       Admission on 02/27/2024, Discharged on 02/27/2024   Component Date Value Ref Range Status    QRS Duration 02/27/2024 102  ms Final    [facility] QTC Calculation 02/27/2024 436  ms Final    POCT Glucose 02/27/2024 131 (H)  70 - 110 mg/dL Final    QRS Duration 02/27/2024 106  ms Final    [facility] QTC Calculation 02/27/2024 407  ms Final    POC ACTIVATED CLOTTING TIME K 02/27/2024 142 (H)  74 - 137 sec Final    Sample 02/27/2024 ARTERIAL   Final    POC ACTIVATED CLOTTING TIME K 02/27/2024 261 (H)  74 - 137 sec Final    Sample 02/27/2024 ARTERIAL   Final    POC ACTIVATED CLOTTING TIME K 02/27/2024 298 (H)  74 - 137 sec Final    Sample 02/27/2024 ARTERIAL   Final    POC ACTIVATED CLOTTING TIME K 02/27/2024 363 (H)  74 - 137 sec Final    Sample 02/27/2024 ARTERIAL   Final    POC ACTIVATED CLOTTING TIME K 02/27/2024 325 (H)  74 - 137 sec Final    Sample 02/27/2024 ARTERIAL   Final    POC ACTIVATED CLOTTING TIME K 02/27/2024 163 (H)  74 - 137 sec Final    Sample 02/27/2024 ARTERIAL   Final    POCT Glucose 02/27/2024 156 (H)  70 - 110 mg/dL Final   Lab Visit on  02/20/2024   Component Date Value Ref Range Status    aPTT 02/20/2024 28.2  21.0 - 32.0 sec Final    Sodium 02/20/2024 140  136 - 145 mmol/L Final    Potassium 02/20/2024 4.1  3.5 - 5.1 mmol/L Final    Chloride 02/20/2024 104  95 - 110 mmol/L Final    CO2 02/20/2024 27  23 - 29 mmol/L Final    Glucose 02/20/2024 110  70 - 110 mg/dL Final    BUN 02/20/2024 15  6 - 20 mg/dL Final    Creatinine 02/20/2024 1.2  0.5 - 1.4 mg/dL Final    Calcium 02/20/2024 9.7  8.7 - 10.5 mg/dL Final    Anion Gap 02/20/2024 9  8 - 16 mmol/L Final    eGFR 02/20/2024 >60.0  >60 mL/min/1.73 m^2 Final    WBC 02/20/2024 6.85  3.90 - 12.70 K/uL Final    RBC 02/20/2024 5.27  4.60 - 6.20 M/uL Final    Hemoglobin 02/20/2024 14.7  14.0 - 18.0 g/dL Final    Hematocrit 02/20/2024 45.0  40.0 - 54.0 % Final    MCV 02/20/2024 85  82 - 98 fL Final    MCH 02/20/2024 27.9  27.0 - 31.0 pg Final    MCHC 02/20/2024 32.7  32.0 - 36.0 g/dL Final    RDW 02/20/2024 12.3  11.5 - 14.5 % Final    Platelets 02/20/2024 190  150 - 450 K/uL Final    MPV 02/20/2024 11.0  9.2 - 12.9 fL Final    Prothrombin Time 02/20/2024 11.3  9.0 - 12.5 sec Final    INR 02/20/2024 1.0  0.8 - 1.2 Final       Past Medical History:   Diagnosis Date    Atrial flutter     Diabetes mellitus, type 2     General anesthetics causing adverse effect in therapeutic use 2007    During general anesthesia for back surgery , experienced severe pain, heard people talking and felt like his heart was about to explode.    GERD (gastroesophageal reflux disease)     Hyperlipidemia     Hypothyroidism     Nasal septal deviation      Social History     Socioeconomic History    Marital status:    Tobacco Use    Smoking status: Former     Passive exposure: Past    Smokeless tobacco: Never   Substance and Sexual Activity    Alcohol use: No    Drug use: No     Social Determinants of Health     Financial Resource Strain: Unknown (9/28/2022)    Overall Financial Resource Strain ([facility])     Difficulty of  Paying Living Expenses: Patient declined   Food Insecurity: Unknown (9/28/2022)    Hunger Vital Sign     Worried About Running Out of Food in the Last Year: Patient declined     Ran Out of Food in the Last Year: Patient declined   Transportation Needs: Unknown (9/28/2022)    PRAPARE - Transportation     Lack of Transportation (Medical): Patient declined     Lack of Transportation (Non-Medical): Patient declined   Physical Activity: Unknown (4/4/2024)    Exercise Vital Sign     Days of Exercise per Week: Patient declined   Stress: Unknown (9/28/2022)    [employer] - Occupational Stress Questionnaire     Feeling of Stress : Patient declined   Social Connections: Unknown (5/22/2023)    Social Connection and Isolation Panel [NHANES]     Active Member of Clubs or Organizations: Yes     Attends Club or Organization Meetings: More than 4 times per year     Marital Status:    Housing Stability: Unknown (9/28/2022)    Housing Stability Vital Sign     Unable to Pay for Housing in the Last Year: Patient refused     Unstable Housing in the Last Year: Patient refused     Past Surgical History:   Procedure Laterality Date    ABLATION OF ARRHYTHMOGENIC FOCUS FOR ATRIAL FIBRILLATION N/A 2/27/2024    Procedure: Ablation atrial fibrillation;  Surgeon: Kehinde Hinds MD;  Location: [facility] EP LAB;  Service: Cardiology;  Laterality: N/A;  AF, [place] (cx if SR), PVI, RFA, Carto, Gen, GP, 3 Prep    BACK SURGERY  01/01/2007    artificial disc L4-5    CARDIAC ELECTROPHYSIOLOGY STUDY AND ABLATION  09/16/2022    CYSTOSCOPY W/ STONE MANIPULATION  01/01/2002    Kidney stones removed    EPIDURAL STEROID INJECTION  01/31/2022    KNEE ARTHROPLASTY Right 06/15/2020    Procedure: ARTHROPLASTY, KNEE;  Surgeon: Alok Rowe MD;  Location: [facility] OR;  Service: Orthopedics;  Laterality: Right;  SAMEERA FAYE    KNEE ARTHROSCOPY W/ DEBRIDEMENT  2000 and 2001    Right knee     Family History   Problem Relation Age of Onset     Diabetes Father     Heart disease Father     Cancer Father         prostate       The 10-year CVD risk score (BETH'Christi, et al., 2008) is: 13.8%    Values used to calculate the score:      Age: 53 years      Sex: Male      Diabetic: Yes      Tobacco smoker: No      Systolic Blood Pressure: 122 mmHg      Is BP treated: Yes      HDL Cholesterol: 65 mg/dL      Total Cholesterol: 181 mg/dL    Tests to Keep You Healthy    Eye Exam: ORDERED BUT NOT SCHEDULED  Colon Cancer Screening: Met on 9/10/2021  Last Blood Pressure <= 139/89 (4/4/2024): Yes  Last HbA1c < 8 (04/01/2024): Yes      Review of patient's allergies indicates:   Allergen Reactions    Demerol [meperidine] Itching       Current Outpatient Medications:     apixaban (ELIQUIS) 5 mg Tab, Take 1 tablet (5 mg total) by mouth 2 (two) times daily., Disp: 180 tablet, Rfl: 3    atorvastatin (LIPITOR) 20 MG tablet, Take 1 tablet (20 mg total) by mouth every evening., Disp: 90 tablet, Rfl: 3    lisinopriL 10 MG tablet, Take 1 tablet (10 mg total) by mouth once daily., Disp: 90 tablet, Rfl: 3    metFORMIN (GLUCOPHAGE) 500 MG tablet, Take 1 tablet (500 mg total) by mouth 3 (three) times daily., Disp: 270 tablet, Rfl: 3    omeprazole (PRILOSEC) 10 MG capsule, Take 10 mg by mouth once daily., Disp: , Rfl:     rOPINIRole (REQUIP) 2 MG tablet, Take 1 tablet (2 mg total) by mouth every evening., Disp: 90 tablet, Rfl: 3    azelastine (ASTELIN) 137 mcg (0.1 %) nasal spray, 2 sprays (274 mcg total) by Nasal route 2 (two) times daily., Disp: 30 mL, Rfl: 3    furosemide (LASIX) 20 MG tablet, Take 1 tablet (20 mg total) by mouth daily as needed (leg swelling, shortness of breath, weight gain >3 lbs in 1 day or >5 lbs in 3 days). (Patient not taking: Reported on 4/4/2024), Disp: 20 tablet, Rfl: 0    levothyroxine (SYNTHROID) 75 MCG tablet, Take 1 tablet (75 mcg total) by mouth once daily., Disp: 90 tablet, Rfl: 3    nystatin (MYCOSTATIN) 100,000 unit/mL suspension, Take 4 mLs  (400,000 Units total) by mouth 4 (four) times daily with meals and nightly. Swish & swallow for 10 days, Disp: 160 mL, Rfl: 1    semaglutide (OZEMPIC) 0.25 mg or 0.5 mg (2 mg/3 mL) pen injector, Inject 0.25 mg into the skin every 7 days., Disp: 3 mL, Rfl: 0    sotaloL (BETAPACE) 80 MG tablet, Take 80 mg by mouth 2 (two) times daily., Disp: , Rfl:     Review of Systems   Constitutional:  Negative for appetite change, chills, fatigue, fever and unexpected weight change.   HENT:  Negative for ear pain and trouble swallowing.    Eyes:  Negative for pain, discharge and visual disturbance.   Respiratory:  Negative for apnea, cough, shortness of breath and wheezing.    Cardiovascular:  Negative for chest pain and leg swelling.   Gastrointestinal:  Negative for abdominal pain, blood in stool, constipation, diarrhea, nausea, vomiting and reflux.   Endocrine: Negative for cold intolerance, heat intolerance and polydipsia.   Genitourinary:  Negative for bladder incontinence, dysuria, erectile dysfunction, frequency, hematuria, testicular pain and urgency.   Musculoskeletal:  Negative for gait problem, joint swelling and myalgias.   Neurological:  Negative for dizziness, seizures and numbness.   Psychiatric/Behavioral:  Negative for agitation, behavioral problems and hallucinations. The patient is not nervous/anxious.            Objective:      Vitals:    04/04/24 0822   BP: 122/80   Pulse: 64   SpO2: 98%   Weight: 112 kg (247 lb)   Height: 6' (1.829 m)     Physical Exam  Vitals and nursing note reviewed.   Constitutional:       General: He is not in acute distress.     Appearance: He is well-developed. He is obese. He is not toxic-appearing.   HENT:      Head: Normocephalic and atraumatic.      Right Ear: Tympanic membrane and external ear normal.      Left Ear: Tympanic membrane and external ear normal.      Nose: Congestion (very swollen nasal turbinates bilat) present.      Mouth/Throat:      Comments: Tongue has white  coating  Eyes:      Pupils: Pupils are equal, round, and reactive to light.   Neck:      Thyroid: No thyromegaly.      Vascular: No carotid bruit.   Cardiovascular:      Rate and Rhythm: Normal rate and regular rhythm.      Heart sounds: Normal heart sounds. No murmur heard.  Pulmonary:      Effort: Pulmonary effort is normal.      Breath sounds: Normal breath sounds. No wheezing or rales.   Abdominal:      General: Bowel sounds are normal. There is no distension.      Palpations: Abdomen is soft.      Tenderness: There is no abdominal tenderness.   Musculoskeletal:         General: No tenderness or deformity. Normal range of motion.      Cervical back: Normal range of motion and neck supple.      Lumbar back: Normal. No spasms.      Comments: Bends 90 degrees at  waist, knees  good  rom   Lymphadenopathy:      Cervical: No cervical adenopathy.   Skin:     General: Skin is warm and dry.      Findings: No rash.   Neurological:      Mental Status: He is alert and oriented to person, place, and time. Mental status is at baseline.      Cranial Nerves: No cranial nerve deficit.      Coordination: Coordination normal.   Psychiatric:         Mood and Affect: Mood normal.         Behavior: Behavior normal.         Thought Content: Thought content normal.         Judgment: Judgment normal.           Assessment:       1. Wellness examination    2. Type 2 diabetes mellitus without complication, without long-term current use of insulin    3. Seasonal allergic rhinitis due to pollen    4. Lumbar disc disease    5. Hard to intubate, subsequent encounter    6. Primary hypertension    7. Mixed hyperlipidemia    8. Oral candidiasis    9. Typical atrial flutter    10. Acquired hypothyroidism    11. History of total right knee replacement    12. Gastroesophageal reflux disease without esophagitis         Plan:       Type 2 diabetes mellitus without complication, without long-term current use of insulin  -     semaglutide (OZEMPIC)  0.25 mg or 0.5 mg (2 mg/3 mL) pen injector; Inject 0.25 mg into the skin every 7 days.  Dispense: 3 mL; Refill: 0  A1c 6.3  add Ozempic 0.25 mg weekly  Seasonal allergic rhinitis due to pollen  -     azelastine (ASTELIN) 137 mcg (0.1 %) nasal spray; 2 sprays (274 mcg total) by Nasal route 2 (two) times daily.  Dispense: 30 mL; Refill: 3    Lumbar disc disease  stable  Hard to intubate, subsequent encounter    Primary hypertension  Well controlled  Mixed hyperlipidemia  Chol 181  TG 53   Oral candidiasis  -     nystatin (MYCOSTATIN) 100,000 unit/mL suspension; Take 4 mLs (400,000 Units total) by mouth 4 (four) times daily with meals and nightly. Swish & swallow for 10 days  Dispense: 160 mL; Refill: 1    Typical atrial flutter    Acquired hypothyroidism    History of total right knee replacement    Gastroesophageal reflux disease without esophagitis      Follow up in about 22 weeks (around 9/5/2024).        4/4/2024 Ralf Saunders

## 2024-04-04 NOTE — TELEPHONE ENCOUNTER
----- Message from Nikkie Bella, Patient Care Assistant sent at 4/4/2024  9:09 AM CDT -----  Pt need 5 mo f/u appt  [phone]     Quinolones Counseling:  I discussed with the patient the risks of fluoroquinolones including but not limited to GI upset, allergic reaction, drug rash, diarrhea, dizziness, photosensitivity, yeast infections, liver function test abnormalities, tendonitis/tendon rupture.

## 2024-04-04 NOTE — TELEPHONE ENCOUNTER
Patient returned clinic's phone call and has been successfully scheduled for his five month follow up.

## 2024-04-10 ENCOUNTER — TELEPHONE (OUTPATIENT)
Dept: ELECTROPHYSIOLOGY | Facility: CLINIC | Age: 54
End: 2024-04-10
Payer: MEDICARE

## 2024-04-18 ENCOUNTER — PATIENT MESSAGE (OUTPATIENT)
Dept: FAMILY MEDICINE | Facility: CLINIC | Age: 54
End: 2024-04-18
Payer: MEDICARE

## 2024-04-18 DIAGNOSIS — E11.9 TYPE 2 DIABETES MELLITUS WITHOUT COMPLICATION, WITHOUT LONG-TERM CURRENT USE OF INSULIN: Primary | ICD-10-CM

## 2024-04-18 RX ORDER — SEMAGLUTIDE 0.68 MG/ML
0.5 INJECTION, SOLUTION SUBCUTANEOUS
Qty: 9 ML | Refills: 0 | Status: SHIPPED | OUTPATIENT
Start: 2024-04-18 | End: 2024-05-13

## 2024-04-18 NOTE — TELEPHONE ENCOUNTER
Who instructed him to contact us after third use? Dr. Saunders, or the pharmacy? I am just trying to determine if the pharmacy told him that because he has no refills, or if Dr. Saunders wanted to evaluate whether he needed a dose increase. If it is the latter, I need to know what his fasting blood sugars have been lately on the medication, and has he noticed a decrease in his appetite, or any weight loss.

## 2024-04-25 ENCOUNTER — PATIENT MESSAGE (OUTPATIENT)
Dept: FAMILY MEDICINE | Facility: CLINIC | Age: 54
End: 2024-04-25
Payer: MEDICARE

## 2024-04-25 DIAGNOSIS — E11.9 TYPE 2 DIABETES MELLITUS WITHOUT COMPLICATION, WITHOUT LONG-TERM CURRENT USE OF INSULIN: Primary | ICD-10-CM

## 2024-04-25 RX ORDER — ORAL SEMAGLUTIDE 7 MG/1
7 TABLET ORAL DAILY
Qty: 30 TABLET | Refills: 2 | Status: SHIPPED | OUTPATIENT
Start: 2024-04-25

## 2024-05-13 NOTE — PROGRESS NOTES
Subjective:     HPI    I had the pleasure of seeing John Corrales in follow-up for his history of atrial flutter. He is a 54M with HTN, HLD, hypothyroidism, DM2, who presented to [facility] in 3/2022 with palpitations, dyspnea, and diaphoresis and was found to be in atrial flutter with RVR. He was rate controlled, and converted to sinus rhythm. He was discharged on eliquis and lopressor, and presents to me to discuss management options.    Mr. Corrales continues to feel occasional palpitations lasting minutes.    Echo in 3/2022 showed an EF of 60% and mild LAE.    On 9/1/2022 a CTI-line was performed. Eliquis was stopped post-procedure. At his 2/2023 visit Mr. Corrales denied recurrent palpitations. Plan at that time was PRN follow-up.    At 8/2023 visit, pt stated that he has had several episodes of AF noted on AppleWatch since purchasing it in 3/2023. Several strips from 5/2023 showed what appeared to be AF although sinus with PVC salvos could not be excluded. Pt now on xarelto. Pt stated that he has alerts for AF roughly every few days. Pt feeling skipped and extra beats, no sustained palpitations.    Pt wore a 2 week monitor in 8/2023, showing a 4% AF burden (average rate 111 bpm, longest episode 7.5 hrs). Prescribed multaq but prohibitively expensive. Prescribed sotalol but passed out (he thought 2/2 bradycardia). Continued to have episodes of AF at 1/2024 visit, longest lasting 12 hours.    PVI/PWI on 2/27/2024 (CTI-line intact). Discharged on eliquis not an antiarrhythmic.    Pt states he continues to have paroxysms of AF.    My interpretation of today's ECG is AF at 133 bpm.    Review of Systems   Constitutional: Negative for decreased appetite, malaise/fatigue, weight gain and weight loss.   HENT:  Negative for sore throat.    Eyes:  Negative for blurred vision.   Cardiovascular:  Positive for palpitations. Negative for chest pain, dyspnea on exertion, irregular heartbeat, leg swelling, near-syncope,  orthopnea, paroxysmal nocturnal dyspnea and syncope.   Respiratory:  Negative for shortness of breath.    Skin:  Negative for rash.   Musculoskeletal:  Negative for arthritis.   Gastrointestinal:  Negative for abdominal pain.   Neurological:  Negative for focal weakness.   Psychiatric/Behavioral:  Negative for altered mental status.         Objective:    Physical Exam  Vitals and nursing note reviewed.   Constitutional:       General: He is not in acute distress.     Appearance: He is well-developed.   HENT:      Head: Normocephalic and atraumatic.   Eyes:      General: No scleral icterus.     Pupils: Pupils are equal, round, and reactive to light.   Neck:      Thyroid: No thyromegaly.   Cardiovascular:      Rate and Rhythm: Tachycardia present. Rhythm irregular.      Pulses: Normal pulses.      Heart sounds: Normal heart sounds. No murmur heard.     No friction rub. No gallop.   Pulmonary:      Effort: Pulmonary effort is normal.      Breath sounds: Normal breath sounds.   Abdominal:      General: Bowel sounds are normal. There is no distension.      Palpations: Abdomen is soft.      Tenderness: There is no abdominal tenderness.   Musculoskeletal:      Cervical back: Neck supple.   Skin:     General: Skin is warm and dry.      Findings: No rash.   Neurological:      Mental Status: He is alert and oriented to person, place, and time.   Psychiatric:         Behavior: Behavior normal.         Assessment:       1. Atrial flutter, unspecified type    2. Primary hypertension    3. Mixed hyperlipidemia    4. Acquired hypothyroidism         Plan:       In summary, John Corrales is a 54M with symptomatic atrial flutter. He is status-post CTI-line, but then developed a 4% AF burden. Failed sotalol, multaq prohibitvely expensive. Remains on eliquis.    Pt now 2.5 months status-post PVI/PWI. Continues to have paroxysms of (and possibly is in persistent) AF.    Plan is for a 3 day monitor. IF AF persistent plan for DCCV  (no [place] as pt has b een compliant).     Thank you for allowing me to participate in the care of this patient. Please do not hesitate to call me with any questions or concerns.

## 2024-05-15 ENCOUNTER — OFFICE VISIT (OUTPATIENT)
Dept: CARDIOLOGY | Facility: CLINIC | Age: 54
End: 2024-05-15
Payer: MEDICARE

## 2024-05-15 VITALS
DIASTOLIC BLOOD PRESSURE: 84 MMHG | RESPIRATION RATE: 16 BRPM | WEIGHT: 237.31 LBS | BODY MASS INDEX: 32.14 KG/M2 | SYSTOLIC BLOOD PRESSURE: 136 MMHG | OXYGEN SATURATION: 96 % | HEART RATE: 124 BPM | HEIGHT: 72 IN

## 2024-05-15 DIAGNOSIS — E03.9 ACQUIRED HYPOTHYROIDISM: ICD-10-CM

## 2024-05-15 DIAGNOSIS — E78.2 MIXED HYPERLIPIDEMIA: ICD-10-CM

## 2024-05-15 DIAGNOSIS — I10 PRIMARY HYPERTENSION: ICD-10-CM

## 2024-05-15 DIAGNOSIS — I48.0 PAROXYSMAL ATRIAL FIBRILLATION: ICD-10-CM

## 2024-05-15 DIAGNOSIS — I48.92 ATRIAL FLUTTER, UNSPECIFIED TYPE: Primary | ICD-10-CM

## 2024-05-15 PROCEDURE — 4010F ACE/ARB THERAPY RXD/TAKEN: CPT | Mod: CPTII,S$GLB,, | Performed by: INTERNAL MEDICINE

## 2024-05-15 PROCEDURE — 3061F NEG MICROALBUMINURIA REV: CPT | Mod: CPTII,S$GLB,, | Performed by: INTERNAL MEDICINE

## 2024-05-15 PROCEDURE — 93010 ELECTROCARDIOGRAM REPORT: CPT | Mod: S$GLB,,, | Performed by: INTERNAL MEDICINE

## 2024-05-15 PROCEDURE — 3075F SYST BP GE 130 - 139MM HG: CPT | Mod: CPTII,S$GLB,, | Performed by: INTERNAL MEDICINE

## 2024-05-15 PROCEDURE — 3079F DIAST BP 80-89 MM HG: CPT | Mod: CPTII,S$GLB,, | Performed by: INTERNAL MEDICINE

## 2024-05-15 PROCEDURE — 99999 PR PBB SHADOW E&M-EST. PATIENT-LVL III: CPT | Mod: PBBFAC,,, | Performed by: INTERNAL MEDICINE

## 2024-05-15 PROCEDURE — 93005 ELECTROCARDIOGRAM TRACING: CPT | Mod: S$GLB,,, | Performed by: INTERNAL MEDICINE

## 2024-05-15 PROCEDURE — 3044F HG A1C LEVEL LT 7.0%: CPT | Mod: CPTII,S$GLB,, | Performed by: INTERNAL MEDICINE

## 2024-05-15 PROCEDURE — 3066F NEPHROPATHY DOC TX: CPT | Mod: CPTII,S$GLB,, | Performed by: INTERNAL MEDICINE

## 2024-05-15 PROCEDURE — 1159F MED LIST DOCD IN RCRD: CPT | Mod: CPTII,S$GLB,, | Performed by: INTERNAL MEDICINE

## 2024-05-15 PROCEDURE — 3008F BODY MASS INDEX DOCD: CPT | Mod: CPTII,S$GLB,, | Performed by: INTERNAL MEDICINE

## 2024-05-15 PROCEDURE — 99214 OFFICE O/P EST MOD 30 MIN: CPT | Mod: S$GLB,,, | Performed by: INTERNAL MEDICINE

## 2024-05-15 RX ORDER — METOPROLOL TARTRATE 25 MG/1
25 TABLET, FILM COATED ORAL 2 TIMES DAILY
Qty: 180 TABLET | Refills: 3 | Status: SHIPPED | OUTPATIENT
Start: 2024-05-15 | End: 2025-05-10

## 2024-05-16 ENCOUNTER — HOSPITAL ENCOUNTER (OUTPATIENT)
Dept: CARDIOLOGY | Facility: CLINIC | Age: 54
Discharge: HOME | End: 2024-05-16
Attending: INTERNAL MEDICINE
Payer: MEDICARE

## 2024-05-16 DIAGNOSIS — I48.0 PAROXYSMAL ATRIAL FIBRILLATION: ICD-10-CM

## 2024-05-16 DIAGNOSIS — I48.92 ATRIAL FLUTTER, UNSPECIFIED TYPE: ICD-10-CM

## 2024-05-16 PROCEDURE — 93244 EXT ECG>48HR<7D REV&INTERPJ: CPT | Mod: ,,, | Performed by: INTERNAL MEDICINE

## 2024-05-16 PROCEDURE — 93242 EXT ECG>48HR<7D RECORDING: CPT | Mod: ,,, | Performed by: INTERNAL MEDICINE

## 2024-05-23 LAB
OHS QRS DURATION: 92 MS
OHS QTC CALCULATION: 453 MS

## 2024-05-27 LAB
OHS CV EVENT MONITOR DAY: 2
OHS CV HOLTER AFIB AVERAGE HR: 138 BPM
OHS CV HOLTER AFIB MAX HR: 177 BPM
OHS CV HOLTER AFIB MIN HR: 110 BPM
OHS CV HOLTER HOOKUP DATE: NORMAL
OHS CV HOLTER HOOKUP TIME: NORMAL
OHS CV HOLTER LENGTH DECIMAL HOURS: 70
OHS CV HOLTER LENGTH HOURS: 22
OHS CV HOLTER LENGTH MINUTES: 0
OHS CV HOLTER SCAN DATE: NORMAL
OHS CV HOLTER SINUS AVERAGE HR: 74 BPM
OHS CV HOLTER SINUS MAX HR: 177 BPM
OHS CV HOLTER SINUS MIN HR: 49 BPM
OHS CV HOLTER STUDY END DATE: NORMAL
OHS CV HOLTER STUDY END TIME: NORMAL

## 2024-05-28 ENCOUNTER — TELEPHONE (OUTPATIENT)
Dept: CARDIOLOGY | Facility: CLINIC | Age: 54
End: 2024-05-28
Payer: MEDICARE

## 2024-05-28 NOTE — TELEPHONE ENCOUNTER
Dr. Hinds sent message regarding test results and wanted patient an appt in 4-8 week. Appt scheduled 7/10. [facility] that appt scheduled.

## 2024-07-07 NOTE — PROGRESS NOTES
Subjective:     HPI    I had the pleasure of seeing John Corrales in follow-up for his history of atrial flutter. Last seen in clinic in 5/2024. He is a 54M with HTN, HLD, hypothyroidism, DM2, who presented to [facility] in 3/2022 with palpitations, dyspnea, and diaphoresis and was found to be in atrial flutter with RVR. He was rate controlled, and converted to sinus rhythm. He was discharged on eliquis and lopressor, and presents to me to discuss management options.    Mr. Corrales continues to feel occasional palpitations lasting minutes.    Echo in 3/2022 showed an EF of 60% and mild LAE.    On 9/1/2022 a CTI-line was performed. Eliquis was stopped post-procedure. At his 2/2023 visit Mr. Corrales denied recurrent palpitations. Plan at that time was PRN follow-up.    At 8/2023 visit, pt stated that he has had several episodes of AF noted on AppleWatch since purchasing it in 3/2023. Several strips from 5/2023 showed what appeared to be AF although sinus with PVC salvos could not be excluded. Pt now on xarelto. Pt stated that he has alerts for AF roughly every few days. Pt feeling skipped and extra beats, no sustained palpitations.    Pt wore a 2 week monitor in 8/2023, showing a 4% AF burden (average rate 111 bpm, longest episode 7.5 hrs). Prescribed multaq but prohibitively expensive. Prescribed sotalol but passed out (he thought 2/2 bradycardia). Continued to have episodes of AF at 1/2024 visit, longest lasting 12 hours.    PVI/PWI on 2/27/2024 (CTI-line intact). Discharged on eliquis not an antiarrhythmic.    At follow-up in 5/2024 pt was in AF at 133 bpm. This prompted a 3 day Zio performed in 5/2024 which showed AF burden <1%, longest episode 3m 15s.    Today in the office Mr. Corrales continues to have episodes of AF every couple days, lasting 10-15 minutes. Takes PRN metoprolol when episodes occur.    Review of Systems   Constitutional: Negative for decreased appetite, malaise/fatigue, weight gain and weight  loss.   HENT:  Negative for sore throat.    Eyes:  Negative for blurred vision.   Cardiovascular:  Positive for palpitations. Negative for chest pain, dyspnea on exertion, irregular heartbeat, leg swelling, near-syncope, orthopnea, paroxysmal nocturnal dyspnea and syncope.   Respiratory:  Negative for shortness of breath.    Skin:  Negative for rash.   Musculoskeletal:  Negative for arthritis.   Gastrointestinal:  Negative for abdominal pain.   Neurological:  Negative for focal weakness.   Psychiatric/Behavioral:  Negative for altered mental status.         Objective:    Physical Exam  Vitals and nursing note reviewed.   Constitutional:       General: He is not in acute distress.     Appearance: He is well-developed.   HENT:      Head: Normocephalic and atraumatic.   Eyes:      General: No scleral icterus.     Pupils: Pupils are equal, round, and reactive to light.   Neck:      Thyroid: No thyromegaly.   Cardiovascular:      Rate and Rhythm: Normal rate and regular rhythm.      Pulses: Normal pulses.      Heart sounds: Normal heart sounds. No murmur heard.     No friction rub. No gallop.   Pulmonary:      Effort: Pulmonary effort is normal.      Breath sounds: Normal breath sounds.   Abdominal:      General: Bowel sounds are normal. There is no distension.      Palpations: Abdomen is soft.      Tenderness: There is no abdominal tenderness.   Musculoskeletal:      Cervical back: Neck supple.   Skin:     General: Skin is warm and dry.      Findings: No rash.   Neurological:      Mental Status: He is alert and oriented to person, place, and time.   Psychiatric:         Behavior: Behavior normal.           Assessment:       1. Atrial flutter, unspecified type    2. Primary hypertension    3. Mixed hyperlipidemia    4. Acquired hypothyroidism    5. Type 2 diabetes mellitus without complication, without long-term current use of insulin         Plan:       In summary, Jonh Corrales is a 54M with symptomatic atrial  flutter. He is status-post CTI-line, but then developed a 4% AF burden. Failed sotalol, multaq prohibitvely expensive. Remains on eliquis.    Pt now 4.5 months status-post PVI/PWI. Continues to have paroxysms of AF albeit very short duration.    We discussed in detail the pathophysiology of AF as well as the myriad of treatment options available to manage it including antiarrhythmics and catheter ablation. We specifically discussed the risks, benefits, indications, and alternatives to re-do PVI. Risks discussed include bleeding, hematoma, vascular damage, cardiac tamponade, stroke, PV stenosis, AE fistula, phrenic nerve damage, and death.  After considering his options he has decided to proceed.     CARTO. Hold eliquis AM of procedure. Post-procedure will not start an antiarrhythmic.    Have also ordered sleep study.    Thank you for allowing me to participate in the care of this patient. Please do not hesitate to call me with any questions or concerns.

## 2024-07-10 ENCOUNTER — OFFICE VISIT (OUTPATIENT)
Dept: CARDIOLOGY | Facility: CLINIC | Age: 54
End: 2024-07-10
Payer: MEDICARE

## 2024-07-10 VITALS
SYSTOLIC BLOOD PRESSURE: 120 MMHG | OXYGEN SATURATION: 98 % | RESPIRATION RATE: 16 BRPM | WEIGHT: 239.5 LBS | HEART RATE: 70 BPM | HEIGHT: 72 IN | BODY MASS INDEX: 32.44 KG/M2 | DIASTOLIC BLOOD PRESSURE: 80 MMHG

## 2024-07-10 DIAGNOSIS — I48.92 ATRIAL FLUTTER, UNSPECIFIED TYPE: Primary | ICD-10-CM

## 2024-07-10 DIAGNOSIS — E11.9 TYPE 2 DIABETES MELLITUS WITHOUT COMPLICATION, WITHOUT LONG-TERM CURRENT USE OF INSULIN: ICD-10-CM

## 2024-07-10 DIAGNOSIS — I10 PRIMARY HYPERTENSION: ICD-10-CM

## 2024-07-10 DIAGNOSIS — E03.9 ACQUIRED HYPOTHYROIDISM: ICD-10-CM

## 2024-07-10 DIAGNOSIS — E78.2 MIXED HYPERLIPIDEMIA: ICD-10-CM

## 2024-07-10 DIAGNOSIS — I48.0 PAROXYSMAL ATRIAL FIBRILLATION: ICD-10-CM

## 2024-07-10 PROCEDURE — 99999 PR PBB SHADOW E&M-EST. PATIENT-LVL IV: CPT | Mod: PBBFAC,,, | Performed by: INTERNAL MEDICINE

## 2024-07-10 PROCEDURE — 3074F SYST BP LT 130 MM HG: CPT | Mod: CPTII,S$GLB,, | Performed by: INTERNAL MEDICINE

## 2024-07-10 PROCEDURE — 99215 OFFICE O/P EST HI 40 MIN: CPT | Mod: S$GLB,,, | Performed by: INTERNAL MEDICINE

## 2024-07-10 PROCEDURE — 3044F HG A1C LEVEL LT 7.0%: CPT | Mod: CPTII,S$GLB,, | Performed by: INTERNAL MEDICINE

## 2024-07-10 PROCEDURE — 3008F BODY MASS INDEX DOCD: CPT | Mod: CPTII,S$GLB,, | Performed by: INTERNAL MEDICINE

## 2024-07-10 PROCEDURE — 1159F MED LIST DOCD IN RCRD: CPT | Mod: CPTII,S$GLB,, | Performed by: INTERNAL MEDICINE

## 2024-07-10 PROCEDURE — 3061F NEG MICROALBUMINURIA REV: CPT | Mod: CPTII,S$GLB,, | Performed by: INTERNAL MEDICINE

## 2024-07-10 PROCEDURE — 3066F NEPHROPATHY DOC TX: CPT | Mod: CPTII,S$GLB,, | Performed by: INTERNAL MEDICINE

## 2024-07-10 PROCEDURE — 3079F DIAST BP 80-89 MM HG: CPT | Mod: CPTII,S$GLB,, | Performed by: INTERNAL MEDICINE

## 2024-07-10 PROCEDURE — 4010F ACE/ARB THERAPY RXD/TAKEN: CPT | Mod: CPTII,S$GLB,, | Performed by: INTERNAL MEDICINE

## 2024-07-19 ENCOUNTER — TELEPHONE (OUTPATIENT)
Dept: ELECTROPHYSIOLOGY | Facility: CLINIC | Age: 54
End: 2024-07-19
Payer: MEDICARE

## 2024-07-22 ENCOUNTER — PATIENT MESSAGE (OUTPATIENT)
Dept: CARDIOLOGY | Facility: CLINIC | Age: 54
End: 2024-07-22
Payer: MEDICARE

## 2024-07-23 NOTE — TELEPHONE ENCOUNTER
Spoke with patient and scheduled on 11/1/24. Medications, allergies, and absence of implanted devices reviewed with patient. Procedure instructions reviewed with patient, will follow up with written instructions via portal per patient's preference. Pt instructed to notify office of any changes in medications or medical condition.    In regards to sleep study - informed pt that there are many locations/options for having sleep study completed and that they will be able to explain in greater detail when he is scheduled.

## 2024-08-10 DIAGNOSIS — E11.9 TYPE 2 DIABETES MELLITUS WITHOUT COMPLICATION, WITHOUT LONG-TERM CURRENT USE OF INSULIN: ICD-10-CM

## 2024-08-10 RX ORDER — ORAL SEMAGLUTIDE 7 MG/1
7 TABLET ORAL DAILY
Qty: 30 TABLET | Refills: 2 | Status: CANCELLED | OUTPATIENT
Start: 2024-08-10

## 2024-08-13 DIAGNOSIS — E11.9 TYPE 2 DIABETES MELLITUS WITHOUT COMPLICATION, WITHOUT LONG-TERM CURRENT USE OF INSULIN: ICD-10-CM

## 2024-08-13 RX ORDER — ORAL SEMAGLUTIDE 7 MG/1
7 TABLET ORAL DAILY
Qty: 30 TABLET | Refills: 2 | Status: CANCELLED | OUTPATIENT
Start: 2024-08-13

## 2024-08-15 DIAGNOSIS — I48.0 PAF (PAROXYSMAL ATRIAL FIBRILLATION): Primary | ICD-10-CM

## 2024-09-03 ENCOUNTER — PATIENT MESSAGE (OUTPATIENT)
Dept: ADMINISTRATIVE | Facility: HOSPITAL | Age: 54
End: 2024-09-03
Payer: MEDICARE

## 2024-09-04 ENCOUNTER — OFFICE VISIT (OUTPATIENT)
Dept: FAMILY MEDICINE | Facility: CLINIC | Age: 54
End: 2024-09-04
Payer: MEDICARE

## 2024-09-04 VITALS
BODY MASS INDEX: 32.1 KG/M2 | WEIGHT: 237 LBS | HEART RATE: 69 BPM | DIASTOLIC BLOOD PRESSURE: 78 MMHG | SYSTOLIC BLOOD PRESSURE: 138 MMHG | HEIGHT: 72 IN

## 2024-09-04 DIAGNOSIS — I48.3 TYPICAL ATRIAL FLUTTER: ICD-10-CM

## 2024-09-04 DIAGNOSIS — E11.9 TYPE 2 DIABETES MELLITUS WITHOUT COMPLICATION, WITHOUT LONG-TERM CURRENT USE OF INSULIN: ICD-10-CM

## 2024-09-04 DIAGNOSIS — M51.9 LUMBAR DISC DISEASE: ICD-10-CM

## 2024-09-04 DIAGNOSIS — E11.9 TYPE 2 DIABETES MELLITUS WITHOUT COMPLICATION, WITHOUT LONG-TERM CURRENT USE OF INSULIN: Primary | ICD-10-CM

## 2024-09-04 DIAGNOSIS — K21.9 GASTROESOPHAGEAL REFLUX DISEASE WITHOUT ESOPHAGITIS: ICD-10-CM

## 2024-09-04 DIAGNOSIS — J30.1 SEASONAL ALLERGIC RHINITIS DUE TO POLLEN: ICD-10-CM

## 2024-09-04 DIAGNOSIS — Z96.651 HISTORY OF TOTAL RIGHT KNEE REPLACEMENT: ICD-10-CM

## 2024-09-04 DIAGNOSIS — S86.891A RIGHT MEDIAL TIBIAL STRESS SYNDROME, INITIAL ENCOUNTER: ICD-10-CM

## 2024-09-04 DIAGNOSIS — E78.5 HYPERLIPIDEMIA, UNSPECIFIED HYPERLIPIDEMIA TYPE: ICD-10-CM

## 2024-09-04 DIAGNOSIS — I10 HYPERTENSION, UNSPECIFIED TYPE: ICD-10-CM

## 2024-09-04 LAB — HBA1C MFR BLD: 5.8 %

## 2024-09-04 PROCEDURE — 3061F NEG MICROALBUMINURIA REV: CPT | Mod: CPTII,S$GLB,, | Performed by: FAMILY MEDICINE

## 2024-09-04 PROCEDURE — 83036 HEMOGLOBIN GLYCOSYLATED A1C: CPT | Mod: QW,,, | Performed by: FAMILY MEDICINE

## 2024-09-04 PROCEDURE — 1159F MED LIST DOCD IN RCRD: CPT | Mod: CPTII,S$GLB,, | Performed by: FAMILY MEDICINE

## 2024-09-04 PROCEDURE — 3075F SYST BP GE 130 - 139MM HG: CPT | Mod: CPTII,S$GLB,, | Performed by: FAMILY MEDICINE

## 2024-09-04 PROCEDURE — 3078F DIAST BP <80 MM HG: CPT | Mod: CPTII,S$GLB,, | Performed by: FAMILY MEDICINE

## 2024-09-04 PROCEDURE — 99214 OFFICE O/P EST MOD 30 MIN: CPT | Mod: S$GLB,,, | Performed by: FAMILY MEDICINE

## 2024-09-04 PROCEDURE — 3066F NEPHROPATHY DOC TX: CPT | Mod: CPTII,S$GLB,, | Performed by: FAMILY MEDICINE

## 2024-09-04 PROCEDURE — 3044F HG A1C LEVEL LT 7.0%: CPT | Mod: CPTII,S$GLB,, | Performed by: FAMILY MEDICINE

## 2024-09-04 PROCEDURE — 3008F BODY MASS INDEX DOCD: CPT | Mod: CPTII,S$GLB,, | Performed by: FAMILY MEDICINE

## 2024-09-04 PROCEDURE — 4010F ACE/ARB THERAPY RXD/TAKEN: CPT | Mod: CPTII,S$GLB,, | Performed by: FAMILY MEDICINE

## 2024-09-04 RX ORDER — FAMOTIDINE, CALCIUM CARBONATE, AND MAGNESIUM HYDROXIDE 10; 800; 165 MG/1; MG/1; MG/1
TABLET, CHEWABLE ORAL
COMMUNITY
Start: 2024-08-19

## 2024-09-04 RX ORDER — METHYLPREDNISOLONE 4 MG/1
TABLET ORAL
Qty: 21 EACH | Refills: 0 | Status: SHIPPED | OUTPATIENT
Start: 2024-09-04 | End: 2024-09-25

## 2024-09-04 RX ORDER — ATORVASTATIN CALCIUM 20 MG/1
20 TABLET, FILM COATED ORAL NIGHTLY
Qty: 90 TABLET | Refills: 3 | Status: SHIPPED | OUTPATIENT
Start: 2024-09-04

## 2024-09-04 RX ORDER — METFORMIN HYDROCHLORIDE 500 MG/1
500 TABLET ORAL 3 TIMES DAILY
Qty: 270 TABLET | Refills: 3 | Status: SHIPPED | OUTPATIENT
Start: 2024-09-04

## 2024-09-04 RX ORDER — LISINOPRIL 10 MG/1
10 TABLET ORAL DAILY
Qty: 90 TABLET | Refills: 3 | Status: SHIPPED | OUTPATIENT
Start: 2024-09-04

## 2024-09-04 RX ORDER — ORAL SEMAGLUTIDE 7 MG/1
7 TABLET ORAL DAILY
Qty: 30 TABLET | Refills: 5 | Status: SHIPPED | OUTPATIENT
Start: 2024-09-04

## 2024-09-04 RX ORDER — FLUTICASONE PROPIONATE 50 MCG
1 SPRAY, SUSPENSION (ML) NASAL NIGHTLY
Qty: 16 G | Refills: 4 | Status: SHIPPED | OUTPATIENT
Start: 2024-09-04

## 2024-09-04 NOTE — PROGRESS NOTES
Subjective:       Patient ID: John Corrales is a 54 y.o. male.    Chief Complaint: Diabetes (A1c//Foot exam ordered, pt has some questions, pt is fasting for blood work,abc )    This 54-year-old diabetic male is here for six-month checkup.  He complains of year-round sinus congestion and stuffiness in his nose.  He has to use Afrin nasal spray at night just to breathe.    He has a right TKR that is doing well.  He had some shin splints from exercising.  He does vigorous walking and bicycling for exercise likes bowling as well.    Lumbar disc disease surgery in 2007.  Does have chronic intermittent back pain, mostly controls it with exercises hot bath and occasionally takes a leave.  Does not want narcotics.    February 20, 2024-cardiac ablation for atrial fib and flutter.  Dr. Michael Hinds, has been found to be in atrial fibrillation 2% of the time.  Follow-up ablation tuna up scheduled in November 19th takes metoprolol only p.r.n. atrial fibrillation.  Compliant with Eliquis 5 mg b.i.d.    Diabetes type 2-Rybelsus 7 mg was working well, who is controlling his appetite and is blood sugars as well.  Now A1c is 5.8.  Has been out of Rybelsusfor 2 weeks.    2021-colonoscopy with Dr. [facility]rough[facility] 5 years    Diabetic eye exam scheduled in November with Dr. Darrin Monten[place] 2020        Diabetes  Pertinent negatives for hypoglycemia include no confusion or headaches. Pertinent negatives for diabetes include no chest pain, no polydipsia, no polyuria and no weakness.       Past Medical History:   Diagnosis Date    Atrial flutter     Diabetes mellitus, type 2     General anesthetics causing adverse effect in therapeutic use 2007    During general anesthesia for back surgery , experienced severe pain, heard people talking and felt like his heart was about to explode.    GERD (gastroesophageal reflux disease)     Hyperlipidemia     Hypothyroidism     Nasal septal deviation      Past Surgical History:   Procedure  Laterality Date    ABLATION OF ARRHYTHMOGENIC FOCUS FOR ATRIAL FIBRILLATION N/A 2/27/2024    Procedure: Ablation atrial fibrillation;  Surgeon: Kehinde Hinds MD;  Location: [facility] EP LAB;  Service: Cardiology;  Laterality: N/A;  AF, [place] (cx if SR), PVI, RFA, Carto, Gen, GP, 3 Prep    BACK SURGERY  01/01/2007    artificial disc L4-5    CARDIAC ELECTROPHYSIOLOGY STUDY AND ABLATION  09/16/2022    CYSTOSCOPY W/ STONE MANIPULATION  01/01/2002    Kidney stones removed    EPIDURAL STEROID INJECTION  01/31/2022    KNEE ARTHROPLASTY Right 06/15/2020    Procedure: ARTHROPLASTY, KNEE;  Surgeon: Alok Rowe MD;  Location: [facility] OR;  Service: Orthopedics;  Laterality: Right;  SAMEERA FAYE    KNEE ARTHROSCOPY W/ DEBRIDEMENT  2000 and 2001    Right knee     Family History   Problem Relation Name Age of Onset    Diabetes Father      Heart disease Father      Cancer Father          prostate       Marital Status:   Alcohol History:  reports no history of alcohol use.  Tobacco History:  reports that he has quit smoking. He has been exposed to tobacco smoke. He has never used smokeless tobacco.  Drug History:  reports no history of drug use.    Review of patient's allergies indicates:   Allergen Reactions    Demerol [meperidine] Itching       Current Outpatient Medications:     apixaban (ELIQUIS) 5 mg Tab, Take 1 tablet (5 mg total) by mouth 2 (two) times daily., Disp: 180 tablet, Rfl: 3    azelastine (ASTELIN) 137 mcg (0.1 %) nasal spray, 2 sprays (274 mcg total) by Nasal route 2 (two) times daily., Disp: 30 mL, Rfl: 3    famotidine-calcium carbonate-magnesium hydroxide (PEPCID COMPLETE) -165 mg, , Disp: , Rfl:     metoprolol tartrate (LOPRESSOR) 25 MG tablet, Take 1 tablet (25 mg total) by mouth 2 (two) times daily., Disp: 180 tablet, Rfl: 3    rOPINIRole (REQUIP) 2 MG tablet, Take 1 tablet (2 mg total) by mouth every evening., Disp: 90 tablet, Rfl: 3    atorvastatin (LIPITOR) 20 MG tablet, Take 1 tablet (20 mg total)  by mouth every evening., Disp: 90 tablet, Rfl: 3    fluticasone propionate (FLONASE) 50 mcg/actuation nasal spray, 1 spray (50 mcg total) by Each Nostril route every evening., Disp: 16 g, Rfl: 4    levothyroxine (SYNTHROID) 75 MCG tablet, Take 1 tablet (75 mcg total) by mouth once daily., Disp: 90 tablet, Rfl: 3    lisinopriL 10 MG tablet, Take 1 tablet (10 mg total) by mouth once daily., Disp: 90 tablet, Rfl: 3    metFORMIN (GLUCOPHAGE) 500 MG tablet, Take 1 tablet (500 mg total) by mouth 3 (three) times daily., Disp: 270 tablet, Rfl: 3    methylPREDNISolone (MEDROL DOSEPACK) 4 mg tablet, use as directed, Disp: 21 each, Rfl: 0    omeprazole (PRILOSEC) 10 MG capsule, Take 10 mg by mouth once daily., Disp: , Rfl:     semaglutide (RYBELSUS) 7 mg tablet, Take 1 tablet (7 mg total) by mouth once daily. (Patient not taking: Reported on 9/4/2024), Disp: 30 tablet, Rfl: 2    Review of Systems   Constitutional:  Negative for activity change and unexpected weight change.   HENT:  Negative for hearing loss, rhinorrhea and trouble swallowing.    Eyes:  Negative for discharge and visual disturbance.   Respiratory:  Negative for chest tightness and wheezing.    Cardiovascular:  Positive for palpitations. Negative for chest pain.   Gastrointestinal:  Negative for blood in stool, constipation, diarrhea and vomiting.   Endocrine: Negative for polydipsia and polyuria.   Genitourinary:  Positive for urgency. Negative for difficulty urinating and hematuria.   Musculoskeletal:  Positive for arthralgias. Negative for joint swelling and neck pain.   Neurological:  Negative for weakness and headaches.   Psychiatric/Behavioral:  Negative for confusion and dysphoric mood.            Objective:      Vitals:    09/04/24 0740   BP: 138/78   Pulse: 69   Weight: 107.5 kg (237 lb)   Height: 6' (1.829 m)     Body mass index is 32.14 kg/m².  Physical Exam  Vitals and nursing note reviewed.   Constitutional:       General: He is not in acute  distress.     Appearance: Normal appearance. He is well-developed. He is not toxic-appearing.   HENT:      Head: Normocephalic and atraumatic.      Right Ear: Tympanic membrane and external ear normal.      Left Ear: Tympanic membrane and external ear normal.      Nose: Congestion (Very swollen turbinates bilaterally) present.      Mouth/Throat:      Pharynx: Oropharynx is clear. No posterior oropharyngeal erythema.   Eyes:      Pupils: Pupils are equal, round, and reactive to light.   Neck:      Thyroid: No thyromegaly.      Vascular: No carotid bruit.   Cardiovascular:      Rate and Rhythm: Normal rate and regular rhythm.      Heart sounds: Normal heart sounds. No murmur heard.  Pulmonary:      Effort: Pulmonary effort is normal.      Breath sounds: Normal breath sounds. No wheezing or rales.   Abdominal:      General: Bowel sounds are normal. There is no distension.      Palpations: Abdomen is soft.      Tenderness: There is no abdominal tenderness.   Musculoskeletal:         General: No tenderness or deformity. Normal range of motion.      Cervical back: Normal range of motion and neck supple.      Lumbar back: Normal. No spasms.      Comments: Bends 90 degrees at  waist right TKR has some lateral valgus laxity, left knee is stable, no pitting edema to lower extremities   Lymphadenopathy:      Cervical: No cervical adenopathy.   Skin:     General: Skin is warm and dry.      Findings: No rash.   Neurological:      Mental Status: He is alert and oriented to person, place, and time. Mental status is at baseline.      Cranial Nerves: No cranial nerve deficit.      Coordination: Coordination normal.   Psychiatric:         Mood and Affect: Mood normal.         Behavior: Behavior normal.         Thought Content: Thought content normal.         Judgment: Judgment normal.           LEFT: normal DP and PT pulses, no trophic changes or ulcerative lesions, and normal sensory exam    RIGHT: normal DP and PT pulses, no  trophic changes or ulcerative lesions, and normal sensory exam    Assessment:       1. Type 2 diabetes mellitus without complication, without long-term current use of insulin    2. Hyperlipidemia, unspecified hyperlipidemia type    3. Hypertension, unspecified type    4. Seasonal allergic rhinitis due to pollen    5. Lumbar disc disease    6. Typical atrial flutter    7. Gastroesophageal reflux disease without esophagitis    8. History of total right knee replacement    9. Right medial tibial stress syndrome, initial encounter        Plan:       Type 2 diabetes mellitus without complication, without long-term current use of insulin  Comments:  A1c at 5.8 % and well managed. continue as is.  Orders:  -     Foot Exam Performed  -     Hemoglobin A1C, POCT  -     metFORMIN (GLUCOPHAGE) 500 MG tablet; Take 1 tablet (500 mg total) by mouth 3 (three) times daily.  Dispense: 270 tablet; Refill: 3  -     lisinopriL 10 MG tablet; Take 1 tablet (10 mg total) by mouth once daily.  Dispense: 90 tablet; Refill: 3  Continue current medications.  Hyperlipidemia, unspecified hyperlipidemia type  -     atorvastatin (LIPITOR) 20 MG tablet; Take 1 tablet (20 mg total) by mouth every evening.  Dispense: 90 tablet; Refill: 3    Hypertension, unspecified type  Comments:  Better managed today. continue as is.  Orders:  -     lisinopriL 10 MG tablet; Take 1 tablet (10 mg total) by mouth once daily.  Dispense: 90 tablet; Refill: 3    Seasonal allergic rhinitis due to pollen  -     methylPREDNISolone (MEDROL DOSEPACK) 4 mg tablet; use as directed  Dispense: 21 each; Refill: 0  -     fluticasone propionate (FLONASE) 50 mcg/actuation nasal spray; 1 spray (50 mcg total) by Each Nostril route every evening.  Dispense: 16 g; Refill: 4  Trial of Medrol Dosepak followed by Flonase spray  Lumbar disc disease  Managing this conservatively  Typical atrial flutter  Follow-up with Dr. Hinds for follow-up ablation  Gastroesophageal reflux disease without  esophagitis  Continue medications  History of total right knee replacement  Stable for exercise  Right medial tibial stress syndrome, initial encounter  May need to reduce the intensity of exercise and follow with ice packs    No follow-ups on file.        9/4/2024 Ralf Saunders M.D.

## 2024-09-19 ENCOUNTER — PATIENT MESSAGE (OUTPATIENT)
Dept: CARDIOLOGY | Facility: CLINIC | Age: 54
End: 2024-09-19
Payer: MEDICARE

## 2024-09-19 ENCOUNTER — PATIENT MESSAGE (OUTPATIENT)
Dept: FAMILY MEDICINE | Facility: CLINIC | Age: 54
End: 2024-09-19
Payer: MEDICARE

## 2024-09-20 DIAGNOSIS — I48.0 PAROXYSMAL ATRIAL FIBRILLATION: ICD-10-CM

## 2024-09-30 DIAGNOSIS — E03.9 HYPOTHYROIDISM, UNSPECIFIED TYPE: ICD-10-CM

## 2024-09-30 RX ORDER — LEVOTHYROXINE SODIUM 75 UG/1
75 TABLET ORAL DAILY
Qty: 90 TABLET | Refills: 3 | Status: SHIPPED | OUTPATIENT
Start: 2024-09-30 | End: 2025-03-29

## 2024-10-01 ENCOUNTER — PATIENT MESSAGE (OUTPATIENT)
Dept: ELECTROPHYSIOLOGY | Facility: CLINIC | Age: 54
End: 2024-10-01
Payer: MEDICARE

## 2024-10-02 ENCOUNTER — PATIENT MESSAGE (OUTPATIENT)
Dept: FAMILY MEDICINE | Facility: CLINIC | Age: 54
End: 2024-10-02
Payer: MEDICARE

## 2024-10-11 ENCOUNTER — PATIENT MESSAGE (OUTPATIENT)
Dept: ELECTROPHYSIOLOGY | Facility: CLINIC | Age: 54
End: 2024-10-11
Payer: MEDICARE

## 2024-10-14 ENCOUNTER — LAB VISIT (OUTPATIENT)
Dept: LAB | Facility: HOSPITAL | Age: 54
End: 2024-10-14
Attending: INTERNAL MEDICINE
Payer: MEDICARE

## 2024-10-14 DIAGNOSIS — I48.0 PAF (PAROXYSMAL ATRIAL FIBRILLATION): ICD-10-CM

## 2024-10-14 LAB
ANION GAP SERPL CALC-SCNC: 7 MMOL/L (ref 8–16)
APTT PPP: 29.7 SEC (ref 21–32)
BUN SERPL-MCNC: 14 MG/DL (ref 6–20)
CALCIUM SERPL-MCNC: 9.1 MG/DL (ref 8.7–10.5)
CHLORIDE SERPL-SCNC: 104 MMOL/L (ref 95–110)
CO2 SERPL-SCNC: 29 MMOL/L (ref 23–29)
CREAT SERPL-MCNC: 1.2 MG/DL (ref 0.5–1.4)
ERYTHROCYTE [DISTWIDTH] IN BLOOD BY AUTOMATED COUNT: 13.2 % (ref 11.5–14.5)
EST. GFR  (NO RACE VARIABLE): >60 ML/MIN/1.73 M^2
GLUCOSE SERPL-MCNC: 189 MG/DL (ref 70–110)
HCT VFR BLD AUTO: 44.9 % (ref 40–54)
HGB BLD-MCNC: 14.5 G/DL (ref 14–18)
INR PPP: 1 (ref 0.8–1.2)
MCH RBC QN AUTO: 27.8 PG (ref 27–31)
MCHC RBC AUTO-ENTMCNC: 32.3 G/DL (ref 32–36)
MCV RBC AUTO: 86 FL (ref 82–98)
PLATELET # BLD AUTO: 214 K/UL (ref 150–450)
PMV BLD AUTO: 11.7 FL (ref 9.2–12.9)
POTASSIUM SERPL-SCNC: 4.4 MMOL/L (ref 3.5–5.1)
PROTHROMBIN TIME: 10.9 SEC (ref 9–12.5)
RBC # BLD AUTO: 5.22 M/UL (ref 4.6–6.2)
SODIUM SERPL-SCNC: 140 MMOL/L (ref 136–145)
WBC # BLD AUTO: 7.17 K/UL (ref 3.9–12.7)

## 2024-10-14 PROCEDURE — 80048 BASIC METABOLIC PNL TOTAL CA: CPT | Performed by: INTERNAL MEDICINE

## 2024-10-14 PROCEDURE — 85610 PROTHROMBIN TIME: CPT | Performed by: INTERNAL MEDICINE

## 2024-10-14 PROCEDURE — 36415 COLL VENOUS BLD VENIPUNCTURE: CPT | Performed by: INTERNAL MEDICINE

## 2024-10-14 PROCEDURE — 85027 COMPLETE CBC AUTOMATED: CPT | Performed by: INTERNAL MEDICINE

## 2024-10-14 PROCEDURE — 85730 THROMBOPLASTIN TIME PARTIAL: CPT | Performed by: INTERNAL MEDICINE

## 2024-10-17 ENCOUNTER — TELEPHONE (OUTPATIENT)
Dept: ELECTROPHYSIOLOGY | Facility: CLINIC | Age: 54
End: 2024-10-17
Payer: MEDICARE

## 2024-10-17 NOTE — TELEPHONE ENCOUNTER
Spoke to Patient    CONFIRMED procedure arrival time of 8:00 AM    Reiterated instructions including:    -Directions to check in desk  -NPO after midnight night prior to procedure. Fasting upon arrival to the hospital the day of the procedure.   -High importance of HOLDING Eliquis, Metformin, and Rybelsus AM of procedure  -Confirmed compliance of Elilquis  - Confirms no new meds prescribed or med changes since scheduling  -Pre-procedure LABS reviewed  -Confirmed absence of implanted device/stimulator  -Confirmed no recent or current fever, cough, or shortness of breath .  -Confirmed no redness, rash, irritation, or yeast infection to groin area.   -Bathe night prior and morning prior to procedure with Hibiclens solution or an antibacterial soap  -Reviewed current visitor policy    Patient verbalized understanding of above, denies any further questions and appreciated the call.

## 2024-10-18 ENCOUNTER — ANESTHESIA EVENT (OUTPATIENT)
Dept: MEDSURG UNIT | Facility: HOSPITAL | Age: 54
End: 2024-10-18
Payer: MEDICARE

## 2024-10-18 ENCOUNTER — HOSPITAL ENCOUNTER (OUTPATIENT)
Facility: HOSPITAL | Age: 54
Discharge: HOME | End: 2024-10-18
Attending: INTERNAL MEDICINE | Admitting: INTERNAL MEDICINE
Payer: MEDICARE

## 2024-10-18 ENCOUNTER — ANESTHESIA (OUTPATIENT)
Dept: MEDSURG UNIT | Facility: HOSPITAL | Age: 54
End: 2024-10-18
Payer: MEDICARE

## 2024-10-18 VITALS
RESPIRATION RATE: 18 BRPM | WEIGHT: 237 LBS | OXYGEN SATURATION: 97 % | BODY MASS INDEX: 32.1 KG/M2 | DIASTOLIC BLOOD PRESSURE: 60 MMHG | TEMPERATURE: 98 F | HEIGHT: 72 IN | HEART RATE: 70 BPM | SYSTOLIC BLOOD PRESSURE: 110 MMHG

## 2024-10-18 DIAGNOSIS — I49.9 ARRHYTHMIA: ICD-10-CM

## 2024-10-18 DIAGNOSIS — I48.0 PAF (PAROXYSMAL ATRIAL FIBRILLATION): ICD-10-CM

## 2024-10-18 DIAGNOSIS — I48.91 ATRIAL FIBRILLATION: Primary | ICD-10-CM

## 2024-10-18 LAB
OHS QRS DURATION: 102 MS
OHS QRS DURATION: 106 MS
OHS QTC CALCULATION: 423 MS
OHS QTC CALCULATION: 447 MS
POCT GLUCOSE: 103 MG/DL (ref 70–110)
POCT GLUCOSE: 110 MG/DL (ref 70–110)

## 2024-10-18 PROCEDURE — 63600175 PHARM REV CODE 636 W HCPCS: Performed by: INTERNAL MEDICINE

## 2024-10-18 PROCEDURE — 93656 COMPRE EP EVAL ABLTJ ATR FIB: CPT | Mod: 22,,, | Performed by: INTERNAL MEDICINE

## 2024-10-18 PROCEDURE — C1753 CATH, INTRAVAS ULTRASOUND: HCPCS | Performed by: INTERNAL MEDICINE

## 2024-10-18 PROCEDURE — 63600175 PHARM REV CODE 636 W HCPCS

## 2024-10-18 PROCEDURE — 93005 ELECTROCARDIOGRAM TRACING: CPT | Mod: 59

## 2024-10-18 PROCEDURE — 82962 GLUCOSE BLOOD TEST: CPT | Performed by: INTERNAL MEDICINE

## 2024-10-18 PROCEDURE — 93657 TX L/R ATRIAL FIB ADDL: CPT | Performed by: INTERNAL MEDICINE

## 2024-10-18 PROCEDURE — C1887 CATHETER, GUIDING: HCPCS | Performed by: INTERNAL MEDICINE

## 2024-10-18 PROCEDURE — C1766 INTRO/SHEATH,STRBLE,NON-PEEL: HCPCS | Performed by: INTERNAL MEDICINE

## 2024-10-18 PROCEDURE — 25000003 PHARM REV CODE 250: Performed by: INTERNAL MEDICINE

## 2024-10-18 PROCEDURE — 27201423 OPTIME MED/SURG SUP & DEVICES STERILE SUPPLY: Performed by: INTERNAL MEDICINE

## 2024-10-18 PROCEDURE — 93010 ELECTROCARDIOGRAM REPORT: CPT | Mod: ,,, | Performed by: INTERNAL MEDICINE

## 2024-10-18 PROCEDURE — C1732 CATH, EP, DIAG/ABL, 3D/VECT: HCPCS | Performed by: INTERNAL MEDICINE

## 2024-10-18 PROCEDURE — 93657 TX L/R ATRIAL FIB ADDL: CPT | Mod: 22,,, | Performed by: INTERNAL MEDICINE

## 2024-10-18 PROCEDURE — C1730 CATH, EP, 19 OR FEW ELECT: HCPCS | Performed by: INTERNAL MEDICINE

## 2024-10-18 PROCEDURE — C1894 INTRO/SHEATH, NON-LASER: HCPCS | Performed by: INTERNAL MEDICINE

## 2024-10-18 PROCEDURE — 93010 ELECTROCARDIOGRAM REPORT: CPT | Mod: 76,,, | Performed by: INTERNAL MEDICINE

## 2024-10-18 PROCEDURE — 37000008 HC ANESTHESIA 1ST 15 MINUTES: Performed by: INTERNAL MEDICINE

## 2024-10-18 PROCEDURE — 27201037 HC PRESSURE MONITORING SET UP

## 2024-10-18 PROCEDURE — 63600175 PHARM REV CODE 636 W HCPCS: Performed by: ANESTHESIOLOGY

## 2024-10-18 PROCEDURE — 37000009 HC ANESTHESIA EA ADD 15 MINS: Performed by: INTERNAL MEDICINE

## 2024-10-18 PROCEDURE — 93656 COMPRE EP EVAL ABLTJ ATR FIB: CPT | Performed by: INTERNAL MEDICINE

## 2024-10-18 PROCEDURE — 25000003 PHARM REV CODE 250

## 2024-10-18 RX ORDER — HYDROMORPHONE HYDROCHLORIDE 1 MG/ML
0.2 INJECTION, SOLUTION INTRAMUSCULAR; INTRAVENOUS; SUBCUTANEOUS EVERY 5 MIN PRN
Status: DISCONTINUED | OUTPATIENT
Start: 2024-10-18 | End: 2024-10-18 | Stop reason: HOSPADM

## 2024-10-18 RX ORDER — LIDOCAINE HYDROCHLORIDE 20 MG/ML
INJECTION, SOLUTION INFILTRATION; PERINEURAL
Status: DISCONTINUED | OUTPATIENT
Start: 2024-10-18 | End: 2024-10-18 | Stop reason: HOSPADM

## 2024-10-18 RX ORDER — HEPARIN SODIUM 10000 [USP'U]/100ML
INJECTION, SOLUTION INTRAVENOUS CONTINUOUS PRN
Status: DISCONTINUED | OUTPATIENT
Start: 2024-10-18 | End: 2024-10-18

## 2024-10-18 RX ORDER — MIDAZOLAM HYDROCHLORIDE 1 MG/ML
INJECTION INTRAMUSCULAR; INTRAVENOUS
Status: DISCONTINUED | OUTPATIENT
Start: 2024-10-18 | End: 2024-10-18

## 2024-10-18 RX ORDER — PROTAMINE SULFATE 10 MG/ML
INJECTION, SOLUTION INTRAVENOUS
Status: DISCONTINUED | OUTPATIENT
Start: 2024-10-18 | End: 2024-10-18

## 2024-10-18 RX ORDER — FENTANYL CITRATE 50 UG/ML
INJECTION, SOLUTION INTRAMUSCULAR; INTRAVENOUS
Status: DISCONTINUED | OUTPATIENT
Start: 2024-10-18 | End: 2024-10-18

## 2024-10-18 RX ORDER — PROPOFOL 10 MG/ML
VIAL (ML) INTRAVENOUS
Status: DISCONTINUED | OUTPATIENT
Start: 2024-10-18 | End: 2024-10-18

## 2024-10-18 RX ORDER — HEPARIN SODIUM 1000 [USP'U]/ML
INJECTION, SOLUTION INTRAVENOUS; SUBCUTANEOUS
Status: DISCONTINUED | OUTPATIENT
Start: 2024-10-18 | End: 2024-10-18

## 2024-10-18 RX ORDER — ONDANSETRON HYDROCHLORIDE 2 MG/ML
INJECTION, SOLUTION INTRAVENOUS
Status: DISCONTINUED | OUTPATIENT
Start: 2024-10-18 | End: 2024-10-18

## 2024-10-18 RX ORDER — ONDANSETRON HYDROCHLORIDE 2 MG/ML
4 INJECTION, SOLUTION INTRAVENOUS ONCE AS NEEDED
Status: DISCONTINUED | OUTPATIENT
Start: 2024-10-18 | End: 2024-10-18 | Stop reason: HOSPADM

## 2024-10-18 RX ORDER — LIDOCAINE HYDROCHLORIDE 20 MG/ML
INJECTION INTRAVENOUS
Status: DISCONTINUED | OUTPATIENT
Start: 2024-10-18 | End: 2024-10-18

## 2024-10-18 RX ORDER — FENTANYL CITRATE 50 UG/ML
25 INJECTION, SOLUTION INTRAMUSCULAR; INTRAVENOUS EVERY 5 MIN PRN
Status: DISCONTINUED | OUTPATIENT
Start: 2024-10-18 | End: 2024-10-18

## 2024-10-18 RX ORDER — FUROSEMIDE 10 MG/ML
20 INJECTION INTRAMUSCULAR; INTRAVENOUS ONCE
Status: COMPLETED | OUTPATIENT
Start: 2024-10-18 | End: 2024-10-18

## 2024-10-18 RX ORDER — DEXAMETHASONE SODIUM PHOSPHATE 4 MG/ML
INJECTION, SOLUTION INTRA-ARTICULAR; INTRALESIONAL; INTRAMUSCULAR; INTRAVENOUS; SOFT TISSUE
Status: DISCONTINUED | OUTPATIENT
Start: 2024-10-18 | End: 2024-10-18

## 2024-10-18 RX ORDER — KETAMINE HCL IN 0.9 % NACL 50 MG/5 ML
SYRINGE (ML) INTRAVENOUS
Status: DISCONTINUED | OUTPATIENT
Start: 2024-10-18 | End: 2024-10-18

## 2024-10-18 RX ORDER — ROCURONIUM BROMIDE 10 MG/ML
INJECTION, SOLUTION INTRAVENOUS
Status: DISCONTINUED | OUTPATIENT
Start: 2024-10-18 | End: 2024-10-18

## 2024-10-18 RX ORDER — DIPHENHYDRAMINE HYDROCHLORIDE 50 MG/ML
25 INJECTION INTRAMUSCULAR; INTRAVENOUS EVERY 6 HOURS PRN
Status: DISCONTINUED | OUTPATIENT
Start: 2024-10-18 | End: 2024-10-18 | Stop reason: HOSPADM

## 2024-10-18 RX ORDER — ACETAMINOPHEN 325 MG/1
650 TABLET ORAL EVERY 4 HOURS PRN
Status: DISCONTINUED | OUTPATIENT
Start: 2024-10-18 | End: 2024-10-18 | Stop reason: HOSPADM

## 2024-10-18 RX ORDER — DEXMEDETOMIDINE HYDROCHLORIDE 100 UG/ML
INJECTION, SOLUTION INTRAVENOUS
Status: DISCONTINUED | OUTPATIENT
Start: 2024-10-18 | End: 2024-10-18

## 2024-10-18 RX ORDER — SUCCINYLCHOLINE CHLORIDE 20 MG/ML
INJECTION INTRAMUSCULAR; INTRAVENOUS
Status: DISCONTINUED | OUTPATIENT
Start: 2024-10-18 | End: 2024-10-18

## 2024-10-18 RX ORDER — PROCHLORPERAZINE EDISYLATE 5 MG/ML
5 INJECTION INTRAMUSCULAR; INTRAVENOUS EVERY 30 MIN PRN
Status: DISCONTINUED | OUTPATIENT
Start: 2024-10-18 | End: 2024-10-18 | Stop reason: HOSPADM

## 2024-10-18 RX ORDER — PANTOPRAZOLE SODIUM 40 MG/1
40 TABLET, DELAYED RELEASE ORAL DAILY
Qty: 30 TABLET | Refills: 0 | Status: SHIPPED | OUTPATIENT
Start: 2024-10-18 | End: 2024-11-17

## 2024-10-18 RX ADMIN — HEPARIN SODIUM 3225 UNITS: 1000 INJECTION, SOLUTION INTRAVENOUS; SUBCUTANEOUS at 01:10

## 2024-10-18 RX ADMIN — HEPARIN SODIUM 3225 UNITS: 1000 INJECTION, SOLUTION INTRAVENOUS; SUBCUTANEOUS at 12:10

## 2024-10-18 RX ADMIN — HYDROMORPHONE HYDROCHLORIDE 0.2 MG: 1 INJECTION, SOLUTION INTRAMUSCULAR; INTRAVENOUS; SUBCUTANEOUS at 02:10

## 2024-10-18 RX ADMIN — FENTANYL CITRATE 100 MCG: 50 INJECTION, SOLUTION INTRAMUSCULAR; INTRAVENOUS at 11:10

## 2024-10-18 RX ADMIN — HEPARIN SODIUM AND DEXTROSE 12 UNITS/KG/HR: 10000; 5 INJECTION INTRAVENOUS at 12:10

## 2024-10-18 RX ADMIN — PROPOFOL 40 MG: 10 INJECTION, EMULSION INTRAVENOUS at 11:10

## 2024-10-18 RX ADMIN — PROPOFOL 200 MG: 10 INJECTION, EMULSION INTRAVENOUS at 11:10

## 2024-10-18 RX ADMIN — DEXAMETHASONE SODIUM PHOSPHATE 4 MG: 4 INJECTION, SOLUTION INTRAMUSCULAR; INTRAVENOUS at 11:10

## 2024-10-18 RX ADMIN — ONDANSETRON 4 MG: 2 INJECTION INTRAMUSCULAR; INTRAVENOUS at 01:10

## 2024-10-18 RX ADMIN — PROTAMINE SULFATE 102 MG: 10 INJECTION, SOLUTION INTRAVENOUS at 01:10

## 2024-10-18 RX ADMIN — LIDOCAINE HYDROCHLORIDE 40 MG: 20 INJECTION INTRAVENOUS at 11:10

## 2024-10-18 RX ADMIN — HYDROMORPHONE HYDROCHLORIDE 0.2 MG: 1 INJECTION, SOLUTION INTRAMUSCULAR; INTRAVENOUS; SUBCUTANEOUS at 03:10

## 2024-10-18 RX ADMIN — MIDAZOLAM HYDROCHLORIDE 2 MG: 2 INJECTION, SOLUTION INTRAMUSCULAR; INTRAVENOUS at 11:10

## 2024-10-18 RX ADMIN — PHENYLEPHRINE HYDROCHLORIDE 0.25 MCG/KG/MIN: 10 INJECTION INTRAVENOUS at 11:10

## 2024-10-18 RX ADMIN — FUROSEMIDE 20 MG: 10 INJECTION, SOLUTION INTRAMUSCULAR; INTRAVENOUS at 05:10

## 2024-10-18 RX ADMIN — SODIUM CHLORIDE: 9 INJECTION, SOLUTION INTRAVENOUS at 11:10

## 2024-10-18 RX ADMIN — SUCCINYLCHOLINE CHLORIDE 160 MG: 20 INJECTION, SOLUTION INTRAMUSCULAR; INTRAVENOUS at 11:10

## 2024-10-18 RX ADMIN — Medication 50 MG: at 11:10

## 2024-10-18 RX ADMIN — PROTAMINE SULFATE 5 MG: 10 INJECTION, SOLUTION INTRAVENOUS at 01:10

## 2024-10-18 RX ADMIN — DEXMEDETOMIDINE 8 MCG: 200 INJECTION, SOLUTION INTRAVENOUS at 02:10

## 2024-10-18 RX ADMIN — ROCURONIUM BROMIDE 5 MG: 10 INJECTION INTRAVENOUS at 11:10

## 2024-10-18 RX ADMIN — HEPARIN SODIUM 21500 UNITS: 1000 INJECTION, SOLUTION INTRAVENOUS; SUBCUTANEOUS at 12:10

## 2024-10-18 RX ADMIN — ACETAMINOPHEN 650 MG: 325 TABLET ORAL at 02:10

## 2024-10-18 NOTE — NURSING TRANSFER
Nursing Transfer Note      10/18/2024   5:39 PM    Nurse giving handoff:ERNESTO HAILE   Nurse receiving handoff:ELMER HAILE     Reason patient is being transferred: D/C CRITIERIA MET    Transfer To: S[facility]U 3    Transfer via stretcher    Transfer with cardiac monitoring [address]OX 0[address] AND REGISTERED BOX AND VERIFIED ABLE TO SEE PATIENT ON MONITOR    Transported by ERNESTO RN     Transfer Vital Signs:  Blood Pressure:SEE EPIC   Heart Rate:SEE EPIC   O2:ROOM AIR   Temperature:SEE EPIC   Respirations:SEE EPIC     Telemetry: YES   Order for Tele Monitor? YES     Additional Lines: WENT OVER SUTURE REMOVAL AND WALK TIME WITH ELMER RN ALSO LET HER KNOW PATIENT NEEDS LASIX WHEN AMBULATE    Medicines sent: [facility] DELEIVERED PATIENT'S MEDICATION AND SENT WITH PATIENT TO S[facility]     Any special needs or follow-up needed: GROIN CHECKS AND LASIX    Patient belongings transferred with patient: YES [facility] DELIVERED MEDICATION     Chart send with patient: YES     Notified: REPORTED TO ELMER HAILE     Patient reassessed at: SEE EPIC  (date, time)  1  Upon arrival to floor: TO ROOM NO COMPLAINTS NO DISTRESS NOTED.

## 2024-10-18 NOTE — PROGRESS NOTES
PATIENT ADMITTED TO RECOVERY SEE [facility] FOR COMPLETE ASSESSMENT PACU BCG'S MAINTAINED SAFETY MEASURES VERIFIED PATIENT INSTRUCTED ON PAIN SCALE AND PATIENT VERBALIZED UNDERSTANDING. UPDATED PATIENT'S WIFE VIA TEXT. ALSO CALLED FOR EKG AND WHEN IT WAS DONE PLACED IN CHART.

## 2024-10-18 NOTE — BRIEF OP NOTE
: Kehinde Hinds MD  Date of Procedure: 10/18/2024    Post-operative Diagnosis: AF    Procedure Performed: Redo Pulmonary vein isolation of all 4 pulmonary veins via radiofrequency ablation.     Description of Procedure: The patient was brought to the EP lab in the fasting state. Prepped and draped in sterile fashion. Safety timeout was performed. Sedation administered by anesthesia staff. Ultrasound guided venous access of the bilateral femoral veins was performed. ICE and CS catheters placed via left femoral vein access. Long sheaths via right femoral venous access. Heparin bolus and continuous infusion per protocol. Two transseptal punctures performed using combination of fluoroscopic and ICE guidance. Map created which demonstrated reconnection of Pvs x4.  RFA to isolate all pulmonary veins. ICE confirmed no significant pericardial effusion.     EBL: <10 mL    Specimens: none  Complications: no immediate    Plan:  Bedrest x 4 hours   ECG on arrival to recovery  Patient to resume OAC this evening. If bleeding or hematoma formation, please notify EP service before holding OAC  PPI x 1 month  Plan for discharge following bedrest if patient tolerating PO intake, voiding, and ambulatory without evidence of complications         Dorian Sosa MD  [facility]  Electrophysiology, PGY-VII

## 2024-10-18 NOTE — NURSING
Patient discharged per MD orders. Instructions given on medications, wound care, activity, signs of infection, when to call MD, and follow up appointments. Pt and wife verbalized understanding. Telemetry and PIV x3 removed. Patient's wife used wheelchair to transfer patient off of unit.

## 2024-10-18 NOTE — ANESTHESIA POSTPROCEDURE EVALUATION
Anesthesia Post Evaluation    Patient: John Corrales    Procedure(s) Performed: Procedure(s) (LRB):  Ablation atrial fibrillation (N/A)    Final Anesthesia Type: general      Level of consciousness: awake and alert  Post-procedure vital signs: reviewed and stable  Pain control: Pain has been treated.  Airway patency: patent    PONV status: Absent or treated.  Anesthetic complications: no      Cardiovascular status: hemodynamically stable  Respiratory status: unassisted  Hydration status: euvolemic                Vitals Value Taken Time   /60 10/18/24 1646   Temp 36.6 °C (97.9 °F) 10/18/24 1654   Pulse 62 10/18/24 1642   Resp 18 10/18/24 1730   SpO2 96 % 10/18/24 1654   Vitals shown include unfiled device data.      No case tracking events are documented in the log.      Pain/Sukumar Score: Pain Rating Prior to Med Admin: 7 (10/18/2024  3:28 PM)  Pain Rating Post Med Admin: 5 (10/18/2024  4:30 PM)  Sukumar Score: 10 (10/18/2024  5:30 PM)

## 2024-10-18 NOTE — ANESTHESIA PROCEDURE NOTES
Intubation    Date/Time: 10/18/2024 11:23 AM    Performed by: Ann-Marie Prather CRNA  Authorized by: Cullen Gonzalez MD    Intubation:     Induction:  Intravenous    Intubated:  Postinduction    Mask Ventilation:  Easy with oral airway    Attempts:  1    Attempted By:  CRNA    Method of Intubation:  Video laryngoscopy    Blade:  Darnell 4    Laryngeal View Grade: Grade IIA - cords partially seen      Difficult Airway Encountered?: No      Complications:  None    Airway Device:  Coil wire tube    Airway Device Size:  6.5    Style/Cuff Inflation:  Cuffed (inflated to minimal occlusive pressure)    Inflation Amount (mL):  6    Tube secured:  22    Secured at:  The lips    Placement Verified By:  Capnometry    Complicating Factors:  Anterior larynx and large/floppy epiglottis    Findings Post-Intubation:  BS equal bilateral and atraumatic/condition of teeth unchanged

## 2024-10-18 NOTE — PLAN OF CARE
Patient arrived to room. PIVs placed. Admit assessment completed. Plan of care discussed with patient.

## 2024-10-18 NOTE — NURSING
Pt ambulated around unit. Tolerated walk well. Vital signs remain stable. See flowsheet. No c/o pain or discomfort at this time, resp even and unlabored. Gauze/tegaderm dressing to bilateral groin sites are CDI. No active bleeding. No hematoma noted.

## 2024-10-18 NOTE — DISCHARGE SUMMARY
[facility] - Cardiology  Cardiac Electrophysiology  Discharge Summary        Patient Name: John Corrales   MRN: [id number]   Admission Date: 10/18/2024    Hospital Length of Stay: 0   Discharge Date: 10/18/2024   Attending Physician: Kehinde Hinds MD   Discharging Provider: Dorian Sosa MD      HPI:   Mr John Corrales is a 54 year old male with PMH of HTN, HLD, hypothyroidism, DM2 who presents to [facility] for redo PVI with Dr. Hinds. Initially diagnosed with typical AFL 2022 and underwent CTI ablation. Subsequently developed AF and underwent PVI 2/2024. Recently having recurrence with frequent episodes of AF. He was offered redo PVI and agreed to proceed.         Presenting EKG: NSR  CHADS-VASc: 2 (HTN, DM)   Anticoagulants: Apixaban 5 mg BID   Antiarrhythmics: None   LV EF: 60% (TTE 3/2022)  Pertinent labs: Hgb 14.5, INR 1.0, Cr 1.2     Hospital Course:   Mr Corrales underwent successful redo PVI. He tolerated the procedure well with no immediate complications. He completed bed rest and was discharged home in stable condition.     Follow up:   Follow up with Dr. Hinds in 3 months     Disposition:   Home or Self Care         Medication List        START taking these medications      pantoprazole 40 MG tablet  Commonly known as: PROTONIX  Take 1 tablet (40 mg total) by mouth once daily.            CONTINUE taking these medications      apixaban 5 mg Tab  Commonly known as: ELIQUIS  Take 1 tablet (5 mg total) by mouth 2 (two) times daily.     atorvastatin 20 MG tablet  Commonly known as: LIPITOR  Take 1 tablet (20 mg total) by mouth every evening.     azelastine 137 mcg (0.1 %) nasal spray  Commonly known as: ASTELIN  2 sprays (274 mcg total) by Nasal route 2 (two) times daily.     fluticasone propionate 50 mcg/actuation nasal spray  Commonly known as: FLONASE  1 spray (50 mcg total) by Each Nostril route every evening.     levothyroxine 75 MCG tablet  Commonly known as: SYNTHROID  Take 1 tablet (75 mcg total) by mouth  once daily.     lisinopriL 10 MG tablet  Take 1 tablet (10 mg total) by mouth once daily.     metFORMIN 500 MG tablet  Commonly known as: GLUCOPHAGE  Take 1 tablet (500 mg total) by mouth 3 (three) times daily.     metoprolol tartrate 25 MG tablet  Commonly known as: LOPRESSOR  Take 1 tablet (25 mg total) by mouth 2 (two) times daily.     PEPCID COMPLETE -165 mg  Generic drug: famotidine-calcium carbonate-magnesium hydroxide     rOPINIRole 2 MG tablet  Commonly known as: REQUIP  Take 1 tablet (2 mg total) by mouth every evening.     RYBELSUS 7 mg tablet  Generic drug: semaglutide  Take 1 tablet (7 mg total) by mouth once daily.               Where to Get Your Medications        These medications were sent to [facility] [place]  [address] [address]      Hours: Always Open Phone: [phone]   pantoprazole 40 MG tablet         Dorian Sosa MD  [facility]  Electrophysiology, PGY-VII

## 2024-10-18 NOTE — TRANSFER OF CARE
Anesthesia Transfer of Care Note    Patient: John Corrales    Procedure(s) Performed: Procedure(s) (LRB):  Ablation atrial fibrillation (N/A)    Patient location: PACU    Anesthesia Type: general    Transport from OR: Transported from OR on 6-10 L/min O2 by face mask with adequate spontaneous ventilation    Post pain: adequate analgesia    Post assessment: no apparent anesthetic complications    Post vital signs: stable    Level of consciousness: awake    Nausea/Vomiting: no nausea/vomiting    Complications: none    Transfer of care protocol was followed      Last vitals: Visit Vitals  BP (!) 148/77 (BP Location: Right arm, Patient Position: Lying)   Pulse 67   Temp 37.1 °C (98.8 °F) (Temporal)   Resp 12   Ht 6' (1.829 m)   Wt 107.5 kg (237 lb)   SpO2 100%   BMI 32.14 kg/m²

## 2024-10-18 NOTE — NURSING
Received report from MIC Mckeon. Patient s/p ablation, AAOx4. VSS, no c/o pain or discomfort at this time, resp even and unlabored. Sutures to bilateral groins is CDI. No active bleeding. No hematoma noted. Post procedure protocol reviewed with patient and patient's wife. Understanding verbalized. Wife at bedside. Nurse call bell within reach.

## 2024-10-18 NOTE — H&P
[facility] [place]  Electrophysiology  H&P      John Corrales   YOB: 1970   Medical Record Number: [id number]   Attending Physician:    Date of Admission: 10/18/2024       Hospital Day:  0  Current Principal Problem:  <principal problem not specified>      History     Cc: atrial fibrillation     HPI  Mr John Corrales is a 54 year old male with PMH of HTN, HLD, hypothyroidism, DM2 who presents to [facility] for redo PVI with Dr. Hinds. Initially diagnosed with typical AFL 2022 and underwent CTI ablation. Subsequently developed AF and underwent PVI 2/2024. Recently having recurrence with frequent episodes of AF. He was offered redo PVI and agreed to proceed.       Presenting EKG: NSR  CHADS-VASc: 2 (HTN, DM)   Anticoagulants: Apixaban 5 mg BID   Antiarrhythmics: None   LV EF: 60% (TTE 3/2022)  Pertinent labs: Hgb 14.5, INR 1.0, Cr 1.2         Medications - Outpatient  Prior to Admission medications    Medication Sig Start Date End Date Taking? Authorizing Provider   apixaban (ELIQUIS) 5 mg Tab Take 1 tablet (5 mg total) by mouth 2 (two) times daily. 9/24/24  Yes Kehinde Hinds MD   atorvastatin (LIPITOR) 20 MG tablet Take 1 tablet (20 mg total) by mouth every evening. 9/4/24  Yes Ralf Saunders MD   azelastine (ASTELIN) 137 mcg (0.1 %) nasal spray 2 sprays (274 mcg total) by Nasal route 2 (two) times daily. 4/4/24 4/4/25 Yes Rafl Saunders MD   famotidine-calcium carbonate-magnesium hydroxide (PEPCID COMPLETE) -165 mg  8/19/24  Yes Provider, Historical   fluticasone propionate (FLONASE) 50 mcg/actuation nasal spray 1 spray (50 mcg total) by Each Nostril route every evening. 9/4/24  Yes Ralf Saunders MD   levothyroxine (SYNTHROID) 75 MCG tablet Take 1 tablet (75 mcg total) by mouth once daily. 9/30/24 3/29/25 Yes Ralf Saunders MD   lisinopriL 10 MG tablet Take 1 tablet (10 mg total) by mouth once daily. 9/4/24  Yes Ralf Saunders MD   metFORMIN (GLUCOPHAGE) 500 MG  tablet Take 1 tablet (500 mg total) by mouth 3 (three) times daily. 9/4/24  Yes Ralf Saunders MD   metoprolol tartrate (LOPRESSOR) 25 MG tablet Take 1 tablet (25 mg total) by mouth 2 (two) times daily. 5/15/24 5/10/25 Yes Kehinde Hinds MD   rOPINIRole (REQUIP) 2 MG tablet Take 1 tablet (2 mg total) by mouth every evening. 3/28/24 10/18/24 Yes Ralf Saunders MD   semaglutide (RYBELSUS) 7 mg tablet Take 1 tablet (7 mg total) by mouth once daily. 9/4/24  Yes Ralf Saunders MD   atenoloL (TENORMIN) 25 MG tablet Take 1 tablet (25 mg total) by mouth once daily. 10/12/21 3/31/22  Gavin Sandoval PA-C         Medications - Current  Scheduled Meds:  Continuous Infusions:  PRN Meds:.      Allergies  Review of patient's allergies indicates:   Allergen Reactions    Demerol [meperidine] Itching         Past Medical History  Past Medical History:   Diagnosis Date    Atrial flutter     Diabetes mellitus, type 2     General anesthetics causing adverse effect in therapeutic use 2007    During general anesthesia for back surgery , experienced severe pain, heard people talking and felt like his heart was about to explode.    GERD (gastroesophageal reflux disease)     Hyperlipidemia     Hypothyroidism     Nasal septal deviation          Past Surgical History  Past Surgical History:   Procedure Laterality Date    ABLATION OF ARRHYTHMOGENIC FOCUS FOR ATRIAL FIBRILLATION N/A 2/27/2024    Procedure: Ablation atrial fibrillation;  Surgeon: Kehinde Hinds MD;  Location: [facility];  Service: Cardiology;  Laterality: N/A;  AF, [place] (cx if SR), PVI, RFA, Carto, Gen, GP, 3 Prep    BACK SURGERY  01/01/2007    artificial disc L4-5    CARDIAC ELECTROPHYSIOLOGY STUDY AND ABLATION  09/16/2022    CYSTOSCOPY W/ STONE MANIPULATION  01/01/2002    Kidney stones removed    EPIDURAL STEROID INJECTION  01/31/2022    KNEE ARTHROPLASTY Right 06/15/2020    Procedure: ARTHROPLASTY, KNEE;  Surgeon: Alok Rowe MD;  Location: [facility] OR;   Service: Orthopedics;  Laterality: Right;  SAMEERA FAYE    KNEE ARTHROSCOPY W/ DEBRIDEMENT  2000 and 2001    Right knee         Social History  Social History     Socioeconomic History    Marital status:    Tobacco Use    Smoking status: Former     Passive exposure: Past    Smokeless tobacco: Never   Substance and Sexual Activity    Alcohol use: No    Drug use: No     Social Drivers of Health     Financial Resource Strain: Unknown (9/28/2022)    Overall Financial Resource Strain (CARDIA)     Difficulty of Paying Living Expenses: Patient declined   Food Insecurity: Unknown (9/28/2022)    Hunger Vital Sign     Worried About Running Out of Food in the Last Year: Patient declined     Ran Out of Food in the Last Year: Patient declined   Transportation Needs: Unknown (9/28/2022)    PRAPARE - Transportation     Lack of Transportation (Medical): Patient declined     Lack of Transportation (Non-Medical): Patient declined   Physical Activity: Unknown (4/4/2024)    Exercise Vital Sign     Days of Exercise per Week: Patient declined   Stress: Unknown (9/28/2022)    [employer] - Occupational Stress Questionnaire     Feeling of Stress : Patient declined   Housing Stability: Unknown (9/28/2022)    Housing Stability Vital Sign     Unable to Pay for Housing in the Last Year: Patient refused     Unstable Housing in the Last Year: Patient refused         ROS  10 point ROS performed and negative except as stated in HPI     Physical Examination         Vital Signs  Vitals  Temp: 98.8 °F (37.1 °C)  Temp Source: Temporal  Pulse: 65  Resp: 18  SpO2: 99 %  BP: 123/74  MAP (mmHg): 94  BP Location: Left arm  BP Method: Automatic  Patient Position: Sitting          24 Hour VS Range    Temp:  [98.8 °F (37.1 °C)]   Pulse:  [65]   Resp:  [18]   BP: (123-129)/(74-76)   SpO2:  [99 %]   No intake or output data in the 24 hours ending 10/18/24 1014        Physical Exam:   Constitutional: no acute distress  HEENT:  "NCAT, EOMI, no scleral icterus  Cardiovascular: Regular rate and rhythm  Pulmonary: Normal respiratory effort   Abdomen: nontender, non-distended   Neuro: alert and oriented, no focal deficits  Extremities: warm, no edema   MSK: no deformities  Integument: intact, no rashes       Data       Recent Labs   Lab 10/14/24  1122   WBC 7.17   HGB 14.5   HCT 44.9           Recent Labs   Lab 10/14/24  1122   INR 1.0        Recent Labs   Lab 10/14/24  1122      K 4.4      CO2 29   BUN 14   CREATININE 1.2   ANIONGAP 7*   CALCIUM 9.1        No results for input(s): "PROT", "ALBUMIN", "BILITOT", "ALKPHOS", "AST", "ALT" in the last 168 hours.     No results for input(s): "TROPONINI" in the last 168 hours.     BNP (pg/mL)   Date Value   03/30/2022 34   03/30/2022 34       No results for input(s): "LABBLOO" in the last 168 hours.         Assessment & Plan   54 year old male with PMH of HTN, HLD, hypothyroidism, DM2 who [place] [facility] [place] with Dr. Hinds.    Atrial fibrillation:   Risks/benefits/alternatives discussed with patient and he agrees to proceed. Consents signed.   -To EP lab for redo PVI with Dr. Hinds  -Noemí Sosa MD  [facility]  Electrophysiology, PGY-VII    "

## 2024-10-18 NOTE — DISCHARGE INSTRUCTIONS
"Medications:  Make sure to continue taking your blood thinner apixiban (trade name: Eliquis) after your procedure as you would normally take  Take pantoprazole (trade name: Protonix) nightly for at least 30 days after your procedure. This is to protect your esophagus during the post-operative period.  If given a prescription of furosemide (trade name: Lasix), which is a diuretic (fluid pill), you can take it daily or twice daily as needed for fluid retention or shortness of breath following your ablation  You may experience chest discomfort (also known as "pericarditis") with deep breathes, coughing, and/or laying down which is typically normal following your procedure. If this occurs, you can take ibuprofen (Motrin) 800 mg every 8 hours for 2-3 days. If the chest pain is persistent or severe please visit the nearest emergency department.    Groin site management, precautions, and restrictions:  Remove the bandages over your groin area the morning after your procedure. You can shower after you remove these bandages. Keep the groin sites clean and dry. You do not need to apply ointments or bandages to the area.   If oozing from groin site occurs, apply pressure without letting up for 15 minutes and lay flat for 1 hour. If bleeding has resolved, you can continue to monitor. If the bleeding continues or there is significant swelling or pain in the groin area, please visit the nearest ER for evaluation and treatment. DO NOT STOP TAKING YOUR BLOOD THINNER UNLESS INSTRUCTED BY A PHYSICIAN.   Do not take baths or submerge your groin area or at least 1 week or when the puncture sites in your groin have completely healed  Do not lift anything over 10 lbs for the first week after your procedure, and avoid strenuous activity during this time to allow for the groin sites to heal. After 1 week, there are no activity restrictions.  Please contact the electrophysiology clinic or go to the ER if you experience: severe chest pain, " shortness of breath, bleeding or swelling of the groin sites, or any other concerns.

## 2024-10-18 NOTE — ANESTHESIA PREPROCEDURE EVALUATION
10/18/2024  John Corrales is a 54 y.o., male.  Patient Active Problem List   Diagnosis    Primary osteoarthritis of right knee    Hypothyroidism    Hypertension    Hyperlipidemia    Gastroesophageal reflux disease    Type 2 diabetes mellitus without complication, without long-term current use of insulin    Lumbar disc disease    Degenerative arthritis of right knee    Atrial flutter    History of total right knee replacement    Hard to intubate    Right medial tibial stress syndrome    Seasonal allergic rhinitis due to pollen           Pre-op Assessment    I have reviewed the Patient Summary Reports.       I have reviewed the Medications.     Review of Systems  Anesthesia Hx:  No problems with previous Anesthesia               Denies Personal Hx of Anesthesia complications.                    Cardiovascular:     Hypertension                                    Hypertension     Atrial Flutter     Hepatic/GI:     GERD         Gerd          Musculoskeletal:  Arthritis        Arthritis          Neurological:      Arthritis                           Endocrine:  Diabetes Hypothyroidism   Diabetes                   Hypothyroidism              Physical Exam    Airway:  No airway management difficulties anticipated  Dental:  No active dental issues noted  Chest/Lungs:  Clear to auscultation    Heart:  Rate: Normal  Rhythm: Regular Rhythm  Sounds: Normal        Anesthesia Plan  Type of Anesthesia, risks & benefits discussed:    Anesthesia Type: Gen ETT  Intra-op Monitoring Plan: Art Line  Informed Consent: Informed consent signed with the Patient and all parties understand the risks and agree with anesthesia plan.  All questions answered.   ASA Score: 3  Anesthesia Plan Notes: Chart reviewed. Patient seen and examined. Previous intubation reviewed. Plan a 6.5 coil wire tube today. Anesthesia plan discussed and  questions answered. E-consent signed. Cullen Gonzalez MD    Ready For Surgery From Anesthesia Perspective.     .

## 2024-10-19 DIAGNOSIS — J30.1 SEASONAL ALLERGIC RHINITIS DUE TO POLLEN: ICD-10-CM

## 2024-10-21 RX ORDER — AZELASTINE 1 MG/ML
2 SPRAY, METERED NASAL 2 TIMES DAILY
Qty: 30 ML | Refills: 3 | Status: SHIPPED | OUTPATIENT
Start: 2024-10-21 | End: 2025-10-21

## 2024-10-22 LAB
POC ACTIVATED CLOTTING TIME K: 140 SEC (ref 74–137)
POC ACTIVATED CLOTTING TIME K: 140 SEC (ref 74–137)
POC ACTIVATED CLOTTING TIME K: 324 SEC (ref 74–137)
POC ACTIVATED CLOTTING TIME K: 330 SEC (ref 74–137)
POC ACTIVATED CLOTTING TIME K: 336 SEC (ref 74–137)
SAMPLE: ABNORMAL

## 2024-10-31 ENCOUNTER — TELEPHONE (OUTPATIENT)
Dept: ELECTROPHYSIOLOGY | Facility: CLINIC | Age: 54
End: 2024-10-31
Payer: MEDICARE

## 2024-10-31 DIAGNOSIS — I48.0 PAROXYSMAL ATRIAL FIBRILLATION: Primary | ICD-10-CM

## 2024-11-05 ENCOUNTER — TELEPHONE (OUTPATIENT)
Dept: ELECTROPHYSIOLOGY | Facility: CLINIC | Age: 54
End: 2024-11-05
Payer: MEDICARE

## 2024-11-26 ENCOUNTER — TELEPHONE (OUTPATIENT)
Dept: FAMILY MEDICINE | Facility: CLINIC | Age: 54
End: 2024-11-26
Payer: MEDICARE

## 2024-12-23 DIAGNOSIS — I48.0 PAROXYSMAL ATRIAL FIBRILLATION: ICD-10-CM

## 2024-12-26 ENCOUNTER — PATIENT MESSAGE (OUTPATIENT)
Dept: CARDIOLOGY | Facility: CLINIC | Age: 54
End: 2024-12-26
Payer: MEDICARE

## 2024-12-26 DIAGNOSIS — I48.0 PAROXYSMAL ATRIAL FIBRILLATION: ICD-10-CM

## 2025-01-02 ENCOUNTER — TELEPHONE (OUTPATIENT)
Dept: FAMILY MEDICINE | Facility: CLINIC | Age: 55
End: 2025-01-02
Payer: MEDICARE

## 2025-01-02 NOTE — TELEPHONE ENCOUNTER
----- Message from Nurse Rice sent at 10/14/2024 11:05 AM CDT -----  Request eye exam from eye care 20/20. Pt scheduled on 11/16/24

## 2025-02-24 ENCOUNTER — PATIENT MESSAGE (OUTPATIENT)
Dept: CARDIOLOGY | Facility: CLINIC | Age: 55
End: 2025-02-24
Payer: MEDICARE

## 2025-02-27 ENCOUNTER — TELEPHONE (OUTPATIENT)
Dept: FAMILY MEDICINE | Facility: CLINIC | Age: 55
End: 2025-02-27
Payer: MEDICARE

## 2025-02-27 DIAGNOSIS — I10 HYPERTENSION, UNSPECIFIED TYPE: ICD-10-CM

## 2025-02-27 DIAGNOSIS — E03.9 ACQUIRED HYPOTHYROIDISM: ICD-10-CM

## 2025-02-27 DIAGNOSIS — E78.5 HYPERLIPIDEMIA, UNSPECIFIED HYPERLIPIDEMIA TYPE: ICD-10-CM

## 2025-02-27 DIAGNOSIS — E11.9 TYPE 2 DIABETES MELLITUS WITHOUT COMPLICATION, WITHOUT LONG-TERM CURRENT USE OF INSULIN: ICD-10-CM

## 2025-02-27 DIAGNOSIS — E03.9 HYPOTHYROIDISM, UNSPECIFIED TYPE: ICD-10-CM

## 2025-02-27 DIAGNOSIS — Z79.899 ENCOUNTER FOR LONG-TERM (CURRENT) USE OF MEDICATIONS: Primary | ICD-10-CM

## 2025-03-18 ENCOUNTER — OFFICE VISIT (OUTPATIENT)
Dept: FAMILY MEDICINE | Facility: CLINIC | Age: 55
End: 2025-03-18
Payer: MEDICARE

## 2025-03-18 ENCOUNTER — PATIENT OUTREACH (OUTPATIENT)
Dept: ADMINISTRATIVE | Facility: HOSPITAL | Age: 55
End: 2025-03-18
Payer: MEDICARE

## 2025-03-18 VITALS
HEART RATE: 72 BPM | WEIGHT: 240 LBS | OXYGEN SATURATION: 98 % | BODY MASS INDEX: 32.51 KG/M2 | HEIGHT: 72 IN | DIASTOLIC BLOOD PRESSURE: 80 MMHG | SYSTOLIC BLOOD PRESSURE: 128 MMHG

## 2025-03-18 DIAGNOSIS — E03.9 ACQUIRED HYPOTHYROIDISM: ICD-10-CM

## 2025-03-18 DIAGNOSIS — E11.9 TYPE 2 DIABETES MELLITUS WITHOUT COMPLICATION, WITHOUT LONG-TERM CURRENT USE OF INSULIN: ICD-10-CM

## 2025-03-18 DIAGNOSIS — I10 PRIMARY HYPERTENSION: ICD-10-CM

## 2025-03-18 DIAGNOSIS — J30.1 SEASONAL ALLERGIC RHINITIS DUE TO POLLEN: ICD-10-CM

## 2025-03-18 DIAGNOSIS — M51.9 LUMBAR DISC DISEASE: ICD-10-CM

## 2025-03-18 DIAGNOSIS — G25.81 RLS (RESTLESS LEGS SYNDROME): ICD-10-CM

## 2025-03-18 DIAGNOSIS — I48.0 PAROXYSMAL ATRIAL FIBRILLATION: Primary | ICD-10-CM

## 2025-03-18 DIAGNOSIS — Z96.651 HISTORY OF TOTAL RIGHT KNEE REPLACEMENT: ICD-10-CM

## 2025-03-18 DIAGNOSIS — K21.9 GASTROESOPHAGEAL REFLUX DISEASE WITHOUT ESOPHAGITIS: ICD-10-CM

## 2025-03-18 DIAGNOSIS — E78.5 HYPERLIPIDEMIA, UNSPECIFIED HYPERLIPIDEMIA TYPE: ICD-10-CM

## 2025-03-18 PROCEDURE — 1159F MED LIST DOCD IN RCRD: CPT | Mod: CPTII,S$GLB,, | Performed by: FAMILY MEDICINE

## 2025-03-18 PROCEDURE — 4010F ACE/ARB THERAPY RXD/TAKEN: CPT | Mod: CPTII,S$GLB,, | Performed by: FAMILY MEDICINE

## 2025-03-18 PROCEDURE — 3079F DIAST BP 80-89 MM HG: CPT | Mod: CPTII,S$GLB,, | Performed by: FAMILY MEDICINE

## 2025-03-18 PROCEDURE — 3008F BODY MASS INDEX DOCD: CPT | Mod: CPTII,S$GLB,, | Performed by: FAMILY MEDICINE

## 2025-03-18 PROCEDURE — 3066F NEPHROPATHY DOC TX: CPT | Mod: CPTII,S$GLB,, | Performed by: FAMILY MEDICINE

## 2025-03-18 PROCEDURE — 99214 OFFICE O/P EST MOD 30 MIN: CPT | Mod: S$GLB,,, | Performed by: FAMILY MEDICINE

## 2025-03-18 PROCEDURE — 3044F HG A1C LEVEL LT 7.0%: CPT | Mod: CPTII,S$GLB,, | Performed by: FAMILY MEDICINE

## 2025-03-18 PROCEDURE — 3074F SYST BP LT 130 MM HG: CPT | Mod: CPTII,S$GLB,, | Performed by: FAMILY MEDICINE

## 2025-03-18 PROCEDURE — 3061F NEG MICROALBUMINURIA REV: CPT | Mod: CPTII,S$GLB,, | Performed by: FAMILY MEDICINE

## 2025-03-18 RX ORDER — AZELASTINE 1 MG/ML
2 SPRAY, METERED NASAL 2 TIMES DAILY
Qty: 30 ML | Refills: 4 | Status: SHIPPED | OUTPATIENT
Start: 2025-03-18 | End: 2026-03-18

## 2025-03-18 RX ORDER — SEMAGLUTIDE 0.68 MG/ML
0.5 INJECTION, SOLUTION SUBCUTANEOUS
Qty: 3 ML | Refills: 3 | Status: SHIPPED | OUTPATIENT
Start: 2025-03-18

## 2025-03-18 RX ORDER — ATORVASTATIN CALCIUM 20 MG/1
20 TABLET, FILM COATED ORAL NIGHTLY
Qty: 90 TABLET | Refills: 3 | Status: SHIPPED | OUTPATIENT
Start: 2025-03-18

## 2025-03-18 RX ORDER — ROPINIROLE 2 MG/1
2 TABLET, FILM COATED ORAL NIGHTLY
Qty: 90 TABLET | Refills: 3 | Status: SHIPPED | OUTPATIENT
Start: 2025-03-18 | End: 2025-09-14

## 2025-03-18 RX ORDER — TIZANIDINE 4 MG/1
4 TABLET ORAL EVERY 6 HOURS PRN
Qty: 60 TABLET | Refills: 1 | Status: SHIPPED | OUTPATIENT
Start: 2025-03-18 | End: 2025-03-28

## 2025-03-18 RX ORDER — FLUTICASONE PROPIONATE 50 MCG
1 SPRAY, SUSPENSION (ML) NASAL NIGHTLY
Qty: 16 G | Refills: 4 | Status: SHIPPED | OUTPATIENT
Start: 2025-03-18
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
## 2025-03-18 NOTE — PROGRESS NOTES
Population Health Chart Review & Patient Outreach Details      Additional [facility] Health Notes:               Updates Requested / Reviewed:      Updated Care Coordination Note, Care Everywhere, , and Immunizations Reconciliation Completed or Queried: [place]         Health Maintenance Topics Overdue:      [facility] Score: 0     Patient is not due for any topics at this time.                       Health Maintenance Topic(s) Outreach Outcomes & Actions Taken:    Eye Exam - Outreach Outcomes & Actions Taken  : External Records Requested & Care Team Updated if Applicable

## 2025-03-18 NOTE — LETTER
AUTHORIZATION FOR RELEASE OF   CONFIDENTIAL INFORMATION    Dear Dr. Darrin Montenegro,    We are seeing John Corrales, date of birth 1970, in the clinic at [facility]. Ralf Saunders MD is the patient's PCP. John Corrales has an outstanding lab/procedure at the time we reviewed his chart. In order to help keep his health information updated, he has authorized us to request the following medical record(s):                                        ( X )  EYE EXAM ( Report From 24 or Most Recent )             Please fax records to [facility], Casey, Michael D., MD,  at [phone] or email to [email or web address].      If you have any questions, please contact     Jamilah WEBER  Clinical Care Coordinator    [facility] / [facility]  [phone] (Phone)  [phone] (Fax)      Patient Name: John Corrales  : 1970  Patient Phone #: [phone]                     John Corrales  MRN: [id number]  : 1970  Age: 54 y.o.  Sex: male         Patient/Legal Guardian Signature  This signature was collected at 01/15/2025           _______________________________   Printed Name/Relationship to Patient      Consent for Examination and Treatment: I hereby authorize the providers and employees of [facility] ([facility]) to provide medical treatment/services which includes, but is not limited to, performing and administering tests and diagnostic procedures that are deemed necessary, including, but not limited to, imaging examinations, blood tests and other laboratory procedures as may be required by the hospital, clinic, or may be ordered by my physician(s) or persons working under the general and/or special instructions of my physician(s).      I understand and agree that this consent covers all authorized persons, including but not limited to physicians, residents, nurse practitioners, physicians' assistants, specialists,  consultants, student nurses, and independently contracted physicians, who are called upon by the physician in charge, to carry out the diagnostic procedures and medical or surgical treatment.     I hereby authorize [facility] to retain or dispose of any specimens or tissue, should there be such remaining from any test or procedure.     I hereby authorize and give consent for [facility] providers and employees to take photographs, images or videotapes of such diagnostic, surgical or treatment procedures of Patient as may be required by [facility] or as may be ordered by a physician. I further acknowledge and agree that [facility] may use cameras or other devices for patient monitoring.     I am aware that the practice of medicine is not an exact science, and I acknowledge that no guarantees have been made to me as to the outcome of any tests, procedures or treatment.     Authorization for Release of Information: I understand that my insurance company and/or their agents may need information necessary to make determinations about payment/reimbursement. I hereby provide authorization to release to all insurance companies, their successors, assignees, other parties with whom they may have contracted, or others acting on their behalf, that are involved with payment for any hospital and/or clinic charges incurred by the patient, any information that they request and deem necessary for payment/reimbursement, and/or quality review.  I further authorize the release of my health information to physicians or other health care practitioners on staff who are involved in my health care now and in the future, and to other health care providers, entities, or institutions for the purpose of my continued care and treatment, including referrals.     REGISTRATION AUTHORIZATION  Form No. 11239 (Rev. 3/25/2024)    Page 1 of 3                       Medicare Patient's Certification and Authorization to Release Information and Payment Request:  I  certify that the information given by me in applying for payment under Title XVIII of the Social Security Act is correct. I authorize any orozco of medical or other information about me to release to the Social SecurityAdministration, or its intermediaries or carriers, any information needed for this or a related Medicare claim. I request that payment of authorized benefits be made on my behalf.     Assignment of Insurance Benefits:   I hereby authorize any and all insurance companies, health plans, defined   benefit plans, health insurers or any entity that is or may be responsible for payment of my medical expenses to pay all hospital and medical benefits now due, and to become due and payable to me under any hospital benefits, sick benefits, injury benefits or any other benefit for services rendered to me, including Major Medical Benefits, direct to [facility] and all independently contracted physicians. I assign any and all rights that I may have against any and all insurance companies, health plans, defined benefit plans, health insurers or any entity that is or may be responsible for payment of my medical expenses, including, but not limited to any right to appeal a denial of a claim, any right to bring any action, lawsuit, administrative proceeding, or other cause of action on my behalf. I specifically assign my right to pursue litigation against any and all insurance companies, health plans, defined benefit plans, health insurers or any entity that is or may be responsible for payment of my medical expenses based upon a refusal to pay charges.            E. Valuables: It is understood and agreed that [facility] is not liable for the damage to or loss of any money, jewelry,   documents, dentures, eye glasses, hearing aids, prosthetics, or other property of value.     F. Computer Equipment: I understand and agree that should I choose to use computer equipment owned by [facility] or if I choose to access the Internet  via [facility]s network, I do so at my own risk. [facility] is not responsible for any damage to my computer equipment or to any damages of any type that might arise from my loss of equipment or data.     G. Acceptance of Financial Responsibility:  I agree that in consideration of the services and   supplies that have been   or will be furnished to the patient, I am hereby obligated to pay all charges made for or on the account of the patient according to the standard rates (in effect at the time the services and supplies are delivered) established by [facility], including its Patient Financial Assistance Policy to the extent it is applicable. I understand that I am responsible for all charges, or portions thereof, not covered by insurance or other sources. Patient refunds will be distributed only after balances at all [facility] facilities are paid.     H. Communication Authorization:  I hereby authorize [facility] and its representatives, along with any billing service   or  who may work on their behalf, to contact me on   my cell phone and/or home phone using pre- recorded messages, artificial voice messages, automatic telephone dialing devices or other computer assisted technology, or by electronic      mail, text messaging, or by any other form of electronic communication. This includes, but is not limited to, appointment reminders, yearly physical exam reminders, preventive care reminders, patient campaigns, welcome calls, and calls about account balances on my account or any account on which I am listed as a guarantor. I understand I have the right to opt out of these communications at any time.      Relationship  Between  Facility and  Provider:      I understand that some, but not all, providers furnishing services to the patient are not employees or agents of [facility]. The patient is under the care and supervision of his/her attending physician, and it is the responsibility of the facility and its nursing  staff to carry out the instructions of such physicians. It is the responsibility of the patient's physician/designee to obtain the patient's informed consent, when required, for medical or surgical treatment, special diagnostic or therapeutic procedures, or hospital services rendered for the patient under the special instructions of the physician/designee.           REGISTRATION AUTHORIZATION  Form No. 05946 (Rev. 3/25/2024)    Page 2 of 3                       Immunizations: [facility] shares immunization information with state sponsored health departments to help you and your doctor keep track of your immunization records. By signing, you consent to have this information shared with the health department in your state:                                [place] - LINKS ([place] Immunization Network for Kids Statewide)                                [place] - MIIX ([place] Immunization Information eXchange)                                [place] - ImmPRINT (Immunization Patient Registry with Integrated Technology)     TERM: This authorization is valid for this and subsequent care/treatment I receive at [facility] and will remain valid unless/until revoked in writing by me.     [facility]: As used in this document, [facility] means all [facility] owned and managed facilities, including, but not limited to, all health centers, surgery centers, clinics, urgent care centers, and hospitals.         [facility] complies with applicable Federal civil rights laws and does not discriminate on the basis of race, color, national origin, age, disability, or sex.  ATENCIÓN: si habla kieran, tiene a carrillo disposición servicios gratuitos de asistencia lingüística. Isabel weinberg [phone].  [facility] Ý: N?u b?n nói Ti?ng Vi?t, có các d?ch v? h? tr? ngôn ng? mi?n phí dành cho b?n. G?i s? [phone].        REGISTRATION AUTHORIZATION  Form No. 30053 (Rev. 3/25/2024)   Page 3 of 3

## 2025-03-24 DIAGNOSIS — Z00.00 ENCOUNTER FOR MEDICARE ANNUAL WELLNESS EXAM: ICD-10-CM

## 2025-03-31 ENCOUNTER — PATIENT MESSAGE (OUTPATIENT)
Dept: CARDIOLOGY | Facility: CLINIC | Age: 55
End: 2025-03-31
Payer: MEDICARE

## 2025-03-31 NOTE — TELEPHONE ENCOUNTER
Called patient. He was given a 90 day supply of Eliquis with 3 refills on 12/27/24. Patient looked at his bottle and he does have refills. He will call his pharmacy to get his prescription. Patient verbalized understanding.

## 2025-04-02 ENCOUNTER — TELEPHONE (OUTPATIENT)
Dept: FAMILY MEDICINE | Facility: CLINIC | Age: 55
End: 2025-04-02
Payer: MEDICARE

## 2025-04-02 DIAGNOSIS — Z12.5 SPECIAL SCREENING FOR MALIGNANT NEOPLASM OF PROSTATE: Primary | ICD-10-CM

## 2025-04-02 NOTE — TELEPHONE ENCOUNTER
----- Message from Suleiman Pendleton sent at 2/27/2025  2:48 PM CST -----  Regarding: PSA due  Wasn't time with yearly labs.

## 2025-04-15 NOTE — PROGRESS NOTES
Subjective:     HPI    I had the pleasure of seeing John Corrales in follow-up for his history of atrial flutter. Last seen in clinic in 7/2024. He is a 54M with HTN, HLD, hypothyroidism, DM2, who presented to [facility] in 3/2022 with palpitations, dyspnea, and diaphoresis and was found to be in atrial flutter with RVR. He was rate controlled, and converted to sinus rhythm. He was discharged on eliquis and lopressor, and presents to me to discuss management options.    Mr. Corrales continues to feel occasional palpitations lasting minutes.    Echo in 3/2022 showed an EF of 60% and mild LAE.    On 9/1/2022 a CTI-line was performed. Eliquis was stopped post-procedure. At his 2/2023 visit Mr. Corrales denied recurrent palpitations. Plan at that time was PRN follow-up.    At 8/2023 visit, pt stated that he has had several episodes of AF noted on AppleWatch since purchasing it in 3/2023. Several strips from 5/2023 showed what appeared to be AF although sinus with PVC salvos could not be excluded. Pt now on xarelto. Pt stated that he has alerts for AF roughly every few days. Pt feeling skipped and extra beats, no sustained palpitations.    Pt wore a 2 week monitor in 8/2023, showing a 4% AF burden (average rate 111 bpm, longest episode 7.5 hrs). Prescribed multaq but prohibitively expensive. Prescribed sotalol but passed out (he thought 2/2 bradycardia). Continued to have episodes of AF at 1/2024 visit, longest lasting 12 hours.    PVI/PWI on 2/27/2024 (CTI-line intact). Discharged on eliquis not an antiarrhythmic.    At follow-up in 5/2024 pt was in AF at 133 bpm. This prompted a 3 day Zio performed in 5/2024 which showed AF burden <1%, longest episode 3m 15s.    At 7/2024 visit, Mr. Corrales continued to have episodes of AF every couple days, lasting 10-15 minutes. Takes PRN metoprolol when episodes occur.    On 10/18/2024 re-do PVI performed. All four PVs reconnected and reisolated. PWI reisolated. Discharged on eliquis  and lopressor 25 mg bid.    Today in the office pt states he continues to have episodes of palpitations lasting 5-10 minutes, with a particularly bad episode last week lasting all day. Trigger seems to be exertion or excitement. Taking lopressor PRN. He doesn't drink ETOH.    Pt shared with me episode from 4/3/2025 at 1101 PM, showing AF at 143 bpm.     My interpretation of today's ECG is NSR 62 bpm with QRSd 104 ms.    Review of Systems   Constitutional: Negative for decreased appetite, malaise/fatigue, weight gain and weight loss.   HENT:  Negative for sore throat.    Eyes:  Negative for blurred vision.   Cardiovascular:  Positive for palpitations. Negative for chest pain, dyspnea on exertion, irregular heartbeat, leg swelling, near-syncope, orthopnea, paroxysmal nocturnal dyspnea and syncope.   Respiratory:  Negative for shortness of breath.    Skin:  Negative for rash.   Musculoskeletal:  Negative for arthritis.   Gastrointestinal:  Negative for abdominal pain.   Neurological:  Negative for focal weakness.   Psychiatric/Behavioral:  Negative for altered mental status.         Objective:    Physical Exam  Vitals and nursing note reviewed.   Constitutional:       General: He is not in acute distress.     Appearance: He is well-developed.   HENT:      Head: Normocephalic and atraumatic.   Eyes:      General: No scleral icterus.     Pupils: Pupils are equal, round, and reactive to light.   Neck:      Thyroid: No thyromegaly.   Cardiovascular:      Rate and Rhythm: Normal rate and regular rhythm.      Pulses: Normal pulses.      Heart sounds: Normal heart sounds. No murmur heard.     No friction rub. No gallop.   Pulmonary:      Effort: Pulmonary effort is normal.      Breath sounds: Normal breath sounds.   Abdominal:      General: Bowel sounds are normal. There is no distension.      Palpations: Abdomen is soft.      Tenderness: There is no abdominal tenderness.   Musculoskeletal:      Cervical back: Neck supple.    Skin:     General: Skin is warm and dry.      Findings: No rash.   Neurological:      Mental Status: He is alert and oriented to person, place, and time.   Psychiatric:         Behavior: Behavior normal.           Assessment:       1. Paroxysmal atrial fibrillation    2. Hypertension    3. Mixed hyperlipidemia    4. Atrial flutter, unspecified type           Plan:       In summary, John Corrales is a 54M with symptomatic atrial flutter. He is status-post CTI-line, but then developed a 4% AF burden. Failed sotalol, multaq prohibitvely expensive. Remains on eliquis.    Status-post PVI/PWI in 9/2022, and re-do PVI/PWI on 10/18/2024. Continues to have regular episodes of palps.     Plan is to start rythmol sr 225 mg bid, follow-up 4 months.    Thank you for allowing me to participate in the care of this patient. Please do not hesitate to call me with any questions or concerns.

## 2025-04-16 ENCOUNTER — OFFICE VISIT (OUTPATIENT)
Dept: CARDIOLOGY | Facility: CLINIC | Age: 55
End: 2025-04-16
Payer: MEDICARE

## 2025-04-16 VITALS
SYSTOLIC BLOOD PRESSURE: 120 MMHG | WEIGHT: 242.31 LBS | HEART RATE: 102 BPM | BODY MASS INDEX: 32.82 KG/M2 | HEIGHT: 72 IN | OXYGEN SATURATION: 96 % | RESPIRATION RATE: 16 BRPM | DIASTOLIC BLOOD PRESSURE: 80 MMHG

## 2025-04-16 DIAGNOSIS — I48.0 PAROXYSMAL ATRIAL FIBRILLATION: Primary | ICD-10-CM

## 2025-04-16 DIAGNOSIS — I10 HYPERTENSION: ICD-10-CM

## 2025-04-16 DIAGNOSIS — I48.92 ATRIAL FLUTTER, UNSPECIFIED TYPE: ICD-10-CM

## 2025-04-16 DIAGNOSIS — E78.2 MIXED HYPERLIPIDEMIA: ICD-10-CM

## 2025-04-16 PROCEDURE — 4010F ACE/ARB THERAPY RXD/TAKEN: CPT | Mod: CPTII,S$GLB,, | Performed by: INTERNAL MEDICINE

## 2025-04-16 PROCEDURE — 93010 ELECTROCARDIOGRAM REPORT: CPT | Mod: S$GLB,,, | Performed by: INTERNAL MEDICINE

## 2025-04-16 PROCEDURE — 99999 PR PBB SHADOW E&M-EST. PATIENT-LVL III: CPT | Mod: PBBFAC,,, | Performed by: INTERNAL MEDICINE

## 2025-04-16 PROCEDURE — 3044F HG A1C LEVEL LT 7.0%: CPT | Mod: CPTII,S$GLB,, | Performed by: INTERNAL MEDICINE

## 2025-04-16 PROCEDURE — 1159F MED LIST DOCD IN RCRD: CPT | Mod: CPTII,S$GLB,, | Performed by: INTERNAL MEDICINE

## 2025-04-16 PROCEDURE — 99214 OFFICE O/P EST MOD 30 MIN: CPT | Mod: S$GLB,,, | Performed by: INTERNAL MEDICINE

## 2025-04-16 PROCEDURE — 3079F DIAST BP 80-89 MM HG: CPT | Mod: CPTII,S$GLB,, | Performed by: INTERNAL MEDICINE

## 2025-04-16 PROCEDURE — 3074F SYST BP LT 130 MM HG: CPT | Mod: CPTII,S$GLB,, | Performed by: INTERNAL MEDICINE

## 2025-04-16 PROCEDURE — 93005 ELECTROCARDIOGRAM TRACING: CPT | Mod: S$GLB,,, | Performed by: INTERNAL MEDICINE

## 2025-04-16 PROCEDURE — 3061F NEG MICROALBUMINURIA REV: CPT | Mod: CPTII,S$GLB,, | Performed by: INTERNAL MEDICINE

## 2025-04-16 PROCEDURE — 3008F BODY MASS INDEX DOCD: CPT | Mod: CPTII,S$GLB,, | Performed by: INTERNAL MEDICINE

## 2025-04-16 PROCEDURE — 3066F NEPHROPATHY DOC TX: CPT | Mod: CPTII,S$GLB,, | Performed by: INTERNAL MEDICINE

## 2025-04-16 PROCEDURE — 1160F RVW MEDS BY RX/DR IN RCRD: CPT | Mod: CPTII,S$GLB,, | Performed by: INTERNAL MEDICINE

## 2025-04-16 RX ORDER — PROPAFENONE HYDROCHLORIDE 225 MG/1
225 CAPSULE, EXTENDED RELEASE ORAL EVERY 12 HOURS
Qty: 60 CAPSULE | Refills: 11 | Status: SHIPPED | OUTPATIENT
Start: 2025-04-16 | End: 2026-04-16

## 2025-04-18 LAB
OHS QRS DURATION: 104 MS
OHS QTC CALCULATION: 408 MS

## 2025-05-23 NOTE — ANESTHESIA PROCEDURE NOTES
Arterial    Diagnosis: a fib    Patient location during procedure: done in OR    Staffing  Authorizing Provider: Cullen Gonzalez MD  Performing Provider: Cullen Gonzalez MD    Staffing  Performed by: Cullen Gonzalez MD  Authorized by: Cullen Gonzalez MD    Anesthesiologist was present at the time of the procedure.  Arterial  Skin Prep: chlorhexidine gluconate  Local Infiltration: none  Orientation: left  Location: radial    Catheter Size: 20 G  Catheter placement by Anatomical landmarks. Heme positive aspiration all ports. Insertion Attempts: 1  Assessment  Dressing: secured with tape and tegaderm              mother

## 2025-07-08 DIAGNOSIS — J30.1 SEASONAL ALLERGIC RHINITIS DUE TO POLLEN: ICD-10-CM

## 2025-07-08 DIAGNOSIS — E11.9 TYPE 2 DIABETES MELLITUS WITHOUT COMPLICATION, WITHOUT LONG-TERM CURRENT USE OF INSULIN: ICD-10-CM

## 2025-07-08 RX ORDER — SEMAGLUTIDE 0.68 MG/ML
0.5 INJECTION, SOLUTION SUBCUTANEOUS
Qty: 3 ML | Refills: 3 | Status: SHIPPED | OUTPATIENT
Start: 2025-07-08

## 2025-07-08 RX ORDER — FLUTICASONE PROPIONATE 50 MCG
1 SPRAY, SUSPENSION (ML) NASAL NIGHTLY
Qty: 16 G | Refills: 4 | Status: SHIPPED | OUTPATIENT
Start: 2025-07-08

## (undated) DEVICE — INTERPULSE SET

## (undated) DEVICE — SOL IRR NACL .9% 3000ML

## (undated) DEVICE — SUT STRATAFIX PDS 1 CTX 18IN

## (undated) DEVICE — SYR DISP LL 5CC

## (undated) DEVICE — PACK EP DRAPE OMC

## (undated) DEVICE — CATH TRICUSPID HALO XP 7FRX110

## (undated) DEVICE — SEE MEDLINE ITEM 157131

## (undated) DEVICE — SYR 10CC LUER LOCK

## (undated) DEVICE — SEE MEDLINE ITEM 152622

## (undated) DEVICE — DRAPE STERI-DRAPE 1000 17X11IN

## (undated) DEVICE — LINE PRESSURE MONITORING 96IN

## (undated) DEVICE — PAD DEFIB CADENCE ADULT R2

## (undated) DEVICE — NDL TUOHY EPIDURAL 20G X 3.5

## (undated) DEVICE — SHEATH HEMOSTASIS 8.5FR

## (undated) DEVICE — SET SMARTABLATE IRR TUBE

## (undated) DEVICE — SLEEVE SCD EXPRESS CALF MEDIUM

## (undated) DEVICE — CHLORAPREP 10.5 ML APPLICATOR

## (undated) DEVICE — CLOSURE SKIN STERI STRIP 1/2X4

## (undated) DEVICE — ELECTRODE REM PLYHSV RETURN 9

## (undated) DEVICE — SUT MONOCRYL 3-0 PS-1

## (undated) DEVICE — SHEATH INTRODUCER 9FR 11CM

## (undated) DEVICE — NDL TRNSSPTL BRK-1 18GA 71CM

## (undated) DEVICE — APPLICATOR CHLORAPREP ORN 26ML

## (undated) DEVICE — SEE MEDLINE ITEM 152530

## (undated) DEVICE — NDL HYPODERMIC BLUNT 18G 1.5IN

## (undated) DEVICE — UNDERGLOVES BIOGEL PI SIZE 8.5

## (undated) DEVICE — DRAPE INCISE IOBAN 2 23X17IN

## (undated) DEVICE — INTRO AGILIS MED CRL 8.5F 71CM

## (undated) DEVICE — PACK LOWER EXTREMITY

## (undated) DEVICE — SEE MEDLINE ITEM 146231

## (undated) DEVICE — R CATH ACUSON ACUNAV 8FR

## (undated) DEVICE — COVER PRB TRNSDUC 7.6X183CM

## (undated) DEVICE — NDL SPINAL 18GX3.5 SPINOCAN

## (undated) DEVICE — GLOVE SURG ULTRA TOUCH 7.5

## (undated) DEVICE — GLOVE SURG ULTRA TOUCH 8.5

## (undated) DEVICE — TOURNIQUET SB QC DP 34X4IN

## (undated) DEVICE — PATCH CARTO REFERENCE

## (undated) DEVICE — TAPE SILK 3IN

## (undated) DEVICE — INTRO 8.5FR 63CM SRO

## (undated) DEVICE — INTRO FAST-CATH SL1 8.5FR 63CM

## (undated) DEVICE — DRAPE STERI INSTRUMENT 1018

## (undated) DEVICE — MANIFOLD 4 PORT

## (undated) DEVICE — SYR GLASS 5CC LUER LOK

## (undated) DEVICE — SET DECANTER MEDICHOICE

## (undated) DEVICE — SOL 9P NACL IRR PIC IL

## (undated) DEVICE — INTRODUCER HEMOSTASIS 7.5F

## (undated) DEVICE — GOWN TOGA SYS PEELWY ZIP 2 XL

## (undated) DEVICE — BLADE SAW SGTL 21.2X85.5X1.2

## (undated) DEVICE — CATH QDOT MICRO BI DIR DF CRV

## (undated) DEVICE — SUT STRATAFIX SPIRAL VIOLET

## (undated) DEVICE — SEE MEDLINE ITEM 107746

## (undated) DEVICE — R CATH TRICSPD HALO XP 7FRX110

## (undated) DEVICE — NDL 27G X 1 1/4

## (undated) DEVICE — PACK BASIC

## (undated) DEVICE — PACK CUSTOM UNIV BASIN SLI

## (undated) DEVICE — NDL TRNSSPTL BRK-1 18GA 98CM

## (undated) DEVICE — SPONGE SUPER KERLIX 6X6.75IN

## (undated) DEVICE — CUBE COLD THERAPY POLAR CARE

## (undated) DEVICE — SEE MEDLINE ITEM 157166

## (undated) DEVICE — DRESSING DERMACEA SPNG 10S

## (undated) DEVICE — SUT FIBERWIRE 2 38 IN TAPER

## (undated) DEVICE — CATH PENTARY F 2-6-2MM 115CM

## (undated) DEVICE — PAD RADI FEMORAL

## (undated) DEVICE — TOWEL OR DISP STRL BLUE 4/PK

## (undated) DEVICE — DRAPE PLASTIC U 60X72

## (undated) DEVICE — BANDAGE ESMARK 6X12

## (undated) DEVICE — SYS LABEL CORRECT MED

## (undated) DEVICE — PAD CAST SPECIALIST STRL 6

## (undated) DEVICE — R CATH BIDIRECTIONL DF CRV 7FR

## (undated) DEVICE — ADHESIVE MASTISOL VIAL 48/BX

## (undated) DEVICE — CATH DS NAV THERMOCOUPLE 7FR

## (undated) DEVICE — LINER SUCTION 3000CC

## (undated) DEVICE — SYR 3CC LUER LOC

## (undated) DEVICE — SYR 50CC LL

## (undated) DEVICE — TUBE SUCTION YANKAUER HI CAP

## (undated) DEVICE — TUBING MINIBORE EXTENSION

## (undated) DEVICE — ALCOHOL 70% ISOP RUBBING 4OZ

## (undated) DEVICE — CATH THERMOCOOL SMTCH SF D F

## (undated) DEVICE — KIT PROBE COVER WITH GEL

## (undated) DEVICE — BLADE SURG CARBON STEEL #10

## (undated) DEVICE — TOGA FLYTE PEEL AWAY XLARGE

## (undated) DEVICE — STRAP OR TABLE 5IN X 72IN